# Patient Record
Sex: FEMALE | Race: WHITE | NOT HISPANIC OR LATINO | Employment: FULL TIME | URBAN - NONMETROPOLITAN AREA
[De-identification: names, ages, dates, MRNs, and addresses within clinical notes are randomized per-mention and may not be internally consistent; named-entity substitution may affect disease eponyms.]

---

## 2017-01-08 DIAGNOSIS — E53.8 VITAMIN B12 DEFICIENCY: ICD-10-CM

## 2017-01-09 RX ORDER — CYANOCOBALAMIN 1000 UG/ML
INJECTION, SOLUTION INTRAMUSCULAR; SUBCUTANEOUS
Qty: 1 ML | Refills: 6 | Status: SHIPPED | OUTPATIENT
Start: 2017-01-09 | End: 2017-06-29 | Stop reason: SDUPTHER

## 2017-01-12 RX ORDER — FERROUS FUMARATE 324(106)MG
TABLET ORAL
Qty: 60 TABLET | Refills: 1 | Status: SHIPPED | OUTPATIENT
Start: 2017-01-12 | End: 2017-02-10

## 2017-02-03 ENCOUNTER — LAB (OUTPATIENT)
Dept: LAB | Facility: HOSPITAL | Age: 47
End: 2017-02-03
Attending: PSYCHIATRY & NEUROLOGY

## 2017-02-03 ENCOUNTER — OFFICE VISIT (OUTPATIENT)
Dept: NEUROLOGY | Facility: CLINIC | Age: 47
End: 2017-02-03

## 2017-02-03 VITALS
SYSTOLIC BLOOD PRESSURE: 124 MMHG | OXYGEN SATURATION: 98 % | WEIGHT: 225.2 LBS | BODY MASS INDEX: 37.52 KG/M2 | DIASTOLIC BLOOD PRESSURE: 76 MMHG | HEIGHT: 65 IN | HEART RATE: 74 BPM

## 2017-02-03 DIAGNOSIS — D50.9 IRON DEFICIENCY ANEMIA, UNSPECIFIED IRON DEFICIENCY ANEMIA TYPE: ICD-10-CM

## 2017-02-03 DIAGNOSIS — G25.81 RESTLESS LEGS SYNDROME (RLS): ICD-10-CM

## 2017-02-03 DIAGNOSIS — G47.61 PERIODIC LIMB MOVEMENT DISORDER (PLMD): ICD-10-CM

## 2017-02-03 DIAGNOSIS — D50.9 IRON DEFICIENCY ANEMIA, UNSPECIFIED IRON DEFICIENCY ANEMIA TYPE: Primary | ICD-10-CM

## 2017-02-03 LAB — FERRITIN SERPL-MCNC: 39.1 NG/ML (ref 6.24–137)

## 2017-02-03 PROCEDURE — 36415 COLL VENOUS BLD VENIPUNCTURE: CPT

## 2017-02-03 PROCEDURE — 82728 ASSAY OF FERRITIN: CPT | Performed by: PSYCHIATRY & NEUROLOGY

## 2017-02-03 PROCEDURE — 99243 OFF/OP CNSLTJ NEW/EST LOW 30: CPT | Performed by: PSYCHIATRY & NEUROLOGY

## 2017-02-03 RX ORDER — NORETHINDRONE ACETATE AND ETHINYL ESTRADIOL AND FERROUS FUMARATE 1MG-20(24)
KIT ORAL
Refills: 0 | COMMUNITY
Start: 2017-01-29 | End: 2017-02-10

## 2017-02-04 NOTE — PROGRESS NOTES
NEUROLOGY CONSULTATION   History of Present Illness     Date: 2/3/2017    Patient Identification  Mary Lunsford is a 46 y.o. female.    Patient information was obtained from patient.  History/Exam limitations: none.  Patient presented to the [facility] by private vehicle.  Referring Provider: Debbie Rodriguez A* APRN, and Floridalma Jimenez M.D.      Chief Complaint   Restless Legs Syndrome (New pt here for sleep consult, pt c/o having RLS badly)      Mary is a delightful 46 y.o. presented to [facility] for consultation of Sleep Disorders.  Patient reported that her usually go to bed at 10PM. Mary reports  snoring  and Restless sleeper difficulty with concentration trouble with memory and excessive daytime sleepiness at night. her is also complaining of morning headache, memory problem and daytime fatigue associate with her apnea.      Mary is also complaining of creepy oliverio sensation in legs, urge to move legs, the urge is worse in the evening or at night and symptom improve with movement and activities which has been interfering with sleep at night.    Patient is also complaining of excessive daytime sleepiness.  her daytime sleepiness causing falling asleep while reading, dozing off while watching TV, falling asleep in public places such as theater or meeting, unable to fight the urge to sleep after lunch, dozing off when somebody is driving and urge to take naps    EPWORTH SLEEPINESS SCALE =10    PMH:   Past Medical History   Diagnosis Date   • Anemia    • Migraine    • Obesity    • Ovarian cyst    • Restless leg syndrome    • Vitamin B12 deficiency        Family Hisotry:   Family History   Problem Relation Age of Onset   • Cancer Father    • Cancer Other    • Other Other        Social Hisotry:   Social History     Social History   • Marital status:      Spouse name: N/A   • Number of children: N/A   • Years of education: N/A     Occupational History  "  • Not on file.     Social History Main Topics   • Smoking status: Former Smoker   • Smokeless tobacco: Not on file   • Alcohol use Yes      Comment: DAILY   • Drug use: No   • Sexual activity: Not on file     Other Topics Concern   • Not on file     Social History Narrative       Medications:   Current Outpatient Prescriptions   Medication Sig Dispense Refill   • MINASTRIN 24 FE 1-20 MG-MCG(24) chewable tablet Take as directed  0   • pramipexole (MIRAPEX) 0.5 MG tablet Take 1.5 tablets by mouth Every Night. 0.5mg QHS and 0.25mg PRN with breakthrough symptoms 45 tablet 5   • Syringe 25G X 1\" 3 ML misc 1 Units every 14 (fourteen) days. To inject B12 2 each 6   • carbidopa-levodopa CR (SINEMET CR)  MG per CR tablet Take 1 tablet by mouth 3 (three) times a day. 90 tablet 11   • cyanocobalamin 1000 MCG/ML injection inject 1 milliliter every 2 weeks as directed 1 mL 6   • FERROCITE 324 MG tablet take 1 tablet by mouth twice a day 60 tablet 1   • ibuprofen (ADVIL,MOTRIN) 200 MG tablet Take  by mouth.       No current facility-administered medications for this visit.        Allergy:   Allergies   Allergen Reactions   • Sulfa Antibiotics        Review of Systems:The following portions of the patient's history were reviewed and updated as appropriate: allergies, current medications, past family history, past medical history, past social history, past surgical history and problem list.  Review of Systems   Constitutional: Negative for chills and fever.   HENT: Negative for congestion, ear pain, hearing loss, rhinorrhea and sore throat.    Eyes: Negative for pain, discharge and redness.   Respiratory: Negative for cough, shortness of breath, wheezing and stridor.    Cardiovascular: Negative for chest pain, palpitations and leg swelling.   Gastrointestinal: Negative for abdominal pain, constipation, nausea and vomiting.   Endocrine: Negative for cold intolerance, heat intolerance and polyphagia.   Genitourinary: Negative " "for dysuria, flank pain, frequency and urgency.   Musculoskeletal: Negative for joint swelling, myalgias, neck pain and neck stiffness.   Skin: Negative for pallor, rash and wound.   Allergic/Immunologic: Negative for environmental allergies.   Neurological: Negative for dizziness, tremors, seizures, syncope, facial asymmetry, speech difficulty, weakness, light-headedness, numbness and headaches.   Hematological: Negative for adenopathy.   Psychiatric/Behavioral: Positive for sleep disturbance. Negative for confusion and hallucinations. The patient is not nervous/anxious.          Physical Exam     Visit Vitals   • /76   • Pulse 74   • Ht 64.5\" (163.8 cm)   • Wt 225 lb 3.2 oz (102 kg)   • SpO2 98%   • BMI 38.06 kg/m2     GENERAL: Patient is pleasant, cooperative, appears to be stated age.  Body habitus is endomorphic.  SKIN AND EXTREMITIES:  No skin rashes or lesions are noted.  No cyanosis, clubbing or edema of the extremities.    HEAD:  Head is normocephalic and atraumatic.    NECK: Neck are non-tender without thyromegaly or adenopathy.  Carotic upstrokes are 1+/4.  No cranial or cervical bruits.  The neck is supple with a full range of motion.   ENT: palate elevate symmetrically, no evidence of high arch palate, tongue midline erythema in posterior pharynx, MellamPatti Classification Class III   HEART:  Regular rate and rhythm with normal S1 and S2 without rub or gallop  LUNGS:  Clear to auscultation without wheezes or crackle   ABDOMEN:  Soft and non-tender, positive bowel sound without hepatosplenomegaly  BACK:  Back is straight without midline defect.    MENTAL:  Higher cortical function/mental status:  The patient is alert.  She is oriented x3 to time, place and person.  Recent and the remote memory appear normal.  The patient has a good fund of knowledge.  There is no visual or auditory hallucination or suicidal or homicidal ideation.  SPEECH:There is no gross evidence of aphasia, dysarthria or " agnosia.      CRANIAL NERVES:  Pupils are 4mm, equal round reactive to light, reacting briskly to 2mm without afferent pupillary defect.  Visual fields are intact to confrontation testing.  Fundoscopic examination reveals sharp disk margins with normal vasculature.  No papilledema, hemorrhages or exudates.  Extraocular movements are full and smooth with normal pursuits and saccades.  No nystagmus noted.  The face is symmetric. palate elevate symmetrically, Tongue midline, positive gag reflex. The remainder of the cranial nerves are intact and symmetrical.    MOTOR: Strength is 5/5 throughout with normal tone and bulk with the following exceptions, 4/5 intrinsic muscles of the hands and feet.  No involuntary movements noted.    REFLEX: are 2/4 and symmetrical in the upper extremities, 2/4 and symmetrical at the knees and 1/4 and symmetrical at the Achilles tendon.  Plantar responses were down-going bilaterally.    SENSATION:  Intact to pinprick, light touch, vibration and proprioception.  Coordination:  The patient normally performs finger-nose-finger, heel-to-knee-to-shin and rapid alternating movements in symmetrical fashion.    STATION AND GAIT:  The patient walks with a narrow-based gait.  Patient is able to heel-toe and tandem walk forward and backwards without difficulty.  Romberg and monopedal  Romberg are negative.             Records Reviewed: Old medical records.    Mary was seen today for restless legs syndrome.    Diagnoses and all orders for this visit:    Iron deficiency anemia, unspecified iron deficiency anemia type  -     Ferritin; Future    Periodic limb movement disorder (PLMD)    Restless legs syndrome (RLS)        Treatments:  1.  We have counseled patient extensively the regular sleep wake schedule  2.  Abstaining from alcohol and smoking  3.  Avoid sleep deprivation  4.  We have discussed how iron deficiency can give rise to restless leg syndrome  5.  We have also discussed about various  treatment option including dopamine agonist and Horizant  6.  We have also review all of her medication that can worsen RLS  7.  We have discussed various medical condition that can give rise to secondary restless leg syndrome  8.  We have counseled patient as follow  I have counseled patient extensively on the pathophysiology of Restless Legs Syndrome(RLS).  I have explained to the patient how D3 receptor dysfunction can give rise to RLS.  We have also discussed how iron deficiency can contribute to RLS.   Nevertheless, the first line of defense against restless legs syndrome is to avoid substances or foods that may be causing or worsening the problem such as alcohol, caffeine , and nicotine. In addition, we have reviewed all medications could exacerbate the problem and checking Ferritin level today.      I have also reviewed with my patient any underlying medical conditions that could give rise to secondary RLS, such as anemia , diabetes , nutritional deficiencies, kidney disease, thyroid  disease, varicose veins, or Parkinson's disease.     We have discussed FDA approved pharmacologic intervention such as dopamine agonists.  These are most often the first line treatment used to treat RLS including Mirapex (pramipexole), Neupro patch (rotigotine), and Requip (ropinirole). I have also counseled patient on side effects of Dopamine agonists include daytime sleepiness, nausea, hypotension and lightheadedness.    We have also discussed the use of non-dopaminergic medication such as Horizant (gabapentin enacarbil), as an adjunct to relieve the symptoms of RLS especially painful RLS symptoms.        This document is signed by Alex Jaramillo MD, FAAN, FAASM February 3, 2017 9:59 PM

## 2017-02-06 DIAGNOSIS — G47.33 OSA (OBSTRUCTIVE SLEEP APNEA): Primary | ICD-10-CM

## 2017-02-10 ENCOUNTER — TELEPHONE (OUTPATIENT)
Dept: NEUROLOGY | Facility: CLINIC | Age: 47
End: 2017-02-10

## 2017-02-10 DIAGNOSIS — E61.1 IRON DEFICIENCY: Primary | ICD-10-CM

## 2017-02-10 RX ORDER — FERROUS SULFATE 325(65) MG
325 TABLET ORAL DAILY
Qty: 30 TABLET | Refills: 3 | Status: SHIPPED | OUTPATIENT
Start: 2017-02-10 | End: 2017-03-14

## 2017-02-17 ENCOUNTER — OFFICE VISIT (OUTPATIENT)
Dept: NEUROLOGY | Facility: CLINIC | Age: 47
End: 2017-02-17

## 2017-02-17 ENCOUNTER — APPOINTMENT (OUTPATIENT)
Dept: LAB | Facility: HOSPITAL | Age: 47
End: 2017-02-17

## 2017-02-17 VITALS
OXYGEN SATURATION: 99 % | DIASTOLIC BLOOD PRESSURE: 74 MMHG | HEIGHT: 64 IN | WEIGHT: 225 LBS | HEART RATE: 88 BPM | BODY MASS INDEX: 38.41 KG/M2 | SYSTOLIC BLOOD PRESSURE: 126 MMHG | RESPIRATION RATE: 18 BRPM

## 2017-02-17 DIAGNOSIS — M79.10 MYALGIA: ICD-10-CM

## 2017-02-17 DIAGNOSIS — M25.50 ARTHRALGIA, UNSPECIFIED JOINT: ICD-10-CM

## 2017-02-17 DIAGNOSIS — G25.81 RESTLESS LEGS SYNDROME (RLS): Primary | ICD-10-CM

## 2017-02-17 LAB
CK MB SERPL-CCNC: 0.37 NG/ML (ref 0–5)
CK SERPL-CCNC: 91 U/L (ref 26–174)
CRP SERPL-MCNC: 13.2 MG/DL (ref 0–10)
ERYTHROCYTE [SEDIMENTATION RATE] IN BLOOD: 12 MM/HR (ref 0–20)
RHEUMATOID FACT SERPL-ACNC: NEGATIVE [IU]/ML

## 2017-02-17 PROCEDURE — 36415 COLL VENOUS BLD VENIPUNCTURE: CPT | Performed by: NURSE PRACTITIONER

## 2017-02-17 PROCEDURE — 82550 ASSAY OF CK (CPK): CPT | Performed by: NURSE PRACTITIONER

## 2017-02-17 PROCEDURE — 86431 RHEUMATOID FACTOR QUANT: CPT | Performed by: NURSE PRACTITIONER

## 2017-02-17 PROCEDURE — 86140 C-REACTIVE PROTEIN: CPT | Performed by: NURSE PRACTITIONER

## 2017-02-17 PROCEDURE — 99213 OFFICE O/P EST LOW 20 MIN: CPT | Performed by: NURSE PRACTITIONER

## 2017-02-17 PROCEDURE — 85652 RBC SED RATE AUTOMATED: CPT | Performed by: NURSE PRACTITIONER

## 2017-02-17 PROCEDURE — 82553 CREATINE MB FRACTION: CPT | Performed by: NURSE PRACTITIONER

## 2017-02-17 PROCEDURE — 86038 ANTINUCLEAR ANTIBODIES: CPT | Performed by: NURSE PRACTITIONER

## 2017-02-17 NOTE — PROGRESS NOTES
Subjective:     Patient ID: Mary Lunsford is a 46 y.o. female.    History of Present Illness   Here for 3 month follow up on severe RLS for 10+ years. Her mother has RLS and grandmother also had severe RLS. She is currently taking Sinemet TID and Mirapex 0.5mg 1 at night and 1/2 tab PRN breakthrough symptoms throughout the day. She feels like her symptoms are well managed overall. She did see Dr. Alex Jaramillo for concerns of sleep apnea and is scheduled to have sleep study in April 2017 with follow up afterwards. He also rechecked her Ferritin level and it was 39.10 (previously 13 9/2016) and recommended she increased iron to 300mg TID with vitamin C to enhance absorption and she is doing this. She also tells me she has had the muscle and joint aches and pains again for the past 2 months or so since switching positions at her current job and she feels achy in her shoulders, arms, elbows, hands, thumbs and between her shoulder blades and after work she feels very stiff, tight and achy. She also types a lot, sometimes 1.5 hours in the morning and is wondering if this could be the cause. She has had the myalgias and arthralgias on and off for several years and in 2015 had negative autoimmune/rheumatoid workup.    The following portions of the patient's history were reviewed and updated as appropriate: allergies, current medications, past family history, past medical history, past social history, past surgical history and problem list.    Review of Systems   Constitutional: Negative for chills, fatigue, fever and unexpected weight change.   HENT: Negative for ear pain, hearing loss, nosebleeds, rhinorrhea and sore throat.    Eyes: Negative for photophobia, pain, discharge, itching and visual disturbance.   Respiratory: Negative for cough, chest tightness, shortness of breath and wheezing.    Cardiovascular: Negative for chest pain, palpitations and leg swelling.   Gastrointestinal: Negative for abdominal pain, blood  in stool, constipation, diarrhea, nausea and vomiting.   Genitourinary: Negative for dysuria, frequency, hematuria and urgency.   Musculoskeletal: Negative for arthralgias, back pain, gait problem, joint swelling, myalgias, neck pain and neck stiffness.   Skin: Negative for rash and wound.   Allergic/Immunologic: Negative for environmental allergies and food allergies.   Neurological: Negative for dizziness, tremors, seizures, syncope, speech difficulty, weakness, light-headedness, numbness and headaches.   Hematological: Negative for adenopathy. Does not bruise/bleed easily.   Psychiatric/Behavioral: Negative for agitation, confusion, decreased concentration, hallucinations, sleep disturbance and suicidal ideas. The patient is not nervous/anxious.         Objective:    Neurologic Exam     Mental Status   Oriented to person, place, and time.   Registration: recalls 3 of 3 objects. Recall at 5 minutes: recalls 3 of 3 objects. Follows 3 step commands.   Attention: normal. Concentration: normal.   Speech: speech is normal   Level of consciousness: alert  Knowledge: good and consistent with education. Able to perform simple calculations.   Able to name object. Able to read. Able to repeat. Able to write. Normal comprehension.     Cranial Nerves   Cranial nerves II through XII intact.     Motor Exam   Muscle bulk: normal  Overall muscle tone: normal    Strength   Strength 5/5 throughout.        Negative tinel's and phalen's bilateral wrists/elbows.     Sensory Exam   Light touch normal.   Vibration normal.   Proprioception normal.   Pinprick normal.     Gait, Coordination, and Reflexes     Gait  Gait: normal    Coordination   Romberg: negative  Finger to nose coordination: normal  Heel to shin coordination: normal    Tremor   Resting tremor: absent  Intention tremor: absent  Action tremor: absent    Reflexes   Right brachioradialis: 2+  Left brachioradialis: 2+  Right biceps: 2+  Left biceps: 2+  Right triceps: 2+  Left  triceps: 2+  Right patellar: 2+  Left patellar: 2+  Right achilles: 2+  Left achilles: 2+  Right : 2+  Left : 2+  Right plantar: normal  Left plantar: normal  Right Martínez: absent  Left Martínez: absent  Right ankle clonus: absent  Left ankle clonus: absent      Physical Exam   Constitutional: She is oriented to person, place, and time.   Neurological: She is oriented to person, place, and time. She has normal strength. She has a normal Finger-Nose-Finger Test, a normal Heel to Alvarado Test and a normal Romberg Test. Gait normal.   Reflex Scores:       Tricep reflexes are 2+ on the right side and 2+ on the left side.       Bicep reflexes are 2+ on the right side and 2+ on the left side.       Brachioradialis reflexes are 2+ on the right side and 2+ on the left side.       Patellar reflexes are 2+ on the right side and 2+ on the left side.       Achilles reflexes are 2+ on the right side and 2+ on the left side.  Psychiatric: Her speech is normal.       Assessment/Plan:     Mary was seen today for restless legs syndrome.    Diagnoses and all orders for this visit:    Restless legs syndrome (RLS)    Arthralgia, unspecified joint  -     CK Total & CKMB  -     Sedimentation Rate  -     Rheumatoid Factor  -     C-reactive Protein  -     Antinuclear Antibody With Reflex [place]    Myalgia  -     CK Total & CKMB  -     Sedimentation Rate  -     Rheumatoid Factor  -     C-reactive Protein  -     Antinuclear Antibody With Reflex [place]       Labs to r/o other etiology of symptoms. Continue current medications for RLS and may consider switching to Neupro patch in future. She will complete sleep study and follow up with Dr. Jaramillo in April and f/u in our office in May 2017. Reviewed medications, potential side effects and signs and symptoms to report. Discussed risk versus benefits of treatment plan with patient and/or family-including medications, labs and radiology that may be ordered. Addressed questions and concerns  during visit. Patient and/or family verbalized understanding and agree with plan.

## 2017-02-20 ENCOUNTER — TELEPHONE (OUTPATIENT)
Dept: NEUROLOGY | Facility: CLINIC | Age: 47
End: 2017-02-20

## 2017-02-20 LAB
ANA SER QL: NEGATIVE
Lab: NORMAL

## 2017-02-20 NOTE — TELEPHONE ENCOUNTER
Please notify patient her blood work with us was normal except CRP which was slightly elevated and is a marker of general inflammation, but her rheumatoid factor and DESIRE results were normal and muscle enzymes were normal. We would not change anything at this time. Will f/u as scheduled. thanks

## 2017-02-20 NOTE — TELEPHONE ENCOUNTER
CALLED PATIENT, SPOKE WITH THE PATIENT, PATIENT IS AWARE OF THE RESULTS AND THE PROVIDER INSTRUCTIONS. PATIENT VERBALIZED UNDERSTANDING.

## 2017-03-14 ENCOUNTER — TELEPHONE (OUTPATIENT)
Dept: INTERNAL MEDICINE | Facility: CLINIC | Age: 47
End: 2017-03-14

## 2017-03-14 ENCOUNTER — HOSPITAL ENCOUNTER (INPATIENT)
Facility: HOSPITAL | Age: 47
LOS: 1 days | Discharge: HOME | End: 2017-03-15
Attending: FAMILY MEDICINE | Admitting: INTERNAL MEDICINE

## 2017-03-14 ENCOUNTER — OFFICE VISIT (OUTPATIENT)
Dept: INTERNAL MEDICINE | Facility: CLINIC | Age: 47
End: 2017-03-14

## 2017-03-14 ENCOUNTER — APPOINTMENT (OUTPATIENT)
Dept: GENERAL RADIOLOGY | Facility: HOSPITAL | Age: 47
End: 2017-03-14

## 2017-03-14 VITALS
OXYGEN SATURATION: 98 % | DIASTOLIC BLOOD PRESSURE: 82 MMHG | SYSTOLIC BLOOD PRESSURE: 130 MMHG | TEMPERATURE: 97.3 F | HEART RATE: 67 BPM | HEIGHT: 64 IN | BODY MASS INDEX: 38.41 KG/M2 | WEIGHT: 225 LBS

## 2017-03-14 DIAGNOSIS — M79.10 PAIN IN THE MUSCLES: ICD-10-CM

## 2017-03-14 DIAGNOSIS — IMO0001 ELEVATED BLOOD PRESSURE: ICD-10-CM

## 2017-03-14 DIAGNOSIS — R07.89 CHEST HEAVINESS: Primary | ICD-10-CM

## 2017-03-14 DIAGNOSIS — E53.8 VITAMIN B12 DEFICIENCY: ICD-10-CM

## 2017-03-14 DIAGNOSIS — I21.4 NSTEMI (NON-ST ELEVATED MYOCARDIAL INFARCTION) (HCC): Primary | ICD-10-CM

## 2017-03-14 DIAGNOSIS — E55.9 VITAMIN D DEFICIENCY: ICD-10-CM

## 2017-03-14 DIAGNOSIS — D50.9 IRON DEFICIENCY ANEMIA, UNSPECIFIED IRON DEFICIENCY ANEMIA TYPE: ICD-10-CM

## 2017-03-14 DIAGNOSIS — E07.9 THYROID DISORDER: ICD-10-CM

## 2017-03-14 DIAGNOSIS — G25.81 RESTLESS LEGS SYNDROME (RLS): ICD-10-CM

## 2017-03-14 LAB
25(OH)D3 SERPL-MCNC: 15.6 NG/ML
ALBUMIN SERPL-MCNC: 4.1 G/DL (ref 3.2–4.8)
ALBUMIN SERPL-MCNC: 4.1 G/DL (ref 3.5–5)
ALBUMIN/GLOB SERPL: 1.4 G/DL (ref 1–2)
ALBUMIN/GLOB SERPL: 1.6 G/DL (ref 1.5–2.5)
ALP SERPL-CCNC: 41 U/L (ref 25–100)
ALP SERPL-CCNC: 45 U/L (ref 38–126)
ALT SERPL W P-5'-P-CCNC: 14 U/L (ref 13–69)
ALT SERPL W P-5'-P-CCNC: 8 U/L (ref 7–40)
ANION GAP SERPL CALCULATED.3IONS-SCNC: 12.5 MMOL/L
ANION GAP SERPL CALCULATED.3IONS-SCNC: 9 MMOL/L (ref 3–11)
ARTICHOKE IGE QN: 123 MG/DL (ref 0–130)
AST SERPL-CCNC: 23 U/L (ref 0–33)
AST SERPL-CCNC: 24 U/L (ref 15–46)
BASOPHILS # BLD AUTO: 0.03 10*3/MM3 (ref 0–0.2)
BASOPHILS # BLD AUTO: 0.04 10*3/MM3 (ref 0–0.2)
BASOPHILS NFR BLD AUTO: 0.4 % (ref 0–2.5)
BASOPHILS NFR BLD AUTO: 0.7 % (ref 0–1)
BILIRUB SERPL-MCNC: 0.4 MG/DL (ref 0.3–1.2)
BILIRUB SERPL-MCNC: 0.5 MG/DL (ref 0.2–1.3)
BUN BLD-MCNC: 15 MG/DL (ref 9–23)
BUN BLD-MCNC: 16 MG/DL (ref 7–20)
BUN/CREAT SERPL: 14.5 (ref 7.1–23.5)
BUN/CREAT SERPL: 15 (ref 7–25)
CALCIUM SPEC-SCNC: 9.1 MG/DL (ref 8.4–10.2)
CALCIUM SPEC-SCNC: 9.4 MG/DL (ref 8.7–10.4)
CHLORIDE SERPL-SCNC: 105 MMOL/L (ref 99–109)
CHLORIDE SERPL-SCNC: 107 MMOL/L (ref 98–107)
CHOLEST SERPL-MCNC: 182 MG/DL (ref 0–200)
CK MB SERPL-CCNC: 1.12 NG/ML (ref 0–5)
CK SERPL-CCNC: 80 U/L (ref 26–174)
CO2 SERPL-SCNC: 24 MMOL/L (ref 20–31)
CO2 SERPL-SCNC: 26 MMOL/L (ref 26–30)
CREAT BLD-MCNC: 1 MG/DL (ref 0.6–1.3)
CREAT BLD-MCNC: 1.1 MG/DL (ref 0.6–1.3)
DEPRECATED RDW RBC AUTO: 46.7 FL (ref 37–54)
DEPRECATED RDW RBC AUTO: 48.6 FL (ref 37–54)
EOSINOPHIL # BLD AUTO: 0.15 10*3/MM3 (ref 0.1–0.3)
EOSINOPHIL # BLD AUTO: 0.18 10*3/MM3 (ref 0–0.7)
EOSINOPHIL NFR BLD AUTO: 2.5 % (ref 0–3)
EOSINOPHIL NFR BLD AUTO: 2.7 % (ref 0–7)
ERYTHROCYTE [DISTWIDTH] IN BLOOD BY AUTOMATED COUNT: 12.6 % (ref 11.5–14.5)
ERYTHROCYTE [DISTWIDTH] IN BLOOD BY AUTOMATED COUNT: 13.1 % (ref 11.3–14.5)
FOLATE SERPL-MCNC: 12.66 NG/ML (ref 3.2–20)
GFR SERPL CREATININE-BSD FRML MDRD: 53 ML/MIN/1.73
GFR SERPL CREATININE-BSD FRML MDRD: 59 ML/MIN/1.73
GLOBULIN UR ELPH-MCNC: 2.5 GM/DL
GLOBULIN UR ELPH-MCNC: 3 GM/DL
GLUCOSE BLD-MCNC: 84 MG/DL (ref 70–100)
GLUCOSE BLD-MCNC: 94 MG/DL (ref 74–98)
HCT VFR BLD AUTO: 42 % (ref 34.5–44)
HCT VFR BLD AUTO: 43.9 % (ref 37–47)
HDLC SERPL-MCNC: 55 MG/DL (ref 40–60)
HGB BLD-MCNC: 13.7 G/DL (ref 11.5–15.5)
HGB BLD-MCNC: 14.5 G/DL (ref 12–16)
HOLD SPECIMEN: NORMAL
HOLD SPECIMEN: NORMAL
IMM GRANULOCYTES # BLD: 0.01 10*3/MM3 (ref 0–0.03)
IMM GRANULOCYTES # BLD: 0.02 10*3/MM3 (ref 0–0.06)
IMM GRANULOCYTES NFR BLD: 0.2 % (ref 0–0.6)
IMM GRANULOCYTES NFR BLD: 0.3 % (ref 0–0.6)
INR PPP: 0.94 (ref 0.9–1.1)
IRON 24H UR-MRATE: 144 MCG/DL (ref 50–175)
IRON SATN MFR SERPL: 40 % (ref 15–50)
LYMPHOCYTES # BLD AUTO: 1.86 10*3/MM3 (ref 0.6–4.8)
LYMPHOCYTES # BLD AUTO: 2.21 10*3/MM3 (ref 0.6–3.4)
LYMPHOCYTES NFR BLD AUTO: 30.9 % (ref 24–44)
LYMPHOCYTES NFR BLD AUTO: 32.8 % (ref 10–50)
MCH RBC QN AUTO: 32.9 PG (ref 27–31)
MCH RBC QN AUTO: 33 PG (ref 27–31)
MCHC RBC AUTO-ENTMCNC: 32.6 G/DL (ref 32–36)
MCHC RBC AUTO-ENTMCNC: 33 G/DL (ref 30–37)
MCV RBC AUTO: 100 FL (ref 81–99)
MCV RBC AUTO: 100.7 FL (ref 80–99)
MONOCYTES # BLD AUTO: 0.36 10*3/MM3 (ref 0–1)
MONOCYTES # BLD AUTO: 0.42 10*3/MM3 (ref 0–0.9)
MONOCYTES NFR BLD AUTO: 6 % (ref 0–12)
MONOCYTES NFR BLD AUTO: 6.2 % (ref 0–12)
NEUTROPHILS # BLD AUTO: 3.6 10*3/MM3 (ref 1.5–8.3)
NEUTROPHILS # BLD AUTO: 3.88 10*3/MM3 (ref 2–6.9)
NEUTROPHILS NFR BLD AUTO: 57.6 % (ref 37–80)
NEUTROPHILS NFR BLD AUTO: 59.7 % (ref 41–71)
NRBC BLD MANUAL-RTO: 0 /100 WBC (ref 0–0)
PLATELET # BLD AUTO: 280 10*3/MM3 (ref 130–400)
PLATELET # BLD AUTO: 286 10*3/MM3 (ref 150–450)
PMV BLD AUTO: 9.6 FL (ref 6–12)
PMV BLD AUTO: 9.8 FL (ref 6–12)
POTASSIUM BLD-SCNC: 4.5 MMOL/L (ref 3.5–5.1)
POTASSIUM BLD-SCNC: 4.5 MMOL/L (ref 3.5–5.5)
PROT SERPL-MCNC: 6.6 G/DL (ref 5.7–8.2)
PROT SERPL-MCNC: 7.1 G/DL (ref 6.3–8.2)
PROTHROMBIN TIME: 10.3 SECONDS (ref 9.3–12.1)
RBC # BLD AUTO: 4.17 10*6/MM3 (ref 3.89–5.14)
RBC # BLD AUTO: 4.39 10*6/MM3 (ref 4.2–5.4)
SODIUM BLD-SCNC: 138 MMOL/L (ref 132–146)
SODIUM BLD-SCNC: 141 MMOL/L (ref 137–145)
T4 FREE SERPL-MCNC: 0.93 NG/DL (ref 0.89–1.76)
TIBC SERPL-MCNC: 364 MCG/DL (ref 250–450)
TRIGL SERPL-MCNC: 114 MG/DL (ref 0–150)
TROPONIN I SERPL-MCNC: 0.36 NG/ML (ref 0–0.05)
TROPONIN I SERPL-MCNC: 0.49 NG/ML (ref 0–0.03)
TROPONIN I SERPL-MCNC: 0.64 NG/ML
TSH SERPL DL<=0.05 MIU/L-ACNC: 4.74 MIU/ML (ref 0.35–5.35)
VIT B12 BLD-MCNC: 521 PG/ML (ref 211–911)
WBC NRBC COR # BLD: 6.02 10*3/MM3 (ref 3.5–10.8)
WBC NRBC COR # BLD: 6.74 10*3/MM3 (ref 4.8–10.8)
WHOLE BLOOD HOLD SPECIMEN: NORMAL
WHOLE BLOOD HOLD SPECIMEN: NORMAL

## 2017-03-14 PROCEDURE — 82607 VITAMIN B-12: CPT | Performed by: FAMILY MEDICINE

## 2017-03-14 PROCEDURE — 84484 ASSAY OF TROPONIN QUANT: CPT | Performed by: FAMILY MEDICINE

## 2017-03-14 PROCEDURE — 80061 LIPID PANEL: CPT | Performed by: FAMILY MEDICINE

## 2017-03-14 PROCEDURE — 99254 IP/OBS CNSLTJ NEW/EST MOD 60: CPT | Performed by: INTERNAL MEDICINE

## 2017-03-14 PROCEDURE — 93000 ELECTROCARDIOGRAM COMPLETE: CPT | Performed by: FAMILY MEDICINE

## 2017-03-14 PROCEDURE — 84439 ASSAY OF FREE THYROXINE: CPT | Performed by: FAMILY MEDICINE

## 2017-03-14 PROCEDURE — 71010 HC CHEST PA OR AP: CPT

## 2017-03-14 PROCEDURE — 83540 ASSAY OF IRON: CPT | Performed by: FAMILY MEDICINE

## 2017-03-14 PROCEDURE — 36415 COLL VENOUS BLD VENIPUNCTURE: CPT | Performed by: FAMILY MEDICINE

## 2017-03-14 PROCEDURE — 85025 COMPLETE CBC W/AUTO DIFF WBC: CPT | Performed by: FAMILY MEDICINE

## 2017-03-14 PROCEDURE — 80053 COMPREHEN METABOLIC PANEL: CPT | Performed by: FAMILY MEDICINE

## 2017-03-14 PROCEDURE — 82746 ASSAY OF FOLIC ACID SERUM: CPT | Performed by: FAMILY MEDICINE

## 2017-03-14 PROCEDURE — 82550 ASSAY OF CK (CPK): CPT | Performed by: FAMILY MEDICINE

## 2017-03-14 PROCEDURE — 85610 PROTHROMBIN TIME: CPT | Performed by: FAMILY MEDICINE

## 2017-03-14 PROCEDURE — 25010000002 ENOXAPARIN PER 10 MG: Performed by: FAMILY MEDICINE

## 2017-03-14 PROCEDURE — 99214 OFFICE O/P EST MOD 30 MIN: CPT | Performed by: FAMILY MEDICINE

## 2017-03-14 PROCEDURE — 93005 ELECTROCARDIOGRAM TRACING: CPT | Performed by: FAMILY MEDICINE

## 2017-03-14 PROCEDURE — 99284 EMERGENCY DEPT VISIT MOD MDM: CPT

## 2017-03-14 PROCEDURE — 82306 VITAMIN D 25 HYDROXY: CPT | Performed by: FAMILY MEDICINE

## 2017-03-14 PROCEDURE — 83550 IRON BINDING TEST: CPT | Performed by: FAMILY MEDICINE

## 2017-03-14 PROCEDURE — 84443 ASSAY THYROID STIM HORMONE: CPT | Performed by: FAMILY MEDICINE

## 2017-03-14 PROCEDURE — 82553 CREATINE MB FRACTION: CPT | Performed by: FAMILY MEDICINE

## 2017-03-14 RX ORDER — NORETHINDRONE ACETATE AND ETHINYL ESTRADIOL AND FERROUS FUMARATE 1MG-20(24)
KIT ORAL
Refills: 0 | Status: ON HOLD | COMMUNITY
Start: 2017-02-26 | End: 2017-03-14

## 2017-03-14 RX ORDER — ASPIRIN 81 MG/1
162 TABLET, CHEWABLE ORAL DAILY
Status: DISCONTINUED | OUTPATIENT
Start: 2017-03-15 | End: 2017-03-15 | Stop reason: HOSPADM

## 2017-03-14 RX ORDER — SODIUM CHLORIDE 0.9 % (FLUSH) 0.9 %
10 SYRINGE (ML) INJECTION AS NEEDED
Status: DISCONTINUED | OUTPATIENT
Start: 2017-03-14 | End: 2017-03-15 | Stop reason: HOSPADM

## 2017-03-14 RX ORDER — ERGOCALCIFEROL 1.25 MG/1
50000 CAPSULE ORAL
Status: DISCONTINUED | OUTPATIENT
Start: 2017-03-15 | End: 2017-03-15 | Stop reason: HOSPADM

## 2017-03-14 RX ORDER — NITROGLYCERIN 0.4 MG/1
0.4 TABLET SUBLINGUAL
Status: DISCONTINUED | OUTPATIENT
Start: 2017-03-14 | End: 2017-03-15

## 2017-03-14 RX ORDER — ERGOCALCIFEROL 1.25 MG/1
50000 CAPSULE ORAL
Status: DISCONTINUED | OUTPATIENT
Start: 2017-03-14 | End: 2017-03-14

## 2017-03-14 RX ORDER — FERROUS SULFATE 325(65) MG
325 TABLET ORAL 2 TIMES DAILY WITH MEALS
COMMUNITY
End: 2017-04-28 | Stop reason: SDUPTHER

## 2017-03-14 RX ORDER — CYANOCOBALAMIN 1000 UG/ML
1000 INJECTION, SOLUTION INTRAMUSCULAR; SUBCUTANEOUS
Status: DISCONTINUED | OUTPATIENT
Start: 2017-03-30 | End: 2017-03-15 | Stop reason: HOSPADM

## 2017-03-14 RX ORDER — PRAMIPEXOLE DIHYDROCHLORIDE 0.25 MG/1
0.75 TABLET ORAL NIGHTLY
Status: DISCONTINUED | OUTPATIENT
Start: 2017-03-14 | End: 2017-03-15 | Stop reason: HOSPADM

## 2017-03-14 RX ORDER — ATORVASTATIN CALCIUM 40 MG/1
40 TABLET, FILM COATED ORAL DAILY
Status: DISCONTINUED | OUTPATIENT
Start: 2017-03-15 | End: 2017-03-15 | Stop reason: HOSPADM

## 2017-03-14 RX ORDER — CARBIDOPA AND LEVODOPA 50; 200 MG/1; MG/1
1 TABLET, EXTENDED RELEASE ORAL 3 TIMES DAILY
Status: DISCONTINUED | OUTPATIENT
Start: 2017-03-14 | End: 2017-03-15 | Stop reason: HOSPADM

## 2017-03-14 RX ORDER — LOSARTAN POTASSIUM 50 MG/1
50 TABLET ORAL
Status: DISCONTINUED | OUTPATIENT
Start: 2017-03-14 | End: 2017-03-15 | Stop reason: SDUPTHER

## 2017-03-14 RX ORDER — SODIUM CHLORIDE 0.9 % (FLUSH) 0.9 %
1-10 SYRINGE (ML) INJECTION AS NEEDED
Status: DISCONTINUED | OUTPATIENT
Start: 2017-03-14 | End: 2017-03-15 | Stop reason: HOSPADM

## 2017-03-14 RX ORDER — ASPIRIN 325 MG
325 TABLET ORAL ONCE
Status: DISCONTINUED | OUTPATIENT
Start: 2017-03-14 | End: 2017-03-15

## 2017-03-14 RX ORDER — NITROGLYCERIN 0.4 MG/1
TABLET SUBLINGUAL
Status: COMPLETED
Start: 2017-03-14 | End: 2017-03-14

## 2017-03-14 RX ADMIN — LOSARTAN POTASSIUM 50 MG: 50 TABLET, FILM COATED ORAL at 22:56

## 2017-03-14 RX ADMIN — NITROGLYCERIN 0.4 MG: 0.4 TABLET SUBLINGUAL at 18:50

## 2017-03-14 RX ADMIN — NITROGLYCERIN 1 INCH: 20 OINTMENT TOPICAL at 22:55

## 2017-03-14 RX ADMIN — ENOXAPARIN SODIUM 100 MG: 100 INJECTION SUBCUTANEOUS at 20:03

## 2017-03-14 NOTE — ED PROVIDER NOTES
"Subjective   HPI Comments: 38-year-old female presents the emergency room with complaints of abnormal labs that occurred today.  Patient states she woke up with all night with bilateral arm pain radiating into her left and right sharp.  She states the pain subsequently radiated more to her left shoulder.  She states that she felt fatigued with her symptoms well.  She denies any nausea or vomiting or diaphoresis patient any chest pain or shortness of breath.  Patient denies similar symptoms in the past.  Patient with her family doctor today who had concern for patient given abnormal EKG findings.  Patient is unable to detail abnormality of her EKG however states that there was a \"slight decrease\" in parts of her EKG.  Patient had labs drawn and was called later today and instructed to come to the emergency room due to abnormal labs.  Patient reports she still is having left shoulder pain.  Patient is not a smoker she denies any recent travel.  She denies any recent surgery in the last 90 days.  She states she is on birth control started 6 months ago to regulate her menstrual cycles.    Patient is a 46 y.o. female presenting with chest pain.   Chest Pain   Chest pain location: Bilateral arm pain.  Pain quality: aching    Pain radiates to:  L shoulder and L arm  Pain severity:  Moderate  Onset quality:  Sudden  Timing:  Intermittent  Progression:  Improving  Chronicity:  New  Context: at rest    Relieved by:  Nothing  Worsened by:  Nothing  Associated symptoms: fatigue    Associated symptoms: no abdominal pain, no cough, no dizziness, no fever, no nausea, no orthopnea, no palpitations, no PND, no shortness of breath, no vomiting and no weakness    Risk factors: birth control    Risk factors: no coronary artery disease, no diabetes mellitus, no high cholesterol, no hypertension, not male, no prior DVT/PE and no smoking        Review of Systems   Constitutional: Positive for fatigue. Negative for activity change and " fever.   HENT: Negative for congestion, sore throat and tinnitus.    Eyes: Negative for visual disturbance.   Respiratory: Negative for apnea, cough, shortness of breath, wheezing and stridor.    Cardiovascular: Positive for chest pain. Negative for palpitations, orthopnea and PND.   Gastrointestinal: Negative for abdominal pain, diarrhea, nausea and vomiting.   Genitourinary: Negative for dysuria and flank pain.   Musculoskeletal: Negative for arthralgias and myalgias.   Skin: Negative for rash.   Neurological: Negative for dizziness, weakness and light-headedness.   Hematological: Negative for adenopathy.   Psychiatric/Behavioral: Negative for agitation.   All other systems reviewed and are negative.      Past Medical History   Diagnosis Date   • Anemia    • Migraine    • Obesity    • Ovarian cyst    • Restless leg syndrome    • Vitamin B12 deficiency        Allergies   Allergen Reactions   • Sulfa Antibiotics        Past Surgical History   Procedure Laterality Date   • Breast surgery     •  section     • Cholecystectomy     • Stomach surgery     • Purvi-en-y procedure     • Cholecystectomy laparoscopic and colonoscopy N/A 2013       Family History   Problem Relation Age of Onset   • Cancer Father    • Heart attack Father    • Cancer Other    • Other Other    • No Known Problems Mother    • Asthma Brother        Social History     Social History   • Marital status:      Spouse name: N/A   • Number of children: N/A   • Years of education: N/A     Social History Main Topics   • Smoking status: Former Smoker     Packs/day: 1.00     Years: 2.00     Quit date: 3/14/1992   • Smokeless tobacco: None   • Alcohol use 7.2 oz/week     12 Cans of beer per week      Comment: weekly   • Drug use: No   • Sexual activity: Yes     Partners: Male     Birth control/ protection: Other      Comment: birth control pill     Other Topics Concern   • None     Social History Narrative           Objective   Physical Exam    Constitutional: She is oriented to person, place, and time. She appears well-nourished. No distress.   HENT:   Head: Normocephalic.   Right Ear: External ear normal.   Left Ear: External ear normal.   Neck: Neck supple. No JVD present. No thyromegaly present.   Cardiovascular: Normal rate, regular rhythm and normal heart sounds.  Exam reveals no gallop and no friction rub.    No murmur heard.  Pulses:       Radial pulses are 2+ on the right side, and 2+ on the left side.        Dorsalis pedis pulses are 2+ on the right side, and 2+ on the left side.        Posterior tibial pulses are 2+ on the right side, and 2+ on the left side.   Pulmonary/Chest: Effort normal and breath sounds normal. No accessory muscle usage. She has no decreased breath sounds. She has no wheezes. She has no rhonchi. She has no rales.   Abdominal: Soft. Bowel sounds are normal. She exhibits no abdominal bruit. There is no tenderness. There is no rigidity, no rebound and no guarding.   Musculoskeletal: Normal range of motion. She exhibits no edema.   Negative Homans sign   Neurological: She is alert and oriented to person, place, and time.   Skin: Skin is warm and dry. She is not diaphoretic. No erythema. No pallor.   Nursing note and vitals reviewed.      Procedures         ED Course  ED Course                Patient noted to have EKG and normal sinus rhythm.  Patient had T-wave inversion in lead aVR and lead V1.  Patient with small Q waves in lead 1.  Patient denies any chest pain however she was not complaining of left sharp pain patient was given sublingual nitroglycerin with relief of pain.  Patient blood pressure has improved currently is 140/96.  Patient's second troponin is 0.488 is trending down.  Patient currently hemodynamically stable stable.  Have given subcutaneous Lovenox.  Has noted the patient received aspirin 4 baby aspirin prior to coming to the emergency room.  Patient case discussed with Dr. walton who feels patient be  admitted to the hospital through the hospitalist.  I discussed case with Dr. Toth is agreeable to admission to hospital  MDM    Final diagnoses:   NSTEMI (non-ST elevated myocardial infarction)   Elevated blood pressure            Kaleb Bardales MD  03/14/17 4210

## 2017-03-14 NOTE — ED NOTES
1949: DR. PEACE CALLED PER DR. YUN, CALL SENT TO [facility] @ THIS TIME.     Paula Harmon  03/14/17 1950

## 2017-03-14 NOTE — PROGRESS NOTES
Subjective   Mary Lunsford is a 46 y.o. female.     History of Present Illness   Achiness in arms and hands for years, comes and goes. 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 Started a new job in January, started having horible pain under shoulder blades that does down arms. Aching pain. Had rouble holding phone due to pain. Was consistent x 10 days or so when she started working. Working on computer more than past job. Had a neurologist appointment and she was checked for a lot of things, essentially normal. Got up last night to go to bathroom, had not had any trouble for a few days. As soon as she laid back down both arms started hurting, worse on left. Still hurting but not as bad. Went to couch and propped herself up, was scared to go back to sleep. Around 6 am the left arm felt like something was tightening in the arm. Did not exert herself this weekend to make muscles hurt.     Continues to take B12 but has noted some monthly.  Since she started the supplement her energy levels do feel somewhat improved.  She has also had a low vitamin D in the past.  Chart states she is supposed be doing IV infusions of iron that she was not aware of that.  Insurance was not covering her oral supplement.  She has a long-standing history of iron deficiency that has uncertain etiology.  Negative for celiac and she does not take chronic PPIs.    The following portions of the patient's history were reviewed and updated as appropriate: allergies, current medications, past family history, past medical history, past social history, past surgical history and problem list.    Review of Systems   Constitutional: Positive for fatigue. Negative for activity change.   Respiratory: Negative for shortness of breath.    Musculoskeletal: Positive for myalgias.   Psychiatric/Behavioral: Positive for sleep disturbance.       Objective   Physical Exam   Constitutional: She is oriented to person, place, and  time. Vital signs are normal. She appears well-developed and well-nourished. She is active. She does not have a sickly appearance. She does not appear ill.   Appears younger than stated age. Well groomed. Obese     HENT:   Head: Normocephalic and atraumatic. Hair is normal.   Right Ear: Hearing normal.   Left Ear: Hearing normal.   Nose: Nose normal.   Eyes: EOM and lids are normal. Pupils are equal, round, and reactive to light.   Neck: Phonation normal. No thyroid mass present.   Cardiovascular: Normal rate, regular rhythm and normal heart sounds.    Pulmonary/Chest: Effort normal and breath sounds normal.   Musculoskeletal:        Right shoulder: She exhibits no tenderness.        Left shoulder: She exhibits no tenderness.   Neurological: She is alert and oriented to person, place, and time. No cranial nerve deficit. Gait normal.   Skin: Skin is warm. She is not diaphoretic. No cyanosis. No pallor. Nails show no clubbing.   Psychiatric: She has a normal mood and affect. Her behavior is normal. Judgment and thought content normal.   Nursing note and vitals reviewed.         ECG 12 Lead  Date/Time: 3/14/2017 9:56 AM  Performed by: JOHAN ODEN  Authorized by: JOHAN ODEN   Comparison: not compared with previous ECG   Previous ECG: no previous ECG available  Rhythm: sinus bradycardia  Rate: bradycardic  BPM: 57  Conduction: conduction normal  ST Segments: ST segments normal  T depression: V1  T flattening: III  QRS axis: normal  Q waves: I and aVL (small)  Clinical impression: abnormal ECG         reviewed labs in chart:  On February 17 she has an AMA that was normal, rheumatoid factor normal, sedimentation rate normal, and slightly elevated CRP which may be due to obesity.  On February 3 she had a ferritin level that was normal.  September 15, 2016 she tested negative for H. pylori and for celiac disease.  August 17, 2016 her vitamin B12 level was a low normal at 229.  February 12, 2016 her thyroid  studies were normal.  Same day her vitamin D was found to be 22.8.  October 14, 2015 her TSH was abnormal at 5.040 and her free T4 was normal at 1.08.  August 12, 2015 her vitamin B12 level was low at 193 and her methylmalonic acid was elevated at 400.    I also reviewed Dr. Jaramillo's note from January.  There is also a telephone note stating that he wants her to be doing IV iron infusions.    Assessment/Plan   Mary was seen today for arm pain.    Diagnoses and all orders for this visit:    Chest heaviness  -     ECG 12 Lead  -     Comprehensive Metabolic Panel; Future  -     CK; Future  -     CK-MB; Future  -     Troponin; Future  -     Treadmill Stress Test; Future    Vitamin B12 deficiency  -     CBC & Differential; Future  -     Vitamin B12 & Folate; Future    Thyroid disorder  -     TSH; Future  -     T4, Free; Future    BMI 38.0-38.9,adult  -     Lipid Panel; Future    Restless legs syndrome (RLS)    Vitamin D deficiency  -     Vitamin D 25 Hydroxy; Future    Iron deficiency anemia, unspecified iron deficiency anemia type  -     Iron Profile; Future    Pain in the muscles  -     CK; Future     discussed EKG and the subtle Q waves noted in the distribution of the lateral heart.  One box of aspirin 81 mg provided to patient and discussed use, can decrease stroke risk and heart attack risk.  She is aware of need for stress test and of the labs being drawn today.  I encouraged her to follow-up with neurology to see how they wish to proceed with IV iron infusions as stated in the chart.  She's never had her cholesterol checked and this was done today.

## 2017-03-14 NOTE — TELEPHONE ENCOUNTER
I called Mary regarding her elevated troponin. She was in office earlier today complaining of some chest heaviness and arm/shoulder pain.  She reports this is still ongoing.  An EKG was done in office that showed Q waves but no ST elevations.  I educated patient on troponin levels.  I explained her that this could be either trending up with  still something going on or it could be trending down if something happened over the weekend (when her pain started).  After her visit she talked to her dad, has had a heart attack, and he had similar arm symptoms.  I had given her some aspirin earlier in the day and I asked her to take 324 mg and to get someone to take her to the ER as she needs at least 1 other troponin to see what her trend is.  She stated understanding and was calling her  when she was hanging up.    We did not get to discuss her other labs.

## 2017-03-15 VITALS
HEIGHT: 65 IN | RESPIRATION RATE: 18 BRPM | TEMPERATURE: 98.7 F | BODY MASS INDEX: 37.07 KG/M2 | DIASTOLIC BLOOD PRESSURE: 67 MMHG | HEART RATE: 60 BPM | OXYGEN SATURATION: 97 % | WEIGHT: 222.5 LBS | SYSTOLIC BLOOD PRESSURE: 103 MMHG

## 2017-03-15 LAB — TROPONIN I SERPL-MCNC: 0.27 NG/ML (ref 0–0.03)

## 2017-03-15 PROCEDURE — 87081 CULTURE SCREEN ONLY: CPT | Performed by: INTERNAL MEDICINE

## 2017-03-15 PROCEDURE — C1894 INTRO/SHEATH, NON-LASER: HCPCS | Performed by: INTERNAL MEDICINE

## 2017-03-15 PROCEDURE — 25010000002 MIDAZOLAM PER 1 MG: Performed by: INTERNAL MEDICINE

## 2017-03-15 PROCEDURE — 25010000002 HEPARIN (PORCINE) PER 1000 UNITS: Performed by: INTERNAL MEDICINE

## 2017-03-15 PROCEDURE — 99232 SBSQ HOSP IP/OBS MODERATE 35: CPT | Performed by: INTERNAL MEDICINE

## 2017-03-15 PROCEDURE — 0 IOPAMIDOL PER 1 ML: Performed by: INTERNAL MEDICINE

## 2017-03-15 PROCEDURE — 4A023N7 MEASUREMENT OF CARDIAC SAMPLING AND PRESSURE, LEFT HEART, PERCUTANEOUS APPROACH: ICD-10-PCS | Performed by: INTERNAL MEDICINE

## 2017-03-15 PROCEDURE — 99222 1ST HOSP IP/OBS MODERATE 55: CPT | Performed by: INTERNAL MEDICINE

## 2017-03-15 PROCEDURE — 84484 ASSAY OF TROPONIN QUANT: CPT | Performed by: INTERNAL MEDICINE

## 2017-03-15 PROCEDURE — B2151ZZ FLUOROSCOPY OF LEFT HEART USING LOW OSMOLAR CONTRAST: ICD-10-PCS | Performed by: INTERNAL MEDICINE

## 2017-03-15 PROCEDURE — 25010000002 FENTANYL CITRATE (PF) 100 MCG/2ML SOLUTION: Performed by: INTERNAL MEDICINE

## 2017-03-15 PROCEDURE — 93458 L HRT ARTERY/VENTRICLE ANGIO: CPT | Performed by: INTERNAL MEDICINE

## 2017-03-15 PROCEDURE — 25010000002 ENOXAPARIN PER 10 MG: Performed by: INTERNAL MEDICINE

## 2017-03-15 PROCEDURE — B2111ZZ FLUOROSCOPY OF MULTIPLE CORONARY ARTERIES USING LOW OSMOLAR CONTRAST: ICD-10-PCS | Performed by: INTERNAL MEDICINE

## 2017-03-15 RX ORDER — ACETAMINOPHEN 325 MG/1
650 TABLET ORAL EVERY 6 HOURS PRN
Status: DISCONTINUED | OUTPATIENT
Start: 2017-03-15 | End: 2017-03-15

## 2017-03-15 RX ORDER — MORPHINE SULFATE 4 MG/ML
1 INJECTION, SOLUTION INTRAMUSCULAR; INTRAVENOUS EVERY 4 HOURS PRN
Status: DISCONTINUED | OUTPATIENT
Start: 2017-03-15 | End: 2017-03-15 | Stop reason: SDUPTHER

## 2017-03-15 RX ORDER — LOSARTAN POTASSIUM 25 MG/1
25 TABLET ORAL DAILY
Status: DISCONTINUED | OUTPATIENT
Start: 2017-03-15 | End: 2017-03-15

## 2017-03-15 RX ORDER — MORPHINE SULFATE 2 MG/ML
1 INJECTION, SOLUTION INTRAMUSCULAR; INTRAVENOUS EVERY 4 HOURS PRN
Status: DISCONTINUED | OUTPATIENT
Start: 2017-03-15 | End: 2017-03-15 | Stop reason: HOSPADM

## 2017-03-15 RX ORDER — FENTANYL CITRATE 50 UG/ML
INJECTION, SOLUTION INTRAMUSCULAR; INTRAVENOUS AS NEEDED
Status: DISCONTINUED | OUTPATIENT
Start: 2017-03-15 | End: 2017-03-15 | Stop reason: HOSPADM

## 2017-03-15 RX ORDER — NALOXONE HCL 0.4 MG/ML
0.4 VIAL (ML) INJECTION
Status: DISCONTINUED | OUTPATIENT
Start: 2017-03-15 | End: 2017-03-15 | Stop reason: SDUPTHER

## 2017-03-15 RX ORDER — FERROUS SULFATE TAB EC 324 MG (65 MG FE EQUIVALENT) 324 (65 FE) MG
324 TABLET DELAYED RESPONSE ORAL
Status: DISCONTINUED | OUTPATIENT
Start: 2017-03-15 | End: 2017-03-15 | Stop reason: HOSPADM

## 2017-03-15 RX ORDER — MIDAZOLAM HYDROCHLORIDE 1 MG/ML
INJECTION INTRAMUSCULAR; INTRAVENOUS AS NEEDED
Status: DISCONTINUED | OUTPATIENT
Start: 2017-03-15 | End: 2017-03-15 | Stop reason: HOSPADM

## 2017-03-15 RX ORDER — LOSARTAN POTASSIUM 50 MG/1
25 TABLET ORAL
Qty: 30 TABLET | Refills: 6 | Status: SHIPPED | OUTPATIENT
Start: 2017-03-15 | End: 2017-03-20

## 2017-03-15 RX ORDER — DEXTROSE AND SODIUM CHLORIDE 5; .9 G/100ML; G/100ML
100 INJECTION, SOLUTION INTRAVENOUS CONTINUOUS
Status: DISCONTINUED | OUTPATIENT
Start: 2017-03-15 | End: 2017-03-15 | Stop reason: HOSPADM

## 2017-03-15 RX ORDER — LIDOCAINE HYDROCHLORIDE 20 MG/ML
INJECTION, SOLUTION INFILTRATION; PERINEURAL AS NEEDED
Status: DISCONTINUED | OUTPATIENT
Start: 2017-03-15 | End: 2017-03-15 | Stop reason: HOSPADM

## 2017-03-15 RX ORDER — NALOXONE HCL 0.4 MG/ML
0.4 VIAL (ML) INJECTION
Status: DISCONTINUED | OUTPATIENT
Start: 2017-03-15 | End: 2017-03-15 | Stop reason: HOSPADM

## 2017-03-15 RX ORDER — SODIUM CHLORIDE 9 MG/ML
100 INJECTION, SOLUTION INTRAVENOUS CONTINUOUS
Status: DISCONTINUED | OUTPATIENT
Start: 2017-03-15 | End: 2017-03-15

## 2017-03-15 RX ORDER — HYDROCODONE BITARTRATE AND ACETAMINOPHEN 7.5; 325 MG/1; MG/1
1 TABLET ORAL EVERY 4 HOURS PRN
Status: DISCONTINUED | OUTPATIENT
Start: 2017-03-15 | End: 2017-03-15

## 2017-03-15 RX ADMIN — ACETAMINOPHEN 650 MG: 325 TABLET, FILM COATED ORAL at 02:40

## 2017-03-15 RX ADMIN — ACETAMINOPHEN 650 MG: 325 TABLET, FILM COATED ORAL at 09:49

## 2017-03-15 RX ADMIN — ENOXAPARIN SODIUM 40 MG: 40 INJECTION SUBCUTANEOUS at 09:49

## 2017-03-15 RX ADMIN — METOPROLOL TARTRATE 25 MG: 25 TABLET ORAL at 09:49

## 2017-03-15 RX ADMIN — ATORVASTATIN CALCIUM 40 MG: 40 TABLET, FILM COATED ORAL at 09:49

## 2017-03-15 RX ADMIN — ASPIRIN 81 MG 162 MG: 81 TABLET ORAL at 09:49

## 2017-03-15 NOTE — PROGRESS NOTES
"Hospitalist Progress Note.    LOS: 1 day    Patient Care Team:  Floridalma Jimenez MD as PCP - General (Family Medicine)    Chief Complaint:    Chief Complaint   Patient presents with   • Abnormal Lab       Subjective   Patient seen and examined this morning.  Events from last night noted.  Patient denies having any fevers chills.  No nausea or vomiting no abdominal pain.  Denies any chest pain shortness of breath cough or sputum production.  There is no significant edema.   Patient also denies having new onset weakness of numbness of either extremity.  She is complaining of headache she states it is since them put a nitroglycerin patch on.  She has already requested and days off.  She did not want to take any medicine but willing to take Tylenol at this point.      Review of Systems:    The pertinent  ROS was done and it is noted above, rest  was negative.    Objective     Vital Signs  Visit Vitals   • /69   • Pulse 68   • Temp 98.7 °F (37.1 °C) (Oral)   • Resp 19   • Ht 65\" (165.1 cm)   • Wt 222 lb 8 oz (101 kg)   • LMP 02/28/2017   • SpO2 95%   • Breastfeeding No   • BMI 37.03 kg/m2         I/O this shift:  In: 380 [P.O.:380]  Out: 150 [Urine:150]    Intake/Output Summary (Last 24 hours) at 03/15/17 1351  Last data filed at 03/15/17 1200   Gross per 24 hour   Intake   1100 ml   Output    700 ml   Net    400 ml       Physical Exam:    General Appearance: alert, oriented x 3, no acute distress,   HEENT: pupils round and reactive to light, oral mucosa dry, extra occular movements intact.  Neck: supple, no JVD, trachea midline  Lungs: Clear to Auscultation, unlabored breathing effort  Heart: RRR, normal S1 and S2, no S3, no rub  Abdomen: soft, non-tender, no palpable bladder, present bowel sounds to auscultation  Extremities: no edema, cyanosis or clubbing.   Neuro: normal speech and mental status, grossly non focal.     Results Review:      Results from last 7 days  Lab Units 03/14/17  1845 03/14/17  1054 "   SODIUM mmol/L 141 138   POTASSIUM mmol/L 4.5 4.5   CHLORIDE mmol/L 107 105   TOTAL CO2 mmol/L 26.0 24.0   BUN mg/dL 16 15   CREATININE mg/dL 1.10 1.00   CALCIUM mg/dL 9.1 9.4   BILIRUBIN mg/dL 0.5 0.4   ALK PHOS U/L 45 41   ALT (SGPT) U/L 14 8   AST (SGOT) U/L 24 23   GLUCOSE mg/dL 94 84       Estimated Creatinine Clearance: 73.7 mL/min (by C-G formula based on Cr of 1.1).                  Results from last 7 days  Lab Units 03/14/17  1845 03/14/17  1054   WBC 10*3/mm3 6.74 6.02   HEMOGLOBIN g/dL 14.5 13.7   PLATELETS 10*3/mm3 280 286         Results from last 7 days  Lab Units 03/14/17  1845   INR  0.94         Imaging Results (last 24 hours)     Procedure Component Value Units Date/Time    XR Chest 1 View [[id number]] Collected:  03/15/17 0915     Updated:  03/15/17 1009    Narrative:       PROCEDURE: XR CHEST 1 VW-     HISTORY:Chest pain protocol.         COMPARISON: 10/09/2011     FINDINGS: No acute pulmonary opacity is present.    There is no evidence  of effusion or pneumothorax.        Mediastinum is unremarkable.          Heart size is stable, normal.            Impression:       No acute abnormality of the chest and no significant  interval change from the prior exam.     This report was finalized on 3/15/2017 10:07 AM by Caleb Clemens MD.          aspirin 162 mg Oral Daily   atorvastatin 40 mg Oral Daily   carbidopa-levodopa CR 1 tablet Oral TID   [START ON 3/30/2017] cyanocobalamin 1,000 mcg Subcutaneous Q30 Days   ferrous sulfate 324 mg Oral TID With Meals   pramipexole 0.75 mg Oral Nightly   vitamin D 50,000 Units Oral Q7 Days       dextrose 5 % and sodium chloride 0.9 % 100 mL/hr       Medication Review:   Current Facility-Administered Medications   Medication Dose Route Frequency Provider Last Rate Last Dose   • aspirin chewable tablet 162 mg  162 mg Oral Daily Brandon Hadley MD   162 mg at 03/15/17 0949   • atorvastatin (LIPITOR) tablet 40 mg  40 mg Oral Daily Brandon Hadley MD   40 mg at  03/15/17 0949   • carbidopa-levodopa CR (SINEMET CR)  MG per CR tablet 1 tablet  1 tablet Oral TID Brandon Hadley MD   1 tablet at 03/14/17 2334   • [START ON 3/30/2017] cyanocobalamin injection 1,000 mcg  1,000 mcg Subcutaneous Q30 Days Brandon Hadley MD       • dextrose 5 % and sodium chloride 0.9 % infusion  100 mL/hr Intravenous Continuous Mukul Cowart MD       • ferrous sulfate EC tablet 324 mg  324 mg Oral TID With Meals Moustapha Peñaloza MD   324 mg at 03/15/17 1237   • morphine injection 1 mg  1 mg Intravenous Q4H PRN Moustapha Peñaloza MD        And   • naloxone (NARCAN) injection 0.4 mg  0.4 mg Intravenous Q5 Min PRN Moustapha Peñaloza MD       • pramipexole (MIRAPEX) tablet 0.75 mg  0.75 mg Oral Nightly Brandon Hadley MD   0.75 mg at 03/14/17 2335   • sodium chloride 0.9 % flush 1-10 mL  1-10 mL Intravenous PRN Brandon Hadley MD       • sodium chloride 0.9 % flush 10 mL  10 mL Intravenous PRN Kaleb Bardales MD       • vitamin D (ERGOCALCIFEROL) capsule 50,000 Units  50,000 Units Oral Q7 Days Brandon Hadley MD   50,000 Units at 03/15/17 0626       Assessment/Plan       Principal Problem:    NSTEMI (non-ST elevated myocardial infarction)  Active Problems:    Elevated blood pressure    Restless legs syndrome (RLS)    Vitamin B12 deficiency    Vitamin D deficiency    Patient is waiting for Dr. Peñaloza to do the heart cath,  and further planning will be done after the heart cath report.  At this point patient is pain-free and resting comfortably.    Details were discussed with the patient as well as family in the room.   I also discussed the details with the nursing staff.    Rest as ordered.    Mukul Cowart MD  03/15/17  1:51 PM    Please note that portions of this note may have been completed with a voice recognition program. Efforts were made to edit the dictations, but occasionally words are mistranscribed.

## 2017-03-15 NOTE — NURSING NOTE
[facility]ACE Teaching for prevention of  MI  Instructed pt on what it is , causes, signs and symptoms, prevention, diet, exercise and when to call the doctor or come to ER with what problems also instructed on post cath care  With tammie

## 2017-03-15 NOTE — ED NOTES
1952: DR. GUDINO CALLED PER DR. YUN, CALL SENT TO HIM @ THIS TIME.     Paula Harmon  03/14/17 2001

## 2017-03-15 NOTE — H&P
Active Problems:    Elevated blood pressure    Restless legs syndrome (RLS)    Vitamin B12 deficiency    Vitamin D deficiency    NSTEMI (non-ST elevated myocardial infarction)          Assessment/Plan   She is to be seen by cardiology.  Her blood pressure is addressed with a ARB and topical nitrates she has been given aspirin.      Chief complaint   Having pain in her shoulders and arms in January for which she she had been evaluated by neurology and apparently there was only mild elevation in sedimentation rate other laboratory values were unremarkable.  On the night prior to admission she developed severe arm and shoulder symptoms she was seen by her physician where an EKG was obtained showing Q waves troponin was obtained which was elevated she was sent to emergency department for persistent elevation in troponin was noted i.e. troponin was 0.64 then 0.36 and then 0.488.  Symptoms are improved but there still is some discomfort in the left upper extremity  Subjective   Shoulder and arm pain without chest pain shortness of breath palpitations syncope no syncope or lower extremity edema      Review of Systems   There have been no fever sweats chills headache ear ache or sore throat no chest pain cough shortness of breath no abdominal pain she has mild chronic constipation probably worse because of oral iron therapy no dysuria or hematuria no lower extremity edema    History  Past Medical History   Diagnosis Date   • Anemia    • Migraine    • Obesity    • Ovarian cyst    • Restless leg syndrome    • Vitamin B12 deficiency     the restless leg syndrome is apparently related to iron deficiency she has been on oral iron which is been poorly tolerated ranges are pending regarding obtaining IV iron replacement  Past Surgical History   Procedure Laterality Date   • Breast surgery     •  section     • Cholecystectomy     • Stomach surgery     • Purvi-en-y procedure     • Cholecystectomy laparoscopic and colonoscopy  N/A 06/01/2013     Family History   Problem Relation Age of Onset   • Cancer Father    • Heart attack Father    • Cancer Other    • Other Other    • No Known Problems Mother    • Asthma Brother     father has a history of multiple myeloma and coronary disease mother healthy 2 siblings are healthy 2 children are healthy  Social History     Social History   • Marital status:      Spouse name: N/A   • Number of children: N/A   • Years of education: N/A     Occupational History   • Not on file.     Social History Main Topics   • Smoking status: Former Smoker     Packs/day: 1.00     Years: 2.00     Quit date: 3/14/1992   • Smokeless tobacco: Not on file   • Alcohol use 7.2 oz/week     12 Cans of beer per week      Comment: weekly   • Drug use: No   • Sexual activity: Yes     Partners: Male     Birth control/ protection: Other      Comment: birth control pill     Other Topics Concern   • Not on file     Social History Narrative           Objective     Vital Signs  Temp:  [97.3 °F (36.3 °C)-99.6 °F (37.6 °C)] 99.6 °F (37.6 °C)  Heart Rate:  [61-90] 61  Resp:  [18] 18  BP: (130-166)/(7-96) 133/84    Physical Exam:       General Appearance:    Alert, cooperative, in no acute distress   Head:    Normocephalic, without obvious abnormality, atraumatic   Eyes:            Lids and lashes normal, conjunctivae and sclerae normal, no   icterus, no pallor,PERRLA   Ears:    Ears appear intact with no abnormalities noted   Throat:   No oral lesions, no thrush, oral mucosa moist   Neck:   No adenopathy, supple, trachea midline, no thyromegaly, no   carotid bruit   Back:     No kyphosis present, no scoliosis present, no tenderness    Lungs:     Clear to auscultation,respirations regular, even and                  unlabored    Heart:    Regular rhythm and normal rate, normal S1 and S2, no            murmur, no gallop, no rub, no click   Chest Wall:    No abnormalities observed   Abdomen:     Normal bowel sounds, no masses, no  organomegaly, soft        non-tender, non-distended, no guarding, no rebound                tenderness   Rectal:      Extremities:   No edema, no cyanosis; mild tenderness left upper arm, shoulder range or motion is intact   Pulses:   Pulses palpable    Skin:   No bleeding or rash   Lymph nodes:   No palpable adenopathy   Neurologic:   Cranial nerves 2 - 12 grossly intact       Brandon Hadley MD  03/14/17  10:32 PM

## 2017-03-15 NOTE — CONSULTS
G-Cardiology Consult Note    Referring Provider: Navneet  Reason for Consultation: NSTEMI    Patient Care Team:  Floridalma Jimenez MD as PCP - General (Family Medicine)    Chief complaint Arm pain    Subjective .     History of present illness:  This is a 46-year-old female patient who is been experiencing bilateral arm pain since January.  The patient was seen her primary care provider today and mentioned that she was having bilateral arm pain.  She is being evaluated for bilateral arm pain by her neurologist.  A 12-lead electrocardiogram was performed which was thought to show high lateral Q waves in leads 1 and aVL.  On review of her 12-lead electrocardiogram these were trivial deflections and warm and not even close to being pathologic Q waves.  There were no ischemic ST-T wave changes and no injury current.  The patient had a normal CPK CPK-MB and CPK-MB relative index.  Her initial troponin was minimally elevated at 0.6.  Repeat troponin was minimally elevated at 0.3 and third troponin minimally elevated at 0.5 representing a plateau pattern.  The patient denies having chest discomfort.  There is no shortness of breath.  There is no exertional chest arm neck jaw shoulder or back discomfort.  There is no dyspnea with activity.  There is no orthopnea PND or lower extremity edema.  There is no dizziness palpitations or syncope.  She has no personal history of hypertension diabetes or hypercholesterolemia.  She is a lifelong nonsmoker.  Her family history is positive for premature coronary disease with her father having a myocardial infarction at the age of 50.    Review of Systems   Review of Systems   Constitution: Negative for chills, diaphoresis, fever, weakness, malaise/fatigue, night sweats, weight gain and weight loss.   HENT: Negative for ear discharge, hearing loss, hoarse voice and nosebleeds.    Eyes: Negative for discharge, double vision, pain and photophobia.   Cardiovascular: Negative for  chest pain, claudication, cyanosis, dyspnea on exertion, irregular heartbeat, leg swelling, near-syncope, orthopnea, palpitations, paroxysmal nocturnal dyspnea and syncope.   Respiratory: Negative for cough, hemoptysis, sputum production and wheezing.    Endocrine: Negative for cold intolerance, heat intolerance, polydipsia, polyphagia and polyuria.   Hematologic/Lymphatic: Negative for adenopathy and bleeding problem. Does not bruise/bleed easily.   Skin: Negative for color change, flushing, itching and rash.   Musculoskeletal: Negative for muscle cramps, muscle weakness, myalgias and stiffness.   Gastrointestinal: Negative for abdominal pain, diarrhea, hematemesis, hematochezia, nausea and vomiting.   Genitourinary: Negative for dysuria, frequency and nocturia.   Neurological: Positive for numbness. Negative for focal weakness, loss of balance, paresthesias and seizures.   Psychiatric/Behavioral: Negative for altered mental status, hallucinations and suicidal ideas.   Allergic/Immunologic: Negative for HIV exposure, hives and persistent infections.       History  Past Medical History   Diagnosis Date   • Anemia    • Migraine    • Obesity    • Ovarian cyst    • Restless leg syndrome    • Vitamin B12 deficiency    , Past Surgical History   Procedure Laterality Date   • Breast surgery     •  section     • Cholecystectomy     • Stomach surgery     • Purvi-en-y procedure     • Cholecystectomy laparoscopic and colonoscopy N/A 2013   , Family History   Problem Relation Age of Onset   • Cancer Father    • Heart attack Father    • Cancer Other    • Other Other    • No Known Problems Mother    • Asthma Brother    , Social History   Substance Use Topics   • Smoking status: Former Smoker     Packs/day: 1.00     Years: 2.00     Quit date: 3/14/1992   • Smokeless tobacco: None   • Alcohol use 7.2 oz/week     12 Cans of beer per week      Comment: weekly   , Prescriptions Prior to Admission   Medication Sig Dispense  "Refill Last Dose   • carbidopa-levodopa CR (SINEMET CR)  MG per CR tablet Take 1 tablet by mouth 3 (three) times a day. 90 tablet 11 3/14/2017 at 1600   • ferrous sulfate 325 (65 FE) MG tablet Take 325 mg by mouth 2 (Two) Times a Day With Meals.   3/14/2017 at 2100   • pramipexole (MIRAPEX) 0.5 MG tablet Take 1.5 tablets by mouth Every Night. 0.5mg QHS and 0.25mg PRN with breakthrough symptoms 45 tablet 5 3/14/2017 at 1730   • Syringe 25G X 1\" 3 ML misc 1 Units every 14 (fourteen) days. To inject B12 2 each 6 3/10/2017 at 1000   • cyanocobalamin 1000 MCG/ML injection inject 1 milliliter every 2 weeks as directed 1 mL 6 Taking    and Allergies:  Sulfa antibiotics    Objective     Vital Sign Min/Max for last 24 hours  Temp  Min: 97.3 °F (36.3 °C)  Max: 99.6 °F (37.6 °C)   BP  Min: 129/79  Max: 166/7   Pulse  Min: 61  Max: 90   Resp  Min: 18  Max: 18   SpO2  Min: 96 %  Max: 100 %   No Data Recorded   Weight  Min: 222 lb 5 oz (101 kg)  Max: 230 lb (104 kg)     Flowsheet Rows         First Filed Value    Admission Height  65\" (165.1 cm) Documented at 03/14/2017 1835    Admission Weight  230 lb (104 kg) Documented at 03/14/2017 1835             Ejection Fraction  No results found for: EF    Echo EF Estimated  No results found for: ECHOEFEST    Nuclear Stress Ejection Fraction  No components found for: NUCEF    Cath Ejection Fraction Quantitative  No results found for: CATHEF    Physical Exam:   Physical Exam   Constitutional: She is oriented to person, place, and time. She appears well-developed and well-nourished.   HENT:   Head: Normocephalic and atraumatic.   Mouth/Throat: Oropharynx is clear and moist.   Eyes: Conjunctivae and EOM are normal. Pupils are equal, round, and reactive to light. No scleral icterus.   Neck: Normal range of motion. Neck supple. No JVD present. No tracheal deviation present. No thyromegaly present.   Cardiovascular: Normal rate, regular rhythm, S1 normal, S2 normal, normal heart sounds, " intact distal pulses and normal pulses.  PMI is not displaced.  Exam reveals no gallop and no friction rub.    No murmur heard.  Pulmonary/Chest: Effort normal and breath sounds normal. No respiratory distress. She has no wheezes. She has no rales.   Abdominal: Soft. Bowel sounds are normal. She exhibits no distension and no mass. There is no tenderness. There is no rebound and no guarding.   Musculoskeletal: Normal range of motion. She exhibits no edema or deformity.   Neurological: She is alert and oriented to person, place, and time. She displays normal reflexes. No cranial nerve deficit. Coordination normal.   Skin: Skin is warm and dry. No rash noted. No erythema.   Psychiatric: She has a normal mood and affect. Her behavior is normal. Thought content normal.       Results Review:   I reviewed the patient's new clinical results.    Results from last 7 days  Lab Units 03/14/17  1845 03/14/17  1054   WBC 10*3/mm3 6.74 6.02   HEMOGLOBIN g/dL 14.5 13.7   HEMATOCRIT % 43.9 42.0   PLATELETS 10*3/mm3 280 286       Results from last 7 days  Lab Units 03/14/17  1845 03/14/17  1054   SODIUM mmol/L 141 138   POTASSIUM mmol/L 4.5 4.5   CHLORIDE mmol/L 107 105   TOTAL CO2 mmol/L 26.0 24.0   BUN mg/dL 16 15   CREATININE mg/dL 1.10 1.00   GLUCOSE mg/dL 94 84   CALCIUM mg/dL 9.1 9.4      TROPONINT: 0.6,0.4,0.5    Results from last 7 days  Lab Units 03/14/17  1054   CHOLESTEROL mg/dL 182   TRIGLYCERIDES mg/dL 114   HDL CHOL mg/dL 55         Assessment/Plan     Active Problems:    Elevated blood pressure    Restless legs syndrome (RLS)    Vitamin B12 deficiency    Vitamin D deficiency    NSTEMI (non-ST elevated myocardial infarction)- atypical presentation.  I plan coronary angiography in the morning with interventional standby.  She is being treated tonight with aspirin and Plavix nitroglycerin and beta blocker.  If she is confirmed to have obstructive coronary disease she will be started on high-dose statin based  cholesterol-lowering therapy.  Further recommendations will be predicated on the outcome of her catheterization.          I discussed the patients findings and my recommendations with patient    Moustapha Peñaloza MD  03/14/17  11:52 PM

## 2017-03-15 NOTE — PLAN OF CARE
Problem: Patient Care Overview (Adult)  Goal: Adult Individualization and Mutuality  Outcome: Ongoing (interventions implemented as appropriate)    Problem: Cardiac Output, Decreased (Adult)  Goal: Identify Related Risk Factors and Signs and Symptoms  Outcome: Ongoing (interventions implemented as appropriate)    03/15/17 0623   Cardiac Output, Decreased   Cardiac Output, Decreased: Related Risk Factors heart rate altered  (BP improved after bp med s)   Signs and Symptoms (Cardiac Output Decreased) (troponin 0.2)       Goal: Adequate Cardiac Output/Effective Tissue Perfusion  Outcome: Ongoing (interventions implemented as appropriate)    03/15/17 0623   Cardiac Output, Decreased (Adult)   Adequate Cardiac Output/Effective Tissue Perfusion making progress toward outcome  (TROPONIN DECREASING)

## 2017-03-15 NOTE — PLAN OF CARE
Problem: Cardiac Output, Decreased (Adult)  Goal: Identify Related Risk Factors and Signs and Symptoms  Outcome: Ongoing (interventions implemented as appropriate)

## 2017-03-17 ENCOUNTER — TELEPHONE (OUTPATIENT)
Dept: CARDIOLOGY | Facility: CLINIC | Age: 47
End: 2017-03-17

## 2017-03-17 LAB — MRSA SPEC QL CULT: NORMAL

## 2017-03-17 NOTE — TELEPHONE ENCOUNTER
I did not do her discharge so I am not sure what medications that the hospitalist sent her home on. I assume she can stop all BP medications. I don't see a discharge summary- may not yet be dictated. What heart pills is she taking?

## 2017-03-17 NOTE — TELEPHONE ENCOUNTER
Patient called and states Heart Cath done this week and is unsure why she is on heart medications if nothing was wrong. She states she doesn't have HTN.I cannot see DC summary with any explanation about the medications.  Please review and advise.  Matilde Burciaga MA

## 2017-03-20 ENCOUNTER — OFFICE VISIT (OUTPATIENT)
Dept: CARDIOLOGY | Facility: CLINIC | Age: 47
End: 2017-03-20

## 2017-03-20 VITALS
WEIGHT: 224.2 LBS | OXYGEN SATURATION: 98 % | SYSTOLIC BLOOD PRESSURE: 118 MMHG | HEIGHT: 65 IN | BODY MASS INDEX: 37.36 KG/M2 | DIASTOLIC BLOOD PRESSURE: 62 MMHG | RESPIRATION RATE: 16 BRPM | HEART RATE: 76 BPM

## 2017-03-20 DIAGNOSIS — R74.8 ABNORMAL CARDIAC ENZYME LEVEL: ICD-10-CM

## 2017-03-20 DIAGNOSIS — I25.42 DISSECTION OF CORONARY ARTERY: Primary | ICD-10-CM

## 2017-03-20 DIAGNOSIS — I40.1 SUBACUTE IDIOPATHIC MYOCARDITIS: ICD-10-CM

## 2017-03-20 PROBLEM — I21.4 NSTEMI (NON-ST ELEVATED MYOCARDIAL INFARCTION): Status: RESOLVED | Noted: 2017-03-14 | Resolved: 2017-03-20

## 2017-03-20 PROCEDURE — 99214 OFFICE O/P EST MOD 30 MIN: CPT | Performed by: INTERNAL MEDICINE

## 2017-03-20 NOTE — PROGRESS NOTES
Subjective:     Encounter Date:03/20/2017      Patient ID: Mary Lunsford is a 46 y.o. female.    Chief Complaint: Bilateral arm pain.  HPI  This is a 46-year-old female patient who presented to the emergency room complaining of bilateral shoulder and arm pain present continuously for the last 5-7 days prior to presentation.  The patient had no chest discomfort per se.  There was no shortness of breath at rest or with activity.  There was no orthopnea PND or lower extremity edema.  There was no dizziness palpitations or syncope.  The patient had a 12-lead electrocardiogram performed which showed no ischemic ST-T wave changes or injury current.  Her CPK, CPK-MB and CPK-MB relative index were all normal.  The patient had a mildly elevated troponin which was persistently elevated in a plateau pattern ranging between 0.2 and 0.5.  The patient was told in the emergency room that she was experiencing a non-ST elevation myocardial infarction and coronary angiography was performed urgently for this reason.  Cardiac catheterization demonstrated no angiographically apparent stenoses.  There was ROHIT-3 flow present in all vessels.  There was no evidence of recent plaque rupture or coronary thrombosis.  However, the distal left anterior descending had a fairly abrupt transition in caliber with significant tortuosity suggestive of hypertensive cardiac disease.  The patient had no personal history of hypertension diabetes or hypercholesterolemia.  In the absence of a history of hypertension there was consideration regarding the possibility of a spontaneous coronary artery dissection.  In the first area of tortuosity there was some haziness but no actual dissection flap was identified and there was no thrombus staining.  The left main circumflex and right coronary arteries had no significant disease and demonstrated normal ROHIT-3 blood flow throughout.  The contrast ventriculogram was relatively poor quality with PVCs.   "The overall ejection fraction was felt to be normal and the left ventricular end-diastolic pressure was normal at 8 mmHg.  There was no mitral regurgitation.  There was perhaps the subtle evidence of mid to distal anterior and apical hypokinesis with distal inferior hypokinesis corresponding to the area of abrupt tapering of the left anterior descending and tortuosity limiting some credence to the possibility of a spontaneous LAD dissection.  Given these findings and the presence of ROHIT-3 blood flow without a focal stenosis no further coronary imaging or intervention was performed.  In follow-up in clinic today the patient reports having ongoing bilateral shoulder and arm pain which is present essentially continuously throughout the day.  There is no associated shortness of breath nausea vomiting or diaphoresis.  She has no dyspnea on exertion or at rest.  There is no orthopnea PND or lower extremity edema.  She has no dizziness palpitations or syncope.  She wonders if her Parkinson's medication that she takes for restless leg syndrome could be causing her arm and shoulder pain.  She has no personal history of diabetes hypertension or dyslipidemia.  She is a nonsmoker.  Her family history is negative for premature coronary disease.  She indicates that she did not take the Cozaar or Lopressor at hospital discharge since her blood pressure tended to \"run low\".  The following portions of the patient's history were reviewed and updated as appropriate: allergies, current medications, past family history, past medical history, past social history, past surgical history and problem  Review of Systems   Constitution: Negative for chills, diaphoresis, fever, weakness, malaise/fatigue, night sweats, weight gain and weight loss.   HENT: Negative for ear discharge, hearing loss, hoarse voice and nosebleeds.    Eyes: Negative for discharge, double vision, pain and photophobia.   Cardiovascular: Negative for chest pain, " claudication, cyanosis, dyspnea on exertion, irregular heartbeat, leg swelling, near-syncope, orthopnea, palpitations, paroxysmal nocturnal dyspnea and syncope.   Respiratory: Negative for cough, hemoptysis, shortness of breath, sputum production and wheezing.    Endocrine: Negative for cold intolerance, heat intolerance, polydipsia, polyphagia and polyuria.   Hematologic/Lymphatic: Negative for adenopathy and bleeding problem. Does not bruise/bleed easily.   Skin: Negative for color change, flushing, itching and rash.   Musculoskeletal: Negative for muscle cramps, muscle weakness, myalgias and stiffness.   Gastrointestinal: Negative for abdominal pain, diarrhea, hematemesis, hematochezia, nausea and vomiting.   Genitourinary: Negative for dysuria, frequency and nocturia.   Neurological: Negative for focal weakness, loss of balance, numbness, paresthesias and seizures.   Psychiatric/Behavioral: Negative for altered mental status, hallucinations and suicidal ideas.   Allergic/Immunologic: Negative for HIV exposure, hives and persistent infections.       Current Outpatient Prescriptions:   •  carbidopa-levodopa CR (SINEMET CR)  MG per CR tablet, Take 1 tablet by mouth 3 (three) times a day., Disp: 90 tablet, Rfl: 11  •  cyanocobalamin 1000 MCG/ML injection, inject 1 milliliter every 2 weeks as directed, Disp: 1 mL, Rfl: 6  •  ferrous sulfate 325 (65 FE) MG tablet, Take 325 mg by mouth 2 (Two) Times a Day With Meals., Disp: , Rfl:   •  pramipexole (MIRAPEX) 0.5 MG tablet, Take 1.5 tablets by mouth Every Night. 0.5mg QHS and 0.25mg PRN with breakthrough symptoms, Disp: 45 tablet, Rfl: 5  •  losartan (COZAAR) 50 MG tablet, Take 0.5 tablets by mouth Daily., Disp: 30 tablet, Rfl: 6  •  metoprolol tartrate (LOPRESSOR) 25 MG tablet, Take 1 tablet by mouth 2 (Two) Times a Day., Disp: 60 tablet, Rfl: 6     Objective:     Physical Exam   Constitutional: She is oriented to person, place, and time. She appears  "well-developed and well-nourished.   HENT:   Head: Normocephalic and atraumatic.   Mouth/Throat: Oropharynx is clear and moist.   Eyes: Conjunctivae and EOM are normal. Pupils are equal, round, and reactive to light. No scleral icterus.   Neck: Normal range of motion. Neck supple. No JVD present. No tracheal deviation present. No thyromegaly present.   Cardiovascular: Normal rate, regular rhythm, S1 normal, S2 normal, normal heart sounds, intact distal pulses and normal pulses.  PMI is not displaced.  Exam reveals no gallop and no friction rub.    No murmur heard.  Pulmonary/Chest: Effort normal and breath sounds normal. No respiratory distress. She has no wheezes. She has no rales.   Abdominal: Soft. Bowel sounds are normal. She exhibits no distension and no mass. There is no tenderness. There is no rebound and no guarding.   Musculoskeletal: Normal range of motion. She exhibits no edema or deformity.   Neurological: She is alert and oriented to person, place, and time. She displays normal reflexes. No cranial nerve deficit. Coordination normal.   Skin: Skin is warm and dry. No rash noted. No erythema.   Psychiatric: She has a normal mood and affect. Her behavior is normal. Thought content normal.     Blood pressure 118/62, pulse 76, resp. rate 16, height 65\" (165.1 cm), weight 224 lb 3.2 oz (102 kg), last menstrual period 02/28/2017, SpO2 98 %, not currently breastfeeding.   Lab Review:       Assessment:         1. Dissection of coronary artery  After further review of her coronary angiograms it appears possible that the patient may have had a spontaneous coronary artery dissection which was not obvious in regards to a dissection flap or evidence of thrombosis.  The patient had a transition in caliber from normal lumen dimensions in the proximal and mid left anterior descending to tapering in the distal vessel that was associated with increased tortuosity that would typically be suggestive of hypertensive cardiac " "disease and/or diabetic disease.  However the patient has no personal history of diabetes or hypertension.  It is a well-known phenomenon that patients with fairly abrupt change in vessel caliber particularly with excessive tortuosity in the distal vessel can be associated with spontaneous coronary artery dissection.  On closer inspection of the contrast ventriculogram there may have been a mid to distal anterior and apical hypokinesis with distal inferior hypokinesis consistent with a wraparound left anterior descending.  However there was ROHIT-3 flow throughout the vessel and no evidence of any side branch compromise.  The contrast ventriculogram was not consistent with transient apical ballooning syndrome.  In addition the patient's troponins were minimally elevated in a \"plateau\" pattern.  This would not have been consistent with myocardial infarction.  The patient continues to have bilateral shoulder and arm pain which I wonder if this could be related to taking Parkinson's medication for restless leg syndrome.  - Adult Transthoracic Echo Complete    2. Subacute idiopathic myocarditis  The patient's troponin elevation appears more consistent with a process such as myocarditis.  There was no pericardial friction rub or changes on electrocardiogram to suggest pericarditis.  Overall her global left ventricular function was felt to be normal by contrast ventriculogram however the quality of her left ventriculogram was less than ideal as she experienced significant ectopy during the injection.  An echocardiogram was not performed while in the hospital.  - Adult Transthoracic Echo Complete    3. Abnormal cardiac enzyme level  The patient's CPK, CPK-MB and CPK-MB relative index were all normal.  The troponins were minimally elevated in a \"Plateau\" pattern.  Her 12-lead electrocardiogram did not suggest myocardial injury or ischemia.  Her symptoms were very atypical in nature.  Procedures     Plan:       I have " recommended an echocardiogram to get a better idea of her global and regional LV systolic function.  I've also recommended a second opinion.  We are making arrangements for the patient to have a outpatient cardiac MRI.  This will be performed with gadolinium contrast and late enhancement imaging should help us to decide if the patient has had a subendocardial myocardial infarction versus myocarditis.  Further coronary imaging may be necessary to decide if the patient has had a spontaneous LAD dissection.  It may be worthwhile to discontinue her Sinemet CR to see if her arm discomfort does not improve.  I have recommended discontinuation of her Cozaar and Lopressor as she has no hypertension and she has not been taking these medications since discharge regardless.  With a normal ejection fraction and no chronic renal insufficiency or diabetes there is no compelling indication to treat with angiotensin II receptor blocker.  Recommended that she take an enteric-coated baby aspirin daily.  Further recommendations will be predicated on the outcome of her outpatient echocardiogram.

## 2017-03-21 RX ORDER — ERGOCALCIFEROL 1.25 MG/1
50000 CAPSULE ORAL WEEKLY
Qty: 6 CAPSULE | Refills: 0 | Status: SHIPPED | OUTPATIENT
Start: 2017-03-21 | End: 2017-10-30

## 2017-03-22 LAB
BH CV ECHO MEAS - % IVS THICK: 16.7 %
BH CV ECHO MEAS - % LVPW THICK: 19 %
BH CV ECHO MEAS - AO MAX PG (FULL): 1.9 MMHG
BH CV ECHO MEAS - AO MAX PG: 7.5 MMHG
BH CV ECHO MEAS - AO MEAN PG (FULL): 1 MMHG
BH CV ECHO MEAS - AO MEAN PG: 4 MMHG
BH CV ECHO MEAS - AO ROOT AREA (BSA CORRECTED): 1
BH CV ECHO MEAS - AO ROOT AREA: 3.5 CM^2
BH CV ECHO MEAS - AO ROOT DIAM: 2.1 CM
BH CV ECHO MEAS - AO V2 MAX: 137 CM/SEC
BH CV ECHO MEAS - AO V2 MEAN: 98.6 CM/SEC
BH CV ECHO MEAS - AO V2 VTI: 27.3 CM
BH CV ECHO MEAS - AVA(I,A): 3.2 CM^2
BH CV ECHO MEAS - AVA(I,D): 3.2 CM^2
BH CV ECHO MEAS - AVA(V,A): 3 CM^2
BH CV ECHO MEAS - AVA(V,D): 3 CM^2
BH CV ECHO MEAS - BSA(HAYCOCK): 2.2 M^2
BH CV ECHO MEAS - BSA: 2.1 M^2
BH CV ECHO MEAS - BZI_BMI: 37.3 KILOGRAMS/M^2
BH CV ECHO MEAS - BZI_METRIC_HEIGHT: 165.1 CM
BH CV ECHO MEAS - BZI_METRIC_WEIGHT: 101.6 KG
BH CV ECHO MEAS - EDV(CUBED): 121.3 ML
BH CV ECHO MEAS - EDV(TEICH): 115.5 ML
BH CV ECHO MEAS - EF(CUBED): 74.2 %
BH CV ECHO MEAS - EF(TEICH): 65.9 %
BH CV ECHO MEAS - ESV(CUBED): 31.3 ML
BH CV ECHO MEAS - ESV(TEICH): 39.4 ML
BH CV ECHO MEAS - FS: 36.4 %
BH CV ECHO MEAS - IVS/LVPW: 1.4
BH CV ECHO MEAS - IVSD: 1.5 CM
BH CV ECHO MEAS - IVSS: 1.8 CM
BH CV ECHO MEAS - LA DIMENSION: 3.6 CM
BH CV ECHO MEAS - LA/AO: 1.7
BH CV ECHO MEAS - LV MASS(C)D: 250.7 GRAMS
BH CV ECHO MEAS - LV MASS(C)DI: 120.8 GRAMS/M^2
BH CV ECHO MEAS - LV MASS(C)S: 168.1 GRAMS
BH CV ECHO MEAS - LV MASS(C)SI: 81 GRAMS/M^2
BH CV ECHO MEAS - LV MAX PG: 5.6 MMHG
BH CV ECHO MEAS - LV MEAN PG: 3 MMHG
BH CV ECHO MEAS - LV V1 MAX: 118 CM/SEC
BH CV ECHO MEAS - LV V1 MEAN: 81.2 CM/SEC
BH CV ECHO MEAS - LV V1 VTI: 25.2 CM
BH CV ECHO MEAS - LVIDD: 5 CM
BH CV ECHO MEAS - LVIDS: 3.2 CM
BH CV ECHO MEAS - LVOT AREA (M): 3.5 CM^2
BH CV ECHO MEAS - LVOT AREA: 3.5 CM^2
BH CV ECHO MEAS - LVOT DIAM: 2.1 CM
BH CV ECHO MEAS - LVPWD: 1.1 CM
BH CV ECHO MEAS - LVPWS: 1.3 CM
BH CV ECHO MEAS - MV A MAX VEL: 67.4 CM/SEC
BH CV ECHO MEAS - MV DEC SLOPE: 448.5 CM/SEC^2
BH CV ECHO MEAS - MV E MAX VEL: 79.8 CM/SEC
BH CV ECHO MEAS - MV E/A: 1.2
BH CV ECHO MEAS - MV P1/2T MAX VEL: 86.6 CM/SEC
BH CV ECHO MEAS - MV P1/2T: 56.6 MSEC
BH CV ECHO MEAS - MVA P1/2T LCG: 2.5 CM^2
BH CV ECHO MEAS - MVA(P1/2T): 3.9 CM^2
BH CV ECHO MEAS - PA MAX PG: 3.1 MMHG
BH CV ECHO MEAS - PA V2 MAX: 88.7 CM/SEC
BH CV ECHO MEAS - RAP SYSTOLE: 10 MMHG
BH CV ECHO MEAS - RVSP: 40 MMHG
BH CV ECHO MEAS - SI(AO): 45.6 ML/M^2
BH CV ECHO MEAS - SI(CUBED): 43.4 ML/M^2
BH CV ECHO MEAS - SI(LVOT): 42 ML/M^2
BH CV ECHO MEAS - SI(TEICH): 36.7 ML/M^2
BH CV ECHO MEAS - SV(AO): 94.6 ML
BH CV ECHO MEAS - SV(CUBED): 90 ML
BH CV ECHO MEAS - SV(LVOT): 87.3 ML
BH CV ECHO MEAS - SV(TEICH): 76.1 ML
BH CV ECHO MEAS - TR MAX VEL: 269.5 CM/SEC
BH CV ECHO MEAS - TV MAX PG: 0.93 MMHG
BH CV ECHO MEAS - TV V2 MAX: 48.1 CM/SEC
LV EF 2D ECHO EST: 76 %

## 2017-03-23 NOTE — DISCHARGE SUMMARY
"  Discharge 20 minDate of Discharge:  3/23/2017    Discharge Diagnosis: Abnormal cardiac troponin with no evidence of myocardial infarction.    Presenting Problem/History of Present Illness  NSTEMI (non-ST elevated myocardial infarction) [I21.4]- this was the suspected diagnosis of presentation was subsequently proved to be incorrect.  Bilateral arm pain initially thought to be an angina equivalent but subsequently proved otherwise.    Hospital Course  Patient is a 46 y.o. female presented with bilateral arm pain.  The patient presented from her primary care provider's office complaining of greater than 24 hours of continuous bilateral arm pain with no numbness or weakness.  The patient had no chest pain or shortness of breath.  There was no orthopnea PND or lower extremity edema.  Her 12-lead electrocardiogram was thought to show lateral Q waves however on review of her 12-lead electrocardiogram she was demonstrated to have tiny nonpathologic Q waves present in leads 1 and aVL consistent with normal septal depolarization.  The patient's initial CPK, CPK-MB and CPK-MB relative index were all normal.  The patient's initial troponin was mildly elevated at 0.6.  Her repeat troponins were mildly elevated with a \"plateau pattern between 0.3 and 0.5.  The patient had no history of hypertension diabetes or hypercholesterolemia.  Her 12-lead electrocardiogram showed no ischemic ST-T wave changes or injury current.  Because of persistent arm pain and abnormal troponins she was initially labeled as having a non-ST elevation myocardial infarction.  She was subsequently offered diagnostic catheterization with interventional standby as definitive means of diagnosing her cardiovascular condition.  Iliac catheterization was performed in an uncomplicated fashion from the radial artery which demonstrated no focal obstructive coronary disease.  The mid left anterior descending demonstrated a fairly abrupt nonfocal tapering with " tortuosity which was initially felt to be the result of long-standing hypertensive vasculopathy.  However on further review the patient was noted to have no history of hypertension and had never taken high blood pressure medications.  There was consideration that the patient may have a spontaneous coronary artery dissection however she demonstrated ROHIT-3 flow with no other features of typical dissection.  In addition her contrast ventriculogram showed normal global and regional LV systolic function with a normal left ventricular end-diastolic pressure.  Consideration was given to the possibility that she had a low-grade subclinical myocarditis.  However this would be extremely atypical with normal global and regional LV systolic function as well as no ST-T wave changes on the electrocardiogram.  In retrospect it was felt that her arm pain was noncardiac in etiology.    Procedures Performed  Procedure(s):  Coronary angiography  Left ventriculography       Consults:   Consults     Date and Time Order Name Status Description    3/14/2017 2219 Inpatient Consult to Cardiology Completed     3/14/2017 1958 IP Consult to Cardiology Completed           Pertinent Test Results: angiography: As described above       Echo EF Estimated  Lab Results   Component Value Date    ECHOEFEST 76 03/22/2017         Cath Ejection Fraction Quantitative  > 55%  Condition on Discharge:  Stable    Physical Exam at Discharge    Vital Signs       Physical Exam:     General Appearance:    Alert, cooperative, in no acute distress   Head:    Normocephalic, without obvious abnormality, atraumatic   Eyes:            Lids and lashes normal, conjunctivae and sclerae normal, no   icterus, no pallor, corneas clear, PERRLA   Ears:    Ears appear intact with no abnormalities noted   Throat:   No oral lesions, no thrush, oral mucosa moist   Neck:   No adenopathy, supple, trachea midline, no thyromegaly, no     carotid bruit, no JVD   Back:     No kyphosis  present, no scoliosis present, no skin lesions,       erythema or scars, no tenderness to percussion or                   palpation,   range of motion normal   Lungs:     Clear to auscultation,respirations regular, even and                   unlabored    Heart:    Regular rhythm and normal rate, normal S1 and S2, no            murmur, no gallop, no rub, no click   Breast Exam:    Deferred   Abdomen:     Normal bowel sounds, no masses, no organomegaly, soft        non-tender, non-distended, no guarding, no rebound                 tenderness   Genitalia:    Deferred   Extremities:   Moves all extremities well, no edema, no cyanosis, no              redness   Pulses:   Pulses palpable and equal bilaterally   Skin:   No bleeding, bruising or rash   Lymph nodes:   No palpable adenopathy   Neurologic:   Cranial nerves 2 - 12 grossly intact, sensation intact, DTR        present and equal bilaterally       Discharge Disposition  Home or Self Care    Discharge Medications   Mary Lunsford   Home Medication Instructions SOCORRO:[id number]    Printed on:03/23/17 1912   Medication Information                      carbidopa-levodopa CR (SINEMET CR)  MG per CR tablet  Take 1 tablet by mouth 3 (three) times a day.             cyanocobalamin 1000 MCG/ML injection  inject 1 milliliter every 2 weeks as directed             ferrous sulfate 325 (65 FE) MG tablet  Take 325 mg by mouth 2 (Two) Times a Day With Meals.             pramipexole (MIRAPEX) 0.5 MG tablet  Take 1.5 tablets by mouth Every Night. 0.5mg QHS and 0.25mg PRN with breakthrough symptoms                 Discharge Diet:   Diet Instructions     Resume previous home diet               Activity at Discharge:   Activity Instructions     As tolerated               Follow-up Appointments  Future Appointments  Date Time Provider Department Center   4/13/2017 7:55 PM [facility] SLEEP ROOM 1 [facility]   4/28/2017 9:30 AM Alex Jaramillo MD, FAAN IZA SALAS None    5/15/2017 3:45 PM MD IZA Short CD BG R None   5/18/2017 5:00 PM LINNEA Mendoza [facility] N CN J[facility]Y None         Test Results Pending at Discharge       Moustapha Peñaloza MD  03/23/17  7:12 PM  20me: Discharge 20 min

## 2017-04-05 ENCOUNTER — APPOINTMENT (OUTPATIENT)
Dept: SLEEP MEDICINE | Facility: HOSPITAL | Age: 47
End: 2017-04-05
Attending: PSYCHIATRY & NEUROLOGY

## 2017-04-13 ENCOUNTER — HOSPITAL ENCOUNTER (OUTPATIENT)
Dept: SLEEP MEDICINE | Facility: HOSPITAL | Age: 47
Setting detail: THERAPIES SERIES
End: 2017-04-13
Attending: PSYCHIATRY & NEUROLOGY

## 2017-04-13 DIAGNOSIS — G47.33 OSA (OBSTRUCTIVE SLEEP APNEA): ICD-10-CM

## 2017-04-13 PROCEDURE — 95810 POLYSOM 6/> YRS 4/> PARAM: CPT | Performed by: PSYCHIATRY & NEUROLOGY

## 2017-04-13 PROCEDURE — 95810 POLYSOM 6/> YRS 4/> PARAM: CPT

## 2017-04-24 ENCOUNTER — TELEPHONE (OUTPATIENT)
Dept: OBSTETRICS AND GYNECOLOGY | Facility: CLINIC | Age: 47
End: 2017-04-24

## 2017-04-24 RX ORDER — NORETHINDRONE ACETATE AND ETHINYL ESTRADIOL AND FERROUS FUMARATE 1MG-20(24)
1 KIT ORAL DAILY
Qty: 28 TABLET | Refills: 1 | Status: SHIPPED | OUTPATIENT
Start: 2017-04-24 | End: 2017-06-06 | Stop reason: SDUPTHER

## 2017-04-24 RX ORDER — NORETHINDRONE ACETATE AND ETHINYL ESTRADIOL AND FERROUS FUMARATE 1MG-20(24)
1 KIT ORAL DAILY
Qty: 28 TABLET | Refills: 1 | Status: SHIPPED | OUTPATIENT
Start: 2017-04-24 | End: 2017-04-28 | Stop reason: SDUPTHER

## 2017-04-24 NOTE — TELEPHONE ENCOUNTER
----- Message from Gema Hubbard sent at 4/24/2017 10:44 AM EDT -----  Contact: PT  THIS IS CJ'S PT.  SHE IS SCHEDULED FOR ANNUAL ON 6/6/17.  CAN WE SEND IN 2 REFILLS OF MINASTRIN 24 FE TO [facility] ON [address]?  THANKS

## 2017-04-28 ENCOUNTER — LAB (OUTPATIENT)
Dept: LAB | Facility: HOSPITAL | Age: 47
End: 2017-04-28
Attending: PSYCHIATRY & NEUROLOGY

## 2017-04-28 ENCOUNTER — OFFICE VISIT (OUTPATIENT)
Dept: NEUROLOGY | Facility: CLINIC | Age: 47
End: 2017-04-28

## 2017-04-28 VITALS
SYSTOLIC BLOOD PRESSURE: 130 MMHG | BODY MASS INDEX: 37.15 KG/M2 | HEIGHT: 65 IN | DIASTOLIC BLOOD PRESSURE: 82 MMHG | WEIGHT: 223 LBS

## 2017-04-28 DIAGNOSIS — E61.1 IRON DEFICIENCY: ICD-10-CM

## 2017-04-28 DIAGNOSIS — G25.81 RESTLESS LEGS SYNDROME (RLS): ICD-10-CM

## 2017-04-28 DIAGNOSIS — E61.1 IRON DEFICIENCY: Primary | ICD-10-CM

## 2017-04-28 DIAGNOSIS — G47.61 PERIODIC LIMB MOVEMENT DISORDER (PLMD): ICD-10-CM

## 2017-04-28 LAB — FERRITIN SERPL-MCNC: 44.2 NG/ML (ref 6.24–137)

## 2017-04-28 PROCEDURE — 36415 COLL VENOUS BLD VENIPUNCTURE: CPT

## 2017-04-28 PROCEDURE — 99214 OFFICE O/P EST MOD 30 MIN: CPT | Performed by: PSYCHIATRY & NEUROLOGY

## 2017-04-28 PROCEDURE — 82728 ASSAY OF FERRITIN: CPT | Performed by: PSYCHIATRY & NEUROLOGY

## 2017-04-28 RX ORDER — ACETAMINOPHEN 160 MG
TABLET,CHEWABLE ORAL
Refills: 0 | COMMUNITY
Start: 2017-04-18 | End: 2019-10-03 | Stop reason: HOSPADM

## 2017-04-28 RX ORDER — FERROUS FUMARATE 324(106)MG
1 TABLET ORAL 2 TIMES DAILY
Refills: 0 | COMMUNITY
Start: 2017-04-24 | End: 2017-07-14 | Stop reason: SDUPTHER

## 2017-04-29 NOTE — PROGRESS NOTES
[facility] NEUROLOGY [place] PROGRESS NOTE  History of Present Illness     Date: 2017    Patient Identification  Mary Lunsford is a 46 y.o. female.    Patient information was obtained from patient.  History/Exam limitations: none.    Original consultation requested by: Floridalma Jimenez MD      Chief Complaint   Follow-up (Pt here for follow up on sleep study and labs. Pt is not sure what she was supposed to do about her iron.  Pt wants to dicsuss some aching in her arms that she was having last time.)      History of Present Illness   Patient is a pleasant 46-year-old with diagnosis of restless leg syndrome interval history since office visit patient underwent total polysomnography study it is significant for periodic limb movement of sleep.  Patient has been taking Mirapex 0.5 mg one half tablet a day along with Sinemet 50/200 one pill a day.  Clinically stable on current regimen.  Discussed about augmentation from chronic dopamine exposure.  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   • Gallstone    • Kidney stones    • Migraine    • Obesity    • Ovarian cyst    • Restless leg syndrome    • Vitamin B12 deficiency        Past Surgical History:   Past Surgical History:   Procedure Laterality Date   • BREAST SURGERY Bilateral 1987   • CARDIAC CATHETERIZATION N/A 3/15/2017    Procedure: Coronary angiography;  Surgeon: Moustapha Peñaloza MD;  Location: [facility] CATH INVASIVE LOCATION;  Service:    • CARDIAC CATHETERIZATION N/A 3/15/2017    Procedure: Left ventriculography;  Surgeon: Moustapha Peñaloza MD;  Location: [facility] CATH INVASIVE LOCATION;  Service:    •  SECTION      1991 & 1995   • CHOLECYSTECTOMY  2013   • GASTRIC BYPASS  2011       Family Hisotry:   Family History   Problem Relation Age of Onset   • Cancer Father    • Heart attack Father    • Cancer Other    • Other Other     • No Known Problems Mother    • Asthma Brother    • Arthritis Paternal Grandmother        Social History:   Social History     Social History   • Marital status:      Spouse name: N/A   • Number of children: N/A   • Years of education: N/A     Occupational History   • Not on file.     Social History Main Topics   • Smoking status: Former Smoker     Packs/day: 1.00     Years: 2.00     Quit date: 10/1995   • Smokeless tobacco: Never Used   • Alcohol use 7.2 oz/week     12 Cans of beer per week      Comment: weekly   • Drug use: No   • Sexual activity: Yes     Partners: Male     Birth control/ protection: Other      Comment: birth control pill     Other Topics Concern   • Not on file     Social History Narrative       Medications:   Current Outpatient Prescriptions   Medication Sig Dispense Refill   • carbidopa-levodopa CR (SINEMET CR)  MG per CR tablet Take 1 tablet by mouth 3 (three) times a day. 90 tablet 11   • FERROCITE 324 MG tablet Take one twice daily  0   • metoprolol tartrate (LOPRESSOR) 25 MG tablet Take 25 mg by mouth 2 (Two) Times a Day.     • Norethin Ace-Eth Estrad-FE (MINASTRIN 24 FE) 1-20 MG-MCG(24) chewable tablet Chew 1 tablet Daily. 28 tablet 1   • pramipexole (MIRAPEX) 0.5 MG tablet Take 1.5 tablets by mouth Every Night. 0.5mg QHS and 0.25mg PRN with breakthrough symptoms 45 tablet 5   • RA ASPIRIN ADULT LOW STRENGTH 81 MG chewable tablet chew and swallow 1 tablet by mouth once daily  0   • vitamin D (ERGOCALCIFEROL) 65046 UNITS capsule capsule Take 1 capsule by mouth 1 (One) Time Per Week. 6 capsule 0   • cyanocobalamin 1000 MCG/ML injection inject 1 milliliter every 2 weeks as directed 1 mL 6     No current facility-administered medications for this visit.        Allergy:   Allergies   Allergen Reactions   • Sulfa Antibiotics        Review of Systems:  Review of Systems   Constitutional: Positive for fatigue. Negative for chills and fever.   HENT: Negative for congestion, ear pain,  "hearing loss, rhinorrhea and sore throat.    Eyes: Negative for pain, discharge and redness.   Respiratory: Positive for apnea. Negative for cough, shortness of breath, wheezing and stridor.    Cardiovascular: Negative for chest pain, palpitations and leg swelling.   Gastrointestinal: Negative for abdominal pain, constipation, nausea and vomiting.   Endocrine: Negative for cold intolerance, heat intolerance and polyphagia.   Genitourinary: Negative for dysuria, flank pain, frequency and urgency.   Musculoskeletal: Negative for joint swelling, myalgias, neck pain and neck stiffness.   Skin: Negative for pallor, rash and wound.   Allergic/Immunologic: Negative for environmental allergies.   Neurological: Negative for dizziness, tremors, seizures, syncope, facial asymmetry, speech difficulty, weakness, light-headedness, numbness and headaches.   Hematological: Negative for adenopathy.   Psychiatric/Behavioral: Negative for confusion and hallucinations. The patient is not nervous/anxious.        Physical Exam     Vitals:    04/28/17 0933   BP: 130/82   Weight: 223 lb (101 kg)   Height: 64.5\" (163.8 cm)     GENERAL: Patient is pleasant, cooperative, appears to be stated age.  Body habitus is endomorphic.  SKIN AND EXTREMITIES:  No skin rashes or lesions are noted.  No cyanosis, clubbing or edema of the extremities.    HEAD:  Head is normocephalic and atraumatic.    NECK: Neck are non-tender without thyromegaly or adenopathy.  Carotic upstrokes are 1+/4.  No cranial or cervical bruits.  The neck is supple with a full range of motion.   ENT: palate elevate symmetrically, no evidence of high arch palate, tongue midline erythema in posterior pharynx, Mallampati Classification Class III   CARDIOVASCULAR:  Regular rate and rhythm with normal S1 and S2 without rub or gallop.  RESPIRATORY:  Clear to auscultation without wheezes or crackle   ABDOMEN:  Soft and non-tender, positive bowel sound without hepatosplenomegaly  BACK:  " Back is straight without midline defect.    PSYCH:  Higher cortical function/mental status:  The patient is alert.  She is oriented x3 to time, place and person.  Recent and the remote memory appear normal.  The patient has a good fund of knowledge.  There is no visual or auditory hallucination or suicidal or homicidal ideation.  SPEECH:There is no gross evidence of aphasia, dysarthria or agnosia.      CRANIAL NERVES:  Pupils are 4mm, equal round reactive to light, reacting briskly to 2mm without afferent pupillary defect.  Visual fields are intact to confrontation testing.  Fundoscopic examination reveals sharp disk margins with normal vasculature.  No papilledema, hemorrhages or exudates.  Extraocular movements are full and smooth with normal pursuits and saccades.  No nystagmus noted.  The face is symmetric. palate elevate symmetrically, Tongue midline, positive gag reflex. The remainder of the cranial nerves are intact and symmetrical.    MOTOR: Strength is 5/5 throughout with normal tone and bulk with the following exceptions, 4/5 intrinsic muscles of the hands and feet.  No involuntary movements noted.    Deep Tendon Reflexes: are 2/4 and symmetrical in the upper extremities, 2/4 and symmetrical at the knees and 1/4 and symmetrical at the Achilles tendon.  Plantar responses were down-going bilaterally.    SENSATION:  Intact to pinprick, light touch, vibration and proprioception.  Coordination:  The patient normally performs finger-nose-finger, heel-to-knee-to-shin and rapid alternating movements in symmetrical fashion.    COORDINATION AND GAIT:  The patient walks with a narrow-based gait.  Patient is able to heel-toe and tandem walk forward and backwards without difficulty.  Romberg and monopedal  Romberg are negative.    MUSCULOSKELETAL: Range of motion normal, no clubbing, cyanosis, or edema.  No joint swelling.            Studies: I have personally reviewed the following and discussed with the  patient.  Results for orders placed or performed in visit on 03/20/17   Adult Transthoracic Echo Complete   Result Value Ref Range    BSA 2.1 m^2    IVSd 1.5 cm    IVSs 1.8 cm    LVIDd 5.0 cm    LVIDs 3.2 cm    LVPWd 1.1 cm    BH CV ECHO FABY - LVPWS 1.3 cm    IVS/LVPW 1.4     FS 36.4 %    EDV(Teich) 115.5 ml    ESV(Teich) 39.4 ml    EF(Teich) 65.9 %    EDV(cubed) 121.3 ml    ESV(cubed) 31.3 ml    EF(cubed) 74.2 %    % IVS thick 16.7 %    % LVPW thick 19.0 %    LV mass(C)d 250.7 grams    LV mass(C)dI 120.8 grams/m^2    LV mass(C)s 168.1 grams    LV mass(C)sI 81.0 grams/m^2    SV(Teich) 76.1 ml    SI(Teich) 36.7 ml/m^2    SV(cubed) 90.0 ml    SI(cubed) 43.4 ml/m^2    Ao root diam 2.1 cm    Ao root area 3.5 cm^2    LA dimension 3.6 cm    LA/Ao 1.7     LVOT diam 2.1 cm    LVOT area 3.5 cm^2    LVOT area(traced) 3.5 cm^2    Ao root area (BSA corrected) 1.0     MV E max steve 79.8 cm/sec    MV A max steve 67.4 cm/sec    MV E/A 1.2     MV P1/2t max steve 86.6 cm/sec    MV P1/2t 56.6 msec    MVA(P1/2t) 3.9 cm^2    MV dec slope 448.5 cm/sec^2    Ao pk steve 137.0 cm/sec    Ao max PG 7.5 mmHg    Ao max PG (full) 1.9 mmHg    Ao V2 mean 98.6 cm/sec    Ao mean PG 4.0 mmHg    Ao mean PG (full) 1.0 mmHg    Ao V2 VTI 27.3 cm    MICKY(I,A) 3.2 cm^2    MICKY(I,D) 3.2 cm^2    MICKY(V,A) 3.0 cm^2    MICKY(V,D) 3.0 cm^2    LV V1 max PG 5.6 mmHg    LV V1 mean PG 3.0 mmHg    LV V1 max 118.0 cm/sec    LV V1 mean 81.2 cm/sec    LV V1 VTI 25.2 cm    SV(Ao) 94.6 ml    SI(Ao) 45.6 ml/m^2    SV(LVOT) 87.3 ml    SI(LVOT) 42.0 ml/m^2    TV V2 max 48.1 cm/sec    TV max PG 0.93 mmHg    PA V2 max 88.7 cm/sec    PA max PG 3.1 mmHg    TR max steve 269.5 cm/sec    RVSP(TR) 40 mmHg    RAP systole 10.0 mmHg    MVA P1/2T LCG 2.5 cm^2     CV ECHO FABY - BZI_BMI 37.3 kilograms/m^2     CV ECHO FABY - BSA(HAYCOCK) 2.2 m^2     CV ECHO FABY - BZI_METRIC_WEIGHT 101.6 kg     CV ECHO FABY - BZI_METRIC_HEIGHT 165.1 cm    Echo EF Estimated 76 %     Records Reviewed: I have  personally reviewed her previous medical record.    Mary was seen today for follow-up.    Diagnoses and all orders for this visit:    Iron deficiency  -     Ferritin; Future    Restless legs syndrome (RLS)    Periodic limb movement disorder (PLMD)        Treatments:  1.  Encourage recklessly wake schedule  2.  Avoid sleep deprivation  3.  Continue dopamine agonist  4.  Counseled patient extensively on augmentation  5.  Counseled patient on restless leg as follow  I have counseled patient extensively on the pathophysiology of Restless Legs Syndrome(RLS).  I have explained to the patient how D3 receptor dysfunction can give rise to RLS.  We have also discussed how iron deficiency can contribute to RLS.   Nevertheless, the first line of defense against restless legs syndrome is to avoid substances or foods that may be causing or worsening the problem such as alcohol, caffeine , and nicotine. In addition, we have reviewed all medications could exacerbate the problem and checking Ferritin level today.      I have also reviewed with my patient any underlying medical conditions that could give rise to secondary RLS, such as anemia , diabetes , nutritional deficiencies, kidney disease, thyroid  disease, varicose veins, or Parkinson's disease.     We have discussed FDA approved pharmacologic intervention such as dopamine agonists.  These are most often the first line treatment used to treat RLS including Mirapex (pramipexole), Neupro patch (rotigotine), and Requip (ropinirole). I have also counseled patient on side effects of Dopamine agonists include daytime sleepiness, nausea, hypotension and lightheadedness.    We have also discussed the use of non-dopaminergic medication such as Horizant (gabapentin enacarbil), as an adjunct to relieve the symptoms of RLS especially painful RLS symptoms.      This Document is signed by Alex Jaramillo MD, FAAN, FAASM   April 28, 20179:33 PM

## 2017-05-02 ENCOUNTER — TELEPHONE (OUTPATIENT)
Dept: NEUROLOGY | Facility: CLINIC | Age: 47
End: 2017-05-02

## 2017-05-18 ENCOUNTER — OFFICE VISIT (OUTPATIENT)
Dept: NEUROLOGY | Facility: CLINIC | Age: 47
End: 2017-05-18

## 2017-05-18 VITALS
SYSTOLIC BLOOD PRESSURE: 142 MMHG | RESPIRATION RATE: 16 BRPM | HEIGHT: 64 IN | DIASTOLIC BLOOD PRESSURE: 96 MMHG | OXYGEN SATURATION: 99 % | HEART RATE: 69 BPM | WEIGHT: 230 LBS | BODY MASS INDEX: 39.27 KG/M2

## 2017-05-18 DIAGNOSIS — G25.81 RESTLESS LEGS SYNDROME (RLS): Primary | ICD-10-CM

## 2017-05-18 DIAGNOSIS — D50.9 IRON DEFICIENCY ANEMIA, UNSPECIFIED IRON DEFICIENCY ANEMIA TYPE: ICD-10-CM

## 2017-05-18 PROCEDURE — 99213 OFFICE O/P EST LOW 20 MIN: CPT | Performed by: NURSE PRACTITIONER

## 2017-05-18 RX ORDER — PRAMIPEXOLE DIHYDROCHLORIDE 0.5 MG/1
0.75 TABLET ORAL NIGHTLY
Qty: 45 TABLET | Refills: 11 | Status: SHIPPED | OUTPATIENT
Start: 2017-05-18 | End: 2017-10-20 | Stop reason: SDUPTHER

## 2017-05-18 RX ORDER — CARBIDOPA AND LEVODOPA 50; 200 MG/1; MG/1
1 TABLET, EXTENDED RELEASE ORAL 3 TIMES DAILY
Qty: 90 TABLET | Refills: 11 | Status: SHIPPED | OUTPATIENT
Start: 2017-05-18 | End: 2017-10-20 | Stop reason: SDUPTHER

## 2017-06-06 ENCOUNTER — OFFICE VISIT (OUTPATIENT)
Dept: OBSTETRICS AND GYNECOLOGY | Facility: CLINIC | Age: 47
End: 2017-06-06

## 2017-06-06 VITALS
WEIGHT: 223 LBS | HEIGHT: 65 IN | DIASTOLIC BLOOD PRESSURE: 80 MMHG | BODY MASS INDEX: 37.15 KG/M2 | SYSTOLIC BLOOD PRESSURE: 116 MMHG

## 2017-06-06 DIAGNOSIS — Z12.4 SCREENING FOR MALIGNANT NEOPLASM OF CERVIX: ICD-10-CM

## 2017-06-06 DIAGNOSIS — Z01.419 ENCOUNTER FOR GYNECOLOGICAL EXAMINATION WITHOUT ABNORMAL FINDING: Primary | ICD-10-CM

## 2017-06-06 PROCEDURE — 99396 PREV VISIT EST AGE 40-64: CPT | Performed by: PHYSICIAN ASSISTANT

## 2017-06-06 RX ORDER — NORETHINDRONE ACETATE AND ETHINYL ESTRADIOL AND FERROUS FUMARATE 1MG-20(24)
1 KIT ORAL DAILY
Qty: 28 TABLET | Refills: 11 | Status: SHIPPED | OUTPATIENT
Start: 2017-06-06 | End: 2017-09-12

## 2017-06-06 NOTE — PROGRESS NOTES
Subjective   Chief Complaint   Patient presents with   • Gynecologic Exam     Mary Lunsford is a 46 y.o. year old  presenting to be seen for her annual exam.   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 Take one twice daily, Disp: , Rfl: 0  •  metoprolol tartrate (LOPRESSOR) 25 MG tablet, Take 25 mg by mouth 2 (Two) Times a Day., Disp: , Rfl:   •  Norethin Ace-Eth Estrad-FE (MINASTRIN 24 FE) 1-20 MG-MCG(24) chewable tablet, Chew 1 tablet Daily., Disp: 28 tablet, Rfl: 11  •  pramipexole (MIRAPEX) 0.5 MG tablet, Take 1.5 tablets by mouth Every Night. 0.5mg QHS and 0.25mg PRN with breakthrough symptoms, Disp: 45 tablet, Rfl: 11  •  RA ASPIRIN ADULT LOW STRENGTH 81 MG chewable tablet, chew and swallow 1 tablet by mouth once daily, Disp: , Rfl: 0  •  vitamin D (ERGOCALCIFEROL) 78710 UNITS capsule capsule, Take 1 capsule by mouth 1 (One) Time Per Week., Disp: 6 capsule, Rfl: 0   Allergies   Allergen Reactions   • Sulfa Antibiotics       Past Surgical History:   Procedure Laterality Date   • BREAST SURGERY Bilateral 1987   • CARDIAC CATHETERIZATION N/A 3/15/2017    Procedure: Coronary angiography;  Surgeon: Moustapha Peñaloza MD;  Location: [facility] CATH INVASIVE LOCATION;  Service:    •  "CARDIAC CATHETERIZATION N/A 3/15/2017    Procedure: Left ventriculography;  Surgeon: Moustapha Peñaloza MD;  Location: [facility] CATH INVASIVE LOCATION;  Service:    •  SECTION      1991 & 1995   • CHOLECYSTECTOMY  2013   • GASTRIC BYPASS  2011      Social History     Social History   • Marital status:      Spouse name: N/A   • Number of children: N/A   • Years of education: N/A     Occupational History   • Not on file.     Social History Main Topics   • Smoking status: Former Smoker     Packs/day: 1.00     Years: 2.00     Quit date: 10/1995   • Smokeless tobacco: Never Used   • Alcohol use 7.2 oz/week     12 Cans of beer per week      Comment: weekly   • Drug use: No   • Sexual activity: Yes     Partners: Male     Birth control/ protection: OCP      Comment: birth control pill     Other Topics Concern   • Not on file     Social History Narrative      Family History   Problem Relation Age of Onset   • Cancer Father    • Heart attack Father    • Cancer Other    • Other Other    • No Known Problems Mother    • Asthma Brother    • Arthritis Paternal Grandmother        The following portions of the patient's history were reviewed and updated as appropriate:problem list, current medications, allergies, past family history, past medical history, past social history and past surgical history.    Review of Systems   Constitutional: Negative.    Gastrointestinal: Negative.    Genitourinary: Negative.            Objective   /80  Ht 65\" (165.1 cm)  Wt 223 lb (101 kg)  LMP 2017  BMI 37.11 kg/m2    Physical Exam   Constitutional: She appears well-developed and well-nourished. She is cooperative.   Pulmonary/Chest: Right breast exhibits no inverted nipple, no mass, no nipple discharge, no skin change and no tenderness. Left breast exhibits no inverted nipple, no mass, no nipple discharge, no skin change and no tenderness.   Abdominal: Soft. Normal appearance. She exhibits no " distension. There is no hepatosplenomegaly. There is no tenderness.   Genitourinary: Vagina normal and uterus normal. There is no tenderness or lesion on the right labia. There is no tenderness or lesion on the left labia. Cervix exhibits no motion tenderness and no discharge. Right adnexum displays no mass and no tenderness. Left adnexum displays no mass and no tenderness.   Neurological: She is alert.   Skin: Skin is warm, dry and intact.   Psychiatric: She has a normal mood and affect. Her behavior is normal.            Assessment /Plan      Mary was seen today for gynecologic exam.    Diagnoses and all orders for this visit:    Encounter for gynecological examination without abnormal finding    Screening for malignant neoplasm of cervix  -     Liquid-based Pap Smear, Screening; Future    Other orders  -     Norethin Ace-Eth Estrad-FE (MINASTRIN 24 FE) 1-20 MG-MCG(24) chewable tablet; Chew 1 tablet Daily.               This note was electronically signed.    Tari Boston PA-C   June 6, 2017

## 2017-06-11 RX ORDER — FERROUS FUMARATE 324(106)MG
TABLET ORAL
Qty: 60 TABLET | Refills: 1 | Status: SHIPPED | OUTPATIENT
Start: 2017-06-11 | End: 2017-07-10

## 2017-06-14 DIAGNOSIS — Z12.4 SCREENING FOR MALIGNANT NEOPLASM OF CERVIX: ICD-10-CM

## 2017-06-29 DIAGNOSIS — E53.8 VITAMIN B12 DEFICIENCY: ICD-10-CM

## 2017-07-03 RX ORDER — CYANOCOBALAMIN 1000 UG/ML
INJECTION, SOLUTION INTRAMUSCULAR; SUBCUTANEOUS
Qty: 1 ML | Refills: 6 | Status: SHIPPED | OUTPATIENT
Start: 2017-07-03 | End: 2019-02-05

## 2017-07-10 ENCOUNTER — OFFICE VISIT (OUTPATIENT)
Dept: INTERNAL MEDICINE | Facility: CLINIC | Age: 47
End: 2017-07-10

## 2017-07-10 VITALS
HEART RATE: 69 BPM | OXYGEN SATURATION: 98 % | WEIGHT: 226 LBS | TEMPERATURE: 99.3 F | DIASTOLIC BLOOD PRESSURE: 80 MMHG | SYSTOLIC BLOOD PRESSURE: 150 MMHG | HEIGHT: 65 IN | BODY MASS INDEX: 37.65 KG/M2

## 2017-07-10 DIAGNOSIS — E61.1 IRON DEFICIENCY: ICD-10-CM

## 2017-07-10 DIAGNOSIS — M79.10 MYALGIA: Primary | ICD-10-CM

## 2017-07-10 DIAGNOSIS — E66.9 OBESITY (BMI 30-39.9): ICD-10-CM

## 2017-07-10 DIAGNOSIS — IMO0001 ELEVATED BLOOD PRESSURE: ICD-10-CM

## 2017-07-10 DIAGNOSIS — F41.9 ANXIETY: ICD-10-CM

## 2017-07-10 DIAGNOSIS — R53.83 FATIGUE, UNSPECIFIED TYPE: ICD-10-CM

## 2017-07-10 DIAGNOSIS — E53.8 VITAMIN B12 DEFICIENCY: ICD-10-CM

## 2017-07-10 DIAGNOSIS — R06.83 SNORING: ICD-10-CM

## 2017-07-10 PROCEDURE — 99214 OFFICE O/P EST MOD 30 MIN: CPT | Performed by: FAMILY MEDICINE

## 2017-07-10 RX ORDER — FLUOXETINE 10 MG/1
10 CAPSULE ORAL DAILY
Qty: 30 CAPSULE | Refills: 2 | Status: SHIPPED | OUTPATIENT
Start: 2017-07-10 | End: 2017-12-20

## 2017-07-10 NOTE — PROGRESS NOTES
Subjective   Mary Lunsford is a 46 y.o. female.     History of Present Illness   She is using a computer mouse more since January. Symptoms have waxed and waned but worse for last couple of months. She is getting pain in wrist on right where it sits on desk. She has some pain in the left but it's worse on right. Pain described as aching. Sometimes thumb will jerk when she's not moving her hand on mouse. Driving from [place] the other day her thumb jerked for a hour. She has a lot of achiness overall. Shoulders hurt in shoulder blades and down arms. Fingers have ached a little for at least 10 years. Symptoms improved when she's on vacation. She's tried adjusting her chair at work to help with angle. She's asked for a stand to help keep her from looking down all the time, she's gotten that. Gel thing for wrist just iritates it. Ibuprofen helps a little for a while.     After her last visit I had her go to the ER due to troponin elevation, checked due to aching in the left arm. She has been put through an extensive work up and says she's still not sure if she had a heart attack. Was seen at  and underwent cardiac MRI. Blood pressure elevated today but she was rushed, late for appointment.    With all of her health issues she admits she's developed some problems with anxiety. She's not been treated for this previously. Nervous. She worries a lot, admits she worries about parkinson's because she read something about Campbell Roberts having some of her symptoms.     Struggling with weight, interested in Contrave.     Also would like to check her levels on vitamin B12 and iron due to taking replacement therapies.     She also wonders what she could do for her snoring.  sleeps elsewhere with his cpap due to her snoring. She notes having a normal sleep study recently.    The following portions of the patient's history were reviewed and updated as appropriate: allergies, current medications, past family history,  past medical history, past social history, past surgical history and problem list.    Review of Systems   Constitutional: Positive for activity change and fatigue. Negative for unexpected weight change.   Cardiovascular: Negative for chest pain.   Musculoskeletal: Positive for arthralgias and myalgias.   Neurological: Positive for tremors (twitching).   Psychiatric/Behavioral: The patient is nervous/anxious.        Vitals:    07/10/17 1616   BP: 150/80   Pulse: 69   Temp: 99.3 °F (37.4 °C)   SpO2: 98%       Objective   Physical Exam   Constitutional: She is oriented to person, place, and time. Vital signs are normal. She appears well-developed and well-nourished. She is active. She does not have a sickly appearance. She does not appear ill.   HENT:   Head: Normocephalic and atraumatic. Hair is normal.   Right Ear: Hearing normal.   Left Ear: Hearing normal.   Eyes: EOM are normal. Pupils are equal, round, and reactive to light. No scleral icterus.   Neck: Phonation normal. Neck supple.   Cardiovascular:   Pulses:       Radial pulses are 2+ on the right side, and 2+ on the left side.   Pulmonary/Chest: Effort normal.   Musculoskeletal: She exhibits no deformity.        Cervical back: She exhibits spasm.        Right hand: She exhibits no deformity and no swelling.        Left hand: She exhibits no deformity and no swelling.   Neurological: She is alert and oriented to person, place, and time. She displays no tremor. No cranial nerve deficit. Gait normal.   Negative Tinel and Phalen bilaterally   Skin: Skin is warm. She is not diaphoretic. No cyanosis. No pallor.   Skin is very tan, no nail abnormalities appreciated. Small scar left wrist from cardiac cath   Psychiatric: Her speech is normal and behavior is normal. Judgment normal. Her mood appears anxious.   Nursing note and vitals reviewed.    Lab Results   Component Value Date    [id number] 521 03/14/2017   was 229 on 8/17/16. Has been taking  injections.    2/17/17: DESIRE negative, ESR normal at 12, CRP elevated at 13.20 (normal 0-10), RF negative    Assessment/Plan   Mary was seen today for arm pain and shoulder pain.    Diagnoses and all orders for this visit:    Myalgia  -     DESIRE  -     CBC Auto Differential  -     C-reactive Protein  -     Rheumatoid Factor, Quant  -     Sedimentation Rate  -     CK    Fatigue, unspecified type  -     TSH  -     T4, Free    Vitamin B12 deficiency  -     Vitamin B12 & Folate    Iron deficiency  -     Iron Profile  -     Transferrin  -     Ferritin    Anxiety  -     FLUoxetine (PROZAC) 10 MG capsule; Take 1 capsule by mouth Daily.    Elevated blood pressure    Obesity (BMI 30-39.9)    Snoring      Repeating labs, her labs for autoimmune problems were drawn prior to the events with her heart.   Started Prozac to help with anxiety, stop for any SI/HI. I've asked her to return in 2 weeks.   Discouraged Contrave at this time as it may worsen anxiety and is typically not covered by insurance.   Consider oral appliance for snoring, follow up with Dr. Jaramillo re: sleep issues.           Floridalma Jimenez MD

## 2017-07-11 LAB
ANA SER QL: NEGATIVE
BASOPHILS # BLD AUTO: 0.04 10*3/MM3 (ref 0–0.2)
BASOPHILS NFR BLD AUTO: 0.5 % (ref 0–2.5)
CK SERPL-CCNC: 58 U/L (ref 30–170)
CRP SERPL-MCNC: 1.8 MG/DL (ref 0–1)
EOSINOPHIL # BLD AUTO: 0.19 10*3/MM3 (ref 0–0.7)
EOSINOPHIL NFR BLD AUTO: 2.3 % (ref 0–7)
ERYTHROCYTE [DISTWIDTH] IN BLOOD BY AUTOMATED COUNT: 12.7 % (ref 11.5–14.5)
ERYTHROCYTE [SEDIMENTATION RATE] IN BLOOD BY WESTERGREN METHOD: 10 MM/HR (ref 0–20)
FERRITIN SERPL-MCNC: 58.8 NG/ML (ref 6.24–137)
FOLATE SERPL-MCNC: 11.6 NG/ML
HCT VFR BLD AUTO: 40.4 % (ref 37–47)
HGB BLD-MCNC: 13.3 G/DL (ref 12–16)
IMM GRANULOCYTES # BLD: 0.04 10*3/MM3 (ref 0–0.06)
IMM GRANULOCYTES NFR BLD: 0.5 % (ref 0–0.6)
IRON SATN MFR SERPL: 22 % (ref 11–46)
IRON SERPL-MCNC: 79 MCG/DL (ref 37–181)
LYMPHOCYTES # BLD AUTO: 2.21 10*3/MM3 (ref 0.6–3.4)
LYMPHOCYTES NFR BLD AUTO: 27 % (ref 10–50)
MCH RBC QN AUTO: 32.2 PG (ref 27–31)
MCHC RBC AUTO-ENTMCNC: 32.9 G/DL (ref 30–37)
MCV RBC AUTO: 97.8 FL (ref 81–99)
MONOCYTES # BLD AUTO: 0.41 10*3/MM3 (ref 0–0.9)
MONOCYTES NFR BLD AUTO: 5 % (ref 0–12)
NEUTROPHILS # BLD AUTO: 5.29 10*3/MM3 (ref 2–6.9)
NEUTROPHILS NFR BLD AUTO: 64.7 % (ref 37–80)
NRBC BLD AUTO-RTO: 0 /100 WBC (ref 0–0)
PLATELET # BLD AUTO: 277 10*3/MM3 (ref 130–400)
RBC # BLD AUTO: 4.13 10*6/MM3 (ref 4.2–5.4)
RHEUMATOID FACT SERPL-ACNC: <10 IU/ML (ref 0–13.9)
T4 FREE SERPL-MCNC: 1.05 NG/DL (ref 0.78–2.19)
TIBC SERPL-MCNC: 366 MCG/DL (ref 261–497)
TRANSFERRIN SERPL-MCNC: 295 MG/DL (ref 200–370)
TSH SERPL DL<=0.005 MIU/L-ACNC: 7.52 MIU/ML (ref 0.47–4.68)
UIBC SERPL-MCNC: 287 MCG/DL
VIT B12 SERPL-MCNC: 254 PG/ML
WBC # BLD AUTO: 8.18 10*3/MM3 (ref 4.8–10.8)

## 2017-07-14 RX ORDER — FERROUS FUMARATE 324(106)MG
1 TABLET ORAL 2 TIMES DAILY
Qty: 30 TABLET | Refills: 5 | Status: SHIPPED | OUTPATIENT
Start: 2017-07-14 | End: 2018-03-25 | Stop reason: SDUPTHER

## 2017-07-14 RX ORDER — LEVOTHYROXINE SODIUM 0.05 MG/1
50 TABLET ORAL DAILY
Qty: 30 TABLET | Refills: 1 | Status: SHIPPED | OUTPATIENT
Start: 2017-07-14 | End: 2017-11-24 | Stop reason: SDUPTHER

## 2017-08-25 ENCOUNTER — HOSPITAL ENCOUNTER (EMERGENCY)
Facility: HOSPITAL | Age: 47
Discharge: HOME | End: 2017-08-25
Attending: STUDENT IN AN ORGANIZED HEALTH CARE EDUCATION/TRAINING PROGRAM | Admitting: STUDENT IN AN ORGANIZED HEALTH CARE EDUCATION/TRAINING PROGRAM

## 2017-08-25 VITALS
BODY MASS INDEX: 38.32 KG/M2 | DIASTOLIC BLOOD PRESSURE: 104 MMHG | HEART RATE: 104 BPM | WEIGHT: 230 LBS | OXYGEN SATURATION: 98 % | RESPIRATION RATE: 20 BRPM | TEMPERATURE: 98.5 F | SYSTOLIC BLOOD PRESSURE: 167 MMHG | HEIGHT: 65 IN

## 2017-08-25 DIAGNOSIS — S91.311A FOOT LACERATION, RIGHT, INITIAL ENCOUNTER: Primary | ICD-10-CM

## 2017-08-25 PROCEDURE — 99282 EMERGENCY DEPT VISIT SF MDM: CPT

## 2017-08-25 PROCEDURE — 90471 IMMUNIZATION ADMIN: CPT | Performed by: PHYSICIAN ASSISTANT

## 2017-08-25 PROCEDURE — 25010000002 TDAP 5-2.5-18.5 LF-MCG/0.5 SUSPENSION: Performed by: PHYSICIAN ASSISTANT

## 2017-08-25 PROCEDURE — 90715 TDAP VACCINE 7 YRS/> IM: CPT | Performed by: PHYSICIAN ASSISTANT

## 2017-08-25 RX ORDER — LIDOCAINE HYDROCHLORIDE 10 MG/ML
10 INJECTION, SOLUTION INFILTRATION; PERINEURAL ONCE
Status: COMPLETED | OUTPATIENT
Start: 2017-08-25 | End: 2017-08-25

## 2017-08-25 RX ADMIN — LIDOCAINE HYDROCHLORIDE 10 ML: 10 INJECTION, SOLUTION INFILTRATION; PERINEURAL at 23:25

## 2017-08-25 RX ADMIN — TETANUS TOXOID, REDUCED DIPHTHERIA TOXOID AND ACELLULAR PERTUSSIS VACCINE, ADSORBED 0.5 ML: 5; 2.5; 8; 8; 2.5 SUSPENSION INTRAMUSCULAR at 23:28

## 2017-08-26 NOTE — ED PROVIDER NOTES
Subjective   HPI Comments: 46 yr old female with Laceration to the top of the right foot.  She was cleaning dishes at home and a knife slid off the table landed on the top of her foot and causing a puncture wound.  She had bleeding at home route the wound and came to the emergency department for evaluation.  Unknown tetanus status      History provided by:  Patient   used: No        Review of Systems   Skin:        Laceration to the top of the right foot   All other systems reviewed and are negative.      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   • Gallstone    • Kidney stones    • Migraine    • Obesity    • Ovarian cyst    • Restless leg syndrome    • Urinary tract infection    • Vitamin B12 deficiency        Allergies   Allergen Reactions   • Sulfa Antibiotics        Past Surgical History:   Procedure Laterality Date   • BREAST SURGERY Bilateral 1987   • CARDIAC CATHETERIZATION N/A 3/15/2017    Procedure: Coronary angiography;  Surgeon: Moustapha Peñaloza MD;  Location: [facility] CATH INVASIVE LOCATION;  Service:    • CARDIAC CATHETERIZATION N/A 3/15/2017    Procedure: Left ventriculography;  Surgeon: Moustapha Peñaloza MD;  Location: [facility] CATH INVASIVE LOCATION;  Service:    •  SECTION      1991 & 1995   • CHOLECYSTECTOMY  2013   • GASTRIC BYPASS  2011       Family History   Problem Relation Age of Onset   • Cancer Father    • Heart attack Father    • Cancer Other    • Other Other    • No Known Problems Mother    • Asthma Brother    • Arthritis Paternal Grandmother        Social History     Social History   • Marital status:      Spouse name: N/A   • Number of children: N/A   • Years of education: N/A     Social History Main Topics   • Smoking status: Former Smoker     Packs/day: 1.00     Years: 2.00     Quit date: 10/1995   • Smokeless tobacco: Never Used   • Alcohol use 7.2 oz/week     12 Cans of beer  per week      Comment: weekly   • Drug use: No   • Sexual activity: Yes     Partners: Male     Birth control/ protection: OCP      Comment: birth control pill     Other Topics Concern   • None     Social History Narrative           Objective   Physical Exam   Constitutional: She is oriented to person, place, and time. She appears well-developed and well-nourished.   HENT:   Head: Normocephalic.   Eyes: EOM are normal. Pupils are equal, round, and reactive to light.   Neck: Normal range of motion. Neck supple.   Cardiovascular: Normal rate and regular rhythm.    Pulmonary/Chest: Effort normal and breath sounds normal.   Abdominal: Soft. Bowel sounds are normal.   Musculoskeletal: Normal range of motion.   Neurological: She is alert and oriented to person, place, and time. She has normal reflexes.   Skin: Skin is warm and dry.   1 cm puncture wound to the top of the right foot bleeding controlled   Psychiatric: She has a normal mood and affect.   Nursing note and vitals reviewed.      Laceration Repair  Date/Time: 8/25/2017 11:14 PM  Performed by: ISAC HANSON JR  Authorized by: FERNANDO PRIDE   Consent: Verbal consent obtained.  Consent given by: patient  Patient understanding: patient states understanding of the procedure being performed  Patient identity confirmed: verbally with patient  Body area: lower extremity  Location details: right foot  Laceration length: 1 cm  Anesthesia: local infiltration    Anesthesia:  Local Anesthetic: lidocaine 1% without epinephrine  Anesthetic total: 3 mL    Sedation:  Patient sedated: no  Irrigation solution: saline  Irrigation method: syringe  Amount of cleaning: standard  Fascia closure: 4-0 Vicryl  Number of sutures: 2  Technique: simple  Approximation: close  Approximation difficulty: simple  Dressing: 4x4 sterile gauze  Patient tolerance: Patient tolerated the procedure well with no immediate complications               ED Course  ED Course                  MDM    Final  diagnoses:   Foot laceration, right, initial encounter            Shawn Foster Jr., PA-C  08/25/17 0236

## 2017-09-11 ENCOUNTER — TELEPHONE (OUTPATIENT)
Dept: OBSTETRICS AND GYNECOLOGY | Facility: CLINIC | Age: 47
End: 2017-09-11

## 2017-09-11 NOTE — TELEPHONE ENCOUNTER
----- Message from Gema Hubbard sent at 9/11/2017 10:39 AM EDT -----  Contact: PT  THIS IS CJ'S PT.  SHE CALLED REQUESTING A GENERIC OC.  SHE CONFIRMED WITH HER PHARMACY THAT BLISOVI 24 FE IS COVERED WITH NO COPAY.  CAN WE E-PRESCRIBE IT TO [facility] [place]?  THANKS

## 2017-09-12 RX ORDER — NORETHINDRONE ACETATE AND ETHINYL ESTRADIOL AND FERROUS FUMARATE 1MG-20(24)
1 KIT ORAL DAILY
Qty: 28 TABLET | Refills: 10 | Status: SHIPPED | OUTPATIENT
Start: 2017-09-12 | End: 2018-07-12 | Stop reason: SDUPTHER

## 2017-10-12 ENCOUNTER — TELEPHONE (OUTPATIENT)
Dept: OBSTETRICS AND GYNECOLOGY | Facility: CLINIC | Age: 47
End: 2017-10-12

## 2017-10-12 RX ORDER — FLUCONAZOLE 150 MG/1
TABLET ORAL
Qty: 2 TABLET | Refills: 0 | Status: SHIPPED | OUTPATIENT
Start: 2017-10-12 | End: 2017-10-19 | Stop reason: SDUPTHER

## 2017-10-12 NOTE — TELEPHONE ENCOUNTER
----- Message from Gema Hubbard sent at 10/12/2017  9:57 AM EDT -----  Contact: PT  THIS IS CJ'S PT.  SHE CALLED STATING SHE HAS A YEAST INFECTION.  CAN WE SEND IN 2 DIFLUCANS TO [facility] [place]?  THANKS

## 2017-10-19 ENCOUNTER — TELEPHONE (OUTPATIENT)
Dept: OBSTETRICS AND GYNECOLOGY | Facility: CLINIC | Age: 47
End: 2017-10-19

## 2017-10-19 RX ORDER — FLUCONAZOLE 150 MG/1
TABLET ORAL
Qty: 2 TABLET | Refills: 0 | Status: SHIPPED | OUTPATIENT
Start: 2017-10-19 | End: 2017-10-30

## 2017-10-19 NOTE — TELEPHONE ENCOUNTER
----- Message from Gema Hubbard sent at 10/19/2017 10:24 AM EDT -----  Contact: PT  THIS IS [facility]S PT.  WE SENT IN DIFLUCAN FOR HER ON 10/12/17.  SHE SAID HER YEAST INFECTION HAS MOSTLY CLEARED UP, BUT SHE IS STILL FEELING SYMPTOMS JUST WHEN SHE USES THE RESTROOM.  CAN WE SEND IN ANOTHER ROUND OF DIFLUCAN OR DOES SHE NEED TO BE SEEN?  SHE USES [facility] [place].  THANKS

## 2017-10-20 ENCOUNTER — OFFICE VISIT (OUTPATIENT)
Dept: NEUROLOGY | Facility: CLINIC | Age: 47
End: 2017-10-20

## 2017-10-20 VITALS
DIASTOLIC BLOOD PRESSURE: 96 MMHG | HEART RATE: 66 BPM | RESPIRATION RATE: 18 BRPM | SYSTOLIC BLOOD PRESSURE: 135 MMHG | BODY MASS INDEX: 37.49 KG/M2 | HEIGHT: 65 IN | WEIGHT: 225 LBS

## 2017-10-20 DIAGNOSIS — G25.81 RESTLESS LEGS SYNDROME (RLS): Primary | ICD-10-CM

## 2017-10-20 PROCEDURE — 99212 OFFICE O/P EST SF 10 MIN: CPT | Performed by: NURSE PRACTITIONER

## 2017-10-20 RX ORDER — CARBIDOPA AND LEVODOPA 50; 200 MG/1; MG/1
1 TABLET, EXTENDED RELEASE ORAL 3 TIMES DAILY
Qty: 90 TABLET | Refills: 5 | Status: SHIPPED | OUTPATIENT
Start: 2017-10-20 | End: 2018-04-03 | Stop reason: SDUPTHER

## 2017-10-20 RX ORDER — PRAMIPEXOLE DIHYDROCHLORIDE 0.5 MG/1
0.75 TABLET ORAL NIGHTLY
Qty: 45 TABLET | Refills: 5 | Status: SHIPPED | OUTPATIENT
Start: 2017-10-20 | End: 2018-04-03 | Stop reason: SDUPTHER

## 2017-10-20 NOTE — PROGRESS NOTES
Subjective:     Patient ID: Mary Lunsford is a 47 y.o. female.    CC:   Chief Complaint   Patient presents with   • Restless Legs Syndrome       HPI:   History of Present Illness   This is a very pleasant 48 yo female who presents for 5 month follow up on severe RLS for 10+ years. Her mother has RLS and grandmother also had severe RLS. She is currently taking Sinemet TID and Mirapex 0.5mg 1 at night and 1/2 tab PRN breakthrough symptoms throughout the day. She feels like her symptoms are well managed overall. She tells me she is doing well. She had Sleep Study 4/2017 and followed up with Dr. Jaramillo which showed no sleep apnea and showed PLMD with movement 6 times an hour. She is taking iron BID for long term RICHAR. Previously tried Ropinirole, Neupro and Horizant without significant improvement in symptoms. She is now on low dose thyroid medication and gets vitamin b12 injections every 2 weeks at home by a friend prescribed by PCP. She is going to be getting a standing desk at work to help with daytime RLS symptoms. She has also recently started on low dose Prozac for anxiety and has been taking this about 3 weeks and has seen some improvement overall. She is scheduled to f/u with PCP for recheck of labs soon.    The following portions of the patient's history were reviewed and updated as appropriate: allergies, current medications, past family history, past medical history, past social history, past surgical history and problem list.    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   • Gallstone    • Kidney stones    • Migraine    • Obesity    • Ovarian cyst    • Restless leg syndrome    • Urinary tract infection    • Vitamin B12 deficiency        Past Surgical History:   Procedure Laterality Date   • BREAST SURGERY Bilateral 07/1987   • CARDIAC CATHETERIZATION N/A 3/15/2017    Procedure: Coronary angiography;  Surgeon: Moustapha Peñaloza MD;  Location[facility]  INVASIVE LOCATION;  Service:    • CARDIAC CATHETERIZATION N/A 3/15/2017    Procedure: Left ventriculography;  Surgeon: Moustapha Peñaloza MD;  Location: [facility] INVASIVE LOCATION;  Service:    •  SECTION      1991 & 1995   • CHOLECYSTECTOMY  2013   • GASTRIC BYPASS  2011       Social History     Social History   • Marital status:      Spouse name: N/A   • Number of children: N/A   • Years of education: N/A     Occupational History   • Not on file.     Social History Main Topics   • Smoking status: Former Smoker     Packs/day: 1.00     Years: 2.00     Quit date: 10/1995   • Smokeless tobacco: Never Used   • Alcohol use 7.2 oz/week     12 Cans of beer per week      Comment: weekly   • Drug use: No   • Sexual activity: Yes     Partners: Male     Birth control/ protection: OCP      Comment: birth control pill     Other Topics Concern   • Not on file     Social History Narrative       Family History   Problem Relation Age of Onset   • Cancer Father    • Heart attack Father    • Cancer Other    • Other Other    • No Known Problems Mother    • Asthma Brother    • Arthritis Paternal Grandmother         Review of Systems   Constitutional: Negative for chills, fatigue, fever and unexpected weight change.   HENT: Negative for ear pain, hearing loss, nosebleeds, rhinorrhea and sore throat.    Eyes: Negative for photophobia, pain, discharge, itching and visual disturbance.   Respiratory: Negative for cough, chest tightness, shortness of breath and wheezing.    Cardiovascular: Negative for chest pain, palpitations and leg swelling.   Gastrointestinal: Negative for abdominal pain, blood in stool, constipation, diarrhea, nausea and vomiting.   Genitourinary: Negative for dysuria, frequency, hematuria and urgency.   Musculoskeletal: Negative for arthralgias, back pain, gait problem, joint swelling, myalgias, neck pain and neck stiffness.   Skin: Negative for rash and wound.    Allergic/Immunologic: Negative for environmental allergies and food allergies.   Neurological: Negative for dizziness, tremors, seizures, syncope, speech difficulty, weakness, light-headedness, numbness and headaches.   Hematological: Negative for adenopathy. Does not bruise/bleed easily.   Psychiatric/Behavioral: Negative for agitation, confusion, decreased concentration, hallucinations, sleep disturbance and suicidal ideas. The patient is not nervous/anxious.         Objective:    Neurologic Exam     Mental Status   Oriented to person, place, and time.   Speech: speech is normal   Level of consciousness: alert    Cranial Nerves   Cranial nerves II through XII intact.     Motor Exam   Muscle bulk: normal  Overall muscle tone: normal    Strength   Strength 5/5 throughout.     Sensory Exam   Light touch normal.   Vibration normal.   Pinprick normal.     Gait, Coordination, and Reflexes     Gait  Gait: normal    Coordination   Finger to nose coordination: normal  Heel to shin coordination: normal    Tremor   Resting tremor: absent  Intention tremor: absent  Action tremor: absent    Reflexes   Right brachioradialis: 2+  Left brachioradialis: 2+  Right biceps: 2+  Left biceps: 2+  Right triceps: 2+  Left triceps: 2+  Right patellar: 2+  Left patellar: 2+  Right achilles: 2+  Left achilles: 2+  Right : 2+  Left : 2+      Physical Exam   Constitutional: She is oriented to person, place, and time.   Neurological: She is oriented to person, place, and time. She has normal strength. She has a normal Finger-Nose-Finger Test and a normal Heel to Shin Test. Gait normal.   Reflex Scores:       Tricep reflexes are 2+ on the right side and 2+ on the left side.       Bicep reflexes are 2+ on the right side and 2+ on the left side.       Brachioradialis reflexes are 2+ on the right side and 2+ on the left side.       Patellar reflexes are 2+ on the right side and 2+ on the left side.       Achilles reflexes are 2+ on the  right side and 2+ on the left side.  Psychiatric: Her speech is normal and behavior is normal. Judgment and thought content normal. Her mood appears anxious. Cognition and memory are normal.       Assessment/Plan:       Mary was seen today for restless legs syndrome.    Diagnoses and all orders for this visit:    Restless legs syndrome (RLS)  -     carbidopa-levodopa CR (SINEMET CR)  MG per CR tablet; Take 1 tablet by mouth 3 (Three) Times a Day.  -     pramipexole (MIRAPEX) 0.5 MG tablet; Take 1.5 tablets by mouth Every Night. 0.5mg QHS and 0.25mg PRN with breakthrough symptoms         Continue current medications. Continue new medications prescribed by PCP. Continue iron lifetime. Limit alcohol and caffeine intake. F/U in 6 months or sooner if needed. Reviewed medications, potential side effects and signs and symptoms to report. Discussed risk versus benefits of treatment plan with patient and/or family-including medications, labs and radiology that may be ordered. Addressed questions and concerns during visit. Patient and/or family verbalized understanding and agree with plan.    Debbie Rodriguez, APRN  10/20/2017

## 2017-10-30 ENCOUNTER — OFFICE VISIT (OUTPATIENT)
Dept: INTERNAL MEDICINE | Facility: CLINIC | Age: 47
End: 2017-10-30

## 2017-10-30 VITALS
RESPIRATION RATE: 12 BRPM | OXYGEN SATURATION: 97 % | HEART RATE: 93 BPM | DIASTOLIC BLOOD PRESSURE: 82 MMHG | WEIGHT: 226 LBS | SYSTOLIC BLOOD PRESSURE: 124 MMHG | BODY MASS INDEX: 37.65 KG/M2 | TEMPERATURE: 97.2 F | HEIGHT: 65 IN

## 2017-10-30 DIAGNOSIS — M25.511 ACUTE PAIN OF RIGHT SHOULDER: Primary | ICD-10-CM

## 2017-10-30 PROCEDURE — 99213 OFFICE O/P EST LOW 20 MIN: CPT | Performed by: FAMILY MEDICINE

## 2017-10-30 NOTE — PATIENT INSTRUCTIONS
Shoulder Range of Motion Exercises  Shoulder range of motion (ROM) exercises are designed to keep the shoulder moving freely. They are often recommended for people who have shoulder pain.  MOVEMENT EXERCISE  When you are able, do this exercise 5-6 days per week, or as told by your health care provider. Work toward doing 2 sets of 10 swings.  Pendulum Exercise  How To Do This Exercise Lying Down  1. Lie face-down on a bed with your abdomen close to the side of the bed.  2. Let your arm hang over the side of the bed.  3. Relax your shoulder, arm, and hand.  4. Slowly and gently swing your arm forward and back. Do not use your neck muscles to swing your arm. They should be relaxed. If you are struggling to swing your arm, have someone gently swing it for you. When you do this exercise for the first time, swing your arm at a 15 degree angle for 15 seconds, or swing your arm 10 times. As pain lessens over time, increase the angle of the swing to 30-45 degrees.  5. Repeat steps 1-4 with the other arm.  How To Do This Exercise While Standing  1. Stand next to a sturdy chair or table and hold on to it with your hand.  ¨ Bend forward at the waist.  ¨ Bend your knees slightly.  ¨ Relax your other arm and let it hang limp.  ¨ Relax the shoulder blade of the arm that is hanging and let it drop.  ¨ While keeping your shoulder relaxed, use body motion to swing your arm in small circles. The first time you do this exercise, swing your arm for about 30 seconds or 10 times. When you do it next time, swing your arm for a little longer.  ¨ Stand up tall and relax.  ¨ Repeat steps 1-7, this time changing the direction of the circles.  2. Repeat steps 1-8 with the other arm.  STRETCHING EXERCISES  Do these exercises 3-4 times per day on 5-6 days per week or as told by your health care provider. Work toward holding the stretch for 20 seconds.  Stretching Exercise 1  1. Lift your arm straight out in front of you.  2. Bend your arm 90  "degrees at the elbow (right angle) so your forearm goes across your body and looks like the letter \"L.\"  3. Use your other arm to gently pull the elbow forward and across your body.  4. Repeat steps 1-3 with the other arm.  Stretching Exercise 2  You will need a towel or rope for this exercise.  1. Bend one arm behind your back with the palm facing outward.  2. Hold a towel with your other hand.  3. Reach the arm that holds the towel above your head, and bend that arm at the elbow. Your wrist should be behind your neck.  4. Use your free hand to grab the free end of the towel.  5. With the higher hand, gently pull the towel up behind you.  6. With the lower hand, pull the towel down behind you.  7. Repeat steps 1-6 with the other arm.  STRENGTHENING EXERCISES  Do each of these exercises at four different times of day (sessions) every day or as told by your health care provider. To begin with, repeat each exercise 5 times (repetitions). Work toward doing 3 sets of 12 repetitions or as told by your health care provider.  Strengthening Exercise 1  You will need a light weight for this activity. As you grow stronger, you may use a heavier weight.  1. Standing with a weight in your hand, lift your arm straight out to the side until it is at the same height as your shoulder.  2. Bend your arm at 90 degrees so that your fingers are pointing to the ceiling.  3. Slowly raise your hand until your arm is straight up in the air.  4. Repeat steps 1-3 with the other arm.  Strengthening Exercise 2  You will need a light weight for this activity. As you grow stronger, you may use a heavier weight.  1. Standing with a weight in your hand, gradually move your straight arm in an arc, starting at your side, then out in front of you, then straight up over your head.  2. Gradually move your other arm in an arc, starting at your side, then out in front of you, then straight up over your head.  3. Repeat steps 1-2 with the other " arm.  Strengthening Exercise 3  You will need an elastic band for this activity. As you grow stronger, gradually increase the size of the bands or increase the number of bands that you use at one time.  1. While standing, hold an elastic band in one hand and raise that arm up in the air.  2. With your other hand, pull down the band until that hand is by your side.  3. Repeat steps 1-2 with the other arm.     This information is not intended to replace advice given to you by your health care provider. Make sure you discuss any questions you have with your health care provider.     Document Released: 09/15/2004 Document Revised: 05/03/2016 Document Reviewed: 12/14/2015  [employer] Interactive Patient Education ©2017 [employer]    Adhesive Capsulitis  Adhesive capsulitis is inflammation of the tendons and ligaments that surround the shoulder joint (shoulder capsule). This condition causes the shoulder to become stiff and painful to move. Adhesive capsulitis is also called frozen shoulder.  CAUSES  This condition may be caused by:  · An injury to the shoulder joint.  · Straining the shoulder.  · Not moving the shoulder for a period of time. This can happen if your arm was injured or in a sling.  · Long-standing health problems, such as:    Diabetes.    Thyroid problems.    Heart disease.    Stroke.    Rheumatoid arthritis.    Lung disease.  In some cases, the cause may not be known.  RISK FACTORS  This condition is more likely to develop in:  · Women.  · People who are older than 40 years of age.  SYMPTOMS  Symptoms of this condition include:  · Pain in the shoulder when moving the arm. There may also be pain when parts of the shoulder are touched. The pain is worse at night or when at rest.  · Soreness or aching in the shoulder.  · Inability to move the shoulder normally.  · Muscle spasms.  DIAGNOSIS  This condition is diagnosed with a physical exam and imaging tests, such as an X-ray or MRI.  TREATMENT  This  condition may be treated with:  · Treatment of the underlying cause or condition.  · Physical therapy. This involves performing exercises to get the shoulder moving again.  · Medicine. Medicine may be given to relieve pain, inflammation, or muscle spasms.  · Steroid injections into the shoulder joint.  · Shoulder manipulation. This is a procedure to move the shoulder into another position. It is done after you are given a medicine to make you fall asleep (general anesthetic). The joint may also be injected with salt water at high pressure to break down scarring.  · Surgery. This may be done in severe cases when other treatments have failed.  Although most people recover completely from adhesive capsulitis, some may not regain the full movement of the shoulder.  HOME CARE INSTRUCTIONS  · Take over-the-counter and prescription medicines only as told by your health care provider.  · If you are being treated with physical therapy, follow instructions from your physical therapist.  · Avoid exercises that put a lot of demand on your shoulder, such as throwing. These exercises can make pain worse.  · If directed, apply ice to the injured area:    Put ice in a plastic bag.    Place a towel between your skin and the bag.    Leave the ice on for 20 minutes, 2-3 times per day.  SEEK MEDICAL CARE IF:  · You develop new symptoms.  · Your symptoms get worse.     This information is not intended to replace advice given to you by your health care provider. Make sure you discuss any questions you have with your health care provider.     Document Released: 10/15/2010 Document Revised: 09/07/2016 Document Reviewed: 04/11/2016  [employer] Interactive Patient Education ©2017 [employer]      Use voltaren twice a day for a week. If not better next week call and I'll send a steroid pack. If still not improved then we need to consider MRI shoulder.

## 2017-10-30 NOTE — PROGRESS NOTES
Subjective    Mary Lunsford is a 47 y.o. female here for:  Chief Complaint   Patient presents with   • Neck Pain   • Shoulder Pain     History of Present Illness   Started having pain in right shoulder a couple of weeks ago. Had trouble raising it, took ibuprofen and it went away. A few days later it started hurting again. Started movement at work with mouse (at computer now more than she was previously). She got to where she was trying to use mouse with left hand so she did not have to move shoulder. Shoulder feels sore. Got up a few days ago and she had pain move to neck area. More tired than normal, napped this weekend both days and that's not her. Shoulder was not bad this morning until she dried her hair with hairdryer. She says she could have been sleeping funny and maybe that has caused some of the neck pain. No pain to touch on neck area. When she raises shoulder it worsens neck pain. New role started in January. Awaiting a new mouse to help. No injuries. Patient reiterates several times that movement worsens/causes pain.    The following portions of the patient's history were reviewed and updated as appropriate: allergies, current medications, past family history, past medical history, past social history, past surgical history and problem list.    Review of Systems   Constitutional: Positive for fatigue.   Musculoskeletal: Positive for arthralgias and myalgias.       Vitals:    10/30/17 1008   BP: 124/82   Pulse: 93   Resp: 12   Temp: 97.2 °F (36.2 °C)   SpO2: 97%       Objective   Physical Exam   Constitutional: Vital signs are normal. She appears well-developed and well-nourished. She is active. She does not appear ill.   Eyes: No scleral icterus.   Cardiovascular:   Pulses:       Radial pulses are 2+ on the right side, and 2+ on the left side.   Pulmonary/Chest: Effort normal.   Musculoskeletal:        Right shoulder: She exhibits tenderness (near deltoid insertion) and pain. She exhibits normal  range of motion, no bony tenderness, no swelling, no deformity, no spasm, normal pulse and normal strength.        Right hand: She exhibits normal capillary refill, no deformity and no swelling. Normal strength noted.        Left hand: She exhibits normal capillary refill, no deformity and no swelling. Normal strength noted.   Negative jeter, negative empty can test right.   Neurological: She is alert.   Nursing note and vitals reviewed.      Assessment/Plan   Mary was seen today for neck pain and shoulder pain.    Diagnoses and all orders for this visit:    Acute pain of right shoulder  -     diclofenac (VOLTAREN) 1 % gel gel; Apply 4 g topically 4 (Four) Times a Day As Needed (shoulder pain).        Use voltaren twice a day for a week. If not better next week call and I'll send a steroid pack. If still not improved then we need to consider MRI shoulder.       Floridalma Jimenez MD

## 2017-11-27 RX ORDER — LEVOTHYROXINE SODIUM 0.05 MG/1
TABLET ORAL
Qty: 30 TABLET | Refills: 3 | Status: SHIPPED | OUTPATIENT
Start: 2017-11-27 | End: 2018-04-02 | Stop reason: SDUPTHER

## 2017-12-20 ENCOUNTER — OFFICE VISIT (OUTPATIENT)
Dept: OBSTETRICS AND GYNECOLOGY | Facility: CLINIC | Age: 47
End: 2017-12-20

## 2017-12-20 VITALS
SYSTOLIC BLOOD PRESSURE: 132 MMHG | DIASTOLIC BLOOD PRESSURE: 80 MMHG | TEMPERATURE: 99.3 F | HEIGHT: 65 IN | WEIGHT: 228 LBS | BODY MASS INDEX: 37.99 KG/M2

## 2017-12-20 DIAGNOSIS — R68.83 CHILLS: ICD-10-CM

## 2017-12-20 DIAGNOSIS — N76.0 ACUTE VAGINITIS: ICD-10-CM

## 2017-12-20 DIAGNOSIS — R05.9 COUGH: ICD-10-CM

## 2017-12-20 DIAGNOSIS — R21 RASH: ICD-10-CM

## 2017-12-20 DIAGNOSIS — R53.81 MALAISE: ICD-10-CM

## 2017-12-20 DIAGNOSIS — R39.89 POSSIBLE URINARY TRACT INFECTION: Primary | ICD-10-CM

## 2017-12-20 LAB
CLARITY, POC: CLEAR
COLOR UR: YELLOW
GLUCOSE UR STRIP-MCNC: NEGATIVE MG/DL
KETONES UR QL: NEGATIVE
LEUKOCYTE EST, POC: NEGATIVE
NITRITE UR-MCNC: NEGATIVE MG/ML
RBC # UR STRIP: NEGATIVE /UL
UROBILINOGEN UR QL: NORMAL

## 2017-12-20 PROCEDURE — 99214 OFFICE O/P EST MOD 30 MIN: CPT | Performed by: OBSTETRICS & GYNECOLOGY

## 2017-12-20 PROCEDURE — 81002 URINALYSIS NONAUTO W/O SCOPE: CPT | Performed by: OBSTETRICS & GYNECOLOGY

## 2017-12-20 RX ORDER — FLUCONAZOLE 150 MG/1
TABLET ORAL
Qty: 2 TABLET | Refills: 0 | Status: SHIPPED | OUTPATIENT
Start: 2017-12-20 | End: 2018-03-08

## 2017-12-20 NOTE — PROGRESS NOTES
Subjective  Chief Complaint   Patient presents with   • Vaginitis     Patient advised was treated for UTI x 1 week ago at [facility], is still having symptoms, vaginal irritation, fever, aching and chills. Patient advised also has rash on upper legs.      Patient is 47 y.o.  here for evaluation of possible persistent UTI.  Pt reports going to [facility] one week ago.  Pt dx with UTI on UA; no culture done.  Pt given macrobid.  Pt reports feeling better until today.  Pt with malaise, chills, whole body aches.  Pt feels like fever.  Pt with rash on LEs; started today.  Pt still with vaginal irritation and burning.  Pt does have vaginal discharge as well.  Pt denies any flank pain.  Pt with no nausea or emesis.  Pt has also developed head congestion, ear pain, and nonproductive cough today as well.  Pt did not take flu vaccine.    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 90 tablet 5   • cyanocobalamin 1000 MCG/ML injection inject 1 milliliter every 2 weeks as directed 1 mL 6   • diclofenac (VOLTAREN) 1 % gel gel Apply 4 g topically 4 (Four) Times a Day As Needed (shoulder pain). 100 g 1   • FERROCITE 324 MG tablet Take 1 tablet by mouth 2 (Two) Times a Day. Take one twice daily 30 tablet 5   • levothyroxine (SYNTHROID, LEVOTHROID) 50 MCG tablet take 1 tablet by mouth once daily 30 tablet 3   • metoprolol tartrate (LOPRESSOR) 25 MG tablet Take 25 mg by mouth 2 (Two) Times a Day.     • norethindrone-ethinyl estradiol-ferrous fumarate (BLISOVI 24 FE) 1-20 MG-MCG(24) per tablet Take 1  tablet by mouth Daily. 28 tablet 10   • pramipexole (MIRAPEX) 0.5 MG tablet Take 1.5 tablets by mouth Every Night. 0.5mg QHS and 0.25mg PRN with breakthrough symptoms 45 tablet 5   • RA ASPIRIN ADULT LOW STRENGTH 81 MG chewable tablet chew and swallow 1 tablet by mouth once daily  0   • [DISCONTINUED] FLUoxetine (PROZAC) 10 MG capsule Take 1 capsule by mouth Daily. 30 capsule 2     No current facility-administered medications on file prior to visit.      Allergies   Allergen Reactions   • Sulfa Antibiotics      Past Surgical History:   Procedure Laterality Date   • BREAST SURGERY Bilateral 1987   • CARDIAC CATHETERIZATION N/A 3/15/2017    Procedure: Coronary angiography;  Surgeon: Moustapha Peñaloza MD;  Location: [facility] CATH INVASIVE LOCATION;  Service:    • CARDIAC CATHETERIZATION N/A 3/15/2017    Procedure: Left ventriculography;  Surgeon: Moustapha Peñaloza MD;  Location: [facility] CATH INVASIVE LOCATION;  Service:    •  SECTION      1991 & 1995   • CHOLECYSTECTOMY  2013   • GASTRIC BYPASS  2011     Family History   Problem Relation Age of Onset   • Cancer Father    • Heart attack Father    • Cancer Other    • Other Other    • No Known Problems Mother    • Asthma Brother    • Arthritis Paternal Grandmother      Social History     Social History   • Marital status:      Spouse name: N/A   • Number of children: N/A   • Years of education: N/A     Social History Main Topics   • Smoking status: Former Smoker     Packs/day: 1.00     Years: 2.00     Quit date: 10/1995   • Smokeless tobacco: Never Used   • Alcohol use 7.2 oz/week     12 Cans of beer per week      Comment: weekly   • Drug use: No   • Sexual activity: Yes     Partners: Male     Birth control/ protection: OCP      Comment: birth control pill     Other Topics Concern   • None     Social History Narrative     Review of Systems  All systems were reviewed and negative except for:  Constitution:  positive for chills, fevers and  "sweats  Genitourinary: postivie for  frequency, painful urination and vaginal irritation  Endocrine: positive for  hot flashes and night sweats     Objective  Vitals:    12/20/17 1608   BP: 132/80   Temp: 99.3 °F (37.4 °C)   Weight: 103 kg (228 lb)   Height: 165.1 cm (65\")     Physical Exam:  General Appearance: alert, appears stated age and cooperative  Head: normocephalic, without obvious abnormality and atraumatic  Eyes: lids and lashes normal, conjunctivae and sclerae normal, no icterus, no pallor, corneas clear and PERRLA  Ears: ears appear intact with no abnormalities noted  Nose: nares normal, septum midline, mucosa normal and no drainage  Neck: suppple, trachea midline and no thyromegaly  Lungs: clear to auscultation, respirations regular, respirations even and respirations unlabored  Heart: regular rhythm and normal rate, normal S1, S2, no murmur, gallop, or rubs and no click  Breasts: Not performed.  Abdomen: normal bowel sounds, no masses, no hepatomegaly, no splenomegaly, soft non-tender, no guarding and no rebound tenderness  Pelvic: Clinical staff was present for exam  External genitalia:  +erythema of vulva  :  urethral meatus normal;  Vaginal:  normal pink mucosa without prolapse or lesions. discharge present -  white;  Cervix:  normal appearance.  Uterus:  normal size, shape and consistency.  Adnexa:  normal bimanual exam of the adnexa.  Extremities: moves extremities well, no edema, no cyanosis and no redness  Skin: no bleeding, bruising: fine macular rash on LE's  Lymph Nodes: no palpable adenopathy  Psych: normal mood and affect, oriented to person, time and place, thought content organized and appropriate judgment    Lab Review   UA    Imaging   No data reviewed    Assessment/Plan    Problem List Items Addressed This Visit        Respiratory    Cough  See plan below.       Musculoskeletal and Integument    Rash  Patient with new onset rash with other associated symptoms as noted; see plan " below.      Other Visit Diagnoses     Possible urinary tract infection    -  Primary  See UA today.  Send urine culture; will hold on antibiotics pending culture results.    Relevant Orders    POC Urinalysis Dipstick (Completed)    Urine Culture - Urine, Urine, Clean Catch    Urinalysis With Microscopic - Urine, Clean Catch    Acute vaginitis      Probable yeast vaginitis.  Cultures done.  Rx Diflucan given as noted.    Relevant Orders    NuSwab VG+ - Swab, Vagina    Chills      Patient with overall body aches, chills, malaise, cough, and rash.  [facility] given above symptoms.  Unable to do flu swab in office.  Pt in agreement with plan.    Malaise      See above plan.            Follow up as directed     This note was electronically signed.  Tracee Garcia M.D.

## 2017-12-27 LAB
A VAGINAE DNA VAG QL NAA+PROBE: NORMAL SCORE
APPEARANCE UR: (no result)
BACTERIA #/AREA URNS HPF: ABNORMAL /HPF
BACTERIA UR CULT: NORMAL
BACTERIA UR CULT: NORMAL
BILIRUB UR QL STRIP: NEGATIVE
BVAB2 DNA VAG QL NAA+PROBE: NORMAL SCORE
C ALBICANS DNA VAG QL NAA+PROBE: NEGATIVE
C GLABRATA DNA VAG QL NAA+PROBE: NEGATIVE
C TRACH RRNA SPEC QL NAA+PROBE: NEGATIVE
CASTS URNS MICRO: ABNORMAL
COLOR UR: YELLOW
CRYSTALS URNS MICRO: ABNORMAL
EPI CELLS #/AREA URNS HPF: ABNORMAL /HPF
GLUCOSE UR QL: NEGATIVE
HGB UR QL STRIP: (no result)
KETONES UR QL STRIP: (no result)
LEUKOCYTE ESTERASE UR QL STRIP: NEGATIVE
MEGA1 DNA VAG QL NAA+PROBE: NORMAL SCORE
N GONORRHOEA RRNA SPEC QL NAA+PROBE: NEGATIVE
NITRITE UR QL STRIP: NEGATIVE
PH UR STRIP: 5.5 [PH] (ref 5–8)
PROT UR QL STRIP: NEGATIVE
RBC #/AREA URNS HPF: ABNORMAL /HPF
SP GR UR: 1.02 (ref 1–1.03)
T VAGINALIS RRNA SPEC QL NAA+PROBE: NEGATIVE
UROBILINOGEN UR STRIP-MCNC: (no result) MG/DL
WBC #/AREA URNS HPF: ABNORMAL /HPF

## 2018-03-01 ENCOUNTER — HOSPITAL ENCOUNTER (EMERGENCY)
Facility: HOSPITAL | Age: 48
Discharge: HOME | End: 2018-03-01
Attending: EMERGENCY MEDICINE | Admitting: EMERGENCY MEDICINE

## 2018-03-01 ENCOUNTER — TELEPHONE (OUTPATIENT)
Dept: INTERNAL MEDICINE | Facility: CLINIC | Age: 48
End: 2018-03-01

## 2018-03-01 VITALS
SYSTOLIC BLOOD PRESSURE: 138 MMHG | OXYGEN SATURATION: 99 % | DIASTOLIC BLOOD PRESSURE: 84 MMHG | WEIGHT: 240 LBS | TEMPERATURE: 98.7 F | BODY MASS INDEX: 39.99 KG/M2 | HEART RATE: 76 BPM | RESPIRATION RATE: 16 BRPM | HEIGHT: 65 IN

## 2018-03-01 DIAGNOSIS — M79.602 BILATERAL ARM PAIN: Primary | ICD-10-CM

## 2018-03-01 DIAGNOSIS — M54.9 UPPER BACK PAIN: ICD-10-CM

## 2018-03-01 DIAGNOSIS — M79.601 BILATERAL ARM PAIN: Primary | ICD-10-CM

## 2018-03-01 LAB
ALBUMIN SERPL-MCNC: 4.3 G/DL (ref 3.5–5)
ALBUMIN/GLOB SERPL: 1.6 G/DL (ref 1–2)
ALP SERPL-CCNC: 47 U/L (ref 38–126)
ALT SERPL W P-5'-P-CCNC: 19 U/L (ref 13–69)
ANION GAP SERPL CALCULATED.3IONS-SCNC: 16.3 MMOL/L
AST SERPL-CCNC: 28 U/L (ref 15–46)
BASOPHILS # BLD AUTO: 0.05 10*3/MM3 (ref 0–0.2)
BASOPHILS NFR BLD AUTO: 0.6 % (ref 0–2.5)
BILIRUB SERPL-MCNC: 0.5 MG/DL (ref 0.2–1.3)
BUN BLD-MCNC: 16 MG/DL (ref 7–20)
BUN/CREAT SERPL: 16 (ref 7.1–23.5)
CALCIUM SPEC-SCNC: 9.1 MG/DL (ref 8.4–10.2)
CHLORIDE SERPL-SCNC: 104 MMOL/L (ref 98–107)
CO2 SERPL-SCNC: 26 MMOL/L (ref 26–30)
CREAT BLD-MCNC: 1 MG/DL (ref 0.6–1.3)
DEPRECATED RDW RBC AUTO: 44.9 FL (ref 37–54)
EOSINOPHIL # BLD AUTO: 0.18 10*3/MM3 (ref 0–0.7)
EOSINOPHIL NFR BLD AUTO: 2 % (ref 0–7)
ERYTHROCYTE [DISTWIDTH] IN BLOOD BY AUTOMATED COUNT: 12.3 % (ref 11.5–14.5)
GFR SERPL CREATININE-BSD FRML MDRD: 59 ML/MIN/1.73
GLOBULIN UR ELPH-MCNC: 2.7 GM/DL
GLUCOSE BLD-MCNC: 93 MG/DL (ref 74–98)
HCG SERPL QL: NEGATIVE
HCT VFR BLD AUTO: 40.5 % (ref 37–47)
HGB BLD-MCNC: 13.8 G/DL (ref 12–16)
HOLD SPECIMEN: NORMAL
HOLD SPECIMEN: NORMAL
IMM GRANULOCYTES # BLD: 0.05 10*3/MM3 (ref 0–0.06)
IMM GRANULOCYTES NFR BLD: 0.6 % (ref 0–0.6)
LYMPHOCYTES # BLD AUTO: 2.72 10*3/MM3 (ref 0.6–3.4)
LYMPHOCYTES NFR BLD AUTO: 30.4 % (ref 10–50)
MCH RBC QN AUTO: 33.7 PG (ref 27–31)
MCHC RBC AUTO-ENTMCNC: 34.1 G/DL (ref 30–37)
MCV RBC AUTO: 99 FL (ref 81–99)
MONOCYTES # BLD AUTO: 0.46 10*3/MM3 (ref 0–0.9)
MONOCYTES NFR BLD AUTO: 5.1 % (ref 0–12)
NEUTROPHILS # BLD AUTO: 5.48 10*3/MM3 (ref 2–6.9)
NEUTROPHILS NFR BLD AUTO: 61.3 % (ref 37–80)
NRBC BLD MANUAL-RTO: 0 /100 WBC (ref 0–0)
PLATELET # BLD AUTO: 243 10*3/MM3 (ref 130–400)
PMV BLD AUTO: 9.1 FL (ref 6–12)
POTASSIUM BLD-SCNC: 4.3 MMOL/L (ref 3.5–5.1)
PROT SERPL-MCNC: 7 G/DL (ref 6.3–8.2)
RBC # BLD AUTO: 4.09 10*6/MM3 (ref 4.2–5.4)
SODIUM BLD-SCNC: 142 MMOL/L (ref 137–145)
TROPONIN I SERPL-MCNC: 0 NG/ML (ref 0–0.05)
TROPONIN I SERPL-MCNC: <0.012 NG/ML (ref 0–0.03)
TROPONIN I SERPL-MCNC: <0.012 NG/ML (ref 0–0.03)
WBC NRBC COR # BLD: 8.94 10*3/MM3 (ref 4.8–10.8)
WHOLE BLOOD HOLD SPECIMEN: NORMAL
WHOLE BLOOD HOLD SPECIMEN: NORMAL

## 2018-03-01 PROCEDURE — 85025 COMPLETE CBC W/AUTO DIFF WBC: CPT

## 2018-03-01 PROCEDURE — 84484 ASSAY OF TROPONIN QUANT: CPT

## 2018-03-01 PROCEDURE — 84703 CHORIONIC GONADOTROPIN ASSAY: CPT

## 2018-03-01 PROCEDURE — 84484 ASSAY OF TROPONIN QUANT: CPT | Performed by: NURSE PRACTITIONER

## 2018-03-01 PROCEDURE — 80053 COMPREHEN METABOLIC PANEL: CPT

## 2018-03-01 PROCEDURE — 93005 ELECTROCARDIOGRAM TRACING: CPT

## 2018-03-01 PROCEDURE — 99284 EMERGENCY DEPT VISIT MOD MDM: CPT

## 2018-03-01 RX ORDER — SODIUM CHLORIDE 0.9 % (FLUSH) 0.9 %
10 SYRINGE (ML) INJECTION AS NEEDED
Status: DISCONTINUED | OUTPATIENT
Start: 2018-03-01 | End: 2018-03-01 | Stop reason: HOSPADM

## 2018-03-01 RX ORDER — ASPIRIN 325 MG
325 TABLET ORAL ONCE
Status: COMPLETED | OUTPATIENT
Start: 2018-03-01 | End: 2018-03-01

## 2018-03-01 RX ADMIN — METOPROLOL TARTRATE 25 MG: 25 TABLET ORAL at 17:53

## 2018-03-01 RX ADMIN — ASPIRIN 325 MG ORAL TABLET 325 MG: 325 PILL ORAL at 17:53

## 2018-03-01 NOTE — ED PROVIDER NOTES
"Subjective   History of Present Illness  This is a 47 year old female who comes in today complaining of bilat arm pain that started this morning. She states that the pain persisted and started running across her upper back around 1pm. She denies any nausea, vomiting or fever. She reports this happened last year and her troponin was elevated and she went for normal heart cath but had \"cork screw\" vessels that were of concern to Dr. Peñaloza so he referred her on to [facility] and she underwent MRI. The cardiologist there felt she may have had a blockage and infarct of a \"tiny\" vessel causing the elevation in the troponin. He then ordered her Metoprolol and she has not had any issues since that time. However, she ran out of her metoprolol 5 days ago.   Review of Systems   Constitutional: Negative.    HENT: Negative.    Eyes: Negative.    Respiratory: Negative.    Cardiovascular: Positive for chest pain.   Gastrointestinal: Negative.    Genitourinary: Negative.    Skin: Negative.    Neurological: Negative.    Psychiatric/Behavioral: Negative.    All other systems reviewed and are negative.      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   • Gallstone    • Kidney stones    • Migraine    • Obesity    • Ovarian cyst    • Restless leg syndrome    • Urinary tract infection    • Vitamin B12 deficiency        Allergies   Allergen Reactions   • Sulfa Antibiotics        Past Surgical History:   Procedure Laterality Date   • BREAST SURGERY Bilateral 1987   • CARDIAC CATHETERIZATION N/A 3/15/2017    Procedure: Coronary angiography;  Surgeon: Moustapha Peñaloza MD;  Location: [facility] CATH INVASIVE LOCATION;  Service:    • CARDIAC CATHETERIZATION N/A 3/15/2017    Procedure: Left ventriculography;  Surgeon: Moustapha Peñaloza MD;  Location: [facility] CATH INVASIVE LOCATION;  Service:    •  SECTION      1991 & 1995   • CHOLECYSTECTOMY  2013   • GASTRIC BYPASS  2011 "       Family History   Problem Relation Age of Onset   • Cancer Father    • Heart attack Father    • Cancer Other    • Other Other    • No Known Problems Mother    • Asthma Brother    • Arthritis Paternal Grandmother        Social History     Social History   • Marital status:      Social History Main Topics   • Smoking status: Former Smoker     Packs/day: 1.00     Years: 2.00     Quit date: 10/1995   • Smokeless tobacco: Never Used   • Alcohol use 7.2 oz/week     12 Cans of beer per week      Comment: drinks beer 4 times a week    • Drug use: No   • Sexual activity: Yes     Partners: Male     Birth control/ protection: OCP      Comment: birth control pill           Objective   Physical Exam   Constitutional: She appears well-developed and well-nourished.   Nursing note and vitals reviewed.  GEN: No acute distress  Head: Normocephalic, atraumatic  Eyes: Pupils equal round reactive to light  ENT: Posterior pharynx normal in appearance, oral mucosa is moist  Chest: Nontender to palpation  Cardiovascular: Regular rate  Lungs: Clear to auscultation bilaterally  Abdomen: Soft, nontender, nondistended, no peritoneal signs  Extremities: No edema, normal appearance  Neuro: GCS 15  Psych: Mood and affect are appropriate      ECG 12 Lead    Date/Time: 3/1/2018 6:32 PM  Performed by: ROSA PALMER  Authorized by: [facility]   Interpreted by physician  Comparison: compared with previous ECG   Similar to previous ECG  Rhythm: sinus rhythm  Clinical impression: non-specific ECG               ED Course  ED Course                  MDM  Number of Diagnoses or Management Options     Amount and/or Complexity of Data Reviewed  Clinical lab tests: reviewed and ordered  Tests in the radiology section of CPT®: ordered and reviewed  Review and summarize past medical records: yes  Discuss the patient with other providers: yes  Independent visualization of images, tracings, or specimens: yes    Risk of  Complications, Morbidity, and/or Mortality  Presenting problems: moderate  Diagnostic procedures: moderate  Management options: moderate        Final diagnoses:   Bilateral arm pain   Upper back pain            LINNEA Yanez  03/01/18 2022

## 2018-03-01 NOTE — TELEPHONE ENCOUNTER
Patient called and states she is having arm pain that started this morning and states she is concerned as last time this happened she had to have extensive cardiac work-up. She states she is probably going to leave work and head this way to possible go to the er but wants a call back to speak with nurse.

## 2018-03-02 NOTE — TELEPHONE ENCOUNTER
Did not see this message until now as Dr. Jimenez and I are out on Thursday afternoons. After looking at patients chart she did go to ER.

## 2018-03-02 NOTE — DISCHARGE INSTRUCTIONS
"    Hypertension  Hypertension, commonly called high blood pressure, is when the force of blood pumping through the arteries is too strong. The arteries are the blood vessels that carry blood from the heart throughout the body. Hypertension forces the heart to work harder to pump blood and may cause arteries to become narrow or stiff. Having untreated or uncontrolled hypertension can cause heart attacks, strokes, kidney disease, and other problems.  A blood pressure reading consists of a higher number over a lower number. Ideally, your blood pressure should be below 120/80. The first (\"top\") number is called the systolic pressure. It is a measure of the pressure in your arteries as your heart beats. The second (\"bottom\") number is called the diastolic pressure. It is a measure of the pressure in your arteries as the heart relaxes.  What are the causes?  The cause of this condition is not known.  What increases the risk?  Some risk factors for high blood pressure are under your control. Others are not.  Factors you can change   · Smoking.  · Having type 2 diabetes mellitus, high cholesterol, or both.  · Not getting enough exercise or physical activity.  · Being overweight.  · Having too much fat, sugar, calories, or salt (sodium) in your diet.  · Drinking too much alcohol.  Factors that are difficult or impossible to change   · Having chronic kidney disease.  · Having a family history of high blood pressure.  · Age. Risk increases with age.  · Race. You may be at higher risk if you are -American.  · Gender. Men are at higher risk than women before age 45. After age 65, women are at higher risk than men.  · Having obstructive sleep apnea.  · Stress.  What are the signs or symptoms?  Extremely high blood pressure (hypertensive crisis) may cause:  · Headache.  · Anxiety.  · Shortness of breath.  · Nosebleed.  · Nausea and vomiting.  · Severe chest pain.  · Jerky movements you cannot control (seizures).  How is " this diagnosed?  This condition is diagnosed by measuring your blood pressure while you are seated, with your arm resting on a surface. The cuff of the blood pressure monitor will be placed directly against the skin of your upper arm at the level of your heart. It should be measured at least twice using the same arm. Certain conditions can cause a difference in blood pressure between your right and left arms.  Certain factors can cause blood pressure readings to be lower or higher than normal (elevated) for a short period of time:  · When your blood pressure is higher when you are in a health care provider's office than when you are at home, this is called white coat hypertension. Most people with this condition do not need medicines.  · When your blood pressure is higher at home than when you are in a health care provider's office, this is called masked hypertension. Most people with this condition may need medicines to control blood pressure.  If you have a high blood pressure reading during one visit or you have normal blood pressure with other risk factors:  · You may be asked to return on a different day to have your blood pressure checked again.  · You may be asked to monitor your blood pressure at home for 1 week or longer.  If you are diagnosed with hypertension, you may have other blood or imaging tests to help your health care provider understand your overall risk for other conditions.  How is this treated?  This condition is treated by making healthy lifestyle changes, such as eating healthy foods, exercising more, and reducing your alcohol intake. Your health care provider may prescribe medicine if lifestyle changes are not enough to get your blood pressure under control, and if:  · Your systolic blood pressure is above 130.  · Your diastolic blood pressure is above 80.  Your personal target blood pressure may vary depending on your medical conditions, your age, and other factors.  Follow these  instructions at home:  Eating and drinking   · Eat a diet that is high in fiber and potassium, and low in sodium, added sugar, and fat. An example eating plan is called the DASH (Dietary Approaches to Stop Hypertension) diet. To eat this way:  ¨ Eat plenty of fresh fruits and vegetables. Try to fill half of your plate at each meal with fruits and vegetables.  ¨ Eat whole grains, such as whole wheat pasta, brown rice, or whole grain bread. Fill about one quarter of your plate with whole grains.  ¨ Eat or drink low-fat dairy products, such as skim milk or low-fat yogurt.  ¨ Avoid fatty cuts of meat, processed or cured meats, and poultry with skin. Fill about one quarter of your plate with lean proteins, such as fish, chicken without skin, beans, eggs, and tofu.  ¨ Avoid premade and processed foods. These tend to be higher in sodium, added sugar, and fat.  · Reduce your daily sodium intake. Most people with hypertension should eat less than 1,500 mg of sodium a day.  · Limit alcohol intake to no more than 1 drink a day for nonpregnant women and 2 drinks a day for men. One drink equals 12 oz of beer, 5 oz of wine, or 1½ oz of hard liquor.  Lifestyle   · Work with your health care provider to maintain a healthy body weight or to lose weight. Ask what an ideal weight is for you.  · Get at least 30 minutes of exercise that causes your heart to beat faster (aerobic exercise) most days of the week. Activities may include walking, swimming, or biking.  · Include exercise to strengthen your muscles (resistance exercise), such as pilates or lifting weights, as part of your weekly exercise routine. Try to do these types of exercises for 30 minutes at least 3 days a week.  · Do not use any products that contain nicotine or tobacco, such as cigarettes and e-cigarettes. If you need help quitting, ask your health care provider.  · Monitor your blood pressure at home as told by your health care provider.  · Keep all follow-up visits  as told by your health care provider. This is important.  Medicines   · Take over-the-counter and prescription medicines only as told by your health care provider. Follow directions carefully. Blood pressure medicines must be taken as prescribed.  · Do not skip doses of blood pressure medicine. Doing this puts you at risk for problems and can make the medicine less effective.  · Ask your health care provider about side effects or reactions to medicines that you should watch for.  Contact a health care provider if:  · You think you are having a reaction to a medicine you are taking.  · You have headaches that keep coming back (recurring).  · You feel dizzy.  · You have swelling in your ankles.  · You have trouble with your vision.  Get help right away if:  · You develop a severe headache or confusion.  · You have unusual weakness or numbness.  · You feel faint.  · You have severe pain in your chest or abdomen.  · You vomit repeatedly.  · You have trouble breathing.  Summary  · Hypertension is when the force of blood pumping through your arteries is too strong. If this condition is not controlled, it may put you at risk for serious complications.  · Your personal target blood pressure may vary depending on your medical conditions, your age, and other factors. For most people, a normal blood pressure is less than 120/80.  · Hypertension is treated with lifestyle changes, medicines, or a combination of both. Lifestyle changes include weight loss, eating a healthy, low-sodium diet, exercising more, and limiting alcohol.  This information is not intended to replace advice given to you by your health care provider. Make sure you discuss any questions you have with your health care provider.  Document Released: 12/18/2006 Document Revised: 11/15/2017 Document Reviewed: 11/15/2017  [employer] EMR Interactive Patient Education © 2017 [employer]

## 2018-03-08 ENCOUNTER — OFFICE VISIT (OUTPATIENT)
Dept: INTERNAL MEDICINE | Facility: CLINIC | Age: 48
End: 2018-03-08

## 2018-03-08 VITALS
OXYGEN SATURATION: 99 % | WEIGHT: 227.75 LBS | TEMPERATURE: 98.6 F | HEIGHT: 64 IN | DIASTOLIC BLOOD PRESSURE: 87 MMHG | BODY MASS INDEX: 38.88 KG/M2 | HEART RATE: 67 BPM | SYSTOLIC BLOOD PRESSURE: 120 MMHG

## 2018-03-08 DIAGNOSIS — H93.12 TINNITUS OF LEFT EAR: Primary | ICD-10-CM

## 2018-03-08 DIAGNOSIS — M54.9 BACK PAIN, UNSPECIFIED BACK LOCATION, UNSPECIFIED BACK PAIN LATERALITY, UNSPECIFIED CHRONICITY: ICD-10-CM

## 2018-03-08 LAB
BILIRUB BLD-MCNC: NEGATIVE MG/DL
CLARITY, POC: CLEAR
COLOR UR: YELLOW
GLUCOSE UR STRIP-MCNC: NEGATIVE MG/DL
KETONES UR QL: NEGATIVE
LEUKOCYTE EST, POC: NEGATIVE
NITRITE UR-MCNC: NEGATIVE MG/ML
PH UR: 5 [PH] (ref 5–8)
PROT UR STRIP-MCNC: NEGATIVE MG/DL
RBC # UR STRIP: ABNORMAL /UL
SP GR UR: 1.02 (ref 1–1.03)
UROBILINOGEN UR QL: ABNORMAL

## 2018-03-08 PROCEDURE — 99214 OFFICE O/P EST MOD 30 MIN: CPT | Performed by: NURSE PRACTITIONER

## 2018-03-08 PROCEDURE — 81003 URINALYSIS AUTO W/O SCOPE: CPT | Performed by: NURSE PRACTITIONER

## 2018-03-08 RX ORDER — CETIRIZINE HYDROCHLORIDE 10 MG/1
10 TABLET ORAL DAILY
Qty: 30 TABLET | Refills: 1 | Status: SHIPPED | OUTPATIENT
Start: 2018-03-08 | End: 2018-05-30

## 2018-03-08 RX ORDER — FLUTICASONE PROPIONATE 50 MCG
2 SPRAY, SUSPENSION (ML) NASAL DAILY
Qty: 9.9 ML | Refills: 0 | Status: SHIPPED | OUTPATIENT
Start: 2018-03-08 | End: 2018-04-07

## 2018-03-08 NOTE — PROGRESS NOTES
"  Chief Complaint / Reason:      Chief Complaint   Patient presents with   • Follow-up     ER Follow up   • Tinnitus     Ringing in left ear       Subjective     HPI  Patient presents today for ER follow-up regarding bilateral arm pain that started on 3/1/18 and with complaints of ringing in her left ear.  She denies any fever, chest pain, shortness of breath, or heart palpitations.  When she was seen in the ER she indicated that the pain started and was running across her upper back and that last year her troponin was elevated and she went for a normal heart catheter that had \"cork screw\" vessels that were of concern to Dr. Peñaloza so he referred her on to [facility] and she underwent MRI. The cardiologist there felt she may have had a blockage and infarct of a \"tiny\" vessel causing the elevation in the troponin. He then ordered her Metoprolol and she has not had any issues since that time.  She ran out of her medication and began having symptoms and when she did she went to [facility] emergency department.  She had normal troponins and EKG.  Patient's GFR was less than 60 and patient indicates that she has had some ongoing back pain that her most recent UA did have blood but no bacteria.  Patient does see neurology for restless leg syndrome and has been taking different medications in attempts to relieve symptoms but her sleeping has been limited due to the RLS.  She has been sleeping on her left side a little more.  Denies any ear drainage or fever.  She does state that her ears feel kind of bubbly and full at times.  Vital signs are stable and patient appears to be in no acute distress at this time.  History taken from: patient    PMH/FH/Social History were reviewed and updated appropriately in the electronic medical record.     Review of Systems:   Review of Systems   Constitutional: Positive for fatigue.   HENT:        Ringing in her left ear    Respiratory: Negative.    Cardiovascular: Negative.  "   Gastrointestinal: Negative.    Musculoskeletal: Positive for back pain.   Neurological: Negative.      All other systems were reviewed and are negative.  Exceptions are noted in the subjective or above.      Objective     Vital Signs  Vitals:    03/08/18 1724   BP: 120/87   Pulse: 67   Temp: 98.6 °F (37 °C)   SpO2: 99%       Body mass index is 38.5 kg/m².    Physical Exam   Constitutional: She is oriented to person, place, and time. She appears well-developed and well-nourished. No distress.   HENT:   Head: Normocephalic.   Right Ear: External ear normal. Tympanic membrane is erythematous and bulging.   Left Ear: External ear normal. Tympanic membrane is erythematous and bulging.   Nose: Nose normal.   Mouth/Throat: Oropharynx is clear and moist.   Cardiovascular: Normal rate, regular rhythm, normal heart sounds and intact distal pulses.    Pulmonary/Chest: Effort normal and breath sounds normal. She has no wheezes. She exhibits no tenderness.   Neurological: She is alert and oriented to person, place, and time.   Skin: Skin is warm and dry. No rash noted. No erythema. No pallor.   Psychiatric: She has a normal mood and affect. Her behavior is normal. Judgment and thought content normal.   Nursing note and vitals reviewed.       Results Review:    I reviewed the patient's new clinical results.   Office Visit on 03/08/2018   Component Date Value Ref Range Status   • Color 03/08/2018 Yellow  Yellow, Straw, Dark Yellow, Paula Final   • Clarity, UA 03/08/2018 Clear  Clear Final   • Glucose, UA 03/08/2018 Negative  Negative, 1000 mg/dL (3+) mg/dL Final   • Bilirubin 03/08/2018 Negative  Negative Final   • Ketones, UA 03/08/2018 Negative  Negative Final   • Specific Gravity  03/08/2018 1.020  1.005 - 1.030 Final   • Blood, UA 03/08/2018 [address] [place]/ul* Negative Final   • pH, Urine 03/08/2018 5.0  5.0 - 8.0 Final   • Protein, POC 03/08/2018 Negative  Negative mg/dL Final   • Urobilinogen, UA 03/08/2018 1 E.U./dL * Normal  Final   • Leukocytes 03/08/2018 Negative  Negative Final   • Nitrite, UA 03/08/2018 Negative  Negative Final         Medication Review:     Current Outpatient Prescriptions:   •  carbidopa-levodopa CR (SINEMET CR)  MG per CR tablet, Take 1 tablet by mouth 3 (Three) Times a Day., Disp: 90 tablet, Rfl: 5  •  cyanocobalamin 1000 MCG/ML injection, inject 1 milliliter every 2 weeks as directed, Disp: 1 mL, Rfl: 6  •  FERROCITE 324 MG tablet, Take 1 tablet by mouth 2 (Two) Times a Day. Take one twice daily, Disp: 30 tablet, Rfl: 5  •  levothyroxine (SYNTHROID, LEVOTHROID) 50 MCG tablet, take 1 tablet by mouth once daily, Disp: 30 tablet, Rfl: 3  •  metoprolol tartrate (LOPRESSOR) 25 MG tablet, Take 1 tablet by mouth Every 12 (Twelve) Hours., Disp: 60 tablet, Rfl: 0  •  norethindrone-ethinyl estradiol-ferrous fumarate (BLISOVI 24 FE) 1-20 MG-MCG(24) per tablet, Take 1 tablet by mouth Daily., Disp: 28 tablet, Rfl: 10  •  pramipexole (MIRAPEX) 0.5 MG tablet, Take 1.5 tablets by mouth Every Night. 0.5mg QHS and 0.25mg PRN with breakthrough symptoms, Disp: 45 tablet, Rfl: 5  •  RA ASPIRIN ADULT LOW STRENGTH 81 MG chewable tablet, chew and swallow 1 tablet by mouth once daily, Disp: , Rfl: 0  •  cetirizine (zyrTEC) 10 MG tablet, Take 1 tablet by mouth Daily., Disp: 30 tablet, Rfl: 1  •  fluticasone (FLONASE) 50 MCG/ACT nasal spray, 2 sprays into each nostril Daily for 30 days. Administer 2 sprays in each nostril for each dose., Disp: 9.9 mL, Rfl: 0    Assessment/Plan   Mary was seen today for follow-up and tinnitus.    Diagnoses and all orders for this visit:    Tinnitus of left ear  -     fluticasone (FLONASE) 50 MCG/ACT nasal spray; 2 sprays into each nostril Daily for 30 days. Administer 2 sprays in each nostril for each dose.  -     cetirizine (zyrTEC) 10 MG tablet; Take 1 tablet by mouth Daily.  Keep scheduled appointment with Neurology     Back pain, unspecified back location, unspecified back pain laterality,  unspecified chronicity  -     POCT urinalysis dipstick, automated  -     XR Abdomen KUB  Increase water intake and avoid constipation or straining   Dicussed reasons for hematuria.     Return in about 4 weeks (around 4/5/2018), or if symptoms worsen or fail to improve.    Megha Mattson, APRN  03/08/2018

## 2018-03-13 ENCOUNTER — HOSPITAL ENCOUNTER (OUTPATIENT)
Dept: GENERAL RADIOLOGY | Facility: HOSPITAL | Age: 48
Discharge: HOME | End: 2018-03-13
Attending: NURSE PRACTITIONER | Admitting: NURSE PRACTITIONER

## 2018-03-13 PROCEDURE — 74018 RADEX ABDOMEN 1 VIEW: CPT

## 2018-03-19 ENCOUNTER — TELEPHONE (OUTPATIENT)
Dept: INTERNAL MEDICINE | Facility: CLINIC | Age: 48
End: 2018-03-19

## 2018-03-19 DIAGNOSIS — R94.4 DECREASED GFR: ICD-10-CM

## 2018-03-19 DIAGNOSIS — R31.9 HEMATURIA, UNSPECIFIED TYPE: ICD-10-CM

## 2018-03-19 DIAGNOSIS — R10.9 FLANK PAIN: Primary | ICD-10-CM

## 2018-03-19 NOTE — PROGRESS NOTES
Please contact patient and let her know I put order for CT scan in and I tried calling her 2 times on first number and one time on second number but was not able to reach her. Thanks

## 2018-03-19 NOTE — TELEPHONE ENCOUNTER
PATIENT CALLED AND WANTED TO GET THE RESULTS OF HER X-RAY DONE ON 03/13/2018. PLEASE CALL AND LET HER KNOW.

## 2018-03-26 RX ORDER — FERROUS FUMARATE 324(106)MG
TABLET ORAL
Qty: 60 TABLET | Refills: 5 | Status: SHIPPED | OUTPATIENT
Start: 2018-03-26 | End: 2018-11-18 | Stop reason: SDUPTHER

## 2018-03-30 ENCOUNTER — OFFICE VISIT (OUTPATIENT)
Dept: CARDIOLOGY | Facility: CLINIC | Age: 48
End: 2018-03-30

## 2018-03-30 VITALS
DIASTOLIC BLOOD PRESSURE: 84 MMHG | HEART RATE: 65 BPM | RESPIRATION RATE: 18 BRPM | HEIGHT: 64 IN | OXYGEN SATURATION: 98 % | BODY MASS INDEX: 38.76 KG/M2 | SYSTOLIC BLOOD PRESSURE: 132 MMHG | WEIGHT: 227 LBS

## 2018-03-30 DIAGNOSIS — I40.1 SUBACUTE IDIOPATHIC MYOCARDITIS: Primary | ICD-10-CM

## 2018-03-30 DIAGNOSIS — I10 ESSENTIAL HYPERTENSION: ICD-10-CM

## 2018-03-30 PROCEDURE — 99213 OFFICE O/P EST LOW 20 MIN: CPT | Performed by: INTERNAL MEDICINE

## 2018-03-30 NOTE — PROGRESS NOTES
Subjective:     Encounter Date:03/30/2018      Patient ID: Mary Lunsford is a 47 y.o. female.    Chief Complaint:Arm pain  HPI  This is a 47-year-old female patient who reports to cardiology clinic for routine follow-up after a recent emergency room visit for symptomatic severe hypertension which occurred after the abrupt discontinuation of her beta blocker.  The patient indicates that she decided that she no longer required beta blocker therapy.  She made the decision to discontinue her beta blocker abruptly.  She did not contact our office or other physicians regarding this decision.  Within a few days of discontinuation of her beta blocker therapy she was experiencing arm pain and went to a local emergency room where she was discovered to be severely hypertensive.  Her medication was restarted and she has done fine since.  She requests a refill of her Lopressor at today's visit.  The patient underwent evaluation at [facility].  At the time there was concern that the patient either had a subacute focal form of myocarditis versus a small non-ST elevation myocardial infarction.  The patient underwent a cardiac MR study with gadolinium enhancement.  Based on the results of this examination the patient was told that she did not experience a myocardial infarction.  There was also concern based on her coronary angiogram that the patient may have had a subacute mid LAD spontaneous dissection.  The patient underwent a vasodilator stress test which showed no evidence of ischemia or infarction.  After review of the coronary angiograms the second opinion by the cardiologist at the [facility] was that there was very little to suggest a spontaneous coronary dissection.  The patient's left arm pain has not recurred since her reinitiation of beta blocker therapy.  She has no exertional chest arm neck jaw shoulder or back discomfort.  There is no shortness of  breath at rest or with activity.  There is no orthopnea PND or lower extremity edema.  There is no dizziness palpitations or syncope.  The following portions of the patient's history were reviewed and updated as appropriate: allergies, current medications, past family history, past medical history, past social history, past surgical history and problem  Review of Systems   Constitution: Negative for chills, diaphoresis, fever, malaise/fatigue, weight gain and weight loss.   HENT: Negative for ear discharge, hearing loss, hoarse voice and nosebleeds.    Eyes: Negative for discharge, double vision, pain and photophobia.   Cardiovascular: Negative for chest pain, claudication, cyanosis, dyspnea on exertion, irregular heartbeat, leg swelling, near-syncope, orthopnea, palpitations, paroxysmal nocturnal dyspnea and syncope.   Respiratory: Negative for cough, hemoptysis, shortness of breath, sputum production and wheezing.    Endocrine: Negative for cold intolerance, heat intolerance, polydipsia, polyphagia and polyuria.   Hematologic/Lymphatic: Negative for adenopathy and bleeding problem. Does not bruise/bleed easily.   Skin: Negative for color change, flushing, itching and rash.   Musculoskeletal: Negative for muscle cramps, muscle weakness, myalgias and stiffness.   Gastrointestinal: Negative for abdominal pain, diarrhea, hematemesis, hematochezia, nausea and vomiting.   Genitourinary: Negative for dysuria, frequency and nocturia.   Neurological: Negative for focal weakness, loss of balance, numbness, paresthesias and seizures.   Psychiatric/Behavioral: Negative for altered mental status, hallucinations and suicidal ideas.   Allergic/Immunologic: Negative for HIV exposure, hives and persistent infections.           Current Outpatient Prescriptions:   •  carbidopa-levodopa CR (SINEMET CR)  MG per CR tablet, Take 1 tablet by mouth 3 (Three) Times a Day., Disp: 90 tablet, Rfl: 5  •  cetirizine (zyrTEC) 10 MG tablet,  Take 1 tablet by mouth Daily., Disp: 30 tablet, Rfl: 1  •  cyanocobalamin 1000 MCG/ML injection, inject 1 milliliter every 2 weeks as directed, Disp: 1 mL, Rfl: 6  •  FERROCITE 324 MG tablet, take 1 tablet by mouth twice a day, Disp: 60 tablet, Rfl: 5  •  fluticasone (FLONASE) 50 MCG/ACT nasal spray, 2 sprays into each nostril Daily for 30 days. Administer 2 sprays in each nostril for each dose., Disp: 9.9 mL, Rfl: 0  •  levothyroxine (SYNTHROID, LEVOTHROID) 50 MCG tablet, take 1 tablet by mouth once daily, Disp: 30 tablet, Rfl: 3  •  norethindrone-ethinyl estradiol-ferrous fumarate (BLISOVI 24 FE) 1-20 MG-MCG(24) per tablet, Take 1 tablet by mouth Daily., Disp: 28 tablet, Rfl: 10  •  pramipexole (MIRAPEX) 0.5 MG tablet, Take 1.5 tablets by mouth Every Night. 0.5mg QHS and 0.25mg PRN with breakthrough symptoms, Disp: 45 tablet, Rfl: 5  •  RA ASPIRIN ADULT LOW STRENGTH 81 MG chewable tablet, chew and swallow 1 tablet by mouth once daily, Disp: , Rfl: 0  •  metoprolol tartrate (LOPRESSOR) 25 MG tablet, Take 1 tablet by mouth Every 12 (Twelve) Hours., Disp: 60 tablet, Rfl: 6     Objective:     Physical Exam   Constitutional: She is oriented to person, place, and time. She appears well-developed and well-nourished.   HENT:   Head: Normocephalic and atraumatic.   Eyes: Conjunctivae are normal. No scleral icterus.   Neck: No JVD present.   Cardiovascular: Normal rate, regular rhythm, normal heart sounds and intact distal pulses.  Exam reveals no gallop and no friction rub.    No murmur heard.  Pulmonary/Chest: Effort normal and breath sounds normal. No respiratory distress.   Abdominal: Soft. Bowel sounds are normal. There is no tenderness.   Musculoskeletal: She exhibits no edema.   Neurological: She is alert and oriented to person, place, and time.   Skin: Skin is warm and dry. No rash noted.   Psychiatric: She has a normal mood and affect. Her behavior is normal.     Blood pressure 132/84, pulse 65, resp. rate 18,  "height 163.8 cm (64.49\"), weight 103 kg (227 lb), SpO2 98 %, not currently breastfeeding.   Lab Review:       Assessment:         1. Subacute idiopathic myocarditis  Stable with no evidence of clinical recurrence.    2. Essential hypertension  The patient had a symptomatic episode of rebound hypertension after the abrupt discontinuation of beta blocker therapy.  Her beta blockers have been now restarted and her blood pressure control is acceptable.    Procedures     Plan:       The patient has been counseled and educated regarding the dangers of cold turkey discontinuation of beta blocker therapy.  She has been instructed that if the decision is made in the future to discontinue therapy with beta blockers that the beta blocker will need to be tapered off slowly over a two-week period.  No changes to her medication therapy have been made at today's visit.  She has been given a 12 month supply of Lopressor 25 mg orally twice per day.  No additional cardiovascular testing is warranted from my standpoint.         "

## 2018-04-02 ENCOUNTER — APPOINTMENT (OUTPATIENT)
Dept: CT IMAGING | Facility: HOSPITAL | Age: 48
End: 2018-04-02
Attending: NURSE PRACTITIONER

## 2018-04-02 RX ORDER — LEVOTHYROXINE SODIUM 0.05 MG/1
TABLET ORAL
Qty: 30 TABLET | Refills: 3 | Status: SHIPPED | OUTPATIENT
Start: 2018-04-02 | End: 2018-08-10 | Stop reason: SDUPTHER

## 2018-04-03 ENCOUNTER — OFFICE VISIT (OUTPATIENT)
Dept: NEUROLOGY | Facility: CLINIC | Age: 48
End: 2018-04-03

## 2018-04-03 ENCOUNTER — APPOINTMENT (OUTPATIENT)
Dept: LAB | Facility: HOSPITAL | Age: 48
End: 2018-04-03

## 2018-04-03 ENCOUNTER — HOSPITAL ENCOUNTER (OUTPATIENT)
Dept: CT IMAGING | Facility: HOSPITAL | Age: 48
Discharge: HOME | End: 2018-04-03
Attending: NURSE PRACTITIONER | Admitting: NURSE PRACTITIONER

## 2018-04-03 VITALS
OXYGEN SATURATION: 98 % | HEART RATE: 90 BPM | WEIGHT: 230 LBS | SYSTOLIC BLOOD PRESSURE: 122 MMHG | DIASTOLIC BLOOD PRESSURE: 72 MMHG | BODY MASS INDEX: 38.88 KG/M2

## 2018-04-03 DIAGNOSIS — G25.81 RESTLESS LEGS SYNDROME (RLS): Primary | ICD-10-CM

## 2018-04-03 LAB — FERRITIN SERPL-MCNC: 162 NG/ML (ref 10–291)

## 2018-04-03 PROCEDURE — 99213 OFFICE O/P EST LOW 20 MIN: CPT | Performed by: NURSE PRACTITIONER

## 2018-04-03 PROCEDURE — 82728 ASSAY OF FERRITIN: CPT | Performed by: NURSE PRACTITIONER

## 2018-04-03 PROCEDURE — 74176 CT ABD & PELVIS W/O CONTRAST: CPT

## 2018-04-03 PROCEDURE — 36415 COLL VENOUS BLD VENIPUNCTURE: CPT | Performed by: NURSE PRACTITIONER

## 2018-04-03 RX ORDER — CARBIDOPA AND LEVODOPA 50; 200 MG/1; MG/1
1 TABLET, EXTENDED RELEASE ORAL 3 TIMES DAILY
Qty: 90 TABLET | Refills: 5 | Status: SHIPPED | OUTPATIENT
Start: 2018-04-03 | End: 2018-10-03 | Stop reason: SDUPTHER

## 2018-04-03 RX ORDER — PRAMIPEXOLE DIHYDROCHLORIDE 0.5 MG/1
0.75 TABLET ORAL NIGHTLY
Qty: 45 TABLET | Refills: 5 | Status: SHIPPED | OUTPATIENT
Start: 2018-04-03 | End: 2018-10-11

## 2018-04-03 NOTE — PROGRESS NOTES
Subjective:     Patient ID: Mary Lunsford is a 47 y.o. female.    CC:   Chief Complaint   Patient presents with   • Restless Legs Syndrome       HPI:   History of Present Illness     This is a very pleasant 48 yo female who presents for 6 month follow up on severe RLS for 10+ years. Her mother has RLS and grandmother also had severe RLS. She is currently taking Sinemet TID and Mirapex 0.5mg 1 at night and 1/2 tab PRN breakthrough symptoms throughout the day. She feels like her symptoms are well managed overall. She tells me she is doing well. She has confirmed PLMD with sleep study in 2017 and no BASIL. She is taking iron BID for long term RICHAR-due for ferritin check (has required iron infusions in the past). Previously tried Ropinirole, Neupro and Horizant without significant improvement in symptoms.She now has a standing desk at work to help with daytime RLS symptoms and this has been beneficial. She is aware of Dopamine Agonists' risks and denies any unusual behavioral changes.     The following portions of the patient's history were reviewed and updated as appropriate: allergies, current medications, past family history, past medical history, past social history, past surgical history and problem list.    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   • Essential hypertension 3/30/2018   • Gallstone    • Kidney stones    • Migraine    • Obesity    • Ovarian cyst    • Restless leg syndrome    • Urinary tract infection    • Vitamin B12 deficiency        Past Surgical History:   Procedure Laterality Date   • BREAST SURGERY Bilateral 07/1987   • CARDIAC CATHETERIZATION N/A 3/15/2017    Procedure: Coronary angiography;  Surgeon: Moustapha Peñaloza MD;  Location: [facility] CATH INVASIVE LOCATION;  Service:    • CARDIAC CATHETERIZATION N/A 3/15/2017    Procedure: Left ventriculography;  Surgeon: Moustapha Peñaloza MD;  Location: [facility] CATH INVASIVE LOCATION;  Service:     •  SECTION      1991 & 1995   • CHOLECYSTECTOMY  2013   • GASTRIC BYPASS  2011       Social History     Social History   • Marital status:      Spouse name: N/A   • Number of children: N/A   • Years of education: N/A     Occupational History   • Not on file.     Social History Main Topics   • Smoking status: Former Smoker     Packs/day: 1.00     Years: 2.00     Quit date: 10/1995   • Smokeless tobacco: Never Used   • Alcohol use 7.2 oz/week     12 Cans of beer per week      Comment: drinks beer 4 times a week    • Drug use: No   • Sexual activity: Yes     Partners: Male     Birth control/ protection: OCP      Comment: birth control pill     Other Topics Concern   • Not on file     Social History Narrative   • No narrative on file       Family History   Problem Relation Age of Onset   • Cancer Father    • Heart attack Father    • Cancer Other    • Other Other    • No Known Problems Mother    • Asthma Brother    • Arthritis Paternal Grandmother         Review of Systems   Constitutional: Negative.    HENT: Negative.    Eyes: Negative.    Respiratory: Negative.    Cardiovascular: Negative.    Gastrointestinal: Negative.    Endocrine: Negative.    Genitourinary: Negative.    Musculoskeletal: Negative.    Skin: Negative.    Allergic/Immunologic: Negative.    Neurological: Negative.    Hematological: Negative.    Psychiatric/Behavioral: Negative.         Objective:    Neurologic Exam     Mental Status   Oriented to person, place, and time.   Level of consciousness: alert    Cranial Nerves   Cranial nerves II through XII intact.     Motor Exam   Muscle bulk: normal  Overall muscle tone: normal    Strength   Strength 5/5 throughout.     Gait, Coordination, and Reflexes     Gait  Gait: normal    Coordination   Finger to nose coordination: normal  Heel to shin coordination: normal    Tremor   Resting tremor: absent  Intention tremor: absent  Action tremor: absent    Reflexes   Right  brachioradialis: 2+  Left brachioradialis: 2+  Right biceps: 2+  Left biceps: 2+  Right triceps: 2+  Left triceps: 2+  Right patellar: 2+  Left patellar: 2+  Right achilles: 2+  Left achilles: 2+  Right : 2+  Left : 2+      Physical Exam   Constitutional: She is oriented to person, place, and time.   Neurological: She is oriented to person, place, and time. She has normal strength. She has a normal Finger-Nose-Finger Test and a normal Heel to Shin Test. Gait normal.   Reflex Scores:       Tricep reflexes are 2+ on the right side and 2+ on the left side.       Bicep reflexes are 2+ on the right side and 2+ on the left side.       Brachioradialis reflexes are 2+ on the right side and 2+ on the left side.       Patellar reflexes are 2+ on the right side and 2+ on the left side.       Achilles reflexes are 2+ on the right side and 2+ on the left side.      Assessment/Plan:       Mary was seen today for restless legs syndrome.    Diagnoses and all orders for this visit:    Restless legs syndrome (RLS)  -     pramipexole (MIRAPEX) 0.5 MG tablet; Take 1.5 tablets by mouth Every Night. 0.5mg QHS and 0.25mg PRN with breakthrough symptoms  -     carbidopa-levodopa CR (SINEMET CR)  MG per CR tablet; Take 1 tablet by mouth 3 (Three) Times a Day.  -     Ferritin         Continue current medications for now. Recheck ferritin level today. F/U in 6 months or sooner if needed. Reviewed medications, potential side effects and signs and symptoms to report. Discussed risk versus benefits of treatment plan with patient and/or family-including medications, labs and radiology that may be ordered. Addressed questions and concerns during visit. Patient and/or family verbalized understanding and agree with plan.    During this visit the following were done:  Labs Reviewed []    Labs Ordered [x]    Radiology Reports Reviewed []    Radiology Ordered []    PCP Records Reviewed []    Referring Provider Records Reviewed []    ER  Records Reviewed []    Hospital Records Reviewed []    History Obtained From Family []    Radiology Images Reviewed []    Other Reviewed []    Records Requested []      Debbie Rodriguez, APRN  4/3/2018

## 2018-04-04 ENCOUNTER — TELEPHONE (OUTPATIENT)
Dept: NEUROLOGY | Facility: CLINIC | Age: 48
End: 2018-04-04

## 2018-04-04 ENCOUNTER — TELEPHONE (OUTPATIENT)
Dept: INTERNAL MEDICINE | Facility: CLINIC | Age: 48
End: 2018-04-04

## 2018-04-04 NOTE — TELEPHONE ENCOUNTER
I called pt and let her know that her ferritin level was excellent and she should continue her medication as prescribed.  Pt verbalized understanding.

## 2018-04-04 NOTE — TELEPHONE ENCOUNTER
Please notify patient her ferritin level looks excellent and she should continue her prescription as written. thanks

## 2018-04-04 NOTE — TELEPHONE ENCOUNTER
There were some lymph nodes that were borderline enlarged, which is not specific for any condition, and the radiologist recommended follow up. If she has any additional abdominal issues, or develops diarrhea or pain, needs to let us know. No other abnormal findings.

## 2018-05-30 ENCOUNTER — OFFICE VISIT (OUTPATIENT)
Dept: INTERNAL MEDICINE | Facility: CLINIC | Age: 48
End: 2018-05-30

## 2018-05-30 DIAGNOSIS — H65.93 BILATERAL OTITIS MEDIA WITH EFFUSION: Primary | ICD-10-CM

## 2018-05-30 DIAGNOSIS — L23.9 ALLERGIC DERMATITIS: ICD-10-CM

## 2018-05-30 PROCEDURE — 99213 OFFICE O/P EST LOW 20 MIN: CPT | Performed by: PHYSICIAN ASSISTANT

## 2018-05-30 RX ORDER — TRIAMCINOLONE ACETONIDE 1 MG/G
CREAM TOPICAL 2 TIMES DAILY
Qty: 15 G | Refills: 0 | Status: SHIPPED | OUTPATIENT
Start: 2018-05-30 | End: 2018-09-11

## 2018-06-01 VITALS
BODY MASS INDEX: 39.61 KG/M2 | HEIGHT: 64 IN | TEMPERATURE: 98.2 F | WEIGHT: 232 LBS | HEART RATE: 73 BPM | SYSTOLIC BLOOD PRESSURE: 136 MMHG | OXYGEN SATURATION: 100 % | DIASTOLIC BLOOD PRESSURE: 84 MMHG

## 2018-06-18 ENCOUNTER — TELEPHONE (OUTPATIENT)
Dept: INTERNAL MEDICINE | Facility: CLINIC | Age: 48
End: 2018-06-18

## 2018-06-18 DIAGNOSIS — F40.243 FEAR OF FLYING: Primary | ICD-10-CM

## 2018-06-18 RX ORDER — LORAZEPAM 0.5 MG/1
TABLET ORAL
Qty: 10 TABLET | Refills: 0 | Status: SHIPPED | OUTPATIENT
Start: 2018-06-18 | End: 2018-09-11

## 2018-06-18 NOTE — TELEPHONE ENCOUNTER
She will be flying on Saturday and has a extreme fear of flying.  Wants to know if there is something that you can prescribe her to help calm her down.  Please return call to pt.

## 2018-07-12 RX ORDER — NORETHINDRONE ACETATE AND ETHINYL ESTRADIOL 1MG-20(24)
KIT ORAL
Qty: 28 TABLET | Refills: 2 | Status: SHIPPED | OUTPATIENT
Start: 2018-07-12 | End: 2018-09-10 | Stop reason: SDUPTHER

## 2018-07-16 RX ORDER — NORETHINDRONE ACETATE AND ETHINYL ESTRADIOL 1MG-20(24)
KIT ORAL
Qty: 28 TABLET | Refills: 10 | OUTPATIENT
Start: 2018-07-16

## 2018-07-30 ENCOUNTER — TRANSCRIBE ORDERS (OUTPATIENT)
Dept: MAMMOGRAPHY | Facility: HOSPITAL | Age: 48
End: 2018-07-30

## 2018-07-30 DIAGNOSIS — Z12.39 BREAST CANCER SCREENING: Primary | ICD-10-CM

## 2018-08-10 RX ORDER — LEVOTHYROXINE SODIUM 0.05 MG/1
50 TABLET ORAL DAILY
Qty: 30 TABLET | Refills: 0 | Status: SHIPPED | OUTPATIENT
Start: 2018-08-10 | End: 2018-09-16 | Stop reason: SDUPTHER

## 2018-09-10 ENCOUNTER — TELEPHONE (OUTPATIENT)
Dept: OBSTETRICS AND GYNECOLOGY | Facility: CLINIC | Age: 48
End: 2018-09-10

## 2018-09-10 RX ORDER — NORETHINDRONE ACETATE AND ETHINYL ESTRADIOL AND FERROUS FUMARATE 1MG-20(24)
1 KIT ORAL DAILY
Qty: 28 TABLET | Refills: 2 | Status: SHIPPED | OUTPATIENT
Start: 2018-09-10 | End: 2019-02-05

## 2018-09-10 NOTE — TELEPHONE ENCOUNTER
----- Message from Gema Hubbard sent at 9/10/2018 11:04 AM EDT -----  Contact: PT  PT RESCHEDULED HER ANNUAL WITH [facility] FOR 10/15/18.  SHE ASKED IF WE CAN SEND IN 2 REFILLS OF BLISOVI 24 FE TO [facility] [place].  THANKS

## 2018-09-11 ENCOUNTER — HOSPITAL ENCOUNTER (OUTPATIENT)
Dept: MAMMOGRAPHY | Facility: HOSPITAL | Age: 48
Discharge: HOME | End: 2018-09-11
Admitting: PHYSICIAN ASSISTANT

## 2018-09-11 ENCOUNTER — OFFICE VISIT (OUTPATIENT)
Dept: INTERNAL MEDICINE | Facility: CLINIC | Age: 48
End: 2018-09-11

## 2018-09-11 VITALS
DIASTOLIC BLOOD PRESSURE: 82 MMHG | HEART RATE: 76 BPM | HEIGHT: 64 IN | WEIGHT: 236 LBS | TEMPERATURE: 98 F | SYSTOLIC BLOOD PRESSURE: 125 MMHG | BODY MASS INDEX: 40.29 KG/M2 | OXYGEN SATURATION: 98 %

## 2018-09-11 DIAGNOSIS — Z12.39 BREAST CANCER SCREENING: ICD-10-CM

## 2018-09-11 DIAGNOSIS — J01.00 ACUTE NON-RECURRENT MAXILLARY SINUSITIS: Primary | ICD-10-CM

## 2018-09-11 PROCEDURE — 99213 OFFICE O/P EST LOW 20 MIN: CPT | Performed by: PHYSICIAN ASSISTANT

## 2018-09-11 PROCEDURE — 77067 SCR MAMMO BI INCL CAD: CPT

## 2018-09-11 PROCEDURE — 77063 BREAST TOMOSYNTHESIS BI: CPT

## 2018-09-11 RX ORDER — DOXYCYCLINE HYCLATE 100 MG/1
100 CAPSULE ORAL 2 TIMES DAILY
Qty: 20 CAPSULE | Refills: 0 | Status: SHIPPED | OUTPATIENT
Start: 2018-09-11 | End: 2018-11-26

## 2018-09-11 NOTE — PATIENT INSTRUCTIONS
Short-term (5-7 days): claritin D, allegra D, zyrtec D   Anti-histamine/ sudafed combo- cheapest generic      Going forward, plain antihistamine (plain zyrtec, claritin, allegra) at bedtime  Help prevent fluid on ears in the future    Possibly ENT or allergist if not feeling better or fluid coming back on

## 2018-09-11 NOTE — PROGRESS NOTES
Subjective     Chief Complaint: ear pain    History of Present Illness     Mary Lunsford is a 47 y.o. female presenting with complaints of bilateral ear pain, and ears are throbbing, sore throat, fatigue, sinus pressure worsening over the past week or so.  Patient has had several episodes of fluid on her ears over the past few months.  She uses Flonase regularly.  Is not on an antihistamine.  States she had never really dealt with allergy troubles in the past.  Denies any fevers, chills, cough, shortness of breath, chest pain.    The following portions of the patient's history were reviewed and updated as appropriate: current medications, allergies, PMH.    Review of Systems   Constitutional: Positive for fatigue. Negative for appetite change, chills, fever and unexpected weight change.   HENT: Positive for congestion, ear pain, sinus pain, sinus pressure and sore throat. Negative for hearing loss, nosebleeds, tinnitus and trouble swallowing.    Eyes: Negative for pain, discharge, redness, itching and visual disturbance.   Respiratory: Negative for cough, chest tightness, shortness of breath and wheezing.    Cardiovascular: Negative for chest pain, palpitations and leg swelling.   Gastrointestinal: Negative for abdominal pain, blood in stool, constipation, diarrhea, nausea and vomiting.   Endocrine: Negative for cold intolerance, heat intolerance, polydipsia, polyphagia and polyuria.   Genitourinary: Negative for decreased urine volume, dysuria, flank pain, frequency and hematuria.   Musculoskeletal: Negative for arthralgias, back pain, gait problem, joint swelling, myalgias, neck pain and neck stiffness.   Skin: Negative for color change and rash.   Allergic/Immunologic: Negative for environmental allergies, food allergies and immunocompromised state.   Neurological: Positive for headaches. Negative for dizziness, syncope, weakness and light-headedness.   Hematological: Negative for adenopathy. Does not  "bruise/bleed easily.   Psychiatric/Behavioral: Negative for dysphoric mood, sleep disturbance and suicidal ideas. The patient is not nervous/anxious.      Objective     Vitals:    09/11/18 0828   BP: 125/82   Pulse: 76   Temp: 98 °F (36.7 °C)   SpO2: 98%   Weight: 107 kg (236 lb)   Height: 163.8 cm (64.49\")     Physical Exam   Constitutional: She is oriented to person, place, and time. She appears well-developed and well-nourished.   HENT:   Right Ear: Tympanic membrane is erythematous. A middle ear effusion is present.   Left Ear: A middle ear effusion is present.   Nose: Right sinus exhibits maxillary sinus tenderness. Left sinus exhibits maxillary sinus tenderness.   Mouth/Throat: Posterior oropharyngeal erythema present.   Eyes: Pupils are equal, round, and reactive to light. EOM are normal.   Neck: Normal range of motion. Neck supple.   Cardiovascular: Normal rate and regular rhythm.    Pulmonary/Chest: Effort normal and breath sounds normal.   Abdominal: Soft. Bowel sounds are normal.   Musculoskeletal: Normal range of motion.   Lymphadenopathy:     She has no cervical adenopathy.   Neurological: She is alert and oriented to person, place, and time.   Skin: Skin is warm and dry.   Psychiatric: She has a normal mood and affect.       Assessment/Plan     Diagnoses and all orders for this visit:    Acute non-recurrent maxillary sinusitis  -     doxycycline (VIBRAMYCIN) 100 MG capsule; Take 1 capsule by mouth 2 (Two) Times a Day.  -     loratadine-pseudoephedrine (CLARITIN-D 12 HOUR) 5-120 MG per 12 hr tablet; Take 1 tablet by mouth 2 (Two) Times a Day.      Discussed with patient she may benefit from regular use of Flonase and/or antihistamine going forward as she continues to have trouble with fluid on her ears.  Will refer if symptoms return.    Radha Bates PA-C  09/11/2018         Please note that portions of this note were completed with a voice recognition program. Efforts were made to edit dictation, but " occasionally words are mistranscribed.

## 2018-09-13 ENCOUNTER — OFFICE VISIT (OUTPATIENT)
Dept: INTERNAL MEDICINE | Facility: CLINIC | Age: 48
End: 2018-09-13

## 2018-09-13 VITALS
WEIGHT: 235.13 LBS | TEMPERATURE: 97.6 F | DIASTOLIC BLOOD PRESSURE: 80 MMHG | SYSTOLIC BLOOD PRESSURE: 114 MMHG | HEIGHT: 64 IN | BODY MASS INDEX: 40.14 KG/M2 | HEART RATE: 66 BPM | RESPIRATION RATE: 18 BRPM | OXYGEN SATURATION: 95 %

## 2018-09-13 DIAGNOSIS — R05.9 COUGH: ICD-10-CM

## 2018-09-13 DIAGNOSIS — J01.00 ACUTE NON-RECURRENT MAXILLARY SINUSITIS: Primary | ICD-10-CM

## 2018-09-13 PROCEDURE — 99213 OFFICE O/P EST LOW 20 MIN: CPT | Performed by: NURSE PRACTITIONER

## 2018-09-13 RX ORDER — DEXTROMETHORPHAN HYDROBROMIDE AND PROMETHAZINE HYDROCHLORIDE 15; 6.25 MG/5ML; MG/5ML
5 SYRUP ORAL NIGHTLY PRN
Qty: 118 ML | Refills: 0 | Status: SHIPPED | OUTPATIENT
Start: 2018-09-13 | End: 2018-11-26

## 2018-09-13 NOTE — PROGRESS NOTES
Chief Complaint / Reason:      Chief Complaint   Patient presents with   • Cough     recheck from Radha on Tues. Cannot sleep for the cough.        Subjective     HPI  Patient presents today with complaints of cough and continued sinus pressure, fatigue and sore throat. She was seen on 9/11/18 by Radha SHELL and was diagnosed with sinusitis. She was given doxycycline and Claritin. She states that symptoms have worsened. States she cannot sleep from coughing so much. Explained to patient that she had only be on the antibiotics for a few days and it would take longer for them to work. Denies fever or chills.    History taken from: patient    PMH/FH/Social History were reviewed and updated appropriately in the electronic medical record.     Review of Systems:   Review of Systems   Constitutional: Positive for fatigue.   HENT: Positive for congestion, ear pain, sinus pain, sinus pressure and sore throat.    Respiratory: Positive for cough.    Cardiovascular: Negative.    Gastrointestinal: Negative.    Skin: Negative.    Neurological: Positive for headaches.   Hematological: Negative for adenopathy.   Psychiatric/Behavioral: Positive for sleep disturbance.     All other systems were reviewed and are negative.  Exceptions are noted in the subjective or above.      Objective     Vital Signs  Vitals:    09/13/18 1152   BP: 114/80   Pulse: 66   Resp: 18   Temp: 97.6 °F (36.4 °C)   SpO2: 95%       Body mass index is 39.75 kg/m².    Physical Exam   Constitutional: She is oriented to person, place, and time. She appears well-developed and well-nourished.   HENT:   Head: Normocephalic.   Right Ear: External ear normal. Tympanic membrane is erythematous and bulging.   Left Ear: External ear normal. Tympanic membrane is erythematous and bulging.   Nose: Right sinus exhibits maxillary sinus tenderness and frontal sinus tenderness. Left sinus exhibits maxillary sinus tenderness and frontal sinus tenderness.   Mouth/Throat: Mucous  membranes are dry. Posterior oropharyngeal erythema present.   Cardiovascular: Normal rate, regular rhythm, normal heart sounds and intact distal pulses.    Pulmonary/Chest: Effort normal and breath sounds normal.   Abdominal: Soft. Bowel sounds are normal.   Lymphadenopathy:     She has no cervical adenopathy.   Neurological: She is alert and oriented to person, place, and time.   Skin: Skin is warm and dry.   Psychiatric: She has a normal mood and affect. Her behavior is normal. Judgment and thought content normal.   Nursing note and vitals reviewed.       Results Review:    I reviewed the patient's previous clinical results.       Medication Review:     Current Outpatient Prescriptions:   •  carbidopa-levodopa CR (SINEMET CR)  MG per CR tablet, Take 1 tablet by mouth 3 (Three) Times a Day., Disp: 90 tablet, Rfl: 5  •  cyanocobalamin 1000 MCG/ML injection, inject 1 milliliter every 2 weeks as directed, Disp: 1 mL, Rfl: 6  •  doxycycline (VIBRAMYCIN) 100 MG capsule, Take 1 capsule by mouth 2 (Two) Times a Day., Disp: 20 capsule, Rfl: 0  •  FERROCITE 324 MG tablet, take 1 tablet by mouth twice a day, Disp: 60 tablet, Rfl: 5  •  loratadine-pseudoephedrine (CLARITIN-D 12 HOUR) 5-120 MG per 12 hr tablet, Take 1 tablet by mouth 2 (Two) Times a Day., Disp: 14 tablet, Rfl: 0  •  metoprolol tartrate (LOPRESSOR) 25 MG tablet, Take 1 tablet by mouth Every 12 (Twelve) Hours., Disp: 60 tablet, Rfl: 6  •  norethindrone-ethinyl estradiol-ferrous fumarate (BLISOVI 24 FE) 1-20 MG-MCG(24) per tablet, Take 1 tablet by mouth Daily., Disp: 28 tablet, Rfl: 2  •  pramipexole (MIRAPEX) 0.5 MG tablet, Take 1.5 tablets by mouth Every Night. 0.5mg QHS and 0.25mg PRN with breakthrough symptoms, Disp: 45 tablet, Rfl: 5  •  RA ASPIRIN ADULT LOW STRENGTH 81 MG chewable tablet, chew and swallow 1 tablet by mouth once daily, Disp: , Rfl: 0  •  levothyroxine (SYNTHROID, LEVOTHROID) 50 MCG tablet, take 1 tablet by mouth daily .NEED APPT FOR  MORE REFILLS, Disp: 30 tablet, Rfl: 0  •  PredniSONE 5 MG tablet therapy pack dosepak, Take 1 tablet by mouth Take As Directed. Take as directed on package instructions., Disp: 21 each, Rfl: 0  •  promethazine-dextromethorphan (PROMETHAZINE-DM) 6.25-15 MG/5ML syrup, Take 5 mL by mouth At Night As Needed for Cough., Disp: 118 mL, Rfl: 0    Assessment/Plan   Mary was seen today for cough.    Diagnoses and all orders for this visit:    Acute non-recurrent maxillary sinusitis  -     PredniSONE 5 MG tablet therapy pack dosepak; Take 1 tablet by mouth Take As Directed. Take as directed on package instructions.    Cough  -     PredniSONE 5 MG tablet therapy pack dosepak; Take 1 tablet by mouth Take As Directed. Take as directed on package instructions.  -     promethazine-dextromethorphan (PROMETHAZINE-DM) 6.25-15 MG/5ML syrup; Take 5 mL by mouth At Night As Needed for Cough.        Return if symptoms worsen or fail to improve.    Megha Mattson, APRN  09/13/2018

## 2018-09-17 RX ORDER — LEVOTHYROXINE SODIUM 0.05 MG/1
TABLET ORAL
Qty: 30 TABLET | Refills: 0 | Status: SHIPPED | OUTPATIENT
Start: 2018-09-17 | End: 2018-10-19 | Stop reason: SDUPTHER

## 2018-10-03 ENCOUNTER — OFFICE VISIT (OUTPATIENT)
Dept: NEUROLOGY | Facility: CLINIC | Age: 48
End: 2018-10-03

## 2018-10-03 ENCOUNTER — APPOINTMENT (OUTPATIENT)
Dept: LAB | Facility: HOSPITAL | Age: 48
End: 2018-10-03

## 2018-10-03 VITALS
WEIGHT: 240 LBS | DIASTOLIC BLOOD PRESSURE: 84 MMHG | BODY MASS INDEX: 40.57 KG/M2 | HEART RATE: 73 BPM | SYSTOLIC BLOOD PRESSURE: 130 MMHG | OXYGEN SATURATION: 99 %

## 2018-10-03 DIAGNOSIS — D50.9 IRON DEFICIENCY ANEMIA, UNSPECIFIED IRON DEFICIENCY ANEMIA TYPE: ICD-10-CM

## 2018-10-03 DIAGNOSIS — G25.81 RESTLESS LEGS SYNDROME (RLS): ICD-10-CM

## 2018-10-03 DIAGNOSIS — G25.81 RESTLESS LEGS SYNDROME (RLS): Primary | ICD-10-CM

## 2018-10-03 LAB
BASOPHILS # BLD AUTO: 0.02 10*3/MM3 (ref 0–0.2)
BASOPHILS NFR BLD AUTO: 0.2 % (ref 0–1)
DEPRECATED RDW RBC AUTO: 47.8 FL (ref 37–54)
EOSINOPHIL # BLD AUTO: 0.2 10*3/MM3 (ref 0–0.3)
EOSINOPHIL NFR BLD AUTO: 2.5 % (ref 0–3)
ERYTHROCYTE [DISTWIDTH] IN BLOOD BY AUTOMATED COUNT: 13.1 % (ref 11.3–14.5)
FOLATE SERPL-MCNC: 8.03 NG/ML (ref 3.2–20)
HCT VFR BLD AUTO: 41.7 % (ref 34.5–44)
HGB BLD-MCNC: 13.5 G/DL (ref 11.5–15.5)
IMM GRANULOCYTES # BLD: 0.02 10*3/MM3 (ref 0–0.03)
IMM GRANULOCYTES NFR BLD: 0.2 % (ref 0–0.6)
IRON 24H UR-MRATE: 63 MCG/DL (ref 50–175)
IRON SATN MFR SERPL: 18 % (ref 15–50)
LYMPHOCYTES # BLD AUTO: 2.4 10*3/MM3 (ref 0.6–4.8)
LYMPHOCYTES NFR BLD AUTO: 29.6 % (ref 24–44)
MCH RBC QN AUTO: 32.5 PG (ref 27–31)
MCHC RBC AUTO-ENTMCNC: 32.4 G/DL (ref 32–36)
MCV RBC AUTO: 100.2 FL (ref 80–99)
MONOCYTES # BLD AUTO: 0.4 10*3/MM3 (ref 0–1)
MONOCYTES NFR BLD AUTO: 4.9 % (ref 0–12)
NEUTROPHILS # BLD AUTO: 5.1 10*3/MM3 (ref 1.5–8.3)
NEUTROPHILS NFR BLD AUTO: 62.8 % (ref 41–71)
PLATELET # BLD AUTO: 295 10*3/MM3 (ref 150–450)
PMV BLD AUTO: 10.1 FL (ref 6–12)
RBC # BLD AUTO: 4.16 10*6/MM3 (ref 3.89–5.14)
TIBC SERPL-MCNC: 344 MCG/DL (ref 250–450)
VIT B12 BLD-MCNC: 186 PG/ML (ref 211–911)
WBC NRBC COR # BLD: 8.12 10*3/MM3 (ref 3.5–10.8)

## 2018-10-03 PROCEDURE — 83550 IRON BINDING TEST: CPT | Performed by: NURSE PRACTITIONER

## 2018-10-03 PROCEDURE — 82728 ASSAY OF FERRITIN: CPT | Performed by: NURSE PRACTITIONER

## 2018-10-03 PROCEDURE — 82607 VITAMIN B-12: CPT | Performed by: NURSE PRACTITIONER

## 2018-10-03 PROCEDURE — 36415 COLL VENOUS BLD VENIPUNCTURE: CPT | Performed by: NURSE PRACTITIONER

## 2018-10-03 PROCEDURE — 83540 ASSAY OF IRON: CPT | Performed by: NURSE PRACTITIONER

## 2018-10-03 PROCEDURE — 83921 ORGANIC ACID SINGLE QUANT: CPT | Performed by: NURSE PRACTITIONER

## 2018-10-03 PROCEDURE — 85025 COMPLETE CBC W/AUTO DIFF WBC: CPT | Performed by: NURSE PRACTITIONER

## 2018-10-03 PROCEDURE — 99213 OFFICE O/P EST LOW 20 MIN: CPT | Performed by: NURSE PRACTITIONER

## 2018-10-03 PROCEDURE — 82746 ASSAY OF FOLIC ACID SERUM: CPT | Performed by: NURSE PRACTITIONER

## 2018-10-03 RX ORDER — CARBIDOPA AND LEVODOPA 50; 200 MG/1; MG/1
1 TABLET, EXTENDED RELEASE ORAL 3 TIMES DAILY
Qty: 90 TABLET | Refills: 5 | Status: SHIPPED | OUTPATIENT
Start: 2018-10-03 | End: 2018-10-30 | Stop reason: SDUPTHER

## 2018-10-03 NOTE — PROGRESS NOTES
Subjective:     Patient ID: Mary Lunsford is a 47 y.o. female.    CC:   Chief Complaint   Patient presents with   • restless leg syndrome       HPI:   History of Present Illness   This is a very pleasant 48 yo female who presents for 6 month follow up on severe RLS for 10+ years. Her mother has RLS (just started Neupro and tolerating well) and grandmother also had severe RLS. She is currently taking Sinemet TID and Mirapex 0.5mg 1 at night and 1/2 tab PRN breakthrough symptoms throughout the day. She feels like her symptoms during the daytime are worsening and more difficult to work through them during work. She has the standing desk but is wondering about trying the Neupro patch (reports tried this in 2012 but at the time was not on Sinemet). She has confirmed PLMD with sleep study in 2017 and no BASIL. She is taking iron BID for long term RICHAR-ferritin 160 4/2018 and previously required iron infusions. Has Vitamin B12 deficiency history. Needs labs rechecked. Previously tried Ropinirole, Neupro and Horizant without significant improvement in symptoms.She now has a standing desk at work to help with daytime RLS symptoms and this has been beneficial. She is aware of Dopamine Agonists' risks and denies any unusual behavioral changes. She is always nervous about medication changes due to possible sleep deprivation.    The following portions of the patient's history were reviewed and updated as appropriate: allergies, current medications, past family history, past medical history, past social history, past surgical history and problem list.    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   • Essential hypertension 3/30/2018   • Gallstone    • Kidney stones    • Migraine    • Obesity    • Ovarian cyst    • Restless leg syndrome    • Urinary tract infection    • Vitamin B12 deficiency        Past Surgical History:   Procedure Laterality Date   • BREAST SURGERY  Bilateral 1987   • CARDIAC CATHETERIZATION N/A 3/15/2017    Procedure: Coronary angiography;  Surgeon: Moustapha Peñaloza MD;  Location: [facility] CATH INVASIVE LOCATION;  Service:    • CARDIAC CATHETERIZATION N/A 3/15/2017    Procedure: Left ventriculography;  Surgeon: Moustapha Peñaloza MD;  Location: [facility] INVASIVE LOCATION;  Service:    •  SECTION      1991 & 1995   • CHOLECYSTECTOMY  2013   • GASTRIC BYPASS  2011       Social History     Social History   • Marital status:      Spouse name: N/A   • Number of children: N/A   • Years of education: N/A     Occupational History   • Not on file.     Social History Main Topics   • Smoking status: Former Smoker     Packs/day: 1.00     Years: 2.00     Quit date: 10/1995   • Smokeless tobacco: Never Used   • Alcohol use 7.2 oz/week     12 Cans of beer per week      Comment: drinks beer 4 times a week    • Drug use: No   • Sexual activity: Defer      Comment: birth control pill     Other Topics Concern   • Not on file     Social History Narrative   • No narrative on file       Family History   Problem Relation Age of Onset   • Cancer Father    • Heart attack Father    • Cancer Other    • Other Other    • No Known Problems Mother    • Asthma Brother    • Arthritis Paternal Grandmother         Review of Systems   Constitutional: Negative for chills, fatigue, fever and unexpected weight change.   HENT: Negative for ear pain, hearing loss, nosebleeds, rhinorrhea and sore throat.    Eyes: Negative for photophobia, pain, discharge, itching and visual disturbance.   Respiratory: Negative for cough, chest tightness, shortness of breath and wheezing.    Cardiovascular: Negative for chest pain, palpitations and leg swelling.   Gastrointestinal: Negative for abdominal pain, blood in stool, constipation, diarrhea, nausea and vomiting.   Genitourinary: Negative for dysuria, frequency, hematuria and urgency.   Musculoskeletal: Negative for  arthralgias, back pain, gait problem, joint swelling, myalgias, neck pain and neck stiffness.   Skin: Negative for rash and wound.   Allergic/Immunologic: Negative for environmental allergies and food allergies.   Neurological: Negative for dizziness, tremors, seizures, syncope, speech difficulty, weakness, light-headedness, numbness and headaches.   Hematological: Negative for adenopathy. Does not bruise/bleed easily.   Psychiatric/Behavioral: Negative for agitation, confusion, decreased concentration, hallucinations, sleep disturbance and suicidal ideas. The patient is not nervous/anxious.    All other systems reviewed and are negative.       Objective:    Neurologic Exam     Mental Status   Oriented to person, place, and time.   Level of consciousness: alert    Cranial Nerves   Cranial nerves II through XII intact.     Motor Exam   Muscle bulk: normal  Overall muscle tone: normal    Strength   Strength 5/5 throughout.     Gait, Coordination, and Reflexes     Gait  Gait: normal    Coordination   Finger to nose coordination: normal    Tremor   Resting tremor: absent  Intention tremor: absent  Action tremor: absent    Reflexes   Right brachioradialis: 2+  Left brachioradialis: 2+  Right biceps: 2+  Left biceps: 2+  Right triceps: 2+  Left triceps: 2+  Right : 2+  Left : 2+      Physical Exam   Constitutional: She is oriented to person, place, and time.   Neurological: She is oriented to person, place, and time. She has normal strength. She has a normal Finger-Nose-Finger Test. Gait normal.   Reflex Scores:       Tricep reflexes are 2+ on the right side and 2+ on the left side.       Bicep reflexes are 2+ on the right side and 2+ on the left side.       Brachioradialis reflexes are 2+ on the right side and 2+ on the left side.      Assessment/Plan:       Mary was seen today for restless leg syndrome.    Diagnoses and all orders for this visit:    Restless legs syndrome (RLS)  Comments:  Will trial Neupro  patch gave 1mg and 2mg sample patches-will call with most effective dosage for RX. Will d/c Mirapex when starting Neupro.  Orders:  -     Iron Profile  -     carbidopa-levodopa CR (SINEMET CR)  MG per CR tablet; Take 1 tablet by mouth 3 (Three) Times a Day.  -     Vitamin B12  -     Folate  -     Methylmalonic Acid, Serum    Iron deficiency anemia, unspecified iron deficiency anemia type  -     CBC & Differential  -     Iron Profile  -     CBC Auto Differential         She will call us with results on Neupro patch. She is aware to stop Mirapex before starting the patch-gave 7 days of 1mg and 2mg samples and copay card. Continue Sinemet. Labs today. F/U in 6 months or sooner if needed. Reviewed medications, potential side effects and signs and symptoms to report. Discussed risk versus benefits of treatment plan with patient and/or family-including medications, labs and radiology that may be ordered. Addressed questions and concerns during visit. Patient and/or family verbalized understanding and agree with plan.    During this visit the following were done:  Labs Reviewed [x]    Labs Ordered [x]    Radiology Reports Reviewed []    Radiology Ordered []    PCP Records Reviewed []    Referring Provider Records Reviewed []    ER Records Reviewed []    Hospital Records Reviewed []    History Obtained From Family []    Radiology Images Reviewed []    Other Reviewed []    Records Requested []      EMR Dragon/Transcription Disclaimer:  Much of this encounter note is an electronic transcription of spoken language to printed text. Electronic transcription of spoken language may permit erroneous words or phrases to be inadvertently transcribed. Although I have reviewed the note for such errors, some may still exist in this documentation.      Debbie Rodriguez, APRN  10/3/2018

## 2018-10-04 ENCOUNTER — TELEPHONE (OUTPATIENT)
Dept: INTERNAL MEDICINE | Facility: CLINIC | Age: 48
End: 2018-10-04

## 2018-10-04 ENCOUNTER — TELEPHONE (OUTPATIENT)
Dept: NEUROLOGY | Facility: CLINIC | Age: 48
End: 2018-10-04

## 2018-10-04 DIAGNOSIS — D50.9 IRON DEFICIENCY ANEMIA, UNSPECIFIED IRON DEFICIENCY ANEMIA TYPE: Primary | ICD-10-CM

## 2018-10-04 DIAGNOSIS — G25.81 RESTLESS LEGS SYNDROME (RLS): ICD-10-CM

## 2018-10-04 LAB — FERRITIN SERPL-MCNC: 224 NG/ML (ref 10–291)

## 2018-10-04 NOTE — TELEPHONE ENCOUNTER
----- Message from LINNEA Mendoza sent at 10/4/2018 12:52 PM EDT -----  Please notify patient her iron levels are still excellent and she should continue her iron replacement. Her vitamin B12 is very low now and she needs to be restarted on Vitamin B12 injections-please notify PCP office. I will also fwd labs to Dr. Jimenez. Thanks, LINNEA Torre

## 2018-10-04 NOTE — TELEPHONE ENCOUNTER
Has been almost a year since I last saw patient. Looks like she's needing vitamin B12 shots set up. See if she wants to schedule and get those started, please.

## 2018-10-04 NOTE — PROGRESS NOTES
Please notify patient her iron levels are still excellent and she should continue her iron replacement. Her vitamin B12 is very low now and she needs to be restarted on Vitamin B12 injections-please notify PCP office. I will also fwd labs to Dr. Jimenez. Thanks, LINNEA Torre

## 2018-10-04 NOTE — TELEPHONE ENCOUNTER
I spoke with pt giving her the results and recommendations and faxed results to Dr. Jimenez's office PCP.

## 2018-10-08 ENCOUNTER — TELEPHONE (OUTPATIENT)
Dept: NEUROLOGY | Facility: CLINIC | Age: 48
End: 2018-10-08

## 2018-10-08 LAB
Lab: NORMAL
METHYLMALONATE SERPL-SCNC: 289 NMOL/L (ref 0–378)

## 2018-10-08 NOTE — TELEPHONE ENCOUNTER
Pt said she has taken the Neurpo patch 1 mgs for two nights and then a 2mg for one night and has not slept for about 3 nights.  She wants to know if you want her to take another 2 mg patch for another night before going to a 3 mg patch and if so can she take a sinemet with it since she no longer takes the mirapex?

## 2018-10-08 NOTE — TELEPHONE ENCOUNTER
I called and lvm with pt whom stated her name on answering machine to let her know the information below

## 2018-10-08 NOTE — TELEPHONE ENCOUNTER
She can go ahead and start the 3mg neupro patch. She can take a sinemet with it if needed. The 3mg should be fine to take. Let me know if she needs a script sent in. It may take a week or so to adjust. I know its always tough for her to change medication regimens with her RLS.

## 2018-10-11 ENCOUNTER — TELEPHONE (OUTPATIENT)
Dept: NEUROLOGY | Facility: CLINIC | Age: 48
End: 2018-10-11

## 2018-10-11 DIAGNOSIS — G25.81 RESTLESS LEGS SYNDROME (RLS): Primary | ICD-10-CM

## 2018-10-11 RX ORDER — ROTIGOTINE 3 MG/24H
3 PATCH, EXTENDED RELEASE TRANSDERMAL DAILY
Qty: 30 PATCH | Refills: 3 | Status: SHIPPED | OUTPATIENT
Start: 2018-10-11 | End: 2018-10-30

## 2018-10-11 NOTE — TELEPHONE ENCOUNTER
Pt said she was taking Neupro 3 mgs at bedtime and would like to have a script for this sent to pharmacy because she is going out of town this weekend and does not have enough.  She wanted you to know the 3 mg is still not working as well as she would like but is trying to hold off and give it ample time to do its magic.

## 2018-10-11 NOTE — TELEPHONE ENCOUNTER
Sent neupro 3mg patches to pharmacy. Have her keep us posted. We can always increase if needed but would like her to try this dose at least 2 weeks or so.

## 2018-10-16 NOTE — TELEPHONE ENCOUNTER
"I spoke with patient and she states that she has a \"nurse friend\" who gives her the vitamin b12 injections. She was just wondering if this could be called into the pharmacy  "

## 2018-10-17 RX ORDER — CYANOCOBALAMIN 1000 UG/ML
1000 INJECTION, SOLUTION INTRAMUSCULAR; SUBCUTANEOUS
Qty: 2 ML | Refills: 5 | Status: SHIPPED | OUTPATIENT
Start: 2018-10-17 | End: 2021-04-27

## 2018-10-19 RX ORDER — LEVOTHYROXINE SODIUM 0.05 MG/1
TABLET ORAL
Qty: 90 TABLET | Refills: 3 | Status: SHIPPED | OUTPATIENT
Start: 2018-10-19 | End: 2019-05-09 | Stop reason: SDUPTHER

## 2018-10-22 ENCOUNTER — TELEPHONE (OUTPATIENT)
Dept: NEUROLOGY | Facility: CLINIC | Age: 48
End: 2018-10-22

## 2018-10-22 DIAGNOSIS — G25.81 RESTLESS LEGS SYNDROME (RLS): Primary | ICD-10-CM

## 2018-10-22 NOTE — TELEPHONE ENCOUNTER
It has been about 19 days since I saw her in office and changed her medication. It is going to take some time for her body to adjust to the neupro. It can take 4-6 weeks sometimes with medicine changes. As far as treatment for RLS some of the best and newest research based guidelines suggest that using Lyrica can be very effective for RLS symptoms especially in severe cases like hers. This would be something she would take in addition to her current medications and should help with her discomfort through the day and night. Would she be willing to try this? She will get a 2 week free trial to titrate the medicine up to an ideal dose. The drug will be generic in 2019. We do have copay cards where she would not pay more than $25/month. Let me know if she is open to this. If she still wants to speak with me I can try to call her during one of my admin. Breaks.

## 2018-10-22 NOTE — TELEPHONE ENCOUNTER
Pt called and said she was still having problems sleeping at night due to the RLS, she requested another appt but your first available is not until 11-5-2018 and she would like to know if you could squeeze her in sooner or give her a call.  She said she is desperate for some sleep and cant seem to function during the day due to the RLS.

## 2018-10-22 NOTE — TELEPHONE ENCOUNTER
Ok I am going to send in Lyrica 75mg twice a day for 2 weeks-she needs to get the free 2 week trial card from us and then also the copay card for now more than 25$ out of pocket for the Lyrica 150mg twice a day which she will take after she has completed the 75mg script. Sending both to pharmacy. She can  copay cards or have someone else get those-the free trial and copay card from our office. This can cause drowsiness and dizziness initially but improves with time. Please let her know to be aware. Thanks.

## 2018-10-23 NOTE — TELEPHONE ENCOUNTER
Pt is aware of the medication Lyrica being sent to pharmacy and she said I could mail the two week free medication and she would download the $25 co-pay off line and print to use.  She said she lives in [place] and it is too far to come .  She is aware of the side effects below and said she was already tired in morning and did not know how that would affect her.  I will mail out the card tomorrow since I am in a different office today.

## 2018-10-30 ENCOUNTER — TELEPHONE (OUTPATIENT)
Dept: NEUROLOGY | Facility: CLINIC | Age: 48
End: 2018-10-30

## 2018-10-30 DIAGNOSIS — G25.81 RESTLESS LEGS SYNDROME (RLS): ICD-10-CM

## 2018-10-30 RX ORDER — PRAMIPEXOLE DIHYDROCHLORIDE 0.5 MG/1
0.25 TABLET ORAL 3 TIMES DAILY
Qty: 45 TABLET | Refills: 3 | Status: SHIPPED | OUTPATIENT
Start: 2018-10-30 | End: 2019-03-27 | Stop reason: SDUPTHER

## 2018-10-30 RX ORDER — CARBIDOPA AND LEVODOPA 50; 200 MG/1; MG/1
1 TABLET, EXTENDED RELEASE ORAL 3 TIMES DAILY
Qty: 120 TABLET | Refills: 5 | Status: SHIPPED | OUTPATIENT
Start: 2018-10-30 | End: 2019-03-27 | Stop reason: SDUPTHER

## 2018-10-30 NOTE — TELEPHONE ENCOUNTER
Called patient and notified her to d/c neupro patch tonight and take mirapex 0.5 1 tab tonight with the lyrica. Then resume the 1/2 tab up to TID. She has required an extra sinemet over the past 3 weeks d/t barely any sleepp. Sent in higher quantity for this. Sent all new RX to pharmacy. She verbalizes understanding and will f/u as scheduled.

## 2018-10-30 NOTE — TELEPHONE ENCOUNTER
Pt's called crying said she just could not do the patch anymore and is still waking up with the jerking, tingling and creepy crawlings in her leg.  She said she may get a couple of hours of sleep a night and can't function during the day.  She does not want to go back to the medication she was on prior to this either.

## 2018-11-18 DIAGNOSIS — G25.81 RESTLESS LEGS SYNDROME (RLS): ICD-10-CM

## 2018-11-19 RX ORDER — FERROUS FUMARATE 324(106)MG
TABLET ORAL
Qty: 60 TABLET | Refills: 5 | Status: SHIPPED | OUTPATIENT
Start: 2018-11-19 | End: 2019-03-27 | Stop reason: SDUPTHER

## 2018-11-19 NOTE — TELEPHONE ENCOUNTER
Pt is currently taking 75 mg once daily not twice daily.  She said it is working well and she feels much better.  She would like to stick with the 75 mg and she is in need of a refill.

## 2018-11-19 NOTE — TELEPHONE ENCOUNTER
Per LINNEA Carolina note on labs she wants pt to continue iron suppliment and also wants her to restart b12 treatment.. Is it ok to refill iron? Please advise. Thanks

## 2018-11-19 NOTE — TELEPHONE ENCOUNTER
I received an automated refill request for the lyrica 75mg twice a day. I sent in a higher dose of the 150mg twice a day earlier in the month with 2 refills. Can you check with patient to see which dosage she is taking and tolerating. Let me know. thanks

## 2018-11-26 ENCOUNTER — OFFICE VISIT (OUTPATIENT)
Dept: OBSTETRICS AND GYNECOLOGY | Facility: CLINIC | Age: 48
End: 2018-11-26

## 2018-11-26 VITALS
HEIGHT: 64 IN | WEIGHT: 242 LBS | SYSTOLIC BLOOD PRESSURE: 126 MMHG | BODY MASS INDEX: 41.32 KG/M2 | DIASTOLIC BLOOD PRESSURE: 84 MMHG

## 2018-11-26 DIAGNOSIS — Z01.419 ENCOUNTER FOR GYNECOLOGICAL EXAMINATION WITHOUT ABNORMAL FINDING: Primary | ICD-10-CM

## 2018-11-26 DIAGNOSIS — Z12.4 SCREENING FOR MALIGNANT NEOPLASM OF CERVIX: ICD-10-CM

## 2018-11-26 PROCEDURE — 99396 PREV VISIT EST AGE 40-64: CPT | Performed by: PHYSICIAN ASSISTANT

## 2018-11-26 NOTE — PROGRESS NOTES
Subjective   Chief Complaint   Patient presents with   • Gynecologic Exam       Mary Lunsford is a 48 y.o. year old  presenting to be seen for her annual gynecological exam.   She has no complaints or concerns  She has been using blisovi mainly for hot flashes and to regulate periods and is working well. However in the past year she has had issues with high blood pressure and cardiac issues and is agreeable to discontinue oc's at this time  Patient's last menstrual period was 2018 (exact date).   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 May take 1 extra prn breakthrough RLS symptoms, Disp: 120 tablet, Rfl: 5  •  FERROCITE 324 MG tablet, take 1 tablet by mouth twice a day, Disp: 60 tablet, Rfl: 5  •  levothyroxine (SYNTHROID, LEVOTHROID) 50 MCG tablet, take 1 tablet by mouth once daily, Disp: 90 tablet, Rfl: 3  •  LYRICA 75 MG capsule, Take 1 capsule by mouth Daily., Disp: 30 capsule, Rfl: 5  •  metoprolol tartrate (LOPRESSOR) 25 MG tablet, take 1 tablet by mouth every 12 hours, Disp: 60 tablet, Rfl: 6  •  norethindrone-ethinyl estradiol-ferrous fumarate (BLISOVI 24 FE) 1-20 MG-MCG(24) per tablet, Take 1 tablet by mouth Daily., Disp: 28 tablet, Rfl: 2  •  pramipexole (MIRAPEX) 0.5 MG tablet, Take 0.5 tablets by mouth 3 (Three) Times a Day., Disp: 45 tablet, Rfl: 3  •  RA ASPIRIN ADULT LOW STRENGTH 81 MG chewable tablet, chew and swallow 1 tablet by mouth once daily, Disp: , Rfl: 0  •  Syringe, Disposable, 1 ML misc, Use to inject vitamin B12 every 14 days, Disp: 2  each, Rfl: 5  •  cyanocobalamin 1000 MCG/ML injection, inject 1 milliliter every 2 weeks as directed, Disp: 1 mL, Rfl: 6  •  cyanocobalamin 1000 MCG/ML injection, Inject 1 mL into the appropriate muscle as directed by prescriber Every 14 (Fourteen) Days., Disp: 2 mL, Rfl: 5  •  loratadine-pseudoephedrine (CLARITIN-D 12 HOUR) 5-120 MG per 12 hr tablet, Take 1 tablet by mouth 2 (Two) Times a Day., Disp: 14 tablet, Rfl: 0   Allergies   Allergen Reactions   • Sulfa Antibiotics Rash and Other (See Comments)     blind      Past Surgical History:   Procedure Laterality Date   • BREAST SURGERY Bilateral 1987   •  SECTION      1991 & 1995   • CHOLECYSTECTOMY  2013   • GASTRIC BYPASS  2011      Social History     Socioeconomic History   • Marital status:      Spouse name: Not on file   • Number of children: Not on file   • Years of education: Not on file   • Highest education level: Not on file   Social Needs   • Financial resource strain: Not on file   • Food insecurity - worry: Not on file   • Food insecurity - inability: Not on file   • Transportation needs - medical: Not on file   • Transportation needs - non-medical: Not on file   Occupational History   • Not on file   Tobacco Use   • Smoking status: Former Smoker     Packs/day: 1.00     Years: 2.00     Pack years: 2.00     Last attempt to quit: 10/1995     Years since quittin.1   • Smokeless tobacco: Never Used   Substance and Sexual Activity   • Alcohol use: Yes     Alcohol/week: 7.2 oz     Types: 12 Cans of beer per week     Comment: drinks beer 4 times a week    • Drug use: No   • Sexual activity: Defer     Partners: Male     Birth control/protection: OCP     Comment: birth control pill   Other Topics Concern   • Not on file   Social History Narrative   • Not on file      Family History   Problem Relation Age of Onset   • Cancer Father    • Heart attack Father    • Cancer Other    • Other Other    • No Known Problems Mother   "  • Asthma Brother    • Arthritis Paternal Grandmother        Review of Systems   Constitutional: Negative.    Gastrointestinal: Negative.    Genitourinary: Negative.            Objective   /84 (BP Location: Left arm, Patient Position: Sitting, Cuff Size: Adult)   Ht 163.8 cm (64.49\")   Wt 110 kg (242 lb)   LMP 11/04/2018 (Exact Date)   BMI 40.91 kg/m²     Physical Exam   Constitutional: She appears well-developed and well-nourished. She is cooperative.   Pulmonary/Chest: Right breast exhibits no inverted nipple, no mass, no nipple discharge, no skin change and no tenderness. Left breast exhibits no inverted nipple, no mass, no nipple discharge, no skin change and no tenderness.   Abdominal: Soft. Normal appearance. There is no tenderness.   Genitourinary: Vagina normal and uterus normal. There is no tenderness or lesion on the right labia. There is no tenderness or lesion on the left labia. Cervix exhibits no motion tenderness and no discharge. Right adnexum displays no mass and no tenderness. Left adnexum displays no mass and no tenderness.   Neurological: She is alert.   Skin: Skin is warm and dry.   Psychiatric: She has a normal mood and affect. Her behavior is normal.            Assessment and Plan  Mary was seen today for gynecologic exam.    Diagnoses and all orders for this visit:    Encounter for gynecological examination without abnormal finding    Screening for malignant neoplasm of cervix  -     Liquid-based Pap Smear, Screening; Future      Patient Instructions   Self breast exam monthly  Annual mammogram  Calcium 1200 mg daily and vit D 2000 mg daily  Regular exercise  Discontinue oc's at end of current pack             This note was electronically signed.    Tari Boston PA-C   November 26, 2018  "

## 2018-12-03 DIAGNOSIS — Z12.4 SCREENING FOR MALIGNANT NEOPLASM OF CERVIX: ICD-10-CM

## 2019-02-05 ENCOUNTER — OFFICE VISIT (OUTPATIENT)
Dept: INTERNAL MEDICINE | Facility: CLINIC | Age: 49
End: 2019-02-05

## 2019-02-05 VITALS
DIASTOLIC BLOOD PRESSURE: 78 MMHG | HEART RATE: 66 BPM | SYSTOLIC BLOOD PRESSURE: 120 MMHG | BODY MASS INDEX: 41.83 KG/M2 | RESPIRATION RATE: 16 BRPM | WEIGHT: 245 LBS | OXYGEN SATURATION: 98 % | TEMPERATURE: 98.3 F | HEIGHT: 64 IN

## 2019-02-05 DIAGNOSIS — H65.93 MIDDLE EAR EFFUSION, BILATERAL: Primary | ICD-10-CM

## 2019-02-05 PROCEDURE — 99213 OFFICE O/P EST LOW 20 MIN: CPT | Performed by: PHYSICIAN ASSISTANT

## 2019-02-05 NOTE — PROGRESS NOTES
"Chief Complaint   Patient presents with   • Earache     Pt c/o having a lot of trouble with her ears, she states that they have heart for the past 3-4 days this time around but she typically has fluid in her ears everytime she visits the doctor.        Subjective   Mary Lunsford is a 48 y.o. female    History of Present Illness    Ear Pain:  Patient reports that she has had problems with her ears for sometime.  She reports that she has been told that she has fluid on her ears regularly.  The right ear began having a muffled tone and \"white noise\" in the back ground.  She reports that she has a family history of ear problems and she has been concerned that she is beginning to have the same problems.  She denies any fever, pain, discharge from the ear.  She does not take any OTC allergy medications.  She has used Flonase in the past, but has not used it for sometime.      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   • Essential hypertension 3/30/2018   • Gallstone    • Kidney stones    • Migraine    • Obesity    • Ovarian cyst    • Restless leg syndrome    • Urinary tract infection    • Vitamin B12 deficiency      Past Surgical History:   Procedure Laterality Date   • BREAST SURGERY Bilateral 1987   • CARDIAC CATHETERIZATION N/A 3/15/2017    Procedure: Coronary angiography;  Surgeon: Moustapha Peñaloza MD;  Location: [facility] INVASIVE LOCATION;  Service:    • CARDIAC CATHETERIZATION N/A 3/15/2017    Procedure: Left ventriculography;  Surgeon: Moustapha Peñaloza MD;  Location: [facility] INVASIVE LOCATION;  Service:    •  SECTION      1991 & 1995   • CHOLECYSTECTOMY  2013   • GASTRIC BYPASS  2011     Family History   Problem Relation Age of Onset   • Cancer Father    • Heart attack Father    • Cancer Other    • Other Other    • No Known Problems Mother    • Asthma Brother    • Arthritis Paternal Grandmother      Social History "     Socioeconomic History   • Marital status:      Spouse name: Not on file   • Number of children: Not on file   • Years of education: Not on file   • Highest education level: Not on file   Social Needs   • Financial resource strain: Not on file   • Food insecurity - worry: Not on file   • Food insecurity - inability: Not on file   • Transportation needs - medical: Not on file   • Transportation needs - non-medical: Not on file   Occupational History   • Not on file   Tobacco Use   • Smoking status: Former Smoker     Packs/day: 1.00     Years: 2.00     Pack years: 2.00     Last attempt to quit: 10/1995     Years since quittin.3   • Smokeless tobacco: Never Used   Substance and Sexual Activity   • Alcohol use: Yes     Alcohol/week: 7.2 oz     Types: 12 Cans of beer per week     Comment: drinks beer 4 times a week    • Drug use: No   • Sexual activity: Defer     Partners: Male     Birth control/protection: OCP     Comment: birth control pill   Other Topics Concern   • Not on file   Social History Narrative   • Not on file     Allergies   Allergen Reactions   • Sulfa Antibiotics Rash and Other (See Comments)     blind         Review of Systems   Constitutional: Negative for activity change, appetite change, chills and fever.   HENT: Negative for congestion, postnasal drip, sinus pressure and sore throat. Ear pain: ear fullness.    Respiratory: Negative for cough and shortness of breath.    Cardiovascular: Negative for chest pain.   Neurological: Positive for headache. Negative for dizziness and light-headedness.     Objective     Vitals:    19 1624   BP: 120/78   Pulse: 66   Resp: 16   Temp: 98.3 °F (36.8 °C)   SpO2: 98%       Physical Exam   Constitutional: She is oriented to person, place, and time. She appears well-developed and well-nourished. No distress.   HENT:   Head: Normocephalic and atraumatic.   Right Ear: External ear and ear canal normal. No mastoid tenderness. Tympanic membrane is not  erythematous, not retracted and not bulging. A middle ear effusion is present.   Left Ear: External ear and ear canal normal. No mastoid tenderness. Tympanic membrane is not erythematous, not retracted and not bulging. A middle ear effusion is present.   Nose: Congestion present. No rhinorrhea.   Mouth/Throat: Uvula is midline, oropharynx is clear and moist and mucous membranes are normal.   Mild congestion   Eyes: Conjunctivae and EOM are normal. Pupils are equal, round, and reactive to light.   Neck: Normal range of motion. Neck supple.   Cardiovascular: Normal rate, regular rhythm and normal heart sounds. Exam reveals no gallop and no friction rub.   No murmur heard.  Pulmonary/Chest: Effort normal and breath sounds normal. No respiratory distress. She has no wheezes. She has no rales.   Neurological: She is alert and oriented to person, place, and time.   Skin: Skin is warm and dry. Capillary refill takes less than 2 seconds. She is not diaphoretic.   Psychiatric: She has a normal mood and affect. Her behavior is normal.   Nursing note and vitals reviewed.      Assessment/Plan     Mary was seen today for earache.    Diagnoses and all orders for this visit:    Middle ear effusion, bilateral  -     Start OTC Allegra or Zyrtec.  Start using Flonase nasal spray daily.  Patient would like to proceed with referral to ENT.  I feel she would benefit from this as she has had symptoms for some time now.  Will proceed with referral.  RTC is symptoms worsen.    -     Ambulatory Referral to ENT (Otolaryngology)    Return if symptoms worsen or fail to improve.    Lavern Wetzel PA-C

## 2019-02-22 ENCOUNTER — TELEPHONE (OUTPATIENT)
Dept: INTERNAL MEDICINE | Facility: CLINIC | Age: 49
End: 2019-02-22

## 2019-02-22 NOTE — TELEPHONE ENCOUNTER
Needs to monitor for worsening in breathing, one complication from flu is pneumonia. Needs to go to ER over weekend if she develops trouble breathing.

## 2019-02-22 NOTE — TELEPHONE ENCOUNTER
Spoke with patient and she states she is having a very bad earache, sore throat, and coughing up mucus and was wondering if they were sx of the flu. I stated to patient that it was and asked her if they had tested her for strep throat as well. She said yes they did but that it was negative. I told her that she could try OTC chlorseptic spray to help numb her throat but that I would send this message to Dr. Jimenez to see if she had anything to add. The patient is taking tamiflu. Please advise

## 2019-02-22 NOTE — TELEPHONE ENCOUNTER
Patient called and states she has been diagnosed with flu this week and wants to speak with nurse about some other symptoms that has came up that wants to discuss with nurse.

## 2019-03-12 ENCOUNTER — TRANSCRIBE ORDERS (OUTPATIENT)
Dept: ADMINISTRATIVE | Facility: HOSPITAL | Age: 49
End: 2019-03-12

## 2019-03-12 ENCOUNTER — HOSPITAL ENCOUNTER (OUTPATIENT)
Dept: ULTRASOUND IMAGING | Facility: HOSPITAL | Age: 49
Discharge: HOME | End: 2019-03-12
Admitting: OTOLARYNGOLOGY

## 2019-03-12 DIAGNOSIS — E04.1 NONTOXIC UNINODULAR GOITER: ICD-10-CM

## 2019-03-12 DIAGNOSIS — E04.1 NONTOXIC UNINODULAR GOITER: Primary | ICD-10-CM

## 2019-03-12 PROCEDURE — 76536 US EXAM OF HEAD AND NECK: CPT

## 2019-03-27 ENCOUNTER — OFFICE VISIT (OUTPATIENT)
Dept: NEUROLOGY | Facility: CLINIC | Age: 49
End: 2019-03-27

## 2019-03-27 VITALS
HEIGHT: 64 IN | SYSTOLIC BLOOD PRESSURE: 128 MMHG | DIASTOLIC BLOOD PRESSURE: 82 MMHG | BODY MASS INDEX: 41.83 KG/M2 | OXYGEN SATURATION: 98 % | WEIGHT: 245 LBS | HEART RATE: 88 BPM

## 2019-03-27 DIAGNOSIS — E53.8 VITAMIN B12 DEFICIENCY: Primary | ICD-10-CM

## 2019-03-27 DIAGNOSIS — G25.81 RESTLESS LEGS SYNDROME (RLS): ICD-10-CM

## 2019-03-27 DIAGNOSIS — D50.9 IRON DEFICIENCY ANEMIA, UNSPECIFIED IRON DEFICIENCY ANEMIA TYPE: ICD-10-CM

## 2019-03-27 PROCEDURE — 99213 OFFICE O/P EST LOW 20 MIN: CPT | Performed by: NURSE PRACTITIONER

## 2019-03-27 RX ORDER — PRAMIPEXOLE DIHYDROCHLORIDE 0.5 MG/1
0.25 TABLET ORAL 3 TIMES DAILY
Qty: 45 TABLET | Refills: 3 | Status: SHIPPED | OUTPATIENT
Start: 2019-03-27 | End: 2019-08-01 | Stop reason: SDUPTHER

## 2019-03-27 RX ORDER — FERROUS FUMARATE 324(106)MG
1 TABLET ORAL 2 TIMES DAILY
Qty: 60 TABLET | Refills: 5 | Status: SHIPPED | OUTPATIENT
Start: 2019-03-27 | End: 2020-02-10

## 2019-03-27 RX ORDER — CARBIDOPA AND LEVODOPA 50; 200 MG/1; MG/1
1 TABLET, EXTENDED RELEASE ORAL 3 TIMES DAILY
Qty: 120 TABLET | Refills: 5 | Status: SHIPPED | OUTPATIENT
Start: 2019-03-27 | End: 2019-09-19 | Stop reason: SDUPTHER

## 2019-03-27 NOTE — PROGRESS NOTES
Subjective:     Patient ID: Mary Lunsford is a 48 y.o. female.    CC:   Chief Complaint   Patient presents with   • restless leg syndrome       HPI:   History of Present Illness   This is a very pleasant 47 yo female who presents for 5 month follow up on severe RLS for 10+ years. Her mother has RLS and grandmother also had severe RLS. She is currently taking Sinemet TID and Mirapex 0.5mg 1 at night and 1/2 tab PRN breakthrough symptoms throughout the day. She feels like her symptoms during the daytime were worsening last visit so we tried to switch her to the neupro patch but after 4-6 weeks and up to 3mg patch daily she could not sleep at all. We discontinued the patch and added Lyrica 75mg QHS. She is tolerating this very well, is sleeping well and is not needing an extra dose of Mirapex in the morning. She feels like she is finally not waking up early in the morning. She wishes to continue the current dosage. She has the standing desk. She has confirmed PLMD with sleep study in 2017 and no BASIL. She is taking iron BID for long term RICHAR-ferritin 10/3/18 224 normal and required iron infusions in past. Has Vitamin B12 deficiency history-10/3/18 B12 180's-needs to restart her injections prescribed by PCP. Previously tried Ropinirole, Neupro and Horizant without significant improvement in symptoms.She now has a standing desk at work to help with daytime RLS symptoms and this has been beneficial. She is aware of Dopamine Agonists' risks and denies any unusual behavioral changes.     The following portions of the patient's history were reviewed and updated as appropriate: allergies, current medications, past family history, past medical history, past social history, past surgical history and problem list.    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   • Essential hypertension 3/30/2018   • Gallstone    • Kidney stones    • Migraine    • Obesity    • Ovarian cyst     • Restless leg syndrome    • Urinary tract infection    • Vitamin B12 deficiency        Past Surgical History:   Procedure Laterality Date   • BREAST SURGERY Bilateral 1987   • CARDIAC CATHETERIZATION N/A 3/15/2017    Procedure: Coronary angiography;  Surgeon: Moustapha Peñaloza MD;  Location: [facility] CATH INVASIVE LOCATION;  Service:    • CARDIAC CATHETERIZATION N/A 3/15/2017    Procedure: Left ventriculography;  Surgeon: Moustapha Peñaloza MD;  Location: [facility] CATH INVASIVE LOCATION;  Service:    •  SECTION      1991 & 1995   • CHOLECYSTECTOMY  2013   • GASTRIC BYPASS  2011       Social History     Socioeconomic History   • Marital status:      Spouse name: Not on file   • Number of children: Not on file   • Years of education: Not on file   • Highest education level: Not on file   Tobacco Use   • Smoking status: Former Smoker     Packs/day: 1.00     Years: 2.00     Pack years: 2.00     Last attempt to quit: 10/1995     Years since quittin.5   • Smokeless tobacco: Never Used   Substance and Sexual Activity   • Alcohol use: Yes     Alcohol/week: 7.2 oz     Types: 12 Cans of beer per week     Comment: drinks beer 4 times a week    • Drug use: No   • Sexual activity: Defer     Partners: Male     Birth control/protection: OCP     Comment: birth control pill       Family History   Problem Relation Age of Onset   • Cancer Father    • Heart attack Father    • Cancer Other    • Other Other    • No Known Problems Mother    • Asthma Brother    • Arthritis Paternal Grandmother         Review of Systems   Constitutional: Negative for chills, fatigue, fever and unexpected weight change.   HENT: Negative for ear pain, hearing loss, nosebleeds, rhinorrhea and sore throat.    Eyes: Negative for photophobia, pain, discharge, itching and visual disturbance.   Respiratory: Negative for cough, chest tightness, shortness of breath and wheezing.    Cardiovascular: Negative for chest pain,  palpitations and leg swelling.   Gastrointestinal: Positive for nausea. Negative for abdominal pain, blood in stool, constipation, diarrhea and vomiting.   Genitourinary: Negative for dysuria, frequency, hematuria and urgency.   Musculoskeletal: Negative for arthralgias, back pain, gait problem, joint swelling, myalgias, neck pain and neck stiffness.   Skin: Negative for rash and wound.   Allergic/Immunologic: Negative for environmental allergies and food allergies.   Neurological: Negative for dizziness, tremors, seizures, syncope, speech difficulty, weakness, light-headedness, numbness and headaches.   Hematological: Negative for adenopathy. Does not bruise/bleed easily.   Psychiatric/Behavioral: Negative for agitation, confusion, decreased concentration, hallucinations, sleep disturbance and suicidal ideas. The patient is not nervous/anxious.    All other systems reviewed and are negative.       Objective:    Neurologic Exam     Mental Status   Oriented to person, place, and time.   Level of consciousness: alert    Cranial Nerves   Cranial nerves II through XII intact.     Motor Exam   Muscle bulk: normal  Overall muscle tone: normal    Strength   Strength 5/5 throughout.     Gait, Coordination, and Reflexes     Gait  Gait: normal    Coordination   Finger to nose coordination: normal    Tremor   Resting tremor: absent  Intention tremor: absent  Action tremor: absent    Reflexes   Right brachioradialis: 2+  Left brachioradialis: 2+  Right biceps: 2+  Left biceps: 2+  Right triceps: 2+  Left triceps: 2+  Right patellar: 2+  Left patellar: 2+  Right achilles: 2+  Left achilles: 2+  Right : 2+  Left : 2+      Physical Exam   Constitutional: She is oriented to person, place, and time.   Neurological: She is oriented to person, place, and time. She has normal strength. She has a normal Finger-Nose-Finger Test. Gait normal.   Reflex Scores:       Tricep reflexes are 2+ on the right side and 2+ on the left  side.       Bicep reflexes are 2+ on the right side and 2+ on the left side.       Brachioradialis reflexes are 2+ on the right side and 2+ on the left side.       Patellar reflexes are 2+ on the right side and 2+ on the left side.       Achilles reflexes are 2+ on the right side and 2+ on the left side.      Assessment/Plan:       Mary was seen today for restless leg syndrome.    Diagnoses and all orders for this visit:    Vitamin B12 deficiency  Comments:  Restart B12 injections    Iron deficiency anemia, unspecified iron deficiency anemia type  Comments:  Continue iron replacement  Orders:  -     Ferrous Fumarate (FERROCITE) 324 (106 Fe) MG tablet; Take 1 tablet by mouth 2 (Two) Times a Day.    Restless legs syndrome (RLS)  -     pramipexole (MIRAPEX) 0.5 MG tablet; Take 0.5 tablets by mouth 3 (Three) Times a Day.  -     LYRICA 75 MG capsule; Take 1 capsule by mouth Every Night.  -     carbidopa-levodopa CR (SINEMET CR)  MG per CR tablet; Take 1 tablet by mouth 3 (Three) Times a Day. May take 1 extra prn breakthrough RLS symptoms  -     Ferrous Fumarate (FERROCITE) 324 (106 Fe) MG tablet; Take 1 tablet by mouth 2 (Two) Times a Day.           She will continue current medications.  Recommend restarting B12 injections and she tells me she has these at home but has not been taking these.  I have refilled all of her medications.  She will follow-up in 6 months or sooner if needed.  Recommend having B12 labs rechecked in about 3 months once consistently taking the injections. Reviewed medications, potential side effects and signs and symptoms to report. Discussed risk versus benefits of treatment plan with patient and/or family-including medications, labs and radiology that may be ordered. Addressed questions and concerns during visit. Patient and/or family verbalized understanding and agree with plan.    As part of this patient's treatment plan I am prescribing controlled substances. The patient has been made  aware of appropriate use of such medications, including potential risk of somnolence, limited ability to drive and/or work safely, and potential for dependence or overdose. It has also been made clear that these medications are for use by the patient only, without concomitant use of alcohol or other substances unless prescribed. Keep out of reach of children.  [place] report has been reviewed. If this is going to be prescribed long term, [facility] Controlled Substance Agreement Contract has also been read and signed by patient and myself.  K[place]ley #[id number] reviewed.    During this visit the following were done:  Labs Reviewed [x]    Labs Ordered []    Radiology Reports Reviewed []    Radiology Ordered []    PCP Records Reviewed []    Referring Provider Records Reviewed []    ER Records Reviewed []    Hospital Records Reviewed []    History Obtained From Family []    Radiology Images Reviewed []    Other Reviewed []    Records Requested []      EMR Dragon/Transcription Disclaimer:  Much of this encounter note is an electronic transcription of spoken language to printed text. Electronic transcription of spoken language may permit erroneous words or phrases to be inadvertently transcribed. Although I have reviewed the note for such errors, some may still exist in this documentation.    Debbie Rodriguez, APRN  3/27/2019

## 2019-04-01 ENCOUNTER — OFFICE VISIT (OUTPATIENT)
Dept: CARDIOLOGY | Facility: CLINIC | Age: 49
End: 2019-04-01

## 2019-04-01 VITALS
WEIGHT: 244.6 LBS | HEIGHT: 64 IN | SYSTOLIC BLOOD PRESSURE: 120 MMHG | HEART RATE: 78 BPM | BODY MASS INDEX: 41.76 KG/M2 | OXYGEN SATURATION: 96 % | RESPIRATION RATE: 18 BRPM | DIASTOLIC BLOOD PRESSURE: 72 MMHG

## 2019-04-01 DIAGNOSIS — I10 ESSENTIAL HYPERTENSION: Primary | ICD-10-CM

## 2019-04-01 PROCEDURE — 99213 OFFICE O/P EST LOW 20 MIN: CPT | Performed by: INTERNAL MEDICINE

## 2019-04-01 NOTE — PROGRESS NOTES
Subjective:     Encounter Date:04/01/2019      Patient ID: Mary Lunsford is a 48 y.o. female.    Chief Complaint: Essential hypertension  HPI  She has had no issues or problems since her last visit.  She reports compliance with her medications.  She remains a non-smoker.  She hopes to lose weight once she starts exercising again this spring.  The patient has no chest discomfort at rest or with activity.  There is no exertional chest arm neck jaw shoulder or back discomfort.  There is no orthopnea PND or lower extremity edema.  There is no dizziness palpitations or syncope.  The following portions of the patient's history were reviewed and updated as appropriate: allergies, current medications, past family history, past medical history, past social history, past surgical history and problem  Review of Systems   Constitution: Negative for chills, diaphoresis, fever, weakness, malaise/fatigue, weight gain and weight loss.   HENT: Negative for ear discharge, hearing loss, hoarse voice and nosebleeds.    Eyes: Negative for discharge, double vision, pain and photophobia.   Cardiovascular: Negative for chest pain, claudication, cyanosis, dyspnea on exertion, irregular heartbeat, leg swelling, near-syncope, orthopnea, palpitations, paroxysmal nocturnal dyspnea and syncope.   Respiratory: Negative for cough, hemoptysis, shortness of breath, sputum production and wheezing.    Endocrine: Negative for cold intolerance, heat intolerance, polydipsia, polyphagia and polyuria.   Hematologic/Lymphatic: Negative for adenopathy and bleeding problem. Does not bruise/bleed easily.   Skin: Negative for color change, flushing, itching and rash.   Musculoskeletal: Negative for muscle cramps, muscle weakness, myalgias and stiffness.   Gastrointestinal: Negative for abdominal pain, diarrhea, hematemesis, hematochezia, nausea and vomiting.   Genitourinary: Negative for dysuria, frequency and nocturia.   Neurological: Negative for  focal weakness, loss of balance, numbness, paresthesias and seizures.   Psychiatric/Behavioral: Negative for altered mental status, hallucinations and suicidal ideas.   Allergic/Immunologic: Negative for HIV exposure, hives and persistent infections.           Current Outpatient Medications:   •  carbidopa-levodopa CR (SINEMET CR)  MG per CR tablet, Take 1 tablet by mouth 3 (Three) Times a Day. May take 1 extra prn breakthrough RLS symptoms, Disp: 120 tablet, Rfl: 5  •  Ferrous Fumarate (FERROCITE) 324 (106 Fe) MG tablet, Take 1 tablet by mouth 2 (Two) Times a Day., Disp: 60 tablet, Rfl: 5  •  levothyroxine (SYNTHROID, LEVOTHROID) 50 MCG tablet, take 1 tablet by mouth once daily, Disp: 90 tablet, Rfl: 3  •  LYRICA 75 MG capsule, Take 1 capsule by mouth Every Night., Disp: 30 capsule, Rfl: 5  •  metoprolol tartrate (LOPRESSOR) 25 MG tablet, take 1 tablet by mouth every 12 hours, Disp: 60 tablet, Rfl: 6  •  pramipexole (MIRAPEX) 0.5 MG tablet, Take 0.5 tablets by mouth 3 (Three) Times a Day., Disp: 45 tablet, Rfl: 3  •  RA ASPIRIN ADULT LOW STRENGTH 81 MG chewable tablet, chew and swallow 1 tablet by mouth once daily, Disp: , Rfl: 0  •  cyanocobalamin 1000 MCG/ML injection, Inject 1 mL into the appropriate muscle as directed by prescriber Every 14 (Fourteen) Days., Disp: 2 mL, Rfl: 5  •  Syringe, Disposable, 1 ML misc, Use to inject vitamin B12 every 14 days, Disp: 2 each, Rfl: 5    Objective:   Physical Exam   Constitutional: She is oriented to person, place, and time. She appears well-developed and well-nourished. No distress.   HENT:   Head: Normocephalic and atraumatic.   Mouth/Throat: Oropharynx is clear and moist.   Eyes: Conjunctivae and EOM are normal. Pupils are equal, round, and reactive to light. No scleral icterus.   Neck: Normal range of motion. Neck supple. No JVD present. No tracheal deviation present. No thyromegaly present.   Cardiovascular: Normal rate, regular rhythm, S1 normal, S2 normal,  "normal heart sounds, intact distal pulses and normal pulses. PMI is not displaced. Exam reveals no gallop and no friction rub.   No murmur heard.  Pulmonary/Chest: Effort normal and breath sounds normal. No stridor. No respiratory distress. She has no wheezes. She has no rales.   Abdominal: Soft. Bowel sounds are normal. She exhibits no distension and no mass. There is no tenderness. There is no rebound and no guarding.   Musculoskeletal: Normal range of motion. She exhibits no edema or deformity.   Neurological: She is alert and oriented to person, place, and time. She displays normal reflexes. No cranial nerve deficit. Coordination normal.   Skin: Skin is warm and dry. No rash noted. She is not diaphoretic. No erythema.   Psychiatric: She has a normal mood and affect. Her behavior is normal. Thought content normal.     Blood pressure 120/72, pulse 78, resp. rate 18, height 162.6 cm (64\"), weight 111 kg (244 lb 9.6 oz), SpO2 96 %, not currently breastfeeding.   Lab Review:     Assessment:       1. Essential hypertension  Acceptable BP control    Procedures    Plan:     Importance of dietary sodium restriction has been reinforced with the patient.  The patient has been counseled regarding the importance of diet exercise and weight management.  No changes in her medication therapy have been made at today's visit.  No additional cardiovascular testing is warranted at this time.      "

## 2019-05-07 ENCOUNTER — OFFICE VISIT (OUTPATIENT)
Dept: INTERNAL MEDICINE | Facility: CLINIC | Age: 49
End: 2019-05-07

## 2019-05-07 VITALS
SYSTOLIC BLOOD PRESSURE: 124 MMHG | HEIGHT: 64 IN | OXYGEN SATURATION: 98 % | DIASTOLIC BLOOD PRESSURE: 78 MMHG | TEMPERATURE: 98.8 F | HEART RATE: 77 BPM | WEIGHT: 246 LBS | BODY MASS INDEX: 42 KG/M2

## 2019-05-07 DIAGNOSIS — M25.50 POLYARTHRALGIA: ICD-10-CM

## 2019-05-07 DIAGNOSIS — R59.0 EPITROCHLEAR LYMPHADENOPATHY: ICD-10-CM

## 2019-05-07 DIAGNOSIS — E53.8 B12 DEFICIENCY: Primary | ICD-10-CM

## 2019-05-07 DIAGNOSIS — E07.9 THYROID DISORDER: ICD-10-CM

## 2019-05-07 PROCEDURE — 99214 OFFICE O/P EST MOD 30 MIN: CPT | Performed by: PHYSICIAN ASSISTANT

## 2019-05-07 PROCEDURE — 96372 THER/PROPH/DIAG INJ SC/IM: CPT | Performed by: PHYSICIAN ASSISTANT

## 2019-05-07 RX ORDER — CYANOCOBALAMIN 1000 UG/ML
1000 INJECTION, SOLUTION INTRAMUSCULAR; SUBCUTANEOUS ONCE
Status: COMPLETED | OUTPATIENT
Start: 2019-05-07 | End: 2019-05-07

## 2019-05-07 RX ADMIN — CYANOCOBALAMIN 1000 MCG: 1000 INJECTION, SOLUTION INTRAMUSCULAR; SUBCUTANEOUS at 08:59

## 2019-05-07 NOTE — PROGRESS NOTES
Mary Lunsford is a 48 y.o. female.     Subjective   History of Present Illness   Here today with concern of left arm pain. 3 weeks ago she moved which included lifting boxes and since then she has had bothersome pain in the left arm which was improving but then she had a yard sale and did more lifting which aggravated the pain again.      She also reports trouble with aching in both hands and upper back for a couple of years which has been worsening.  She feels the upper back pain may be contributed to by sitting at a desk all day at work as well as having large, heavy breasts.  She does not feel the aches in her hands are work related as the pain is present at all times. Her upper back pain is not present on the weekends when she is more active.  Ibuprofen helps both upper back and hand pain. She denies any appreciable edema, numbness or tingling.     1 day ago she noticed a lump in the right elbow which is tender to palpation but otherwise not bothersome. No known injury but she cannot exclude. She denies any skin abnormalities. No fever, abnormal fatigue, night sweats, weight loss or abnormal bleeding/bruising.     She is known to have thyroid disorder and has not had labs assessed in over 1 year. She is currently taking levothyroxine 50 mcg daily. She denies abnormal fatigue, weight change, temperature intolerance, skin changes, bowel habit changes or heart rate changes.         The following portions of the patient's history were reviewed and updated as appropriate: allergies, current medications, past family history, past medical history, past social history, past surgical history and problem list.    Review of Systems   Constitutional: Negative for activity change, appetite change, chills, diaphoresis, fatigue, fever and unexpected weight change.   Eyes: Negative for discharge, redness and visual disturbance.   Respiratory: Negative for cough, chest tightness, shortness of breath and wheezing.     Cardiovascular: Negative for chest pain, palpitations and leg swelling.   Endocrine: Negative for cold intolerance, heat intolerance, polydipsia, polyphagia and polyuria.        Large breasts.    Musculoskeletal: Positive for arthralgias, back pain and myalgias. Negative for gait problem, joint swelling, neck pain and neck stiffness.   Skin: Negative for color change, pallor, rash and wound.   Allergic/Immunologic: Negative for environmental allergies, food allergies and immunocompromised state.   Neurological: Negative for dizziness, syncope, facial asymmetry, weakness, light-headedness, numbness and headaches.   Hematological: Positive for adenopathy (possible right elbow). Does not bruise/bleed easily.   Psychiatric/Behavioral: Negative for agitation, behavioral problems, confusion, decreased concentration, dysphoric mood, hallucinations, self-injury, sleep disturbance and suicidal ideas. The patient is not nervous/anxious and is not hyperactive.          Objective    Physical Exam   Constitutional: She is oriented to person, place, and time. She appears well-developed and well-nourished. No distress.   HENT:   Head: Normocephalic and atraumatic.   Eyes: Conjunctivae and EOM are normal. Pupils are equal, round, and reactive to light.   Neck: Normal range of motion. Neck supple.   Cardiovascular: Normal rate, regular rhythm and normal heart sounds. Exam reveals no gallop and no friction rub.   No murmur heard.  Pulmonary/Chest: Effort normal and breath sounds normal. No respiratory distress. She has no wheezes. She has no rales.   Abdominal: Soft. Bowel sounds are normal. There is no tenderness.   Musculoskeletal: Normal range of motion. She exhibits tenderness (cervical and upper thoracic paraspinal muscle tenderness bilaterally. right medial elbow soft tissue tenderness without bony tenderness.  no tenderness of hands or wrists, negative tinnel and timmy tests. ). She exhibits no edema or deformity.  "  Lymphadenopathy:     She has no cervical adenopathy.        Right: Epitrochlear adenopathy present. No supraclavicular adenopathy present.        Left: No supraclavicular and no epitrochlear adenopathy present.   Neurological: She is alert and oriented to person, place, and time. She displays normal reflexes. No cranial nerve deficit or sensory deficit. She exhibits normal muscle tone. Coordination normal.   Skin: Skin is warm and dry. Capillary refill takes less than 2 seconds. No rash noted. She is not diaphoretic. No erythema. No pallor.   Psychiatric: She has a normal mood and affect. Her behavior is normal. Judgment and thought content normal.   Nursing note and vitals reviewed.        /78   Pulse 77   Temp 98.8 °F (37.1 °C)   Ht 162.6 cm (64.02\")   Wt 112 kg (246 lb)   SpO2 98%   BMI 42.20 kg/m²     Nursing note and vitals reviewed.        Assessment/Plan   Mary was seen today for hand pain and lump near r elbow.    Diagnoses and all orders for this visit:      Epitrochlear lymphadenopathy  -     CBC & Differential  -     Comprehensive Metabolic Panel  -     US Nonvascular Extremity Limited- right arm/lymph nodes    Polyarthralgia  -     CBC & Differential  -     Comprehensive Metabolic Panel  -     Rheumatoid Arthritis Expanded Panel  Upper back pain likely related to poor ergonomics at work where she sits at a desk all day as well as large/heavy breasts. She was encouraged to have her work station assessed for potential changes that may help her pain.  Massage therapy and chiropractic care should also be considered if desired.  Will assess etiology of hand pain with rheumatoid arthritis expanded panel.    Continue NSAIDs as needed with food but encouraged to use sparingly.    Thyroid disorder  -     TSH  -     T4, Free  Clinically euthyroid.                "

## 2019-05-09 LAB
ALBUMIN SERPL-MCNC: 4 G/DL (ref 3.5–5)
ALBUMIN/GLOB SERPL: 1.5 G/DL (ref 1–2)
ALP SERPL-CCNC: 63 U/L (ref 38–126)
ALT SERPL-CCNC: 22 U/L (ref 13–69)
AST SERPL-CCNC: 25 U/L (ref 15–46)
BASOPHILS # BLD AUTO: 0.04 10*3/MM3 (ref 0–0.2)
BASOPHILS NFR BLD AUTO: 0.7 % (ref 0–1.5)
BILIRUB SERPL-MCNC: 0.7 MG/DL (ref 0.2–1.3)
BUN SERPL-MCNC: 16 MG/DL (ref 7–20)
BUN/CREAT SERPL: 16 (ref 7.1–23.5)
CALCIUM SERPL-MCNC: 9.3 MG/DL (ref 8.4–10.2)
CCP IGA+IGG SERPL IA-ACNC: 9 UNITS (ref 0–19)
CHLORIDE SERPL-SCNC: 102 MMOL/L (ref 98–107)
CO2 SERPL-SCNC: 28 MMOL/L (ref 26–30)
CREAT SERPL-MCNC: 1 MG/DL (ref 0.6–1.3)
CRP SERPL-MCNC: 9.1 MG/L (ref 0–4.9)
EOSINOPHIL # BLD AUTO: 0.29 10*3/MM3 (ref 0–0.4)
EOSINOPHIL NFR BLD AUTO: 5.3 % (ref 0.3–6.2)
ERYTHROCYTE [DISTWIDTH] IN BLOOD BY AUTOMATED COUNT: 12.4 % (ref 12.3–15.4)
ERYTHROCYTE [SEDIMENTATION RATE] IN BLOOD BY WESTERGREN METHOD: 4 MM/HR (ref 0–40)
GLOBULIN SER CALC-MCNC: 2.6 GM/DL
GLUCOSE SERPL-MCNC: 89 MG/DL (ref 74–98)
HCT VFR BLD AUTO: 41.6 % (ref 34–46.6)
HGB BLD-MCNC: 13.7 G/DL (ref 12–15.9)
IMM GRANULOCYTES # BLD AUTO: 0.02 10*3/MM3 (ref 0–0.05)
IMM GRANULOCYTES NFR BLD AUTO: 0.4 % (ref 0–0.5)
LYMPHOCYTES # BLD AUTO: 1.64 10*3/MM3 (ref 0.7–3.1)
LYMPHOCYTES NFR BLD AUTO: 29.9 % (ref 19.6–45.3)
MCH RBC QN AUTO: 32.8 PG (ref 26.6–33)
MCHC RBC AUTO-ENTMCNC: 32.9 G/DL (ref 31.5–35.7)
MCV RBC AUTO: 99.5 FL (ref 79–97)
MONOCYTES # BLD AUTO: 0.41 10*3/MM3 (ref 0.1–0.9)
MONOCYTES NFR BLD AUTO: 7.5 % (ref 5–12)
NEUTROPHILS # BLD AUTO: 3.08 10*3/MM3 (ref 1.7–7)
NEUTROPHILS NFR BLD AUTO: 56.2 % (ref 42.7–76)
NRBC BLD AUTO-RTO: 0 /100 WBC (ref 0–0.2)
PLATELET # BLD AUTO: 230 10*3/MM3 (ref 140–450)
POTASSIUM SERPL-SCNC: 4.6 MMOL/L (ref 3.5–5.1)
PROT SERPL-MCNC: 6.6 G/DL (ref 6.3–8.2)
RBC # BLD AUTO: 4.18 10*6/MM3 (ref 3.77–5.28)
RHEUMATOID FACT SERPL-ACNC: 10.1 IU/ML (ref 0–13.9)
SODIUM SERPL-SCNC: 139 MMOL/L (ref 137–145)
T4 FREE SERPL-MCNC: 1.06 NG/DL (ref 0.78–2.19)
TSH SERPL DL<=0.005 MIU/L-ACNC: 5.75 MIU/ML (ref 0.47–4.68)
WBC # BLD AUTO: 5.48 10*3/MM3 (ref 3.4–10.8)

## 2019-05-09 RX ORDER — LEVOTHYROXINE SODIUM 0.07 MG/1
75 TABLET ORAL DAILY
Qty: 30 TABLET | Refills: 5 | Status: SHIPPED | OUTPATIENT
Start: 2019-05-09 | End: 2019-09-17

## 2019-05-13 ENCOUNTER — HOSPITAL ENCOUNTER (OUTPATIENT)
Dept: ULTRASOUND IMAGING | Facility: HOSPITAL | Age: 49
Discharge: HOME | End: 2019-05-13
Admitting: PHYSICIAN ASSISTANT

## 2019-05-13 PROCEDURE — 76882 US LMTD JT/FCL EVL NVASC XTR: CPT

## 2019-06-11 ENCOUNTER — TELEPHONE (OUTPATIENT)
Dept: NEUROLOGY | Facility: CLINIC | Age: 49
End: 2019-06-11

## 2019-06-11 NOTE — TELEPHONE ENCOUNTER
PATIENT IS WANTING TO KNOW IF SHE CAN GET AN EARLY REFILL ON HER LYRICA. SHE IS GOING TO BE OUT OF TOWN UNTIL NEXT Saturday BUT SHE WILL BE OUT ON WEDNESDAY

## 2019-07-22 ENCOUNTER — OFFICE VISIT (OUTPATIENT)
Dept: INTERNAL MEDICINE | Facility: CLINIC | Age: 49
End: 2019-07-22

## 2019-07-22 VITALS
RESPIRATION RATE: 15 BRPM | TEMPERATURE: 98.6 F | HEART RATE: 62 BPM | HEIGHT: 64 IN | WEIGHT: 248 LBS | BODY MASS INDEX: 42.34 KG/M2 | DIASTOLIC BLOOD PRESSURE: 103 MMHG | SYSTOLIC BLOOD PRESSURE: 156 MMHG | OXYGEN SATURATION: 100 %

## 2019-07-22 DIAGNOSIS — E07.9 THYROID DISORDER: ICD-10-CM

## 2019-07-22 DIAGNOSIS — E53.8 B12 DEFICIENCY: ICD-10-CM

## 2019-07-22 DIAGNOSIS — R93.89 THYROID WITH HETEROGENEOUS ECHOTEXTURE DETERMINED BY ULTRASOUND: Primary | ICD-10-CM

## 2019-07-22 DIAGNOSIS — R22.1 LOCALIZED SWELLING, MASS AND LUMP, NECK: ICD-10-CM

## 2019-07-22 DIAGNOSIS — R03.0 ELEVATED BLOOD PRESSURE READING: ICD-10-CM

## 2019-07-22 PROCEDURE — 99214 OFFICE O/P EST MOD 30 MIN: CPT | Performed by: NURSE PRACTITIONER

## 2019-07-22 PROCEDURE — 96372 THER/PROPH/DIAG INJ SC/IM: CPT | Performed by: NURSE PRACTITIONER

## 2019-07-22 RX ORDER — CYANOCOBALAMIN 1000 UG/ML
1000 INJECTION, SOLUTION INTRAMUSCULAR; SUBCUTANEOUS ONCE
Status: COMPLETED | OUTPATIENT
Start: 2019-07-22 | End: 2019-07-22

## 2019-07-22 RX ADMIN — CYANOCOBALAMIN 1000 MCG: 1000 INJECTION, SOLUTION INTRAMUSCULAR; SUBCUTANEOUS at 17:48

## 2019-07-29 ENCOUNTER — TELEPHONE (OUTPATIENT)
Dept: INTERNAL MEDICINE | Facility: CLINIC | Age: 49
End: 2019-07-29

## 2019-07-29 DIAGNOSIS — Z12.11 COLON CANCER SCREENING: Primary | ICD-10-CM

## 2019-07-29 NOTE — TELEPHONE ENCOUNTER
Patient called asking if she can get a referral to have a screening colonoscopy done. She would like dr blackman.

## 2019-08-01 DIAGNOSIS — G25.81 RESTLESS LEGS SYNDROME (RLS): ICD-10-CM

## 2019-08-01 RX ORDER — PRAMIPEXOLE DIHYDROCHLORIDE 0.5 MG/1
TABLET ORAL
Qty: 45 TABLET | Refills: 3 | Status: SHIPPED | OUTPATIENT
Start: 2019-08-01 | End: 2019-09-19 | Stop reason: SDUPTHER

## 2019-08-02 ENCOUNTER — TELEPHONE (OUTPATIENT)
Dept: INTERNAL MEDICINE | Facility: CLINIC | Age: 49
End: 2019-08-02

## 2019-08-02 NOTE — TELEPHONE ENCOUNTER
PATIENT CALLED WANTING A UPDATE ON THE TEST THAT WAS ORDERED FOR HER NECK. TOLD HER ITS PENDING REVIEW ON HER INSURANCE. SHE WAS CONCERNED. SHE WANDERED IF THEIR ANYTHING SHE COULD DO TO SPEED UP PROCESS I TOLD HER SHE MAY GIVE HER INSURANCE A CALL. PLEASE ADVISE IF THEIR IS ANYTHING ELSE. THANKS

## 2019-08-12 ENCOUNTER — TELEPHONE (OUTPATIENT)
Dept: INTERNAL MEDICINE | Facility: CLINIC | Age: 49
End: 2019-08-12

## 2019-08-12 ENCOUNTER — OFFICE VISIT (OUTPATIENT)
Dept: INTERNAL MEDICINE | Facility: CLINIC | Age: 49
End: 2019-08-12

## 2019-08-12 VITALS
OXYGEN SATURATION: 100 % | HEIGHT: 64 IN | TEMPERATURE: 97.9 F | HEART RATE: 55 BPM | BODY MASS INDEX: 42 KG/M2 | WEIGHT: 246 LBS | SYSTOLIC BLOOD PRESSURE: 128 MMHG | DIASTOLIC BLOOD PRESSURE: 87 MMHG

## 2019-08-12 DIAGNOSIS — R19.7 DIARRHEA, UNSPECIFIED TYPE: Primary | ICD-10-CM

## 2019-08-12 PROCEDURE — 99213 OFFICE O/P EST LOW 20 MIN: CPT | Performed by: NURSE PRACTITIONER

## 2019-08-12 NOTE — PROGRESS NOTES
"Date: 2019    Name: Mary Lunsford  : 1970    Chief Complaint:   Chief Complaint   Patient presents with   • Diarrhea     x 1 week       HPI:  Ms Grayson has had diarrhea x 7 days.  She has a previously scheduled consultation for colonosopy on 19, related to BRB per rectum; noted 2 weeks ago x 2 days.  No recollection of suspect food, no sick contacts.  Missed work 2 days last week.  [place]ushing diet, diminished appetite. Increased flatulence.   Diarrhea has been stringy with some body, darker when diarrhea started; now, orange-y in color.  This morning, thought she felt gassy and passed stool before she realized what she was feeling was more than gas.  Denies fever, chills, abdominal pain, dizziness, headache, dysuria, back pain.  Has not taken any medication for diarrhea.      History:  The following portions of the patient's history were reviewed and updated as appropriate: allergies, current medications, past medical history, family history, surgical history, social history and problem list.     ROS:  Review of Systems   Constitutional: Positive for appetite change.   Respiratory: Negative for cough, shortness of breath and wheezing.    Cardiovascular: Negative for chest pain, palpitations and leg swelling.   Gastrointestinal: Positive for abdominal distention. Negative for rectal pain.   Musculoskeletal: Negative for arthralgias and myalgias.   Skin: Negative for pallor and rash.       VS:  Vitals:    19 1623   BP: 128/87   Pulse: 55   Temp: 97.9 °F (36.6 °C)   TempSrc: Temporal   SpO2: 100%   Weight: 112 kg (246 lb)   Height: 162.6 cm (64\")     Body mass index is 42.23 kg/m².    PE:  Physical Exam   Constitutional: She is oriented to person, place, and time. She appears well-developed and well-nourished. She appears distressed.   HENT:   Mouth/Throat: Oropharynx is clear and moist.   Eyes: Conjunctivae are normal.   Cardiovascular: Normal rate, regular rhythm, normal heart sounds and " intact distal pulses.   Pulmonary/Chest: Effort normal and breath sounds normal.   Abdominal: Soft. Bowel sounds are increased. There is no tenderness. There is no rigidity, no guarding, no CVA tenderness, no tenderness at McBurney's point and negative Alejandra's sign.   Neurological: She is alert and oriented to person, place, and time.   Skin: Skin is warm and dry. Capillary refill takes less than 2 seconds.       Assessment/Plan:  Mary was seen today for diarrhea.    Diagnoses and all orders for this visit:    Diarrhea, unspecified type  -     CBC & Differential  -     Comprehensive Metabolic Panel  -     Fecal Leukocytes - Stool, Per Rectum  -     Gastrointestinal Panel, PCR - Stool, Per Rectum  - Drink plenty of clear, decaffeinated fluids, as tolerated.  - Continue bland diet until symptoms improve  - Good hand hygiene practices    Return in about 10 days (around 8/22/2019).

## 2019-08-15 ENCOUNTER — HOSPITAL ENCOUNTER (OUTPATIENT)
Dept: CT IMAGING | Facility: HOSPITAL | Age: 49
Discharge: HOME | End: 2019-08-15
Admitting: NURSE PRACTITIONER

## 2019-08-15 ENCOUNTER — OFFICE VISIT (OUTPATIENT)
Dept: GASTROENTEROLOGY | Facility: CLINIC | Age: 49
End: 2019-08-15

## 2019-08-15 VITALS
SYSTOLIC BLOOD PRESSURE: 138 MMHG | HEIGHT: 65 IN | TEMPERATURE: 98.3 F | DIASTOLIC BLOOD PRESSURE: 85 MMHG | RESPIRATION RATE: 16 BRPM | BODY MASS INDEX: 41.32 KG/M2 | HEART RATE: 54 BPM | WEIGHT: 248 LBS

## 2019-08-15 DIAGNOSIS — R19.7 DIARRHEA, UNSPECIFIED TYPE: Primary | Chronic | ICD-10-CM

## 2019-08-15 DIAGNOSIS — K62.5 BRIGHT RED BLOOD PER RECTUM: Chronic | ICD-10-CM

## 2019-08-15 DIAGNOSIS — R10.32 BILATERAL LOWER ABDOMINAL CRAMPING: Chronic | ICD-10-CM

## 2019-08-15 DIAGNOSIS — Z12.11 COLON CANCER SCREENING: ICD-10-CM

## 2019-08-15 DIAGNOSIS — R10.31 BILATERAL LOWER ABDOMINAL CRAMPING: Chronic | ICD-10-CM

## 2019-08-15 PROCEDURE — 70490 CT SOFT TISSUE NECK W/O DYE: CPT

## 2019-08-15 PROCEDURE — 99204 OFFICE O/P NEW MOD 45 MIN: CPT | Performed by: NURSE PRACTITIONER

## 2019-08-15 RX ORDER — SODIUM CHLORIDE 9 MG/ML
70 INJECTION, SOLUTION INTRAVENOUS CONTINUOUS PRN
Status: CANCELLED | OUTPATIENT
Start: 2019-08-15

## 2019-08-15 NOTE — PROGRESS NOTES
Please contact patient and let her know no nodule was seen and it was unremarkable examination of the neck.

## 2019-08-15 NOTE — PROGRESS NOTES
"Chief Complaint   Patient presents with   • Colon Cancer Screening   • Diarrhea   • Rectal Bleeding     The patient has a history of bright red blood per rectum starting about 2 weeks ago. The patient has 3-4 episodes of bright red blood on the tissue. It is a very small amount. A few days after rectal bleeding started, the patient began having diarrhea and was having 6-8 episodes per day. It only lasted 3-4 days. Since then, she has may 2-4 episodes of diarrhea per day. Stools are described as loose and \"stringy\". No history of watery diarrhea. Diarrhea has woke her up at night. She has not traveled, taken antibiotics or been around anyone ill prior to diarrhea started. Stool studies were negative. The patient denies fevers or chills. She does have some lower abdominal cramping on occasion immediately prior to having a bowel movement. The pain is mild to moderate and resolves after a bowel movement.    The patient denies nausea or vomiting. There is no history of heartburn or difficulty swallowing.    The patient has not had a colonoscopy in the past. There is no family history of colon cancer. The patient's brother has a history of ulcerative colitis diagnosed in [facility].    Diarrhea    This is a new problem. Episode onset: 1 1/2 weeks. The problem has been waxing and waning. Diarrhea characteristics: loose and stringy. The patient states that diarrhea awakens her from sleep. Pertinent negatives include no abdominal pain, arthralgias, chills, coughing, fever, headaches, myalgias or vomiting. Nothing aggravates the symptoms. There are no known risk factors. She has tried nothing for the symptoms.   Rectal Bleeding   This is a new problem. Episode onset: 2 weeks ago. The problem occurs intermittently. The problem has been waxing and waning. Pertinent negatives include no abdominal pain, arthralgias, chest pain, chills, coughing, fatigue, fever, headaches, joint swelling, myalgias, nausea, rash or vomiting. Nothing " aggravates the symptoms. She has tried nothing for the symptoms.   Abdominal Cramping   This is a new problem. Episode onset: 1 week ago. The onset quality is sudden. The problem occurs intermittently. The problem has been waxing and waning. The pain is located in the suprapubic region. The pain is moderate. The quality of the pain is cramping. The abdominal pain does not radiate. Associated symptoms include diarrhea and hematochezia. Pertinent negatives include no arthralgias, constipation, dysuria, fever, headaches, hematuria, myalgias, nausea or vomiting. The pain is aggravated by bowel movement. The pain is relieved by nothing. She has tried nothing for the symptoms.      Review of Systems   Constitutional: Negative for appetite change, chills, fatigue, fever and unexpected weight change.   HENT: Negative for mouth sores, nosebleeds and trouble swallowing.    Eyes: Negative for discharge and redness.   Respiratory: Negative for apnea, cough and shortness of breath.    Cardiovascular: Negative for chest pain, palpitations and leg swelling.   Gastrointestinal: Positive for diarrhea and hematochezia. Negative for abdominal distention, abdominal pain, anal bleeding, blood in stool, constipation, nausea and vomiting.   Endocrine: Negative for cold intolerance, heat intolerance and polydipsia.   Genitourinary: Negative for dysuria, hematuria and urgency.   Musculoskeletal: Negative for arthralgias, joint swelling and myalgias.   Skin: Negative for rash.   Allergic/Immunologic: Negative for food allergies and immunocompromised state.   Neurological: Negative for dizziness, seizures, syncope and headaches.   Hematological: Negative for adenopathy. Does not bruise/bleed easily.   Psychiatric/Behavioral: Negative for dysphoric mood. The patient is not nervous/anxious and is not hyperactive.      Patient Active Problem List   Diagnosis   • Anemia   • Obesity (BMI 30-39.9)   • Elevated blood pressure   • Fatigue   •  Insomnia   • Iron deficiency anemia   • Irregular periods   • Pain in the muscles   • Rash   • Restless legs syndrome (RLS)   • Thyroid disorder   • Vitamin B12 deficiency   • Snoring   • Cough   • Chest heaviness   • Vitamin D deficiency   • Dissection of coronary artery   • Subacute idiopathic myocarditis   • Abnormal cardiac enzyme level   • Essential hypertension   • Breast lump   • Diarrhea   • Bright red blood per rectum   • Bilateral lower abdominal cramping     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   • Essential hypertension 3/30/2018   • Gallstone    • Kidney stones    • Migraine    • Obesity    • Ovarian cyst    • Restless leg syndrome    • Urinary tract infection    • Vitamin B12 deficiency      Past Surgical History:   Procedure Laterality Date   • BREAST SURGERY Bilateral 1987   • CARDIAC CATHETERIZATION N/A 3/15/2017    Procedure: Coronary angiography;  Surgeon: Moustapha Peñaloza MD;  Location: [facility] CATH INVASIVE LOCATION;  Service:    • CARDIAC CATHETERIZATION N/A 3/15/2017    Procedure: Left ventriculography;  Surgeon: Moustapha Peñaloza MD;  Location: [facility] CATH INVASIVE LOCATION;  Service:    •  SECTION      1991 & 1995   • CHOLECYSTECTOMY  2013   • GASTRIC BYPASS  2011     Family History   Problem Relation Age of Onset   • Cancer Father    • Heart attack Father    • Cancer Other    • Other Other    • No Known Problems Mother    • Asthma Brother    • Ulcerative colitis Brother    • Arthritis Paternal Grandmother    • Colon cancer Neg Hx      Social History     Tobacco Use   • Smoking status: Former Smoker     Packs/day: 1.00     Years: 2.00     Pack years: 2.00     Last attempt to quit: 10/1995     Years since quittin.8   • Smokeless tobacco: Never Used   Substance Use Topics   • Alcohol use: Yes     Alcohol/week: 7.2 oz     Types: 12 Cans of beer per week     Comment: drinks beer 4 times a week   "      Current Outpatient Medications:   •  carbidopa-levodopa CR (SINEMET CR)  MG per CR tablet, Take 1 tablet by mouth 3 (Three) Times a Day. May take 1 extra prn breakthrough RLS symptoms, Disp: 120 tablet, Rfl: 5  •  cyanocobalamin 1000 MCG/ML injection, Inject 1 mL into the appropriate muscle as directed by prescriber Every 14 (Fourteen) Days., Disp: 2 mL, Rfl: 5  •  Ferrous Fumarate (FERROCITE) 324 (106 Fe) MG tablet, Take 1 tablet by mouth 2 (Two) Times a Day., Disp: 60 tablet, Rfl: 5  •  levothyroxine (SYNTHROID, LEVOTHROID) 75 MCG tablet, Take 1 tablet by mouth Daily., Disp: 30 tablet, Rfl: 5  •  LYRICA 75 MG capsule, Take 1 capsule by mouth Every Night., Disp: 30 capsule, Rfl: 5  •  metoprolol tartrate (LOPRESSOR) 25 MG tablet, take 1 tablet by mouth every 12 hours, Disp: 180 tablet, Rfl: 3  •  pramipexole (MIRAPEX) 0.5 MG tablet, take 1/2 tablet by mouth three times a day, Disp: 45 tablet, Rfl: 3  •  RA ASPIRIN ADULT LOW STRENGTH 81 MG chewable tablet, chew and swallow 1 tablet by mouth once daily, Disp: , Rfl: 0  •  Syringe, Disposable, 1 ML misc, Use to inject vitamin B12 every 14 days, Disp: 2 each, Rfl: 5    Allergies   Allergen Reactions   • Sulfa Antibiotics Rash and Other (See Comments)     blind     /85   Pulse 54   Temp 98.3 °F (36.8 °C)   Resp 16   Ht 165.1 cm (65\")   Wt 112 kg (248 lb)   BMI 41.27 kg/m²     Physical Exam   Constitutional: She is oriented to person, place, and time. She appears well-developed and well-nourished. No distress.   HENT:   Head: Normocephalic and atraumatic.   Right Ear: Hearing and external ear normal.   Left Ear: Hearing and external ear normal.   Nose: Nose normal.   Mouth/Throat: Oropharynx is clear and moist and mucous membranes are normal. Mucous membranes are not pale, not dry and not cyanotic. No oral lesions. No oropharyngeal exudate.   Eyes: Conjunctivae and EOM are normal. Right eye exhibits no discharge. Left eye exhibits no discharge. "   Neck: Trachea normal. Neck supple. No JVD present. No edema present. No thyroid mass and no thyromegaly present.   Cardiovascular: Normal rate, regular rhythm, S2 normal and normal heart sounds. Exam reveals no gallop, no S3 and no friction rub.   No murmur heard.  Pulmonary/Chest: Effort normal and breath sounds normal. No respiratory distress. She exhibits no tenderness.   Abdominal: Normal appearance and bowel sounds are normal. She exhibits no distension, no ascites and no mass. There is no splenomegaly or hepatomegaly. There is no tenderness. There is no rigidity, no rebound and no guarding. No hernia.     Vascular Status -  Her right foot exhibits no edema. Her left foot exhibits no edema.  Lymphadenopathy:     She has no cervical adenopathy.        Left: No supraclavicular adenopathy present.   Neurological: She is alert and oriented to person, place, and time. She has normal strength. No cranial nerve deficit or sensory deficit.   Skin: No rash noted. She is not diaphoretic. No cyanosis. No pallor. Nails show no clubbing.   Psychiatric: She has a normal mood and affect.   Nursing note and vitals reviewed.  Stigmata of chronic liver disease:  None.  Asterixis:  None.    Laboratory Tests:   Upon review of records:     Dated 5/7/2019 glucose 89 BUN 16 creatinine 1.0 sodium 139 potassium 4.6 chloride 102 CO2 28 calcium 9.3 albumin 4.0 total bilirubin 0.7 alkaline phosphatase 63 AST 25 ALT 22 WBC 5.48 hemoglobin 13.7 hematocrit 41.6 platelet count 230 MCV 99.5 TSH 5.570 CRP 9.1    Dated 8/12/2019 glucose 96 BUN 13 creatinine 1.11 sodium 141 potassium 4.3 chloride 103 CO2 26.6 calcium 9.1 albumin 4.4 total bilirubin 0.3 alkaline phosphatase 62 AST 15 ALT 6 WBC 5.62 hemoglobin 13.7 hematocrit 42.5 platelet count 245 .7 gastrointestinal panel by PCR: Negative    Assessment:      ICD-10-CM ICD-9-CM   1. Diarrhea, unspecified type R19.7 787.91   2. Bright red blood per rectum K62.5 569.3   3. Bilateral lower  abdominal cramping R10.31 789.09    R10.32    4. Colon cancer screening Z12.11 V76.51     Plan/  Patient Instructions   1. Colonoscopy: Description of the procedure, risks, benefits, alternatives and options, including nonoperative options, were discussed with the patient in detail. The patient understands and wishes to proceed.     Leo Sykes, APRN

## 2019-08-16 LAB
ADV 40+41 DNA STL QL NAA+NON-PROBE: NOT DETECTED
ALBUMIN SERPL-MCNC: 4.4 G/DL (ref 3.5–5.2)
ALBUMIN/GLOB SERPL: 1.9 G/DL
ALP SERPL-CCNC: 62 U/L (ref 39–117)
ALT SERPL-CCNC: 6 U/L (ref 1–33)
AST SERPL-CCNC: 15 U/L (ref 1–32)
ASTRO TYP 1-8 RNA STL QL NAA+NON-PROBE: NOT DETECTED
BASOPHILS # BLD AUTO: 0.04 10*3/MM3 (ref 0–0.2)
BASOPHILS NFR BLD AUTO: 0.7 % (ref 0–1.5)
BILIRUB SERPL-MCNC: 0.3 MG/DL (ref 0.2–1.2)
BUN SERPL-MCNC: 13 MG/DL (ref 6–20)
BUN/CREAT SERPL: 11.7 (ref 7–25)
C CAYETANENSIS DNA STL QL NAA+NON-PROBE: NOT DETECTED
C COLI+JEJ+UPSA DNA STL QL NAA+NON-PROBE: NOT DETECTED
C DIF TOX TCDA+TCDB STL QL NAA+NON-PROBE: NOT DETECTED
CALCIUM SERPL-MCNC: 9.1 MG/DL (ref 8.6–10.5)
CHLORIDE SERPL-SCNC: 103 MMOL/L (ref 98–107)
CO2 SERPL-SCNC: 26.6 MMOL/L (ref 22–29)
CREAT SERPL-MCNC: 1.11 MG/DL (ref 0.57–1)
CRYPTOSP DNA STL QL NAA+NON-PROBE: NOT DETECTED
E COLI O157 DNA STL QL NAA+NON-PROBE: NORMAL
E HISTOLYT DNA STL QL NAA+NON-PROBE: NOT DETECTED
EAEC PAA PLAS AGGR+AATA ST NAA+NON-PRB: NOT DETECTED
EC STX1+STX2 GENES STL QL NAA+NON-PROBE: NOT DETECTED
EOSINOPHIL # BLD AUTO: 0.33 10*3/MM3 (ref 0–0.4)
EOSINOPHIL NFR BLD AUTO: 5.9 % (ref 0.3–6.2)
EPEC EAE GENE STL QL NAA+NON-PROBE: NOT DETECTED
ERYTHROCYTE [DISTWIDTH] IN BLOOD BY AUTOMATED COUNT: 12.8 % (ref 12.3–15.4)
ETEC LTA+ST1A+ST1B TOX ST NAA+NON-PROBE: NOT DETECTED
G LAMBLIA DNA STL QL NAA+NON-PROBE: NOT DETECTED
GLOBULIN SER CALC-MCNC: 2.3 GM/DL
GLUCOSE SERPL-MCNC: 96 MG/DL (ref 65–99)
HCT VFR BLD AUTO: 42.5 % (ref 34–46.6)
HGB BLD-MCNC: 13.7 G/DL (ref 12–15.9)
IMM GRANULOCYTES # BLD AUTO: 0.02 10*3/MM3 (ref 0–0.05)
IMM GRANULOCYTES NFR BLD AUTO: 0.4 % (ref 0–0.5)
LYMPHOCYTES # BLD AUTO: 1.81 10*3/MM3 (ref 0.7–3.1)
LYMPHOCYTES NFR BLD AUTO: 32.2 % (ref 19.6–45.3)
MCH RBC QN AUTO: 32.5 PG (ref 26.6–33)
MCHC RBC AUTO-ENTMCNC: 32.2 G/DL (ref 31.5–35.7)
MCV RBC AUTO: 100.7 FL (ref 79–97)
MONOCYTES # BLD AUTO: 0.45 10*3/MM3 (ref 0.1–0.9)
MONOCYTES NFR BLD AUTO: 8 % (ref 5–12)
NEUTROPHILS # BLD AUTO: 2.97 10*3/MM3 (ref 1.7–7)
NEUTROPHILS NFR BLD AUTO: 52.8 % (ref 42.7–76)
NOROVIRUS GI+II RNA STL QL NAA+NON-PROBE: NOT DETECTED
NRBC BLD AUTO-RTO: 0 /100 WBC (ref 0–0.2)
P SHIGELLOIDES DNA STL QL NAA+NON-PROBE: NOT DETECTED
PLATELET # BLD AUTO: 245 10*3/MM3 (ref 140–450)
POTASSIUM SERPL-SCNC: 4.3 MMOL/L (ref 3.5–5.2)
PROT SERPL-MCNC: 6.7 G/DL (ref 6–8.5)
RBC # BLD AUTO: 4.22 10*6/MM3 (ref 3.77–5.28)
RVA RNA STL QL NAA+NON-PROBE: NOT DETECTED
S ENT+BONG DNA STL QL NAA+NON-PROBE: NOT DETECTED
SAPO I+II+IV+V RNA STL QL NAA+NON-PROBE: NOT DETECTED
SHIGELLA SP+EIEC IPAH ST NAA+NON-PROBE: NOT DETECTED
SODIUM SERPL-SCNC: 141 MMOL/L (ref 136–145)
V CHOL+PARA+VUL DNA STL QL NAA+NON-PROBE: NOT DETECTED
V CHOLERAE DNA STL QL NAA+NON-PROBE: NOT DETECTED
WBC # BLD AUTO: 5.62 10*3/MM3 (ref 3.4–10.8)
WBC STL QL MICRO: NORMAL
WBC STL QL MICRO: NORMAL
Y ENTEROCOL DNA STL QL NAA+NON-PROBE: NOT DETECTED

## 2019-08-21 ENCOUNTER — TELEPHONE (OUTPATIENT)
Dept: NEUROLOGY | Facility: CLINIC | Age: 49
End: 2019-08-21

## 2019-08-21 NOTE — TELEPHONE ENCOUNTER
I called and gave a verbal for pt at pharmacy for pregamblin in place of the lyrica, maryam Lewis request and pt is aware to give our office a call if the change in medication doesn't work and we can switch it back

## 2019-09-16 ENCOUNTER — APPOINTMENT (OUTPATIENT)
Dept: LAB | Facility: HOSPITAL | Age: 49
End: 2019-09-16

## 2019-09-16 ENCOUNTER — OFFICE VISIT (OUTPATIENT)
Dept: INTERNAL MEDICINE | Facility: CLINIC | Age: 49
End: 2019-09-16

## 2019-09-16 VITALS
HEART RATE: 76 BPM | DIASTOLIC BLOOD PRESSURE: 72 MMHG | SYSTOLIC BLOOD PRESSURE: 126 MMHG | WEIGHT: 252 LBS | TEMPERATURE: 97.8 F | OXYGEN SATURATION: 98 % | RESPIRATION RATE: 16 BRPM | HEIGHT: 65 IN | BODY MASS INDEX: 41.99 KG/M2

## 2019-09-16 DIAGNOSIS — E55.9 VITAMIN D DEFICIENCY: ICD-10-CM

## 2019-09-16 DIAGNOSIS — R94.4 DECREASED GFR: ICD-10-CM

## 2019-09-16 DIAGNOSIS — D50.9 IRON DEFICIENCY ANEMIA, UNSPECIFIED IRON DEFICIENCY ANEMIA TYPE: ICD-10-CM

## 2019-09-16 DIAGNOSIS — R79.82 CRP ELEVATED: ICD-10-CM

## 2019-09-16 DIAGNOSIS — M25.522 LEFT ELBOW PAIN: ICD-10-CM

## 2019-09-16 DIAGNOSIS — M25.50 POLYARTHRALGIA: ICD-10-CM

## 2019-09-16 DIAGNOSIS — E53.8 B12 DEFICIENCY: Primary | ICD-10-CM

## 2019-09-16 DIAGNOSIS — Z12.39 BREAST CANCER SCREENING: ICD-10-CM

## 2019-09-16 DIAGNOSIS — E53.8 VITAMIN B12 DEFICIENCY: ICD-10-CM

## 2019-09-16 DIAGNOSIS — R59.0 EPITROCHLEAR LYMPHADENOPATHY: ICD-10-CM

## 2019-09-16 DIAGNOSIS — E53.8 VITAMIN B12 DEFICIENCY: Primary | ICD-10-CM

## 2019-09-16 DIAGNOSIS — Z80.7 FAMILY HISTORY OF MULTIPLE MYELOMA: ICD-10-CM

## 2019-09-16 DIAGNOSIS — E07.9 THYROID DISORDER: ICD-10-CM

## 2019-09-16 LAB
ALBUMIN SERPL-MCNC: 4.1 G/DL (ref 3.5–5.2)
ALBUMIN/GLOB SERPL: 1.4 G/DL
ALP SERPL-CCNC: 75 U/L (ref 39–117)
ALT SERPL W P-5'-P-CCNC: 7 U/L (ref 1–33)
ANION GAP SERPL CALCULATED.3IONS-SCNC: 14.7 MMOL/L (ref 5–15)
AST SERPL-CCNC: 18 U/L (ref 1–32)
BILIRUB SERPL-MCNC: 0.3 MG/DL (ref 0.2–1.2)
BUN BLD-MCNC: 16 MG/DL (ref 6–20)
BUN/CREAT SERPL: 15.7 (ref 7–25)
CA-I BLD-MCNC: 5.2 MG/DL (ref 4.6–5.4)
CA-I SERPL ISE-MCNC: 1.29 MMOL/L (ref 1.15–1.35)
CALCIUM SPEC-SCNC: 9 MG/DL (ref 8.6–10.5)
CHLORIDE SERPL-SCNC: 100 MMOL/L (ref 98–107)
CO2 SERPL-SCNC: 24.3 MMOL/L (ref 22–29)
CREAT BLD-MCNC: 1.02 MG/DL (ref 0.57–1)
CRP SERPL-MCNC: 0.82 MG/DL (ref 0–0.5)
GFR SERPL CREATININE-BSD FRML MDRD: 57 ML/MIN/1.73
GLOBULIN UR ELPH-MCNC: 2.9 GM/DL
GLUCOSE BLD-MCNC: 85 MG/DL (ref 65–99)
IRON 24H UR-MRATE: 109 MCG/DL (ref 37–145)
IRON SATN MFR SERPL: 33 % (ref 20–50)
POTASSIUM BLD-SCNC: 4.4 MMOL/L (ref 3.5–5.2)
PROT SERPL-MCNC: 7 G/DL (ref 6–8.5)
PTH-INTACT SERPL-MCNC: 68.2 PG/ML (ref 15–65)
SODIUM BLD-SCNC: 139 MMOL/L (ref 136–145)
TIBC SERPL-MCNC: 334 MCG/DL (ref 298–536)
TRANSFERRIN SERPL-MCNC: 224 MG/DL (ref 200–360)
VIT B12 BLD-MCNC: 276 PG/ML (ref 211–946)

## 2019-09-16 PROCEDURE — 99214 OFFICE O/P EST MOD 30 MIN: CPT | Performed by: FAMILY MEDICINE

## 2019-09-16 PROCEDURE — 85652 RBC SED RATE AUTOMATED: CPT | Performed by: FAMILY MEDICINE

## 2019-09-16 PROCEDURE — 82784 ASSAY IGA/IGD/IGG/IGM EACH: CPT | Performed by: FAMILY MEDICINE

## 2019-09-16 PROCEDURE — 84156 ASSAY OF PROTEIN URINE: CPT | Performed by: FAMILY MEDICINE

## 2019-09-16 PROCEDURE — 36415 COLL VENOUS BLD VENIPUNCTURE: CPT | Performed by: FAMILY MEDICINE

## 2019-09-16 PROCEDURE — 86235 NUCLEAR ANTIGEN ANTIBODY: CPT | Performed by: FAMILY MEDICINE

## 2019-09-16 PROCEDURE — 82306 VITAMIN D 25 HYDROXY: CPT | Performed by: FAMILY MEDICINE

## 2019-09-16 PROCEDURE — 83883 ASSAY NEPHELOMETRY NOT SPEC: CPT | Performed by: FAMILY MEDICINE

## 2019-09-16 PROCEDURE — 84166 PROTEIN E-PHORESIS/URINE/CSF: CPT | Performed by: FAMILY MEDICINE

## 2019-09-16 PROCEDURE — 82330 ASSAY OF CALCIUM: CPT | Performed by: FAMILY MEDICINE

## 2019-09-16 PROCEDURE — 84466 ASSAY OF TRANSFERRIN: CPT | Performed by: FAMILY MEDICINE

## 2019-09-16 PROCEDURE — 82728 ASSAY OF FERRITIN: CPT | Performed by: FAMILY MEDICINE

## 2019-09-16 PROCEDURE — 82607 VITAMIN B-12: CPT | Performed by: FAMILY MEDICINE

## 2019-09-16 PROCEDURE — 80053 COMPREHEN METABOLIC PANEL: CPT | Performed by: FAMILY MEDICINE

## 2019-09-16 PROCEDURE — 86140 C-REACTIVE PROTEIN: CPT | Performed by: FAMILY MEDICINE

## 2019-09-16 PROCEDURE — 84165 PROTEIN E-PHORESIS SERUM: CPT | Performed by: FAMILY MEDICINE

## 2019-09-16 PROCEDURE — 84443 ASSAY THYROID STIM HORMONE: CPT | Performed by: FAMILY MEDICINE

## 2019-09-16 PROCEDURE — 86334 IMMUNOFIX E-PHORESIS SERUM: CPT | Performed by: FAMILY MEDICINE

## 2019-09-16 PROCEDURE — 86038 ANTINUCLEAR ANTIBODIES: CPT | Performed by: FAMILY MEDICINE

## 2019-09-16 PROCEDURE — 83540 ASSAY OF IRON: CPT | Performed by: FAMILY MEDICINE

## 2019-09-16 PROCEDURE — 84439 ASSAY OF FREE THYROXINE: CPT | Performed by: FAMILY MEDICINE

## 2019-09-16 PROCEDURE — 83970 ASSAY OF PARATHORMONE: CPT | Performed by: FAMILY MEDICINE

## 2019-09-16 PROCEDURE — 86225 DNA ANTIBODY NATIVE: CPT | Performed by: FAMILY MEDICINE

## 2019-09-16 NOTE — PROGRESS NOTES
"Subjective    Mary Lunsford is a 48 y.o. female here for:    Chief Complaint   Patient presents with   • Arm Pain     arm and hands ache all the time on and off for years    • Neck Pain     has had an xray and ct scan but she is still having pain and tingling on the right side of face        History of Present Illness   Having a lot of aching in arms and hands, pain worsening over time. Feels \"sore\". Also feels there's something going from left elbow toward hand, hand is throbbing some mornings. Has had pain since April when she moved. Considers it possibly tennis elbow. She feels heat may help some with discomfort.     Also having neck issues. She feels the right karlos of her neck is more prominent than left near clavicles. In general, it feels different on the right than the left. Has had imaging (CT scan) and no abnormalities noted. On levothyroxine due to hypothyroidism. Due to have labs rechecked.     Feels like right face tingles at times, not numb, but comes and goes over last few weeks.     The following portions of the patient's history were reviewed and updated as appropriate: allergies, current medications, past family history, past medical history, past social history, past surgical history and problem list.    Review of Systems   Constitutional: Positive for fatigue.   Musculoskeletal: Positive for arthralgias, joint swelling, myalgias and neck pain.   Neurological: Positive for numbness.   Psychiatric/Behavioral: Positive for stress.   All other systems reviewed and are negative.      Vitals:    09/16/19 1524   BP: 126/72   Pulse: 76   Resp: 16   Temp: 97.8 °F (36.6 °C)   TempSrc: Temporal   SpO2: 98%   Weight: 114 kg (252 lb)   Height: 165.1 cm (65\")       Objective   Physical Exam   Constitutional: She is oriented to person, place, and time. Vital signs are normal. She appears well-developed and well-nourished. She is active.  Non-toxic appearance. She does not have a sickly appearance. She does " not appear ill. No distress. She is morbidly obese.  HENT:   Head: Normocephalic and atraumatic.   Right Ear: Hearing normal.   Left Ear: Hearing normal.   Mouth/Throat: Mucous membranes are not dry.   Eyes: EOM are normal. No scleral icterus.   Neck: Phonation normal. Neck supple.       Pulmonary/Chest: Effort normal.   Musculoskeletal:        Left elbow: Tenderness found. Lateral epicondyle tenderness noted.        Right wrist: She exhibits swelling.        Right hand: She exhibits no deformity.        Left hand: She exhibits no deformity.   Bilateral antecubital fossa medial aspect knots noted, with tendernes.s    Neurological: She is alert and oriented to person, place, and time. She displays no tremor. No cranial nerve deficit.   Skin: Skin is warm. No rash noted. She is not diaphoretic. No pallor.   Very tan   Psychiatric: She has a normal mood and affect. Her speech is normal and behavior is normal. Judgment and thought content normal. Cognition and memory are normal.   Nursing note and vitals reviewed.    Reviewed read on ct soft tissue neck from 8/15/19 as well as us 5/13/19.    Assessment/Plan     Problem List Items Addressed This Visit        Digestive    Vitamin B12 deficiency    Relevant Orders    Vitamin B12    Vitamin D deficiency    Relevant Orders    Vitamin D 25 Hydroxy       Endocrine    Thyroid disorder    Relevant Orders    TSH+Free T4    PTH, Intact    Calcium, Ionized       Hematopoietic and Hemostatic    Iron deficiency anemia    Relevant Orders    Ferritin    Iron Profile    Comprehensive Metabolic Panel    Peripheral Blood Smear      Other Visit Diagnoses     B12 deficiency    -  Primary    Relevant Orders    Vitamin B12    Polyarthralgia        Relevant Medications    diclofenac (VOLTAREN) 1 % gel gel    Other Relevant Orders    DESIRE by IFA, Reflex 9-biomarkers profile    PTH, Intact    Calcium, Ionized    Epitrochlear lymphadenopathy        Relevant Orders    Peripheral Blood Smear     GOLDY,PE and FLC, Serum    Decreased GFR        Relevant Orders    GOLDY,PE and FLC, Serum    Protein Electrophoresis, Random Urine - Urine, Clean Catch    Family history of multiple myeloma        Relevant Orders    GOLDY,PE and FLC, Serum    Breast cancer screening        Relevant Orders    Mammo Screening Digital Tomosynthesis Bilateral With CAD    CRP elevated        Relevant Orders    C-reactive Protein    Sedimentation Rate    Comprehensive Metabolic Panel    DESIRE by IFA, Reflex 9-biomarkers profile    GOLDY,PE and FLC, Serum    Left elbow pain        Relevant Orders    Ambulatory Referral to Orthopedic Surgery          May refer to rheumatology pending lab results. Discussed braces for elbow over the counter.     Floridalma Jimenez MD

## 2019-09-17 LAB
25(OH)D3 SERPL-MCNC: 48 NG/ML (ref 30–100)
ALBUMIN SERPL-MCNC: 3.9 G/DL (ref 2.9–4.4)
ALBUMIN/GLOB SERPL: 1.4 {RATIO} (ref 0.7–1.7)
ALPHA1 GLOB FLD ELPH-MCNC: 0.2 G/DL (ref 0–0.4)
ALPHA2 GLOB SERPL ELPH-MCNC: 0.8 G/DL (ref 0.4–1)
B-GLOBULIN SERPL ELPH-MCNC: 1 G/DL (ref 0.7–1.3)
ERYTHROCYTE [SEDIMENTATION RATE] IN BLOOD: 13 MM/HR (ref 0–20)
FERRITIN SERPL-MCNC: 290 NG/ML (ref 13–150)
GAMMA GLOB SERPL ELPH-MCNC: 0.9 G/DL (ref 0.4–1.8)
GLOBULIN SER CALC-MCNC: 2.9 G/DL (ref 2.2–3.9)
IGA SERPL-MCNC: 219 MG/DL (ref 87–352)
IGG SERPL-MCNC: 903 MG/DL (ref 700–1600)
IGM SERPL-MCNC: 49 MG/DL (ref 26–217)
INTERPRETATION SERPL IEP-IMP: ABNORMAL
KAPPA LC SERPL-MCNC: 30.8 MG/L (ref 3.3–19.4)
KAPPA LC/LAMBDA SER: 1.46 {RATIO} (ref 0.26–1.65)
LAMBDA LC FREE SERPL-MCNC: 21.1 MG/L (ref 5.7–26.3)
Lab: ABNORMAL
M-SPIKE: ABNORMAL G/DL
PATHOLOGY REVIEW: YES
PROT SERPL-MCNC: 6.8 G/DL (ref 6–8.5)
T4 FREE SERPL-MCNC: 1.22 NG/DL (ref 0.93–1.7)
TSH SERPL DL<=0.05 MIU/L-ACNC: 7.24 UIU/ML (ref 0.27–4.2)

## 2019-09-17 RX ORDER — LEVOTHYROXINE SODIUM 88 UG/1
88 TABLET ORAL DAILY
Qty: 90 TABLET | Refills: 3 | Status: SHIPPED | OUTPATIENT
Start: 2019-09-17 | End: 2020-09-08

## 2019-09-18 ENCOUNTER — PATIENT MESSAGE (OUTPATIENT)
Dept: INTERNAL MEDICINE | Facility: CLINIC | Age: 49
End: 2019-09-18

## 2019-09-18 DIAGNOSIS — R79.82 CRP ELEVATED: ICD-10-CM

## 2019-09-18 DIAGNOSIS — E07.9 THYROID DISORDER: ICD-10-CM

## 2019-09-18 DIAGNOSIS — M25.50 POLYARTHRALGIA: ICD-10-CM

## 2019-09-18 DIAGNOSIS — R76.8 POSITIVE ANA (ANTINUCLEAR ANTIBODY): Primary | ICD-10-CM

## 2019-09-18 DIAGNOSIS — R94.4 DECREASED GFR: ICD-10-CM

## 2019-09-18 DIAGNOSIS — R59.0 EPITROCHLEAR LYMPHADENOPATHY: ICD-10-CM

## 2019-09-18 LAB
ALBUMIN 24H MFR UR ELPH: 16.5 %
ALPHA1 GLOB 24H MFR UR ELPH: 2.6 %
ALPHA2 GLOB 24H MFR UR ELPH: 8.8 %
ANA SER QL IA: POSITIVE
ANA SPECKLED TITR SER: NORMAL {TITER}
B-GLOBULIN MFR UR ELPH: 42.6 %
CENTROMERE B AB SER-ACNC: <0.2 AI (ref 0–0.9)
CHROMATIN AB SERPL-ACNC: 0.2 AI (ref 0–0.9)
DSDNA AB SER-ACNC: <1 IU/ML (ref 0–9)
ENA JO1 AB SER-ACNC: <0.2 AI (ref 0–0.9)
ENA RNP AB SER-ACNC: <0.2 AI (ref 0–0.9)
ENA SCL70 AB SER-ACNC: <0.2 AI (ref 0–0.9)
ENA SM AB SER-ACNC: 0.2 AI (ref 0–0.9)
ENA SS-A AB SER-ACNC: <0.2 AI (ref 0–0.9)
ENA SS-B AB SER-ACNC: <0.2 AI (ref 0–0.9)
GAMMA GLOB 24H MFR UR ELPH: 29.5 %
LAB AP CASE REPORT: NORMAL
LAB AP CLINICAL INFORMATION: NORMAL
Lab: ABNORMAL
Lab: NORMAL
M-SPIKE, UR: NORMAL %
PATH REPORT.FINAL DX SPEC: NORMAL
PATH REPORT.GROSS SPEC: NORMAL
PROT UR-MCNC: 6.8 MG/DL

## 2019-09-19 ENCOUNTER — OFFICE VISIT (OUTPATIENT)
Dept: NEUROLOGY | Facility: CLINIC | Age: 49
End: 2019-09-19

## 2019-09-19 VITALS
HEART RATE: 71 BPM | WEIGHT: 251 LBS | HEIGHT: 64 IN | DIASTOLIC BLOOD PRESSURE: 90 MMHG | SYSTOLIC BLOOD PRESSURE: 130 MMHG | BODY MASS INDEX: 42.85 KG/M2 | OXYGEN SATURATION: 98 %

## 2019-09-19 DIAGNOSIS — G25.81 RESTLESS LEGS SYNDROME (RLS): Primary | ICD-10-CM

## 2019-09-19 DIAGNOSIS — R20.2 FACIAL TINGLING: ICD-10-CM

## 2019-09-19 DIAGNOSIS — D50.9 IRON DEFICIENCY ANEMIA, UNSPECIFIED IRON DEFICIENCY ANEMIA TYPE: ICD-10-CM

## 2019-09-19 PROBLEM — Z12.11 COLON CANCER SCREENING: Status: ACTIVE | Noted: 2019-09-19

## 2019-09-19 PROCEDURE — 99214 OFFICE O/P EST MOD 30 MIN: CPT | Performed by: NURSE PRACTITIONER

## 2019-09-19 RX ORDER — PRAMIPEXOLE DIHYDROCHLORIDE 0.5 MG/1
0.25 TABLET ORAL 3 TIMES DAILY
Qty: 45 TABLET | Refills: 5 | Status: SHIPPED | OUTPATIENT
Start: 2019-09-19 | End: 2020-05-26

## 2019-09-19 RX ORDER — CARBIDOPA AND LEVODOPA 50; 200 MG/1; MG/1
1 TABLET, EXTENDED RELEASE ORAL 3 TIMES DAILY
Qty: 120 TABLET | Refills: 5 | Status: SHIPPED | OUTPATIENT
Start: 2019-09-19 | End: 2020-07-20

## 2019-09-20 DIAGNOSIS — M25.522 ARTHRALGIA OF ELBOW, LEFT: Primary | ICD-10-CM

## 2019-09-20 NOTE — TELEPHONE ENCOUNTER
From: Floridalma Jimenez MD  To: Mary Lunsford  Sent: 9/18/2019 5:26 PM EDT  Subject: labs    Positive DESIRE. Can be found with autoimmune disease (including thyroid issues) but also found with conditions such as lupus. I suggest the higher dose of thyroid medicine as I've previously suggested and if no improvement in 4-6 weeks a referral to rheumatology. However, if you would like to see them, just for a complete work up, that's okay and I can refer you. I'm not sure how far out they're scheduling and I refer to providers in [place].    Dr. Jimenez

## 2019-09-23 ENCOUNTER — OFFICE VISIT (OUTPATIENT)
Dept: ORTHOPEDIC SURGERY | Facility: CLINIC | Age: 49
End: 2019-09-23

## 2019-09-23 VITALS — HEIGHT: 64 IN | RESPIRATION RATE: 18 BRPM | WEIGHT: 251 LBS | BODY MASS INDEX: 42.85 KG/M2

## 2019-09-23 DIAGNOSIS — G56.03 BILATERAL CARPAL TUNNEL SYNDROME: ICD-10-CM

## 2019-09-23 DIAGNOSIS — M77.12 LATERAL EPICONDYLITIS OF LEFT ELBOW: ICD-10-CM

## 2019-09-23 DIAGNOSIS — M25.532 ARTHRALGIA OF LEFT WRIST: Primary | ICD-10-CM

## 2019-09-23 PROCEDURE — 99204 OFFICE O/P NEW MOD 45 MIN: CPT | Performed by: ORTHOPAEDIC SURGERY

## 2019-09-23 NOTE — PROGRESS NOTES
Subjective   Patient ID: Mary Lunsford is a 48 y.o. female  Pain of the Left Elbow (Patient is here today for left elbow pain, she denies any injury or trauma. 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  She research carpal tunnel syndrome and does not feel she has the symptoms completely of that, she is treating some of the issues to her generalized thyroid disorder.  Denies neck pain history of neck trauma.  Has noticed occasional weakness and difficulty reaching out with left elbow.  Is being followed by neurology for facial paresthesias MRI brain is pending, has been tested in the past with Dr. Jimenez ordering inflammatory markers which have only shown mildly positive for lupus but not definitive.  Currently taking Motrin 600 mg intermittently with slight improvement in her left arm pain.  She also recalls applying a warm heating pad to her left forearm which seems to help while its applied but after she takes it off the elbow feels more stiff, also feels some pain relief when she elevates her left arm on a blanket while working.      Review of Systems   Constitutional: Negative for fever.   HENT: Negative for voice change.    Eyes: Negative for visual disturbance.   Respiratory: Negative for shortness of breath.    Cardiovascular: Negative for chest pain.   Gastrointestinal: Negative for abdominal pain.   Genitourinary: Negative for dysuria.   Musculoskeletal: Positive for arthralgias. Negative for gait problem and joint swelling.   Skin: Negative for rash.   Neurological: Negative for speech difficulty.    Hematological: Does not bruise/bleed easily.   Psychiatric/Behavioral: Negative for confusion.       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   • Essential hypertension 3/30/2018   • Gallstone    • Kidney stones    • Migraine    • Obesity    • Ovarian cyst    • Restless leg syndrome    • Urinary tract infection    • Vitamin B12 deficiency         Past Surgical History:   Procedure Laterality Date   • BREAST SURGERY Bilateral 1987   • CARDIAC CATHETERIZATION N/A 3/15/2017    Procedure: Coronary angiography;  Surgeon: Moustapha Peñaloza MD;  Location: [facility] CATH INVASIVE LOCATION;  Service:    • CARDIAC CATHETERIZATION N/A 3/15/2017    Procedure: Left ventriculography;  Surgeon: Moustapha Peñaloza MD;  Location: [facility] CATH INVASIVE LOCATION;  Service:    •  SECTION      1991 & 1995   • CHOLECYSTECTOMY  2013   • GASTRIC BYPASS  2011       Family History   Problem Relation Age of Onset   • Cancer Father    • Heart attack Father    • Cancer Other    • Other Other    • No Known Problems Mother    • Asthma Brother    • Ulcerative colitis Brother    • Arthritis Paternal Grandmother    • Colon cancer Neg Hx        Social History     Socioeconomic History   • Marital status:      Spouse name: Not on file   • Number of children: Not on file   • Years of education: Not on file   • Highest education level: Not on file   Tobacco Use   • Smoking status: Former Smoker     Packs/day: 1.00     Years: 2.00     Pack years: 2.00     Last attempt to quit: 10/1995     Years since quittin.9   • Smokeless tobacco: Never Used   Substance and Sexual Activity   • Alcohol use: Yes     Alcohol/week: 7.2 oz     Types: 12 Cans of beer per week     Comment: drinks beer 4 times a week    • Drug use: No   • Sexual activity: Defer     Partners: Male     Birth control/protection: OCP     Comment: birth control pill       I have reviewed the above medical  "and surgical history, family history, social history, medications, allergies and review of systems.    Allergies   Allergen Reactions   • Sulfa Antibiotics Rash and Other (See Comments)     blind         Current Outpatient Medications:   •  carbidopa-levodopa CR (SINEMET CR)  MG per CR tablet, Take 1 tablet by mouth 3 (Three) Times a Day. May take 1 extra prn breakthrough RLS symptoms, Disp: 120 tablet, Rfl: 5  •  cyanocobalamin 1000 MCG/ML injection, Inject 1 mL into the appropriate muscle as directed by prescriber Every 14 (Fourteen) Days., Disp: 2 mL, Rfl: 5  •  diclofenac (VOLTAREN) 1 % gel gel, APPLY 2 G TO AFFECTED AREA 4 TIMES DAILY (MAX 8 G PER JOINT PER DAY), Disp: 100 g, Rfl: 5  •  Ferrous Fumarate (FERROCITE) 324 (106 Fe) MG tablet, Take 1 tablet by mouth 2 (Two) Times a Day., Disp: 60 tablet, Rfl: 5  •  levothyroxine (SYNTHROID, LEVOTHROID) 88 MCG tablet, Take 1 tablet by mouth Daily., Disp: 90 tablet, Rfl: 3  •  LYRICA 75 MG capsule, Take 1 capsule by mouth Every Night., Disp: 30 capsule, Rfl: 5  •  metoprolol tartrate (LOPRESSOR) 25 MG tablet, take 1 tablet by mouth every 12 hours, Disp: 180 tablet, Rfl: 3  •  pramipexole (MIRAPEX) 0.5 MG tablet, Take 0.5 tablets by mouth 3 (Three) Times a Day., Disp: 45 tablet, Rfl: 5  •  RA ASPIRIN ADULT LOW STRENGTH 81 MG chewable tablet, chew and swallow 1 tablet by mouth once daily, Disp: , Rfl: 0  •  Syringe, Disposable, 1 ML misc, Use to inject vitamin B12 every 14 days, Disp: 2 each, Rfl: 5    Objective   Resp 18   Ht 162.6 cm (64\")   Wt 114 kg (251 lb)   LMP 12/01/2018   BMI 43.08 kg/m²    Physical Exam  Constitutional: Patient is oriented to person, place, and time. Patient appears well-developed and well-nourished.   HENT:Head: Normocephalic and atraumatic.   Eyes: EOM are normal. Pupils are equal, round, and reactive to light.   Neck: Normal range of motion. Neck supple.   Cardiovascular: Normal rate.    Pulmonary/Chest: Effort normal and breath " sounds normal.   Abdominal: Soft.   Neurological: Patient is alert and oriented to person, place, and time.   Skin: Skin is warm and dry.   Psychiatric: Patient has a normal mood and affect.   Nursing note and vitals reviewed.       [unfilled]   Left elbow: Some tenderness at the anterolateral epicondylar region no swelling skin appears normal loss of extension 5 to 8 degrees compared to the contralateral elbow, slight loss of supination with pain at the radiocapitellar joint, no instability with radial ulnar stress, no medial epicondylar tenderness olecranon tenderness atrophy or tenderness of the biceps tendon.    Left wrist: Some tenderness with dorsiflexion and supination ulnar deviation over the proximal ulnar side of the carpal row, no tenderness acutely over the TFCC or anatomic snuffbox or trapezial joint.  No palpable ganglion cyst skin is normal, Tinel's Phalen's and carpal compression test at the median nerve are negative for paresthesias in the fingers circulation of the hand appears normal.    Right hand: No restriction range of motion no palpable cystic change and stability negative Tinel's Phalen's and carpal compression test over the median nerve.    Assessment/Plan   Review of Radiographic Studies:    Radiographic images today of affected area I personally viewed and showed no sign of acute fracture or dislocation.      Procedures     Mary was seen today for pain.    Diagnoses and all orders for this visit:    Arthralgia of left wrist  -     XR Wrist 3+ View Left  -     EMG & Nerve Conduction Test  -     Ambulatory Referral to Occupational Therapy    Lateral epicondylitis of left elbow    Bilateral carpal tunnel syndrome       Orthopedic activities reviewed and patient expressed appreciation, Using illustrations and models, the nature of the pathology was explained to the patient, Physical therapy referral given and Use brace as instructed      Recommendations/Plan:   Work/Activity Status: May  perform usual activities as tolerated    Patient agreeable to call or return sooner for any concerns.             Impression:  Left nondominant forearm pain probable lateral epicondylitis with possible early carpal tunnel syndrome, history of lupus positive markers, possible inflammatory generalized arthritis, rule out carpal tunnel syndrome left and right hands.  Plan:  EMG study both hands, night splinting to the left wrist, continue use of Motrin, referral to occupational therapy for treatment for lateral epicondylitis of the left elbow follow-up after studies complete.  She will follow-up with neurology regarding her facial paresthesias and brain scan

## 2019-10-03 ENCOUNTER — ANESTHESIA (OUTPATIENT)
Dept: GASTROENTEROLOGY | Facility: HOSPITAL | Age: 49
End: 2019-10-03

## 2019-10-03 ENCOUNTER — TELEPHONE (OUTPATIENT)
Dept: GASTROENTEROLOGY | Facility: CLINIC | Age: 49
End: 2019-10-03

## 2019-10-03 ENCOUNTER — ANESTHESIA EVENT (OUTPATIENT)
Dept: GASTROENTEROLOGY | Facility: HOSPITAL | Age: 49
End: 2019-10-03

## 2019-10-03 ENCOUNTER — HOSPITAL ENCOUNTER (OUTPATIENT)
Facility: HOSPITAL | Age: 49
Setting detail: HOSPITAL OUTPATIENT SURGERY
Discharge: HOME | End: 2019-10-03
Attending: INTERNAL MEDICINE | Admitting: INTERNAL MEDICINE

## 2019-10-03 VITALS
HEART RATE: 54 BPM | DIASTOLIC BLOOD PRESSURE: 86 MMHG | TEMPERATURE: 97.8 F | OXYGEN SATURATION: 99 % | SYSTOLIC BLOOD PRESSURE: 150 MMHG | BODY MASS INDEX: 42.85 KG/M2 | WEIGHT: 251 LBS | HEIGHT: 64 IN | RESPIRATION RATE: 18 BRPM

## 2019-10-03 DIAGNOSIS — Z12.11 COLON CANCER SCREENING: ICD-10-CM

## 2019-10-03 DIAGNOSIS — R10.32 BILATERAL LOWER ABDOMINAL CRAMPING: ICD-10-CM

## 2019-10-03 DIAGNOSIS — R19.7 DIARRHEA, UNSPECIFIED TYPE: Primary | ICD-10-CM

## 2019-10-03 DIAGNOSIS — R10.31 BILATERAL LOWER ABDOMINAL CRAMPING: ICD-10-CM

## 2019-10-03 DIAGNOSIS — R19.7 DIARRHEA, UNSPECIFIED TYPE: ICD-10-CM

## 2019-10-03 DIAGNOSIS — K62.5 BRIGHT RED BLOOD PER RECTUM: ICD-10-CM

## 2019-10-03 LAB
B-HCG UR QL: NEGATIVE
INTERNAL NEGATIVE CONTROL: NEGATIVE
INTERNAL POSITIVE CONTROL: POSITIVE
Lab: NORMAL

## 2019-10-03 PROCEDURE — 45380 COLONOSCOPY AND BIOPSY: CPT | Performed by: INTERNAL MEDICINE

## 2019-10-03 PROCEDURE — S0260 H&P FOR SURGERY: HCPCS | Performed by: INTERNAL MEDICINE

## 2019-10-03 PROCEDURE — 81025 URINE PREGNANCY TEST: CPT | Performed by: INTERNAL MEDICINE

## 2019-10-03 PROCEDURE — 25010000002 PROPOFOL 10 MG/ML EMULSION: Performed by: NURSE ANESTHETIST, CERTIFIED REGISTERED

## 2019-10-03 RX ORDER — SIMETHICONE 20 MG/.3ML
EMULSION ORAL AS NEEDED
Status: DISCONTINUED | OUTPATIENT
Start: 2019-10-03 | End: 2019-10-03 | Stop reason: HOSPADM

## 2019-10-03 RX ORDER — KETAMINE HYDROCHLORIDE 50 MG/ML
INJECTION, SOLUTION, CONCENTRATE INTRAMUSCULAR; INTRAVENOUS AS NEEDED
Status: DISCONTINUED | OUTPATIENT
Start: 2019-10-03 | End: 2019-10-03 | Stop reason: SURG

## 2019-10-03 RX ORDER — PROPOFOL 10 MG/ML
VIAL (ML) INTRAVENOUS AS NEEDED
Status: DISCONTINUED | OUTPATIENT
Start: 2019-10-03 | End: 2019-10-03 | Stop reason: SURG

## 2019-10-03 RX ORDER — SODIUM CHLORIDE 9 MG/ML
70 INJECTION, SOLUTION INTRAVENOUS CONTINUOUS PRN
Status: DISCONTINUED | OUTPATIENT
Start: 2019-10-03 | End: 2019-10-03 | Stop reason: HOSPADM

## 2019-10-03 RX ORDER — MAGNESIUM HYDROXIDE 1200 MG/15ML
LIQUID ORAL AS NEEDED
Status: DISCONTINUED | OUTPATIENT
Start: 2019-10-03 | End: 2019-10-03 | Stop reason: HOSPADM

## 2019-10-03 RX ORDER — LIDOCAINE 50 MG/G
OINTMENT TOPICAL AS NEEDED
Status: DISCONTINUED | OUTPATIENT
Start: 2019-10-03 | End: 2019-10-03 | Stop reason: HOSPADM

## 2019-10-03 RX ORDER — CHOLESTYRAMINE LIGHT 4 G/5.7G
POWDER, FOR SUSPENSION ORAL
Qty: 239.4 G | Refills: 3 | Status: SHIPPED | OUTPATIENT
Start: 2019-10-03 | End: 2020-01-27

## 2019-10-03 RX ADMIN — PROPOFOL 100 MG: 10 INJECTION, EMULSION INTRAVENOUS at 12:40

## 2019-10-03 RX ADMIN — KETAMINE HYDROCHLORIDE 40 MG: 50 INJECTION, SOLUTION INTRAMUSCULAR; INTRAVENOUS at 12:47

## 2019-10-03 RX ADMIN — PROPOFOL 140 MCG/KG/MIN: 10 INJECTION, EMULSION INTRAVENOUS at 12:45

## 2019-10-03 RX ADMIN — PROPOFOL 200 MG: 10 INJECTION, EMULSION INTRAVENOUS at 12:45

## 2019-10-03 NOTE — DISCHARGE INSTRUCTIONS
Rest today  No pushing,pulling,tugging,heavy lifting, or strenuous activity   No major decision making,driving,or drinking alcoholic beverages for 24 hours due to the sedation you received  Always use good hand hygiene/washing technique  No driving on pain medication.     To assist you in voiding:  Drink plenty of fluids  Listen to running water while attempting to void.    If you are unable to urinate and you have an uncomfortable urge to void or it has been   6 hours since you were discharged, return to the Emergency Room    ***********************************************************************************    Postprocedure instructions:  1. Nothing by mouth to fully alert.  2. Once fully alert may have clear liquid diet.  3. Advance diet as tolerated.  4. Vital signs as routine.    Diet:   1. Low-fat low lactose diet.    Blood Thinner Directions:  Avoid Aspirin & other NSAIDS for _7__ days. Tylenol is okay.    Treatments:  Questran Light.  To start out take 1/4 scoop on affected at night.  The dose may be increased to 1/2  schoop or half a packet, 3 field of schoop or even up to 1 scoop or packet at night.  Adjust the dose to have 1-2 soft stools a day.    Other Instructions:  Call [facility] at [phone] or come to the Emergency Department if you experience the following: Chest pain, abdominal pain, bleeding (vomiting of blood or coffee colored material, black stools or conor blood in stools), fever/chills, nausea and vomiting or dizziness.    Follow-up:  DR. PARUL PAEZ in 3-4 weeks.Office phone # [phone].    Follow-up colonoscopy: 10 years.      ************************************************************************************    Notes to the patient and the family from Dr. Paez.    Dear patient/family member,    Today your colon was examined extensively from rectum to cecum and beyond into the small intestine twice.   Findings on today's procedure are as follows:    1. Scant early  diverticular change in the left colon.  These are benign outpouchings that can often be seen in the colon.    2. No polyps.  No cancer. No inflammation or colitis. No suggestion of Crohn's disease.  3. Internal hemorrhoids.  4. Small superficial anal tear.    Recommendations:  1. As above.  2. SITZ bath 3 times a day.  20 minutes each time for 2 days.  3. If pain in the anorectal area then use 5% lidocaine-Xylocaine ointment that has been given to you by the nursing staff in the same day surgery area.  Small amounts on the finger and apply outside and inside of the anorectal area.    4. CORTIZONE 10 OINTMENT (hydrocortisone 1% ointment) over the counter. AVOID CREAM OR GEL.  Apply anorectally  2-3 times a day for 1 week.  May need to use a liner to protect the garments.          Should you have more questions please do not hesitate to talk to the nurse who can call me and let me talk to you.      I hope you feel better.    Jose Paez M.D., FACP, FACG.

## 2019-10-03 NOTE — H&P
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  states she had testing at , but was told that they didn't \"find anything\"   • Essential hypertension 3/30/2018   • Gallstone    • Kidney stones    • Migraine    • Obesity    • Ovarian cyst    • Restless leg syndrome    • Urinary tract infection    • Vitamin B12 deficiency    • Wears glasses     to read       Surgical history:    Past Surgical History:   Procedure Laterality Date   • BREAST SURGERY Bilateral 1987    breast reduction   • CARDIAC CATHETERIZATION N/A 3/15/2017    Procedure: Coronary angiography;  Surgeon: Moustapha Peñaloza MD;  Location: [facility] CATH INVASIVE LOCATION;  Service:    • CARDIAC CATHETERIZATION N/A 3/15/2017    Procedure: Left ventriculography;  Surgeon: Moustapha Peñaloza MD;  Location: [facility] CATH INVASIVE LOCATION;  Service:    •  SECTION      1991 & 1995   • CHOLECYSTECTOMY  2013   • ENDOSCOPY     • GASTRIC BYPASS  2011       Social history:  Social History     Socioeconomic History   • Marital status:      Spouse name: Not on file   • Number of children: Not on file   • Years of education: Not on file   • Highest education level: Not on file   Tobacco Use   • Smoking status: Former Smoker     Packs/day: 1.00     Years: 2.00     Pack years: 2.00     Last attempt to quit: 10/1995     Years since quittin.0   • Smokeless tobacco: Never Used   Substance and Sexual Activity   • Alcohol use: Yes     Alcohol/week: 7.2 oz     Types: 12 Cans of beer per week     Comment: 16 beers per week   • Drug use: No   • Sexual activity: Defer "       Allergies:  Sulfa antibiotics  Latex allergy: None  Contrast allergy: None    Medications:  Medications Prior to Admission   Medication Sig Dispense Refill Last Dose   • carbidopa-levodopa CR (SINEMET CR)  MG per CR tablet Take 1 tablet by mouth 3 (Three) Times a Day. May take 1 extra prn breakthrough RLS symptoms 120 tablet 5 10/3/2019 at 0400   • diclofenac (VOLTAREN) 1 % gel gel APPLY 2 G TO AFFECTED AREA 4 TIMES DAILY (MAX 8 G PER JOINT PER DAY) (Patient taking differently: 2 (Two) Times a Day As Needed. APPLY 2 G TO AFFECTED AREA 4 TIMES DAILY (MAX 8 G PER JOINT PER DAY)) 100 g 5 10/2/2019 at 0900   • levothyroxine (SYNTHROID, LEVOTHROID) 88 MCG tablet Take 1 tablet by mouth Daily. 90 tablet 3 10/2/2019 at 0830   • LYRICA 75 MG capsule Take 1 capsule by mouth Every Night. 30 capsule 5 10/2/2019 at 2130   • metoprolol tartrate (LOPRESSOR) 25 MG tablet take 1 tablet by mouth every 12 hours 180 tablet 3 10/2/2019 at 2130   • pramipexole (MIRAPEX) 0.5 MG tablet Take 0.5 tablets by mouth 3 (Three) Times a Day. 45 tablet 5 10/3/2019 at 0400   • cyanocobalamin 1000 MCG/ML injection Inject 1 mL into the appropriate muscle as directed by prescriber Every 14 (Fourteen) Days. (Patient not taking: Reported on 10/2/2019) 2 mL 5 More than a month at Unknown time   • Ferrous Fumarate (FERROCITE) 324 (106 Fe) MG tablet Take 1 tablet by mouth 2 (Two) Times a Day. (Patient not taking: Reported on 10/2/2019) 60 tablet 5 9/27/2019 at 0800   • RA ASPIRIN ADULT LOW STRENGTH 81 MG chewable tablet chew and swallow 1 tablet by mouth once daily  0 Taking   • Syringe, Disposable, 1 ML misc Use to inject vitamin B12 every 14 days (Patient not taking: Reported on 10/2/2019) 2 each 5 Not Taking at Unknown time       Review of systems:   Constitutional: No recent: Fever, Weight loss  Respiratory: No recent: SOB  Cardiovascular: No recent: Chest Pains, congestive heart failure or arrhythmias. Neurological: No recent: Seizures,  "CVA, TIA.   Genitourinary: No recent: Renal Failure, UTI.  Endocrine: No recent: Worsening of diabetes or thyroid disease.  Musculoskeletal: No recent: Joint swelling.  Hem. Oncology: No recent: Bleeding.  Psychiatric: No recent: Worsening of depression or anxiety.     VITAL SIGNS:    Blood pressure 146/96, pulse 64, temperature 96.6 °F (35.9 °C), temperature source Temporal, resp. rate 16, height 162.6 cm (64\"), weight 114 kg (251 lb), last menstrual period 12/01/2018, SpO2 98 %, not currently breastfeeding.    PHYSICAL EXAMINATION:   HEENT: Normal.   Lungs: Clear to auscultation.  Heart: No S3, no murmur.    Abdomen: Soft.  BS+ ND, NT  Extremities: No edema.  No cyanosis.  Neuro: Alert X 3. No focal deficit.    Assessment:  Colon Cancer Screen, No prior Colonoscopy, Family Hx Ulcerative Colitis(Brother), Diarrhea, BRBPR, Lower Abdominal Cramping    Plan:   Colonoscopy    Risks/Benefits:  The potential benefits, risk and/or side effects of the procedure and alternatives have been discussed with the patient/authorized representative and questions were answered.    "

## 2019-10-03 NOTE — ANESTHESIA POSTPROCEDURE EVALUATION
Patient: Mary Lunsford    Procedure Summary     Date:  10/03/19 Room / Location:  [facility] ENDOSCOPY 2 / [facility] ENDOSCOPY    Anesthesia Start:  1235 Anesthesia Stop:      Procedure:  COLONOSCOPY WITH BIOPSIES (N/A Anus) Diagnosis:       Diarrhea, unspecified type      Bright red blood per rectum      Bilateral lower abdominal cramping      Colon cancer screening      (Diarrhea, unspecified type [R19.7])      (Bright red blood per rectum [K62.5])      (Bilateral lower abdominal cramping [R10.31, R10.32])      (Colon cancer screening [Z12.11])    Surgeon:  Jose Paez MD Provider:  Juan Antonio Diamond CRNA    Anesthesia Type:  MAC ASA Status:  2          Anesthesia Type: MAC  Last vitals  BP   122/55   Temp   97   Pulse   74   Resp   12   SpO2 97     Post Anesthesia Care and Evaluation    Patient location during evaluation: bedside  Patient participation: complete - patient participated  Level of consciousness: awake and alert  Pain score: 0  Pain management: adequate  Airway patency: patent  Anesthetic complications: No anesthetic complications  PONV Status: none  Cardiovascular status: acceptable  Respiratory status: acceptable and nasal cannula  Hydration status: acceptable

## 2019-10-03 NOTE — ANESTHESIA PREPROCEDURE EVALUATION
" Anesthesia Evaluation     Patient summary reviewed and Nursing notes reviewed   no history of anesthetic complications:  NPO Solid Status: > 8 hours  NPO Liquid Status: > 8 hours           Airway   Mallampati: II  TM distance: >3 FB  Neck ROM: full  No difficulty expected  Dental - normal exam     Pulmonary - normal exam   (+) a smoker Former,     ROS comment: QUIT smoking 1995  Cardiovascular - normal exam  Exercise tolerance: good (4-7 METS)    PT is on anticoagulation therapy  Patient on routine beta blocker and Beta blocker given within 24 hours of surgery    (+) hypertension well controlled less than 2 medications, CAD,     ROS comment: Pt on ASA therapy    ECHO 3/2017  Left ventricular function is hyperdynamic (EF > 70%). Estimated EF was in agreement with the calculated EF. Estimated EF appears to be in the range of greater than 70%.   No aortic stenosis    3/2017 cardiac cath    IMPRESSION:  · Angiographically normal coronary arteries.  · Hyperdynamic left ventricular systolic function with normal regional wall motion.  · No evidence of myocardial infarction.  · The left-sided valvular abnormalities appreciated.  · Normal left ventricular filling pressures.  · Serially mild elevation in cardiac troponins with a \"plateau pattern\".  Explanation for this is unknown.  CPK, CPK-MB and CPK-MB fraction are all normal.    Neuro/Psych  (+) headaches,     GI/Hepatic/Renal/Endo    (+) obesity, morbid obesity, GI bleeding, renal disease,     Musculoskeletal (-) negative ROS    Abdominal   (+) obese,    Substance History   (+) alcohol use,   (-) drug use     OB/GYN negative ob/gyn ROS   (-)  Pregnant    Comment: Post menopause   HCG negative DOS      Other        ROS/Med Hx Other: Anemia  Obesity (BMI 30-39.9)  Elevated blood pressure  Fatigue  Insomnia  Iron deficiency anemia  Irregular periods  Pain in the muscles  Rash  Restless legs syndrome (RLS)  Thyroid disorder  Vitamin B12 deficiency  Snoring  Cough  Chest " heaviness  Vitamin D deficiency  Dissection of coronary artery  Subacute idiopathic myocarditis  Abnormal cardiac enzyme level  Essential hypertension  Breast lump  Diarrhea  Bright red blood per rectum  Bilateral lower abdominal cramping  Colon cancer screening  Facial tingling    RLS  Gastric bypass  Cardiac catheterization                  Anesthesia Plan    ASA 2     MAC   (Patient advised that intravenous sedation would be utilized as primary anesthetic technique. Every effort will be made to make sure patient is comfortable. Patient advised that they may experience recall of events of the procedure. Patient verbalized understanding and agreed to plan. )  intravenous induction   Anesthetic plan, all risks, benefits, and alternatives have been provided, discussed and informed consent has been obtained with: patient.    Plan discussed with CRNA.

## 2019-10-03 NOTE — OP NOTE
PROCEDURE:  Colonoscopy to the terminal ileum with biopsies.    DATE OF PROCEDURE:  October 3, 2019    REFERRING PROVIDER:  Floridalma Jimenez MD     INSTRUMENT USED:   Olympus PCF H 190 videocolonoscope.      INDICATIONS OF THE PROCEDURE: This is a 48-year-old white female for colon cancer screening.  There is history of bright red blood per rectum as well as diarrhea.  There is positive family history of ulcerative colitis (brother).    PREVIOUS COLONOSCOPY: None.    BIOPSIES: Biopsies were obtained from the terminal ileum.  Random biopsies were obtained from the colon including rectum.      PHOTOGRAPHS:  Photographs were included in the medical records.     MEDICATIONS:  MAC.       CONSENT/PREPROCEDURE EVALUATION:  Risks, benefits, alternatives and options of the procedure including risks of sedation/anesthesia were discussed with the patient and informed consent was obtained prior to the procedure.  History and physical examination were performed and nothing precluded the test.      REPORT:  The patient was placed in left lateral decubitus position and a digital examination was performed.  Once under the influence of IV sedation, the instrument was inserted into the rectum and advanced under direct vision to cecum which was identified by the ileocecal valve, triradiate folds and appendiceal orifice. The scope was then maneuvered into the terminal ileum.        FINDINGS:      Digital rectal examination:  Good anal tone.  No perianal pathology.  No mass.        Terminal ileum:  7-8 cm.  Somewhat increased nodularity was noted within the terminal ileum which likely represents lymphoid hyperplasia.  Biopsies were obtained.      Cecum and ascending colon: Normal.       Hepatic flexure, transverse colon, splenic flexure:  Normal.         Descending colon, sigmoid colon and rectum: Scant early diverticular change in the left colon.  No endoscopic evidence of colitis was seen.  Random biopsies were obtained from  the colon upon withdrawal of the scope.  A retroflex examination within the rectum revealed internal hemorrhoids.       Anoscopy: Using a clear anoscope and colonoscope as a light source, anoscopy was performed.  Dentate line was noted.  Small superficial anal tear was noted.  The scope was then pulled out of the patient.  The patient tolerated the procedure well.     The scope was then straightened, the lower GI tract was decompressed, and the scope was pulled out of the patient.  The patient tolerated the procedure well.  There were no immediate complications and the patient was transferred in stable condition for post procedure observation.      TECHNICAL DATA:   1. Left Hand prep score: 8 (3+2+3).    2. Anus to cecal time: 4  minutes.  3. Difficulty of examination:  Average.    4. Withdrawal time: 17 minutes.  5. Procedure time: 24 minutes  6. Retroflex examination in right colon: Yes.    7. Second look Rectum to cecum with decompression: Yes.    DIAGNOSES:    1. Scant diverticular change in the left colon.  2. Internal hemorrhoids.  3. Small superficial anal tear.  4. No endoscopic evidence of colitis was seen.  Random biopsies were obtained from the colon upon withdrawal of the scope.    RECOMMENDATIONS:     1. Dietary instructions.  2. Follow biopsies.    3. Follow-up:   3 to 4 weeks.  4. Followup colonoscopy:  10 years.   5. Hemorrhoidal care.       Thank you very much for letting me participate in the care of this patient. Please do not hesitate to call me if you have any questions.

## 2019-10-09 LAB
LAB AP CASE REPORT: NORMAL
PATH REPORT.FINAL DX SPEC: NORMAL

## 2019-10-16 ENCOUNTER — APPOINTMENT (OUTPATIENT)
Dept: MRI IMAGING | Facility: HOSPITAL | Age: 49
End: 2019-10-16

## 2019-10-23 ENCOUNTER — PROCEDURE VISIT (OUTPATIENT)
Dept: NEUROLOGY | Facility: CLINIC | Age: 49
End: 2019-10-23

## 2019-10-23 VITALS
SYSTOLIC BLOOD PRESSURE: 140 MMHG | HEIGHT: 64 IN | OXYGEN SATURATION: 97 % | HEART RATE: 61 BPM | BODY MASS INDEX: 42.68 KG/M2 | DIASTOLIC BLOOD PRESSURE: 78 MMHG | WEIGHT: 250 LBS

## 2019-10-23 DIAGNOSIS — M79.10 PAIN IN THE MUSCLES: Primary | ICD-10-CM

## 2019-10-23 PROCEDURE — 95910 NRV CNDJ TEST 7-8 STUDIES: CPT | Performed by: PSYCHIATRY & NEUROLOGY

## 2019-10-23 PROCEDURE — 95886 MUSC TEST DONE W/N TEST COMP: CPT | Performed by: PSYCHIATRY & NEUROLOGY

## 2019-10-23 NOTE — PROGRESS NOTES
[facility]   Electrodiagnostic Laboratory    Nerve Conduction & EMG Report        Patient:  Mary Lunsford   Patient ID: [id number]   YOB: 1970  Sex:  female      Exam Physician: Isaias Jane MD  Refer Physician: Floridalma Jimenez MD    Electromyogram and Nerve Conduction Velocity Procedure Note    Hx: 49 y.o. right handed female with complaint of pain involving the the upper extremities. Symptoms have been present for several months and were provoked by no clear event. Significant past medical history includes nothing suggestive of neuropathy. Family history no family history of nerve or muscle disease.    Exam: Motor power is normal. There is no atrophy. There are no fasciculations. Deep tendon reflexes are present and symmetrical. Sensory exam is normal.      Edx studies of the bilateral upper extremities were performed to evaluate for carpal tunnel syndrome.     NCS Examination   For sensory nerve conduction studies, the amplitude is measured peak-to-peak, the latency reported is the distal peak latency, and the conduction velocity, if measured, is determined from onset latencies and is over the forearm.   For motor nerve conduction studies, the amplitude is measured baseline-to-peak, the latency reported is the distal onset latency, the conduction velocity is calculated over the forearm, and the F wave latency is the minimum latency.   Unless otherwise noted, the hand temperature was monitored continuously and remained between 32°C and 36°C during the performance of the NCSs.        Sensory NCS      Nerve / Sites Rec. Site Onset Lat Peak Lat NP Amp PP Amp Segments Distance Velocity     ms ms µV µV  cm m/s   L MEDIAN - Dig II Antidr      Wrist Dig II 2.45 3.15 53.4 57.2 Wrist - Dig II 14 57.1      Ref.   3.70 20.0  Ref.     R MEDIAN - Dig II Antidr      Wrist Dig II 2.95 3.75 30.8 37.6 Wrist - Dig II 14 47.5      Ref.   3.70 20.0  Ref.     L ULNAR - Dig V Antidr       Wrist Dig V 2.15 2.80 23.6 84.3 Wrist - Dig V 14 65.1      Ref.   3.70 10.0  Ref.     R ULNAR - Dig V Antidr      Wrist Dig V 2.20 3.05 23.4 67.7 Wrist - Dig V 14 63.6      Ref.   3.70 10.0  Ref.                      Motor NCS      Nerve / Sites Rec. Site Lat Amp Seq Amp Segments Dist Velocity     ms mV %  cm m/s   L MEDIAN - APB      Wrist APB 3.30 3.6 100 Wrist - APB 8       Ref.  4.40 4.0  Ref.        Elbow APB 7.05 3.4 95.8 Elbow - Wrist 22 58.7      Ref.     Ref.  49.0   R MEDIAN - APB      Wrist APB 3.35 8.1 100 Wrist - APB 8       Ref.  4.40 4.0  Ref.        Elbow APB 6.85 5.9 72.6 Elbow - Wrist 21 60.0      Ref.     Ref.  49.0   L ULNAR - ADM      Wrist ADM 2.55 7.3 100 Wrist - ADM 8       Ref.  3.90 5.0  Ref.        B.Elbow ADM 5.35 6.9 94.2 B.Elbow - Wrist 19 67.9      Ref.     Ref.  49.0      A.Elbow ADM 7.05 8.2 119 A.Elbow - B.Elbow 9 52.9   R ULNAR - ADM      Wrist ADM 2.50 9.5 100 Wrist - ADM 8       Ref.  3.90 5.0  Ref.        B.Elbow ADM 6.10 6.8 71.4 B.Elbow - Wrist 19 52.8      Ref.     Ref.  49.0      A.Elbow ADM 7.30 7.5 111 A.Elbow - B.Elbow 9 75.0                 F  Wave      Nerve Fmin    ms   L MEDIAN 29.20   REF 33.00   L ULNAR 26.30   REF 33.00   R MEDIAN 25.55   REF 33.00   R ULNAR 26.95   REF 33.00                       EMG Examination   The study was performed with a concentric needle electrode. Fibrillation and fasciculation activity is graded from none (0) to continuous (4+). The configuration and recruitment pattern of motor unit action potentials under voluntary control, if not normal, are described bel        EMG Summary Table     Spontaneous MUAP Recruitment    IA Fib PSW Fasc H.F. Amp Dur. PPP Pattern   L. DELTOID N None None None None N N N N   R. DELTOID N None None None None N N N N   L. BICEPS N None None None None N N N N   R. BICEPS N None None None None N N N N   L. TRICEPS N None None None None N N N N   R. TRICEPS N None None None None N N N N   L. PRON TERES N None  None None None N N N N   R. PRON TERES N None None None None N N N N   L. FIRST D INTEROSS N None None None None N N N N   R. FIRST D INTEROSS N None None None None N N N N        Conclusion: Normal NCV and EMG of the right upper extremity and left upper extremity          Instrument used:  [employer]        Performed by:          Isaias Jane MD

## 2019-10-29 RX ORDER — LEVOTHYROXINE SODIUM 0.07 MG/1
75 TABLET ORAL DAILY
Qty: 30 TABLET | Refills: 0 | Status: SHIPPED | OUTPATIENT
Start: 2019-10-29 | End: 2020-02-24 | Stop reason: DRUGHIGH

## 2019-11-19 ENCOUNTER — HOSPITAL ENCOUNTER (OUTPATIENT)
Dept: MAMMOGRAPHY | Facility: HOSPITAL | Age: 49
Discharge: HOME | End: 2019-11-19
Admitting: FAMILY MEDICINE

## 2019-11-19 PROCEDURE — 77063 BREAST TOMOSYNTHESIS BI: CPT

## 2019-11-19 PROCEDURE — 77067 SCR MAMMO BI INCL CAD: CPT

## 2020-01-03 ENCOUNTER — TELEPHONE (OUTPATIENT)
Dept: NEUROLOGY | Facility: CLINIC | Age: 50
End: 2020-01-03

## 2020-01-03 DIAGNOSIS — R20.2 FACIAL TINGLING: Primary | ICD-10-CM

## 2020-01-03 NOTE — TELEPHONE ENCOUNTER
Pt called and would like to reschedule her MRI of Brain.  She did not make it to her appt on time and they would not see her?  It was authorized already but now need a new order placed please

## 2020-01-20 ENCOUNTER — HOSPITAL ENCOUNTER (OUTPATIENT)
Dept: MRI IMAGING | Facility: HOSPITAL | Age: 50
Discharge: HOME | End: 2020-01-20
Admitting: NURSE PRACTITIONER

## 2020-01-20 DIAGNOSIS — R20.2 FACIAL TINGLING: ICD-10-CM

## 2020-01-20 PROCEDURE — 70551 MRI BRAIN STEM W/O DYE: CPT

## 2020-01-23 ENCOUNTER — TELEPHONE (OUTPATIENT)
Dept: NEUROLOGY | Facility: CLINIC | Age: 50
End: 2020-01-23

## 2020-01-23 NOTE — TELEPHONE ENCOUNTER
----- Message from LINNEA Mendoza sent at 1/23/2020 10:57 AM EST -----  Notify the patient that the MRI of her brain is normal for her age.  There is a teeny tiny aging change in the left top of the brain which is not considered significant and is very common to be seen in healthy people. For every decade alive, we can see 1-2 of these and so this is considered normal. Her brain looks very healthy. No evidence of stroke.  We will f/u as scheduled in office. Thanks, LINNEA Lopez

## 2020-01-23 NOTE — PROGRESS NOTES
Notify the patient that the MRI of her brain is normal for her age.  There is a teeny tiny aging change in the left top of the brain which is not considered significant and is very common to be seen in healthy people. For every decade alive, we can see 1-2 of these and so this is considered normal. Her brain looks very healthy. No evidence of stroke.  We will f/u as scheduled in office. Thanks, LINNEA Lopez

## 2020-01-27 ENCOUNTER — OFFICE VISIT (OUTPATIENT)
Dept: OBSTETRICS AND GYNECOLOGY | Facility: CLINIC | Age: 50
End: 2020-01-27

## 2020-01-27 VITALS
WEIGHT: 254 LBS | DIASTOLIC BLOOD PRESSURE: 76 MMHG | SYSTOLIC BLOOD PRESSURE: 132 MMHG | HEIGHT: 64 IN | BODY MASS INDEX: 43.36 KG/M2

## 2020-01-27 DIAGNOSIS — Z12.4 SCREENING FOR CERVICAL CANCER: ICD-10-CM

## 2020-01-27 DIAGNOSIS — Z01.419 ENCOUNTER FOR GYNECOLOGICAL EXAMINATION WITHOUT ABNORMAL FINDING: Primary | ICD-10-CM

## 2020-01-27 PROCEDURE — 99396 PREV VISIT EST AGE 40-64: CPT | Performed by: PHYSICIAN ASSISTANT

## 2020-01-27 NOTE — PROGRESS NOTES
"Subjective   Chief Complaint   Patient presents with   • Gynecologic Exam     Last pap done 18-WNL, MMG done 19-WNL, No complaints       Mary Lunsford is a 49 y.o. year old  presenting to be seen for her annual gynecological exam.   She has no complaints  LMP has been 2018  Had screening mammogram 2019    Past Medical History:   Diagnosis Date   • Anemia    • Body piercing     both ears   • Disease of thyroid gland    • Dissection of coronary artery 3/20/2017    pt denies this 10/2/19.  states she had testing at , but was told that they didn't \"find anything\"   • Essential hypertension 3/30/2018   • Gallstone    • Kidney stones    • Migraine    • Obesity    • Ovarian cyst    • Restless leg syndrome    • Urinary tract infection    • Vitamin B12 deficiency    • Wears glasses     to read        Current Outpatient Medications:   •  carbidopa-levodopa CR (SINEMET CR)  MG per CR tablet, Take 1 tablet by mouth 3 (Three) Times a Day. May take 1 extra prn breakthrough RLS symptoms, Disp: 120 tablet, Rfl: 5  •  cyanocobalamin 1000 MCG/ML injection, Inject 1 mL into the appropriate muscle as directed by prescriber Every 14 (Fourteen) Days., Disp: 2 mL, Rfl: 5  •  Ferrous Fumarate (FERROCITE) 324 (106 Fe) MG tablet, Take 1 tablet by mouth 2 (Two) Times a Day., Disp: 60 tablet, Rfl: 5  •  levothyroxine (SYNTHROID, LEVOTHROID) 88 MCG tablet, Take 1 tablet by mouth Daily., Disp: 90 tablet, Rfl: 3  •  LYRICA 75 MG capsule, Take 1 capsule by mouth Every Night., Disp: 30 capsule, Rfl: 5  •  metoprolol tartrate (LOPRESSOR) 25 MG tablet, take 1 tablet by mouth every 12 hours, Disp: 180 tablet, Rfl: 3  •  pramipexole (MIRAPEX) 0.5 MG tablet, Take 0.5 tablets by mouth 3 (Three) Times a Day., Disp: 45 tablet, Rfl: 5  •  Syringe, Disposable, 1 ML misc, Use to inject vitamin B12 every 14 days, Disp: 2 each, Rfl: 5  •  diclofenac (VOLTAREN) 1 % gel gel, APPLY 2 G TO AFFECTED AREA 4 TIMES " DAILY (MAX 8 G PER JOINT PER DAY) (Patient taking differently: 2 (Two) Times a Day As Needed. APPLY 2 G TO AFFECTED AREA 4 TIMES DAILY (MAX 8 G PER JOINT PER DAY)), Disp: 100 g, Rfl: 5  •  levothyroxine (SYNTHROID, LEVOTHROID) 75 MCG tablet, Take 1 tablet by mouth Daily. Labwork needed for additional refills, Disp: 30 tablet, Rfl: 0   Allergies   Allergen Reactions   • Sulfa Antibiotics Rash and Other (See Comments)     blind      Past Surgical History:   Procedure Laterality Date   • BREAST SURGERY Bilateral 1987    breast reduction   • CARDIAC CATHETERIZATION N/A 3/15/2017    Procedure: Coronary angiography;  Surgeon: Moustapha Peñaloza MD;  Location: [facility] CATH INVASIVE LOCATION;  Service:    • CARDIAC CATHETERIZATION N/A 3/15/2017    Procedure: Left ventriculography;  Surgeon: Moustapha Peñaloza MD;  Location: [facility] CATH INVASIVE LOCATION;  Service:    •  SECTION      1991 & 1995   • CHOLECYSTECTOMY  2013   • COLONOSCOPY N/A 10/3/2019    Procedure: COLONOSCOPY WITH BIOPSIES; ANOSCOPY;  Surgeon: Jose Paez MD;  Location: [facility] ENDOSCOPY;  Service: Gastroenterology   • ENDOSCOPY     • GASTRIC BYPASS  2011   • REDUCTION MAMMAPLASTY        Social History     Socioeconomic History   • Marital status:      Spouse name: Not on file   • Number of children: Not on file   • Years of education: Not on file   • Highest education level: Not on file   Tobacco Use   • Smoking status: Former Smoker     Packs/day: 1.00     Years: 2.00     Pack years: 2.00     Last attempt to quit: 10/1995     Years since quittin.3   • Smokeless tobacco: Never Used   Substance and Sexual Activity   • Alcohol use: Yes     Alcohol/week: 12.0 standard drinks     Types: 12 Cans of beer per week     Comment: 16 beers per week   • Drug use: No   • Sexual activity: Yes     Partners: Male     Birth control/protection: Post-menopausal      Family History   Problem Relation Age of Onset   • Cancer Father    •  "Heart attack Father    • Cancer Other    • Other Other    • No Known Problems Mother    • Asthma Brother    • Ulcerative colitis Brother    • Arthritis Paternal Grandmother    • Colon cancer Neg Hx        Review of Systems   Constitutional: Negative for activity change, chills and fever.   Gastrointestinal: Negative for abdominal pain, nausea and vomiting.   Genitourinary: Negative for difficulty urinating, dysuria, menstrual problem, pelvic pain and vaginal discharge.           Objective   /76   Ht 162.6 cm (64\")   Wt 115 kg (254 lb)   LMP 12/01/2018   Breastfeeding No   BMI 43.60 kg/m²     Physical Exam   Constitutional: She appears well-developed and well-nourished. She is cooperative. No distress.   Eyes: Conjunctivae, EOM and lids are normal.   Pulmonary/Chest: Right breast exhibits no inverted nipple, no mass, no nipple discharge, no skin change and no tenderness. Left breast exhibits no inverted nipple, no mass, no nipple discharge, no skin change and no tenderness.   Abdominal: Soft. Normal appearance. There is no tenderness.   Genitourinary: Uterus normal. There is no tenderness or lesion on the right labia. There is no tenderness or lesion on the left labia. Cervix exhibits no motion tenderness, no discharge and no friability. Right adnexum displays no mass and no tenderness. Left adnexum displays no mass and no tenderness. No erythema or tenderness in the vagina. No signs of injury around the vagina.   Genitourinary Comments: Pap done   Neurological: She is alert.   Skin: Skin is warm and dry. No lesion and no rash noted.   Psychiatric: She has a normal mood and affect. Her behavior is normal. Thought content normal.            Assessment and Plan  Mary was seen today for gynecologic exam.    Diagnoses and all orders for this visit:    Encounter for gynecological examination without abnormal finding    Screening for cervical cancer  -     Liquid-based Pap Smear, Screening; Future      Patient " Instructions   Self breast exam monthly  Annual mammogram  vit D 2000 mg daily  Regular excercise             This note was electronically signed.    Tari Boston PA-C   January 27, 2020

## 2020-01-30 ENCOUNTER — TELEPHONE (OUTPATIENT)
Dept: NEUROLOGY | Facility: CLINIC | Age: 50
End: 2020-01-30

## 2020-01-30 NOTE — TELEPHONE ENCOUNTER
PT CALLED AND SAID SHE WAS NOT ABLE TO  HER MEDICATION lYRICA  ON 1- BECAUSE PHARMACY SAID SHE NEEDED  A PA FOR THE LYRICA.  I CALLED THE PHARMACY AND VERIFIED AND COMPLETED A PA AND CALLED AND LVM FOR PT INFORMING AS SOON AS I HEAR ANYTHING ON APPROVAL OR DENIAL I WILL CALL HER AND ALERT PHARMACY AS WELL.  
Never smoker

## 2020-01-31 DIAGNOSIS — Z12.4 SCREENING FOR CERVICAL CANCER: ICD-10-CM

## 2020-02-06 ENCOUNTER — TELEPHONE (OUTPATIENT)
Dept: NEUROLOGY | Facility: CLINIC | Age: 50
End: 2020-02-06

## 2020-02-06 NOTE — TELEPHONE ENCOUNTER
I went on line and downloaded a lyrica copay card and called the pharmacy and activated it and it went through for $8 pickup, good until 12- and pt is aware.

## 2020-02-10 DIAGNOSIS — D50.9 IRON DEFICIENCY ANEMIA, UNSPECIFIED IRON DEFICIENCY ANEMIA TYPE: ICD-10-CM

## 2020-02-10 DIAGNOSIS — G25.81 RESTLESS LEGS SYNDROME (RLS): ICD-10-CM

## 2020-02-10 RX ORDER — FERROUS FUMARATE 324(106)MG
TABLET ORAL
Qty: 180 TABLET | Refills: 0 | Status: SHIPPED | OUTPATIENT
Start: 2020-02-10 | End: 2020-06-11

## 2020-02-24 ENCOUNTER — OFFICE VISIT (OUTPATIENT)
Dept: INTERNAL MEDICINE | Facility: CLINIC | Age: 50
End: 2020-02-24

## 2020-02-24 ENCOUNTER — PATIENT MESSAGE (OUTPATIENT)
Dept: INTERNAL MEDICINE | Facility: CLINIC | Age: 50
End: 2020-02-24

## 2020-02-24 ENCOUNTER — HOSPITAL ENCOUNTER (OUTPATIENT)
Dept: ULTRASOUND IMAGING | Facility: HOSPITAL | Age: 50
Discharge: HOME | End: 2020-02-24
Admitting: FAMILY MEDICINE

## 2020-02-24 ENCOUNTER — HOSPITAL ENCOUNTER (OUTPATIENT)
Dept: GENERAL RADIOLOGY | Facility: HOSPITAL | Age: 50
Discharge: HOME | End: 2020-02-24
Admitting: FAMILY MEDICINE

## 2020-02-24 VITALS
RESPIRATION RATE: 18 BRPM | DIASTOLIC BLOOD PRESSURE: 65 MMHG | WEIGHT: 253.25 LBS | SYSTOLIC BLOOD PRESSURE: 120 MMHG | HEIGHT: 64 IN | HEART RATE: 62 BPM | TEMPERATURE: 97.1 F | BODY MASS INDEX: 43.23 KG/M2 | OXYGEN SATURATION: 98 %

## 2020-02-24 DIAGNOSIS — R10.2 PELVIC PAIN: ICD-10-CM

## 2020-02-24 DIAGNOSIS — R10.2 PELVIC PAIN: Primary | ICD-10-CM

## 2020-02-24 DIAGNOSIS — R10.31 RLQ ABDOMINAL PAIN: ICD-10-CM

## 2020-02-24 DIAGNOSIS — M53.3 SACROILIAC DYSFUNCTION: ICD-10-CM

## 2020-02-24 DIAGNOSIS — E07.9 THYROID DISORDER: Primary | ICD-10-CM

## 2020-02-24 DIAGNOSIS — R10.32 LEFT LOWER QUADRANT ABDOMINAL PAIN: ICD-10-CM

## 2020-02-24 LAB
T4 FREE SERPL-MCNC: 1.42 NG/DL (ref 0.93–1.7)
TSH SERPL DL<=0.005 MIU/L-ACNC: 2.44 UIU/ML (ref 0.27–4.2)

## 2020-02-24 PROCEDURE — 99214 OFFICE O/P EST MOD 30 MIN: CPT | Performed by: FAMILY MEDICINE

## 2020-02-24 PROCEDURE — 76856 US EXAM PELVIC COMPLETE: CPT

## 2020-02-24 PROCEDURE — 72202 X-RAY EXAM SI JOINTS 3/> VWS: CPT

## 2020-02-24 NOTE — PROGRESS NOTES
"Subjective    Mary Lunsford is a 49 y.o. female here for:  Chief Complaint   Patient presents with   • Back Pain     Pain in her back and right side. Denies trauma to her back. Pain has been on and off for a couple of years. Last time she had this pain her GYN did a vaginal US to see if anything internally is causing the pain. US was normal. Sharp pains sometimes. She does not feel like it is muscle pain because the pain is not on both sides.        History per MA reviewed.    History of Present Illness   Back pain on and off x years. Seems to be bothering her more. Pain since yesterday morning. Back and side on right. Normally may bother her for a few hours or a day. Pain worse with sitting. Could not get comfortable sleeping last night, ended up in recliner. Pain described as sharp sometimes with movement, mostly a dull ache right lower back and some in right pelvic area. Reports having us with gyn approx 2 years ago that was okay, no ovarian lesions. It was suggested she could have scar tissue causing the discomfort. Pain has not kept her awake before like this time. No blood in urine nor bowels. Bowel movements okay, had colonoscopy last year that was okay. Has had a \"lot of gas last couple of days\" but unsure if that is related. Does not recall having that before. Discomfort is constant, not pulsatile. Has had one kidney stone, this does not feel anything like it. No loss of appetite.     Acquired hypothyroidism chronic daily issue, on 88 mcg levothyroxine. Would like to recheck levels. Notes improvement in her polyarthralgias overall with this medicine.    The following portions of the patient's history were reviewed and updated as appropriate: allergies, current medications, past family history, past medical history, past social history, past surgical history and problem list.    Review of Systems   Constitutional: Positive for activity change. Negative for appetite change.   Genitourinary: Negative for " "dysuria, frequency and urgency.   Musculoskeletal: Positive for back pain.   Neurological:        Tingling, followed by neurology       Visit Vitals  /65   Pulse 62   Temp 97.1 °F (36.2 °C) (Temporal)   Resp 18   Ht 162.6 cm (64.02\")   Wt 115 kg (253 lb 4 oz)   LMP 12/01/2018   SpO2 98%   BMI 43.45 kg/m²         Objective   Physical Exam   Constitutional: She is oriented to person, place, and time. Vital signs are normal. She appears well-developed and well-nourished. She is active.  Non-toxic appearance. She does not have a sickly appearance. She does not appear ill. No distress. She is morbidly obese.  HENT:   Head: Normocephalic and atraumatic.   Right Ear: Hearing normal.   Left Ear: Hearing normal.   Mouth/Throat: Mucous membranes are not dry.   Eyes: EOM are normal. No scleral icterus.   Neck: Phonation normal. Neck supple.   Pulmonary/Chest: Effort normal.   Abdominal: Soft. Bowel sounds are normal. She exhibits no distension and no mass. There is tenderness in the right lower quadrant. There is no rigidity, no rebound and no guarding.   Musculoskeletal:        Lumbar back: She exhibits no tenderness, no pain and no spasm.   Neurological: She is alert and oriented to person, place, and time. She displays no tremor. No cranial nerve deficit.   Skin: Skin is warm. No rash noted. She is not diaphoretic. No pallor.   Psychiatric: She has a normal mood and affect. Her speech is normal and behavior is normal. Judgment and thought content normal. Cognition and memory are normal.   Nursing note and vitals reviewed.        Assessment/Plan     Problem List Items Addressed This Visit        Endocrine    Thyroid disorder - Primary    Relevant Orders    TSH+Free T4      Other Visit Diagnoses     Pelvic pain        Relevant Orders    US Pelvis Complete    Sacroiliac dysfunction        Relevant Orders    XR sacroiliac joints 3+ vw          ·     Floridalma Jimenez MD  "

## 2020-02-25 NOTE — TELEPHONE ENCOUNTER
From: Floridalma Jimenez MD  To: Mary Lunsford  Sent: 2/24/2020 12:43 PM EST  Subject: ultrasound result    Ultrasound is also normal. A CT scan is something else to consider, though if this were due to scar tissue as you mentioned you would not see this on scan. Please keep track of this, all the possible associated symptoms. Also mention it to neurology when you go.     Dr. Jimenez

## 2020-03-13 ENCOUNTER — TELEPHONE (OUTPATIENT)
Dept: NEUROLOGY | Facility: CLINIC | Age: 50
End: 2020-03-13

## 2020-03-13 DIAGNOSIS — G25.81 RESTLESS LEGS SYNDROME (RLS): ICD-10-CM

## 2020-03-13 NOTE — TELEPHONE ENCOUNTER
Sent new lyrica script to pharmacy. Not sure why she is filling for lower quantity. The scripts I sent today and always send are #30.

## 2020-03-13 NOTE — TELEPHONE ENCOUNTER
I called and spoke with pharmacy letting them know the script had been sent in and she said she would call pt when script was ready for

## 2020-03-13 NOTE — TELEPHONE ENCOUNTER
Pharmacy called and said pt has been using the co-pay card for Lyrica and getting smaller quantities but for some reason or another it is now requiring a # 30 per month and pt has #16 left on the script.  Pharmacy said only way to use the card is a # of 30 and this will be her last script and she will need a new script for the future.

## 2020-03-16 ENCOUNTER — TELEPHONE (OUTPATIENT)
Dept: INTERNAL MEDICINE | Facility: CLINIC | Age: 50
End: 2020-03-16

## 2020-03-16 NOTE — TELEPHONE ENCOUNTER
Floridalma Jimenez MD Jackson, Emily             yes    Previous Messages      ----- Message -----   From: Stephanie Espinoza   Sent: 3/16/2020   1:18 PM EDT   To: Floridalma Jimenez MD   Subject: Unable to Contact                                 Scheduling tried to call patient on 2/26 2/27 2/28   We mailed a letter on 3/2 with their office contact information   Patient still has not scheduled.   Can we close?

## 2020-05-25 DIAGNOSIS — G25.81 RESTLESS LEGS SYNDROME (RLS): ICD-10-CM

## 2020-05-26 RX ORDER — PRAMIPEXOLE DIHYDROCHLORIDE 0.5 MG/1
TABLET ORAL
Qty: 135 TABLET | Refills: 1 | Status: SHIPPED | OUTPATIENT
Start: 2020-05-26 | End: 2020-07-20

## 2020-06-11 DIAGNOSIS — D50.9 IRON DEFICIENCY ANEMIA, UNSPECIFIED IRON DEFICIENCY ANEMIA TYPE: ICD-10-CM

## 2020-06-11 DIAGNOSIS — G25.81 RESTLESS LEGS SYNDROME (RLS): ICD-10-CM

## 2020-06-11 RX ORDER — FERROUS FUMARATE 324(106)MG
TABLET ORAL
Qty: 180 TABLET | Refills: 0 | Status: SHIPPED | OUTPATIENT
Start: 2020-06-11 | End: 2020-10-14

## 2020-07-20 ENCOUNTER — OFFICE VISIT (OUTPATIENT)
Dept: NEUROLOGY | Facility: CLINIC | Age: 50
End: 2020-07-20

## 2020-07-20 VITALS
HEIGHT: 64 IN | WEIGHT: 250 LBS | HEART RATE: 66 BPM | DIASTOLIC BLOOD PRESSURE: 80 MMHG | OXYGEN SATURATION: 99 % | BODY MASS INDEX: 42.68 KG/M2 | TEMPERATURE: 97.5 F | SYSTOLIC BLOOD PRESSURE: 124 MMHG

## 2020-07-20 DIAGNOSIS — D50.9 IRON DEFICIENCY ANEMIA, UNSPECIFIED IRON DEFICIENCY ANEMIA TYPE: ICD-10-CM

## 2020-07-20 DIAGNOSIS — G25.81 RESTLESS LEGS SYNDROME (RLS): Primary | ICD-10-CM

## 2020-07-20 DIAGNOSIS — E55.9 VITAMIN D DEFICIENCY: ICD-10-CM

## 2020-07-20 DIAGNOSIS — E53.8 VITAMIN B12 DEFICIENCY: ICD-10-CM

## 2020-07-20 PROCEDURE — 82607 VITAMIN B-12: CPT | Performed by: NURSE PRACTITIONER

## 2020-07-20 PROCEDURE — 82728 ASSAY OF FERRITIN: CPT | Performed by: NURSE PRACTITIONER

## 2020-07-20 PROCEDURE — 36415 COLL VENOUS BLD VENIPUNCTURE: CPT | Performed by: NURSE PRACTITIONER

## 2020-07-20 PROCEDURE — 82746 ASSAY OF FOLIC ACID SERUM: CPT | Performed by: NURSE PRACTITIONER

## 2020-07-20 PROCEDURE — 82306 VITAMIN D 25 HYDROXY: CPT | Performed by: NURSE PRACTITIONER

## 2020-07-20 PROCEDURE — 99213 OFFICE O/P EST LOW 20 MIN: CPT | Performed by: NURSE PRACTITIONER

## 2020-07-20 PROCEDURE — 83921 ORGANIC ACID SINGLE QUANT: CPT | Performed by: NURSE PRACTITIONER

## 2020-07-20 RX ORDER — CARBIDOPA AND LEVODOPA 50; 200 MG/1; MG/1
1 TABLET, EXTENDED RELEASE ORAL 3 TIMES DAILY
Qty: 360 TABLET | Refills: 1 | Status: SHIPPED | OUTPATIENT
Start: 2020-07-20 | End: 2020-11-04

## 2020-07-20 RX ORDER — PRAMIPEXOLE DIHYDROCHLORIDE 0.5 MG/1
0.25 TABLET ORAL 3 TIMES DAILY
Qty: 135 TABLET | Refills: 1 | Status: SHIPPED | OUTPATIENT
Start: 2020-07-20 | End: 2020-11-11

## 2020-07-20 NOTE — PROGRESS NOTES
Subjective:     Patient ID: Mary Lunsford is a 49 y.o. female.    CC:   Chief Complaint   Patient presents with   • Restless Legs Syndrome       HPI:   History of Present Illness   This is a very pleasant 50 yo female who presents for 11 month follow up on severe RLS for 10+ years. Her mother has RLS and grandmother also had severe RLS. She is currently taking Sinemet CR TID & 1 extra dose PRN breakthrough symptoms and Mirapex 0.25mg TID. Failed Neupro patch. Previously failed ropinirole. She has been on Lyrica DANAY 75mg QHS for RLS -generic was completely ineffective. She has the standing desk at work. She has confirmed PLMD with sleep study in 2017 and no BASIL. Has had low b12 and ferritin as well as vitamin D in past-has not had replacement or labs recently.Previously tried Ropinirole, Neupro and Horizant without significant improvement in symptoms.She now has a standing desk at work to help with daytime RLS symptoms and this has been beneficial. She is aware of Dopamine Agonists' risks and denies any unusual behavioral changes. She needs refills today.    When we saw her in the fall 2019 she was having facial tingling and she also underwent an EMG and NCVS of her right upper and left upper extremities and this was completely normal on 10/23/2019 with Dr. Buck Lan of our neurologist.  She tells me those symptoms have subsided since that time.  Underwent MRI of the brain with and without contrast which showed 2 very small chronic white matter changes of the brain and otherwise MRI of the brain was completely unremarkable with no abnormal contrast enhancement and no acute intracranial abnormalities.    The following portions of the patient's history were reviewed and updated as appropriate: allergies, current medications, past family history, past medical history, past social history, past surgical history and problem list.    Past Medical History:   Diagnosis Date   • Anemia    • Body piercing     both ears  "  • Disease of thyroid gland    • Dissection of coronary artery 3/20/2017    pt denies this 10/2/19.  states she had testing at , but was told that they didn't \"find anything\"   • Essential hypertension 3/30/2018   • Gallstone    • Kidney stones    • Migraine    • Obesity    • Ovarian cyst    • Restless leg syndrome    • Urinary tract infection    • Vitamin B12 deficiency    • Wears glasses     to read       Past Surgical History:   Procedure Laterality Date   • BREAST SURGERY Bilateral 1987    breast reduction   • CARDIAC CATHETERIZATION N/A 3/15/2017    Procedure: Coronary angiography;  Surgeon: Moustapha Peñaloza MD;  Location: [facility] CATH INVASIVE LOCATION;  Service:    • CARDIAC CATHETERIZATION N/A 3/15/2017    Procedure: Left ventriculography;  Surgeon: Moustapha Peñaloza MD;  Location: [facility] CATH INVASIVE LOCATION;  Service:    •  SECTION      1991 & 1995   • CHOLECYSTECTOMY  2013   • COLONOSCOPY N/A 10/3/2019    Procedure: COLONOSCOPY WITH BIOPSIES; ANOSCOPY;  Surgeon: Jose Paez MD;  Location: [facility] ENDOSCOPY;  Service: Gastroenterology   • ENDOSCOPY     • GASTRIC BYPASS  2011   • REDUCTION MAMMAPLASTY         Social History     Socioeconomic History   • Marital status:      Spouse name: Not on file   • Number of children: Not on file   • Years of education: Not on file   • Highest education level: Not on file   Tobacco Use   • Smoking status: Former Smoker     Packs/day: 1.00     Years: 2.00     Pack years: 2.00     Last attempt to quit: 10/1995     Years since quittin.8   • Smokeless tobacco: Never Used   Substance and Sexual Activity   • Alcohol use: Yes     Alcohol/week: 12.0 standard drinks     Types: 12 Cans of beer per week     Comment: 16 beers per week   • Drug use: No   • Sexual activity: Yes     Partners: Male     Birth control/protection: Post-menopausal       Family History   Problem Relation Age of Onset   • Cancer Father    • Heart attack " "Father    • Cancer Other    • Other Other    • No Known Problems Mother    • Asthma Brother    • Ulcerative colitis Brother    • Arthritis Paternal Grandmother    • Colon cancer Neg Hx         Review of Systems   Constitutional: Negative for chills, fatigue, fever and unexpected weight change.   HENT: Negative for ear pain, hearing loss, nosebleeds, rhinorrhea and sore throat.    Eyes: Negative for photophobia, pain, discharge, itching and visual disturbance.   Respiratory: Negative for cough, chest tightness, shortness of breath and wheezing.    Cardiovascular: Negative for chest pain, palpitations and leg swelling.   Gastrointestinal: Negative for abdominal pain, blood in stool, constipation, diarrhea, nausea and vomiting.   Genitourinary: Negative for dysuria, frequency, hematuria and urgency.   Musculoskeletal: Negative for arthralgias, back pain, gait problem, joint swelling, myalgias, neck pain and neck stiffness.   Skin: Negative for rash and wound.   Allergic/Immunologic: Negative for environmental allergies and food allergies.   Neurological: Negative for dizziness, tremors, seizures, syncope, speech difficulty, weakness, light-headedness, numbness and headaches.   Hematological: Negative for adenopathy. Does not bruise/bleed easily.   Psychiatric/Behavioral: Negative for agitation, confusion, decreased concentration, hallucinations, sleep disturbance and suicidal ideas. The patient is not nervous/anxious.         Objective:  /80   Pulse 66   Temp 97.5 °F (36.4 °C)   Ht 162.6 cm (64\")   Wt 113 kg (250 lb)   LMP 12/01/2018   SpO2 99%   BMI 42.91 kg/m²     Neurologic Exam     Mental Status   Oriented to person, place, and time.   Speech: speech is normal   Level of consciousness: alert    Cranial Nerves   Cranial nerves II through XII intact.     Motor Exam   Muscle bulk: normal  Overall muscle tone: normal    Strength   Strength 5/5 throughout.     Gait, Coordination, and Reflexes "     Gait  Gait: normal    Coordination   Finger to nose coordination: normal    Tremor   Resting tremor: absent  Intention tremor: absent  Action tremor: absent    Reflexes   Right brachioradialis: 2+  Left brachioradialis: 2+  Right biceps: 2+  Left biceps: 2+  Right triceps: 2+  Left triceps: 2+  Right patellar: 2+  Left patellar: 2+  Right achilles: 2+  Left achilles: 2+  Right : 2+  Left : 2+      Physical Exam   Constitutional: She is oriented to person, place, and time.   Neurological: She is oriented to person, place, and time. She has normal strength. She has a normal Finger-Nose-Finger Test. Gait normal.   Reflex Scores:       Tricep reflexes are 2+ on the right side and 2+ on the left side.       Bicep reflexes are 2+ on the right side and 2+ on the left side.       Brachioradialis reflexes are 2+ on the right side and 2+ on the left side.       Patellar reflexes are 2+ on the right side and 2+ on the left side.       Achilles reflexes are 2+ on the right side and 2+ on the left side.  Psychiatric: Her speech is normal.       Assessment/Plan:       Mary was seen today for restless legs syndrome.    Diagnoses and all orders for this visit:    Restless legs syndrome (RLS)  -     carbidopa-levodopa CR (SINEMET CR)  MG per CR tablet; Take 1 tablet by mouth 3 (Three) Times a Day. May take 1 extra prn breakthrough RLS symptoms  -     LYRICA 75 MG capsule; Take 1 capsule by mouth Every Night.  -     pramipexole (MIRAPEX) 0.5 MG tablet; Take 0.5 tablets by mouth 3 (Three) Times a Day.  -     Ferritin; Future  -     Ferritin    Iron deficiency anemia, unspecified iron deficiency anemia type  -     Ferritin; Future  -     Ferritin    Vitamin B12 deficiency  -     Vitamin B12 & Folate; Future  -     Methylmalonic Acid, Serum; Future  -     Vitamin B12 & Folate  -     Methylmalonic Acid, Serum    Vitamin D deficiency  -     Vitamin D 25 Hydroxy; Future  -     Vitamin D 25 Hydroxy           Continue  current meds. F/U in 6 months or sooner if needed. Labs today.  Reviewed medications, potential side effects and signs and symptoms to report. Discussed risk versus benefits of treatment plan with patient and/or family-including medications, labs and radiology that may be ordered. Addressed questions and concerns during visit. Patient and/or family verbalized understanding and agree with plan.    During this visit the following were done:  Labs Reviewed [x]    Labs Ordered [x]    Radiology Reports Reviewed [x]    Radiology Ordered []    PCP Records Reviewed []    Referring Provider Records Reviewed []    ER Records Reviewed []    Hospital Records Reviewed []    History Obtained From Family []    Radiology Images Reviewed [x]    Other Reviewed [x]    Records Requested []      As part of this patient's treatment plan I am prescribing controlled substances. The patient has been made aware of appropriate use of such medications, including potential risk of somnolence, limited ability to drive and/or work safely, and potential for dependence or overdose. It has also been made clear that these medications are for use by the patient only, without concomitant use of alcohol or other substances unless prescribed. Keep out of reach of children.  [place] report has been reviewed. If this is going to be prescribed long term, [facility] Controlled Substance Agreement Contract has also been read and signed by patient and myself.  [place]ley #[id number] reviewed.    Debbie Rodriguez, APRN  7/20/2020

## 2020-07-21 LAB
25(OH)D3 SERPL-MCNC: 48.7 NG/ML (ref 30–100)
FERRITIN SERPL-MCNC: 379 NG/ML (ref 13–150)
FOLATE SERPL-MCNC: 13.7 NG/ML (ref 4.78–24.2)
VIT B12 BLD-MCNC: 304 PG/ML (ref 211–946)

## 2020-07-22 ENCOUNTER — TELEPHONE (OUTPATIENT)
Dept: NEUROLOGY | Facility: CLINIC | Age: 50
End: 2020-07-22

## 2020-07-22 NOTE — TELEPHONE ENCOUNTER
----- Message from LINNEA Mendoza sent at 7/22/2020  9:42 AM EDT -----  Vitamin D level is excellent. Ferritin level is high normal. Vitamin B12 is getting low again-would recommend you ask PCP to send in injection prescriptions again. Thanks, LINNEA Lopez

## 2020-07-22 NOTE — PROGRESS NOTES
Vitamin D level is excellent. Ferritin level is high normal. Vitamin B12 is getting low again-would recommend you ask PCP to send in injection prescriptions again. Thanks, LINNEA Lopez

## 2020-07-23 LAB
Lab: NORMAL
METHYLMALONATE SERPL-SCNC: 253 NMOL/L (ref 0–378)

## 2020-09-08 RX ORDER — LEVOTHYROXINE SODIUM 88 UG/1
TABLET ORAL
Qty: 90 TABLET | Refills: 3 | Status: SHIPPED | OUTPATIENT
Start: 2020-09-08 | End: 2021-09-13

## 2020-10-13 DIAGNOSIS — G25.81 RESTLESS LEGS SYNDROME (RLS): ICD-10-CM

## 2020-10-13 DIAGNOSIS — D50.9 IRON DEFICIENCY ANEMIA, UNSPECIFIED IRON DEFICIENCY ANEMIA TYPE: ICD-10-CM

## 2020-10-14 RX ORDER — FERROUS FUMARATE 324(106)MG
TABLET ORAL
Qty: 180 TABLET | Refills: 1 | Status: SHIPPED | OUTPATIENT
Start: 2020-10-14 | End: 2021-05-26

## 2020-10-22 ENCOUNTER — TRANSCRIBE ORDERS (OUTPATIENT)
Dept: ADMINISTRATIVE | Facility: HOSPITAL | Age: 50
End: 2020-10-22

## 2020-10-22 DIAGNOSIS — Z12.31 VISIT FOR SCREENING MAMMOGRAM: Primary | ICD-10-CM

## 2020-11-03 DIAGNOSIS — G25.81 RESTLESS LEGS SYNDROME (RLS): ICD-10-CM

## 2020-11-04 RX ORDER — CARBIDOPA AND LEVODOPA 50; 200 MG/1; MG/1
TABLET, EXTENDED RELEASE ORAL
Qty: 360 TABLET | Refills: 1 | Status: SHIPPED | OUTPATIENT
Start: 2020-11-04 | End: 2021-05-03

## 2020-11-10 DIAGNOSIS — G25.81 RESTLESS LEGS SYNDROME (RLS): ICD-10-CM

## 2020-11-11 RX ORDER — PRAMIPEXOLE DIHYDROCHLORIDE 0.5 MG/1
TABLET ORAL
Qty: 135 TABLET | Refills: 1 | Status: SHIPPED | OUTPATIENT
Start: 2020-11-11 | End: 2021-05-03

## 2020-12-15 ENCOUNTER — HOSPITAL ENCOUNTER (OUTPATIENT)
Dept: MAMMOGRAPHY | Facility: HOSPITAL | Age: 50
Discharge: HOME | End: 2020-12-15
Admitting: PHYSICIAN ASSISTANT

## 2020-12-15 DIAGNOSIS — Z12.31 VISIT FOR SCREENING MAMMOGRAM: ICD-10-CM

## 2020-12-15 PROCEDURE — 77067 SCR MAMMO BI INCL CAD: CPT

## 2020-12-15 PROCEDURE — 77063 BREAST TOMOSYNTHESIS BI: CPT

## 2020-12-18 DIAGNOSIS — G25.81 RESTLESS LEGS SYNDROME (RLS): ICD-10-CM

## 2021-02-26 ENCOUNTER — TELEPHONE (OUTPATIENT)
Dept: INTERNAL MEDICINE | Facility: CLINIC | Age: 51
End: 2021-02-26

## 2021-02-26 NOTE — TELEPHONE ENCOUNTER
Patient did not complete CT abdomen ordered on 02/25/2020. This order is too old to be used. May I cancel the order?

## 2021-03-03 ENCOUNTER — OFFICE VISIT (OUTPATIENT)
Dept: OBSTETRICS AND GYNECOLOGY | Facility: CLINIC | Age: 51
End: 2021-03-03

## 2021-03-03 VITALS
DIASTOLIC BLOOD PRESSURE: 82 MMHG | BODY MASS INDEX: 42.51 KG/M2 | WEIGHT: 249 LBS | SYSTOLIC BLOOD PRESSURE: 140 MMHG | HEIGHT: 64 IN

## 2021-03-03 DIAGNOSIS — N62 HYPERTROPHY OF BREAST: ICD-10-CM

## 2021-03-03 DIAGNOSIS — L98.8 SKIN LESION OF BREAST: Primary | ICD-10-CM

## 2021-03-03 PROCEDURE — 99212 OFFICE O/P EST SF 10 MIN: CPT | Performed by: PHYSICIAN ASSISTANT

## 2021-03-03 NOTE — PATIENT INSTRUCTIONS
Suspect skin lesion right breast is dry skin/eczema which might respond to topical  Emollients or mild steroid cream however patient is concerned so will refer to general surgeon for further evaluation. Also diagnostic right mammogram

## 2021-03-03 NOTE — PROGRESS NOTES
"Subjective   Chief Complaint   Patient presents with   • Follow-up     Patient c/o lump and itching in right breast       Mary Lunsford is a 50 y.o. year old  presenting to be seen for complaints of some pain and possible lump in the right breast and also an itchy spot and lesion on her right breast.  Patient reports she noticed some itching and a change in the skin on the right breast 2 months ago.  Patient had a normal screening mammogram in 2020.  She does not have any family history of breast cancer.  She is not had any nipple discharge.  She has not really felt a lump in the right breast but feels a fullness in the right upper breast compared to the left breast.    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  states she had testing at , but was told that they didn't \"find anything\"   • Essential hypertension 3/30/2018   • Gallstone    • Kidney stones    • Migraine    • Obesity    • Ovarian cyst    • Restless leg syndrome    • Urinary tract infection    • Vitamin B12 deficiency    • Wears glasses     to read        Current Outpatient Medications:   •  ASPIRIN 81 PO, Take 1 tablet by mouth., Disp: , Rfl:   •  carbidopa-levodopa CR (SINEMET CR)  MG per CR tablet, TAKE 1 TABLET BY MOUTH THREE TIMES A DAY AND MAY TAKE 1 EXTRA IF NEEDED FOR BREAKTHROUGH RESTLESS LEG, Disp: 360 tablet, Rfl: 1  •  Ferrocite 324 MG tablet, TAKE 1 TABLET BY MOUTH TWICE DAILY, Disp: 180 tablet, Rfl: 1  •  levothyroxine (SYNTHROID, LEVOTHROID) 88 MCG tablet, TAKE 1 TABLET BY MOUTH ONCE DAILY, Disp: 90 tablet, Rfl: 3  •  Lyrica 75 MG capsule, Take 1 capsule by mouth Every Night., Disp: 90 capsule, Rfl: 1  •  metoprolol tartrate (LOPRESSOR) 25 MG tablet, TAKE 1 TABLET BY MOUTH EVERY 12 HOURS, Disp: 180 tablet, Rfl: 3  •  pramipexole (MIRAPEX) 0.5 MG tablet, TAKE 1/2 TABLET " BY MOUTH THREE TIMES DAILY, Disp: 135 tablet, Rfl: 1  •  cyanocobalamin 1000 MCG/ML injection, Inject 1 mL into the appropriate muscle as directed by prescriber Every 14 (Fourteen) Days., Disp: 2 mL, Rfl: 5  •  diclofenac (VOLTAREN) 1 % gel gel, APPLY 2 G TO AFFECTED AREA 4 TIMES DAILY (MAX 8 G PER JOINT PER DAY) (Patient taking differently: 2 (Two) Times a Day As Needed. APPLY 2 G TO AFFECTED AREA 4 TIMES DAILY (MAX 8 G PER JOINT PER DAY)), Disp: 100 g, Rfl: 5  •  Syringe, Disposable, 1 ML misc, Use to inject vitamin B12 every 14 days, Disp: 2 each, Rfl: 5   Allergies   Allergen Reactions   • Sulfa Antibiotics Rash and Other (See Comments)     blind      Past Surgical History:   Procedure Laterality Date   • BREAST SURGERY Bilateral 1987    breast reduction   • CARDIAC CATHETERIZATION N/A 3/15/2017    Procedure: Coronary angiography;  Surgeon: Moustapha Peñaloza MD;  Location: [facility] CATH INVASIVE LOCATION;  Service:    • CARDIAC CATHETERIZATION N/A 3/15/2017    Procedure: Left ventriculography;  Surgeon: Moustapha Peñaloza MD;  Location: [facility] CATH INVASIVE LOCATION;  Service:    •  SECTION      1991 & 1995   • CHOLECYSTECTOMY  2013   • COLONOSCOPY N/A 10/3/2019    Procedure: COLONOSCOPY WITH BIOPSIES; ANOSCOPY;  Surgeon: Jose Paez MD;  Location: [facility] ENDOSCOPY;  Service: Gastroenterology   • ENDOSCOPY     • GASTRIC BYPASS  2011   • REDUCTION MAMMAPLASTY        Social History     Socioeconomic History   • Marital status:      Spouse name: Not on file   • Number of children: Not on file   • Years of education: Not on file   • Highest education level: Not on file   Tobacco Use   • Smoking status: Former Smoker     Packs/day: 1.00     Years: 2.00     Pack years: 2.00     Quit date: 10/1995     Years since quittin.4   • Smokeless tobacco: Never Used   Substance and Sexual Activity   • Alcohol use: Yes     Alcohol/week: 12.0 standard drinks     Types: 12 Cans of beer per  "week     Comment: 16 beers per week   • Drug use: No   • Sexual activity: Yes     Partners: Male     Birth control/protection: Post-menopausal      Family History   Problem Relation Age of Onset   • Cancer Father    • Heart attack Father    • Cancer Other    • Other Other    • No Known Problems Mother    • Asthma Brother    • Ulcerative colitis Brother    • Arthritis Paternal Grandmother    • Colon cancer Neg Hx        Review of Systems   Constitutional: Negative for chills, diaphoresis and fever.   Gastrointestinal: Negative for abdominal pain, constipation, diarrhea, nausea and vomiting.   Genitourinary: Negative.            Objective   /82   Ht 162.6 cm (64\")   Wt 113 kg (249 lb)   LMP 12/01/2018 (LMP Unknown)   Breastfeeding No   BMI 42.74 kg/m²     Physical Exam  Constitutional:       Appearance: Normal appearance. She is well-developed and well-groomed.   Eyes:      General: Lids are normal.      Extraocular Movements: Extraocular movements intact.      Conjunctiva/sclera: Conjunctivae normal.   Chest:      Breasts:         Right: Skin change present. No inverted nipple, mass, nipple discharge or tenderness.         Left: No inverted nipple, mass, nipple discharge, skin change or tenderness.          Comments: Oval shaped 3 cm x 2 cm somewhat pink skin lesion right breast at 2-3 oclock adjacent to areola. Dry and scaly appearance. No specific lumps palpable in right breast but upper inner quadrant right breast slightly more pronounced/hypertrophied in appearance    Neurological:      General: No focal deficit present.      Mental Status: She is alert and oriented to person, place, and time.   Psychiatric:         Attention and Perception: Attention normal.         Mood and Affect: Mood normal.         Speech: Speech normal.         Behavior: Behavior is cooperative.         Thought Content: Thought content normal.              Assessment and Plan  Diagnoses and all orders for this visit:    1. Skin " lesion of breast (Primary)  -     Mammo Diagnostic Right With CAD; Future  -     Ambulatory Referral to General Surgery    2. Hypertrophy of breast  -     Mammo Diagnostic Right With CAD; Future      Patient Instructions   Suspect skin lesion right breast is dry skin/eczema which might respond to topical  Emollients or mild steroid cream however patient is concerned so will refer to general surgeon for further evaluation. Also diagnostic right mammogram             This note was electronically signed.    Tari Boston PA-C   March 3, 2021

## 2021-03-05 DIAGNOSIS — N62 HYPERTROPHY OF BREAST: Primary | ICD-10-CM

## 2021-03-11 NOTE — PROGRESS NOTES
Patient: Mary Lunsford    YOB: 1970    Date: 03/15/2021    Primary Care Provider: Floridalma Jimenez MD    Chief Complaint   Patient presents with   • Skin Lesion     of right breast       SUBJECTIVE:    History of present illness:  States she has a place on her right breast that is sensitive near the nipple.  Area is itchy and irritated.  Concerned about underlying malignancy.  Patient had a previous reduction and concern it may be a malignancy.  No nipple drainage or breast mass.    The following portions of the patient's history were reviewed and updated as appropriate: allergies, current medications, past family history, past medical history, past social history, past surgical history and problem list.      Review of Systems   All other systems reviewed and are negative.      Allergies:  Allergies   Allergen Reactions   • Sulfa Antibiotics Rash and Other (See Comments)     blind       Medications:    Current Outpatient Medications:   •  amoxicillin-clavulanate (AUGMENTIN) 875-125 MG per tablet, Take 1 tablet by mouth 2 (Two) Times a Day for 7 days., Disp: 14 tablet, Rfl: 0  •  ASPIRIN 81 PO, Take 1 tablet by mouth., Disp: , Rfl:   •  carbidopa-levodopa CR (SINEMET CR)  MG per CR tablet, TAKE 1 TABLET BY MOUTH THREE TIMES A DAY AND MAY TAKE 1 EXTRA IF NEEDED FOR BREAKTHROUGH RESTLESS LEG, Disp: 360 tablet, Rfl: 1  •  diclofenac (VOLTAREN) 1 % gel gel, APPLY 2 G TO AFFECTED AREA 4 TIMES DAILY (MAX 8 G PER JOINT PER DAY) (Patient taking differently: 2 (Two) Times a Day As Needed. APPLY 2 G TO AFFECTED AREA 4 TIMES DAILY (MAX 8 G PER JOINT PER DAY)), Disp: 100 g, Rfl: 5  •  Ferrocite 324 MG tablet, TAKE 1 TABLET BY MOUTH TWICE DAILY, Disp: 180 tablet, Rfl: 1  •  levothyroxine (SYNTHROID, LEVOTHROID) 88 MCG tablet, TAKE 1 TABLET BY MOUTH ONCE DAILY, Disp: 90 tablet, Rfl: 3  •  Lyrica 75 MG capsule, Take 1 capsule by mouth Every Night., Disp: 90 capsule, Rfl: 1  •  pramipexole (MIRAPEX)  "0.5 MG tablet, TAKE 1/2 TABLET BY MOUTH THREE TIMES DAILY, Disp: 135 tablet, Rfl: 1  •  azelastine (ASTELIN) 0.1 % nasal spray, azelastine 137 mcg (0.1 %) nasal spray aerosol  1 spray each nostril BID, Disp: , Rfl:   •  cyanocobalamin 1000 MCG/ML injection, Inject 1 mL into the appropriate muscle as directed by prescriber Every 14 (Fourteen) Days., Disp: 2 mL, Rfl: 5  •  metoprolol tartrate (LOPRESSOR) 25 MG tablet, TAKE 1 TABLET BY MOUTH EVERY 12 HOURS, Disp: 180 tablet, Rfl: 3  •  Syringe, Disposable, 1 ML misc, Use to inject vitamin B12 every 14 days, Disp: 2 each, Rfl: 5  No current facility-administered medications for this visit.    History:  Past Medical History:   Diagnosis Date   • Anemia    • Body piercing     both ears   • Disease of thyroid gland    • Dissection of coronary artery 3/20/2017    pt denies this 10/2/19.  states she had testing at , but was told that they didn't \"find anything\"   • Essential hypertension 3/30/2018   • Gallstone    • Kidney stones    • Migraine    • Obesity    • Ovarian cyst    • Restless leg syndrome    • Urinary tract infection    • Vitamin B12 deficiency    • Wears glasses     to read       Past Surgical History:   Procedure Laterality Date   • BREAST SURGERY Bilateral 1987    breast reduction   • CARDIAC CATHETERIZATION N/A 3/15/2017    Procedure: Coronary angiography;  Surgeon: Moustapha Peñaloza MD;  Location: [facility] CATH INVASIVE LOCATION;  Service:    • CARDIAC CATHETERIZATION N/A 3/15/2017    Procedure: Left ventriculography;  Surgeon: Moustapha Peñaloza MD;  Location: [facility] CATH INVASIVE LOCATION;  Service:    •  SECTION      1991 & 1995   • CHOLECYSTECTOMY  2013   • COLONOSCOPY N/A 10/3/2019    Procedure: COLONOSCOPY WITH BIOPSIES; ANOSCOPY;  Surgeon: Jose Paez MD;  Location: [facility] ENDOSCOPY;  Service: Gastroenterology   • ENDOSCOPY     • GASTRIC BYPASS  2011   • REDUCTION MAMMAPLASTY         Family History   Problem Relation " "Age of Onset   • Cancer Father    • Heart attack Father    • Cancer Other    • Other Other    • No Known Problems Mother    • Asthma Brother    • Ulcerative colitis Brother    • Arthritis Paternal Grandmother    • Colon cancer Neg Hx        Social History     Tobacco Use   • Smoking status: Former Smoker     Packs/day: 1.00     Years: 2.00     Pack years: 2.00     Quit date: 10/1995     Years since quittin.4   • Smokeless tobacco: Never Used   Substance Use Topics   • Alcohol use: Yes     Alcohol/week: 12.0 standard drinks     Types: 12 Cans of beer per week     Comment: 16 beers per week   • Drug use: No        OBJECTIVE:    Vital Signs:   Vitals:    03/15/21 1532   BP: 118/78   Pulse: 62   Resp: 18   Temp: 97.8 °F (36.6 °C)   SpO2: 98%   Weight: 113 kg (249 lb)   Height: 165.1 cm (65\")       Physical Exam:   General Appearance:    Alert, cooperative, in no acute distress   Head:    Normocephalic, without obvious abnormality, atraumatic   Eyes:            Lids and lashes normal, conjunctivae and sclerae normal, no   icterus, no pallor, corneas clear, PERRLA   Ears:    Ears appear intact with no abnormalities noted   Throat:   No oral lesions, no thrush, oral mucosa moist   Neck:   No adenopathy, supple, trachea midline, no thyromegaly, no   carotid bruit, no JVD   Lungs:     Clear to auscultation,respirations regular, even and                  unlabored    Heart:    Regular rhythm and normal rate, normal S1 and S2, no            murmur, no gallop, no rub, no click   Chest Wall:    No abnormalities observed   Abdomen:     Normal bowel sounds, no masses, no organomegaly, soft        non-tender, non-distended, no guarding, no rebound                tenderness   Extremities:   Moves all extremities well, no edema, no cyanosis, no             redness   Pulses:   Pulses palpable and equal bilaterally   Skin:   No bleeding, bruising or rash.  Right medial breast near the nipple, patient has a 1.5 cm area of " dermatitis.  No pigmentation or ulceration.  No suspicious nodule.   Lymph nodes:   No palpable adenopathy   Neurologic:   Cranial nerves 2 - 12 grossly intact, sensation intact, DTR       present and equal bilaterally     Results Review:   I reviewed the patient's new clinical results.    Review of Systems was reviewed and confirmed as accurate as documented by the MA.    ASSESSMENT/PLAN:    1. Skin neoplasm        Patient reassured, appears to have dermatitis on the right medial nipple region.  Recommend cortisone cream.  If areas not resolve or worsen, she will follow-up in 6-week for excision.    I discussed the patients findings and my recommendations with patient        Electronically signed by Michelle Fuchs MD  03/16/21

## 2021-03-15 ENCOUNTER — APPOINTMENT (OUTPATIENT)
Dept: GENERAL RADIOLOGY | Facility: HOSPITAL | Age: 51
End: 2021-03-15

## 2021-03-15 ENCOUNTER — HOSPITAL ENCOUNTER (EMERGENCY)
Facility: HOSPITAL | Age: 51
Discharge: HOME | End: 2021-03-15
Attending: EMERGENCY MEDICINE | Admitting: EMERGENCY MEDICINE

## 2021-03-15 ENCOUNTER — OFFICE VISIT (OUTPATIENT)
Dept: SURGERY | Facility: CLINIC | Age: 51
End: 2021-03-15

## 2021-03-15 VITALS
HEART RATE: 62 BPM | OXYGEN SATURATION: 98 % | TEMPERATURE: 97.8 F | DIASTOLIC BLOOD PRESSURE: 78 MMHG | SYSTOLIC BLOOD PRESSURE: 118 MMHG | WEIGHT: 249 LBS | RESPIRATION RATE: 18 BRPM | BODY MASS INDEX: 41.48 KG/M2 | HEIGHT: 65 IN

## 2021-03-15 VITALS
TEMPERATURE: 98.4 F | DIASTOLIC BLOOD PRESSURE: 108 MMHG | WEIGHT: 250.2 LBS | HEIGHT: 64 IN | OXYGEN SATURATION: 99 % | BODY MASS INDEX: 42.72 KG/M2 | SYSTOLIC BLOOD PRESSURE: 149 MMHG | RESPIRATION RATE: 18 BRPM | HEART RATE: 71 BPM

## 2021-03-15 DIAGNOSIS — D49.2 SKIN NEOPLASM: Primary | ICD-10-CM
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
## 2021-03-15 PROCEDURE — 25010000002 TDAP 5-2.5-18.5 LF-MCG/0.5 SUSPENSION: Performed by: EMERGENCY MEDICINE

## 2021-03-15 PROCEDURE — 73130 X-RAY EXAM OF HAND: CPT

## 2021-03-15 PROCEDURE — 99283 EMERGENCY DEPT VISIT LOW MDM: CPT

## 2021-03-15 PROCEDURE — 90715 TDAP VACCINE 7 YRS/> IM: CPT | Performed by: EMERGENCY MEDICINE

## 2021-03-15 PROCEDURE — 90471 IMMUNIZATION ADMIN: CPT | Performed by: EMERGENCY MEDICINE

## 2021-03-15 PROCEDURE — 99203 OFFICE O/P NEW LOW 30 MIN: CPT | Performed by: SURGERY

## 2021-03-15 RX ORDER — AMOXICILLIN AND CLAVULANATE POTASSIUM 875; 125 MG/1; MG/1
1 TABLET, FILM COATED ORAL 2 TIMES DAILY
Qty: 14 TABLET | Refills: 0 | Status: SHIPPED | OUTPATIENT
Start: 2021-03-15 | End: 2021-03-22

## 2021-03-15 RX ORDER — AZELASTINE 1 MG/ML
SPRAY, METERED NASAL
COMMUNITY
End: 2022-01-19

## 2021-03-15 RX ORDER — IBUPROFEN 800 MG/1
800 TABLET ORAL ONCE
Status: COMPLETED | OUTPATIENT
Start: 2021-03-15 | End: 2021-03-15

## 2021-03-15 RX ADMIN — TETANUS TOXOID, REDUCED DIPHTHERIA TOXOID AND ACELLULAR PERTUSSIS VACCINE, ADSORBED 0.5 ML: 5; 2.5; 8; 8; 2.5 SUSPENSION INTRAMUSCULAR at 07:36

## 2021-03-15 RX ADMIN — IBUPROFEN 800 MG: 800 TABLET ORAL at 07:36

## 2021-03-15 NOTE — ED PROVIDER NOTES
"Subjective   50-year-old female presenting with cat bite.  She states that this morning she was breaking up a fight between her cat and her dog.  She was bitten on the left hand.  Less so on the right hand.  She has a few puncture wounds to the left hand, associated mild achy pain.  No real alleviating or aggravating factors.  The cat is a \"neighborhood cat\".  Cat is currently in their garage.  She has no other complaints or concerns.  Her tetanus is not up-to-date.          Review of Systems   Constitutional: Negative.    HENT: Negative.    Eyes: Negative.    Respiratory: Negative.    Cardiovascular: Negative.    Gastrointestinal: Negative.    Genitourinary: Negative.    Musculoskeletal: Positive for arthralgias.   Skin: Positive for wound.   Neurological: Negative.    Psychiatric/Behavioral: Negative.        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  states she had testing at , but was told that they didn't \"find anything\"   • Essential hypertension 3/30/2018   • Gallstone    • Kidney stones    • Migraine    • Obesity    • Ovarian cyst    • Restless leg syndrome    • Urinary tract infection    • Vitamin B12 deficiency    • Wears glasses     to read       Allergies   Allergen Reactions   • Sulfa Antibiotics Rash and Other (See Comments)     blind       Past Surgical History:   Procedure Laterality Date   • BREAST SURGERY Bilateral 1987    breast reduction   • CARDIAC CATHETERIZATION N/A 3/15/2017    Procedure: Coronary angiography;  Surgeon: Moustapha Peñaloza MD;  Location: [facility] INVASIVE LOCATION;  Service:    • CARDIAC CATHETERIZATION N/A 3/15/2017    Procedure: Left ventriculography;  Surgeon: Moustapha Peñaloza MD;  Location: [facility] INVASIVE LOCATION;  Service:    •  SECTION      1991 & 1995   • CHOLECYSTECTOMY  2013   • COLONOSCOPY " N/A 10/3/2019    Procedure: COLONOSCOPY WITH BIOPSIES; ANOSCOPY;  Surgeon: Jose Paez MD;  Location: [facility] ENDOSCOPY;  Service: Gastroenterology   • ENDOSCOPY     • GASTRIC BYPASS  2011   • REDUCTION MAMMAPLASTY         Family History   Problem Relation Age of Onset   • Cancer Father    • Heart attack Father    • Cancer Other    • Other Other    • No Known Problems Mother    • Asthma Brother    • Ulcerative colitis Brother    • Arthritis Paternal Grandmother    • Colon cancer Neg Hx        Social History     Socioeconomic History   • Marital status:      Spouse name: Not on file   • Number of children: Not on file   • Years of education: Not on file   • Highest education level: Not on file   Tobacco Use   • Smoking status: Former Smoker     Packs/day: 1.00     Years: 2.00     Pack years: 2.00     Quit date: 10/1995     Years since quittin.4   • Smokeless tobacco: Never Used   Substance and Sexual Activity   • Alcohol use: Yes     Alcohol/week: 12.0 standard drinks     Types: 12 Cans of beer per week     Comment: 16 beers per week   • Drug use: No   • Sexual activity: Yes     Partners: Male     Birth control/protection: Post-menopausal           Objective   Physical Exam  Constitutional:       General: She is not in acute distress.     Appearance: Normal appearance. She is not ill-appearing, toxic-appearing or diaphoretic.   HENT:      Head: Normocephalic and atraumatic.      Right Ear: External ear normal.      Left Ear: External ear normal.      Nose: Nose normal.      Mouth/Throat:      Mouth: Mucous membranes are moist.      Pharynx: Oropharynx is clear.   Eyes:      Extraocular Movements: Extraocular movements intact.      Conjunctiva/sclera: Conjunctivae normal.      Pupils: Pupils are equal, round, and reactive to light.   Cardiovascular:      Rate and Rhythm: Normal rate and regular rhythm.      Pulses: Normal pulses.      Heart sounds: Normal heart sounds.      Comments: Capillary refill  normal  Pulmonary:      Effort: Pulmonary effort is normal. No respiratory distress.      Breath sounds: Normal breath sounds.   Abdominal:      General: Bowel sounds are normal. There is no distension.      Tenderness: There is no abdominal tenderness.   Musculoskeletal:         General: No swelling, tenderness or deformity. Normal range of motion.      Cervical back: Normal range of motion.   Skin:     General: Skin is warm and dry.      Capillary Refill: Capillary refill takes less than 2 seconds.      Findings: No rash.      Comments: 2 puncture wounds to the dorsum of the left hand with mild surrounding tenderness, puncture wound to the left middle finger distal tip laterally, small scratch to the right hand around the thumb base   Neurological:      General: No focal deficit present.      Mental Status: She is alert and oriented to person, place, and time.   Psychiatric:         Mood and Affect: Mood normal.         Behavior: Behavior normal.         Procedures           ED Course                                           MDM  Number of Diagnoses or Management Options  Cat bite, initial encounter  Diagnosis management comments: 50-year-old female with cat bite.  Well-developed, well-nourished lady in no distress with exam as above.  Will obtain x-ray, cleanse wounds.  She will need prophylactic antibiotics.  Will update tetanus as well.  Will treat pain.  Disposition is likely home.    DDx: Cat bite    X-ray per radiology is unremarkable. Wounds have been irrigated by nursing. Health department will be in contact with patient about quarantining the cat.  We will start her on antibiotics.  Return precautions discussed.  Patient and  are comfortable with and understanding the plan.       Amount and/or Complexity of Data Reviewed  Tests in the radiology section of CPT®: reviewed        Final diagnoses:   Cat bite, initial encounter            Campbell Stephens MD  03/15/21 0847

## 2021-03-15 NOTE — ED NOTES
At this time, Health Department contacted. A voicemail was left. Awaiting call back.     [facility]  03/15/21 8387

## 2021-03-15 NOTE — ED NOTES
At this time, Health Dept called back and stated they would be in contact with the patient regarding the animal being quarantined.     Leena Shahid  03/15/21 0821

## 2021-03-16 ENCOUNTER — OFFICE VISIT (OUTPATIENT)
Dept: INTERNAL MEDICINE | Facility: CLINIC | Age: 51
End: 2021-03-16

## 2021-03-16 ENCOUNTER — NURSE TRIAGE (OUTPATIENT)
Dept: CALL CENTER | Facility: HOSPITAL | Age: 51
End: 2021-03-16

## 2021-03-16 VITALS
OXYGEN SATURATION: 99 % | HEIGHT: 64 IN | HEART RATE: 79 BPM | TEMPERATURE: 98.6 F | WEIGHT: 248.8 LBS | DIASTOLIC BLOOD PRESSURE: 84 MMHG | BODY MASS INDEX: 42.47 KG/M2 | SYSTOLIC BLOOD PRESSURE: 140 MMHG
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
## 2021-03-16 DIAGNOSIS — L03.119 CELLULITIS OF HAND: ICD-10-CM

## 2021-03-16 PROCEDURE — 99213 OFFICE O/P EST LOW 20 MIN: CPT | Performed by: NURSE PRACTITIONER

## 2021-03-16 RX ORDER — AZITHROMYCIN 250 MG/1
TABLET, FILM COATED ORAL
Qty: 6 TABLET | Refills: 0 | Status: SHIPPED | OUTPATIENT
Start: 2021-03-16 | End: 2021-04-27

## 2021-03-16 NOTE — PROGRESS NOTES
"  Office Visit      Patient Name: Mary Lunsford  : 1970   MRN: [id number]   Care Team: Patient Care Team:  Floridalma Jimenez MD as PCP - General (Family Medicine)    Chief Complaint  Animal Bite (Patient was bit on her left hand after trying to get a neighborhood cat away from her dog. She did go to the ER yesterday and had an updated Tetanus shot and was given Amoxicillen. Hurts to bend hand and painfull. She has been taking ibuprofen with little relief.)    Subjective     Subjective      Mary Lunsford presents to [facility] for cat bite. Bitten by a neighborhood cat yesterday when getting between it and her dog. She was bitten on the left hand and scratched on the right hand. This cat has not been vaccinated that she knows of and is currently with animal control but not acting rabid. Was seen in the ER yesterday underwent x-ray which were negative, received tetanus, and was started on augmentin. Since her visit the wound has worsened. She has worsening swelling, redness, and pain. She also felt feverish all night, didn't actually take her temperature. Has received 2 doses of augmentin so far. Has tried ibuprofen for pain and it hasn't helped much with her pain.     Objective     Objective   Vital Signs:   /84   Pulse 79   Temp 98.6 °F (37 °C)   Ht 162.6 cm (64\")   Wt 113 kg (248 lb 12.8 oz)   SpO2 99%   BMI 42.71 kg/m²     Physical Exam  Vitals and nursing note reviewed.   Constitutional:       Appearance: Normal appearance.   Cardiovascular:      Rate and Rhythm: Normal rate and regular rhythm.      Heart sounds: Normal heart sounds. No murmur.   Pulmonary:      Effort: Pulmonary effort is normal. No respiratory distress.      Breath sounds: Normal breath sounds.   Lymphadenopathy:      Cervical: No cervical adenopathy.      Upper Body:      Left upper body: No supraclavicular or axillary adenopathy.   Skin:     General: Skin is warm and dry.     "  Findings: Erythema and wound present.             Comments: Erythema, swelling, and bite marks present-- skin is warm to touch.   Left middle finger with scratch near nail bed that is erythematous, tender, and warm to touch  Left hand with several superficial scratches    Neurological:      Mental Status: She is alert.   Psychiatric:         Mood and Affect: Mood normal.         Behavior: Behavior normal.          Assessment / Plan         Assessment  Problem List Items Addressed This Visit     None      Visit Diagnoses     Cat bite, subsequent encounter    -  Primary    Cellulitis of hand                Plan  - Continue augmentin and will add azithromycin for coverage of cat scratch fever. Instructed that GI upset is likely to occur with dual antibiotic therapy and I recommend taking with food as well as using a probiotic. Keep wound clean, dry, and covered. Will return in 2 days for wound recheck.   - Discussed worsening signs and symptoms and she will present to ED if this occurs.     Follow Up   Return in about 2 days (around 3/18/2021) for Recheck.  Patient was given instructions and counseling regarding her condition or for health maintenance advice. Please see specific information pulled into the AVS if appropriate.     LINNEA Torres  [facility]

## 2021-03-17 ENCOUNTER — HOSPITAL ENCOUNTER (EMERGENCY)
Facility: HOSPITAL | Age: 51
Discharge: HOME | End: 2021-03-17
Attending: EMERGENCY MEDICINE | Admitting: EMERGENCY MEDICINE

## 2021-03-17 VITALS
HEART RATE: 74 BPM | TEMPERATURE: 98.2 F | BODY MASS INDEX: 41.35 KG/M2 | DIASTOLIC BLOOD PRESSURE: 93 MMHG | RESPIRATION RATE: 20 BRPM | WEIGHT: 248.2 LBS | HEIGHT: 65 IN | SYSTOLIC BLOOD PRESSURE: 142 MMHG | OXYGEN SATURATION: 96 %

## 2021-03-17 DIAGNOSIS — L03.114 CELLULITIS OF LEFT HAND: ICD-10-CM
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
## 2021-03-17 DIAGNOSIS — S61.452S CAT BITE OF HAND, LEFT, SEQUELA: Primary | ICD-10-CM

## 2021-03-17 PROCEDURE — 99283 EMERGENCY DEPT VISIT LOW MDM: CPT

## 2021-03-17 RX ORDER — HYDROCODONE BITARTRATE AND ACETAMINOPHEN 5; 325 MG/1; MG/1
1 TABLET ORAL EVERY 6 HOURS PRN
Qty: 12 TABLET | Refills: 0 | Status: SHIPPED | OUTPATIENT
Start: 2021-03-17 | End: 2021-04-27

## 2021-03-17 RX ORDER — ONDANSETRON 4 MG/1
4 TABLET, ORALLY DISINTEGRATING ORAL EVERY 8 HOURS PRN
Qty: 12 TABLET | Refills: 0 | Status: SHIPPED | OUTPATIENT
Start: 2021-03-17 | End: 2021-04-27

## 2021-03-22 NOTE — ED PROVIDER NOTES
"Subjective   History of Present Illness    Chief Complaint: Wound check for cat bite left hand  History of Present Illness: 50-year-old female presents with hand swelling, wound check for cat bite and cat scratch to the left hand.  On Augmentin and Zithromax.  Reports more swelling and redness to the dorsum of the hand  Onset: 3/15/2021  Duration: Persistent  Exacerbating / Alleviating factors: Use of antibiotics with worsening symptoms  Associated symptoms: None pain and finger swelling      Nurses Notes reviewed and agree, including vitals, allergies, social history and prior medical history.     REVIEW OF SYSTEMS: All systems reviewed and not pertinent unless noted.    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  states she had testing at , but was told that they didn't \"find anything\"   • Essential hypertension 3/30/2018   • Gallstone    • Kidney stones    • Migraine    • Obesity    • Ovarian cyst    • Restless leg syndrome    • Urinary tract infection    • Vitamin B12 deficiency    • Wears glasses     to read       Allergies   Allergen Reactions   • Sulfa Antibiotics Rash and Other (See Comments)     blind       Past Surgical History:   Procedure Laterality Date   • BREAST SURGERY Bilateral 1987    breast reduction   • CARDIAC CATHETERIZATION N/A 3/15/2017    Procedure: Coronary angiography;  Surgeon: Moustapha Peñaloza MD;  Location: [facility] INVASIVE LOCATION;  Service:    • CARDIAC CATHETERIZATION N/A 3/15/2017    Procedure: Left ventriculography;  Surgeon: Moustapha Peñaloza MD;  Location: [facility] INVASIVE LOCATION;  Service:    •  SECTION      " 1991 & 1995   • CHOLECYSTECTOMY  2013   • COLONOSCOPY N/A 10/3/2019    Procedure: COLONOSCOPY WITH BIOPSIES; ANOSCOPY;  Surgeon: Jose Paez MD;  Location: [facility] ENDOSCOPY;  Service: Gastroenterology   • ENDOSCOPY     • GASTRIC BYPASS  2011   • REDUCTION MAMMAPLASTY         Family History   Problem Relation Age of Onset   • Cancer Father    • Heart attack Father    • Cancer Other    • Other Other    • No Known Problems Mother    • Asthma Brother    • Ulcerative colitis Brother    • Arthritis Paternal Grandmother    • Colon cancer Neg Hx        Social History     Socioeconomic History   • Marital status:      Spouse name: Not on file   • Number of children: Not on file   • Years of education: Not on file   • Highest education level: Not on file   Tobacco Use   • Smoking status: Former Smoker     Packs/day: 1.00     Years: 2.00     Pack years: 2.00     Quit date: 10/1995     Years since quittin.4   • Smokeless tobacco: Never Used   Substance and Sexual Activity   • Alcohol use: Yes     Alcohol/week: 12.0 standard drinks     Types: 12 Cans of beer per week     Comment: 16 beers per week   • Drug use: No   • Sexual activity: Yes     Partners: Male     Birth control/protection: Post-menopausal           Objective   Physical Exam  GENERAL APPEARANCE: Well developed, well nourished, 50-year-old white female,  in acute distress.  VITAL SIGNS: per nursing, reviewed and noted  SKIN: Exposed skin with left dorsal hand cellulitis with small puncture without fluctuance or abscess associated mild diffuse soft tissue swelling.  Linear abrasion/Scratch to the lateral aspect of the distal left second finger without purulence or drainage.  2 rings on the left fourth finger with distal swelling left fourth finger.  Head: Normocephalic, atraumatic.   EYES:  EOMI.  ENT: Normal voice.  Patient maintained wearing mask throughout patient encounter due to coronavirus pandemic  LUNGS: No increased work of  breathing. No retractions.   CARDIOVASCULAR: Good Peripheral pulses. Good capillary refill. Pink and warm extremities.   MUSCULOSKELETAL: No compartment syndrome. Intact sensation and negative Kanavel signs.  Full range of motion of the hand..   NEUROLOGIC: Alert, oriented x 3. No gross deficits. GCS 15.   NECK: Supple, symmetric. No tenderness, Full ROM  Psychiatric: normal affect.   Back: full rom, no paraspinal spasm.     Procedures           ED Course      Rings removed due to soft tissue swelling the left fourth finger, attempted umbilical tape but was unsuccessful, nurse was able to use ring cutter.  Bedside ultrasound revealed no abscess, only cellulitic changes of the dorsum of the hand, deferred incision and drainage.                                     MDM  50-year-old female presents with cat bite with cellulitis of the dorsum of left hand.  Rest continue antibiotics, and compression wraps elevation.  No indications for IV antibiotics or admission at this time.  Close outpatient follow-up return precautions were discussed.  Continue her dual antibiotic coverage.  Final diagnoses:   Cat bite of hand, left, sequela   Cellulitis of left hand       ED Disposition  ED Disposition     ED Disposition Condition Comment    Discharge Stable           Floridalma Jimenez MD  [address]  [facility] [address]  [phone]          [facility] Emergency Department  [address] [facility] [address] [phone]    As needed, If symptoms worsen         Medication List      New Prescriptions    HYDROcodone-acetaminophen 5-325 MG per tablet  Commonly known as: NORCO  Take 1 tablet by mouth Every 6 (Six) Hours As Needed for Moderate Pain .     ondansetron ODT 4 MG disintegrating tablet  Commonly known as: ZOFRAN-ODT  Place 1 tablet on the tongue Every 8 (Eight) Hours As Needed for Nausea.        Changed    diclofenac 1 % gel gel  Commonly known as: Voltaren  APPLY 2 G TO AFFECTED  AREA 4 TIMES DAILY (MAX 8 G PER JOINT PER DAY)  What changed:   · when to take this  · reasons to take this           Where to Get Your Medications      These medications were sent to [id number] - [place], [place] - [address] [place] AT [facility] [address] [facility] - [phone]  - [phone] FX  [address] [place], [place] [address] [facility] [address]    Phone: [phone]   · HYDROcodone-acetaminophen 5-325 MG per tablet  · ondansetron ODT 4 MG disintegrating tablet          Devyn Lindquist,   03/22/21 0748

## 2021-03-29 ENCOUNTER — IMMUNIZATION (OUTPATIENT)
Dept: VACCINE CLINIC | Facility: HOSPITAL | Age: 51
End: 2021-03-29

## 2021-03-29 PROCEDURE — 0001A: CPT | Performed by: INTERNAL MEDICINE

## 2021-03-29 PROCEDURE — 91300 HC SARSCOV02 VAC 30MCG/0.3ML IM: CPT | Performed by: INTERNAL MEDICINE

## 2021-03-31 ENCOUNTER — APPOINTMENT (OUTPATIENT)
Dept: MAMMOGRAPHY | Facility: HOSPITAL | Age: 51
End: 2021-03-31

## 2021-04-19 ENCOUNTER — IMMUNIZATION (OUTPATIENT)
Dept: VACCINE CLINIC | Facility: HOSPITAL | Age: 51
End: 2021-04-19

## 2021-04-19 PROCEDURE — 91300 HC SARSCOV02 VAC 30MCG/0.3ML IM: CPT | Performed by: INTERNAL MEDICINE

## 2021-04-19 PROCEDURE — 0002A: CPT | Performed by: INTERNAL MEDICINE

## 2021-04-27 ENCOUNTER — OFFICE VISIT (OUTPATIENT)
Dept: INTERNAL MEDICINE | Facility: CLINIC | Age: 51
End: 2021-04-27

## 2021-04-27 VITALS
HEART RATE: 81 BPM | SYSTOLIC BLOOD PRESSURE: 141 MMHG | BODY MASS INDEX: 41.29 KG/M2 | TEMPERATURE: 97.1 F | OXYGEN SATURATION: 99 % | HEIGHT: 65 IN | DIASTOLIC BLOOD PRESSURE: 99 MMHG | WEIGHT: 247.8 LBS

## 2021-04-27 DIAGNOSIS — J01.10 ACUTE NON-RECURRENT FRONTAL SINUSITIS: Primary | ICD-10-CM

## 2021-04-27 DIAGNOSIS — J40 BRONCHITIS: ICD-10-CM

## 2021-04-27 PROCEDURE — 99214 OFFICE O/P EST MOD 30 MIN: CPT | Performed by: NURSE PRACTITIONER

## 2021-04-27 RX ORDER — ALBUTEROL SULFATE 90 UG/1
2 AEROSOL, METERED RESPIRATORY (INHALATION) EVERY 4 HOURS PRN
Qty: 18 G | Refills: 0 | Status: SHIPPED | OUTPATIENT
Start: 2021-04-27 | End: 2021-05-03

## 2021-04-27 RX ORDER — FLUCONAZOLE 150 MG/1
150 TABLET ORAL
Qty: 2 TABLET | Refills: 0 | Status: SHIPPED | OUTPATIENT
Start: 2021-04-27 | End: 2021-05-03

## 2021-04-27 RX ORDER — CHLORHEXIDINE GLUCONATE 0.12 MG/ML
RINSE ORAL
COMMUNITY
End: 2021-06-22

## 2021-04-27 RX ORDER — IPRATROPIUM BROMIDE 42 UG/1
SPRAY, METERED NASAL
COMMUNITY
End: 2022-01-19

## 2021-04-27 RX ORDER — METHYLPREDNISOLONE 4 MG/1
TABLET ORAL
Qty: 21 EACH | Refills: 0 | Status: SHIPPED | OUTPATIENT
Start: 2021-04-27 | End: 2021-05-03

## 2021-04-27 RX ORDER — GUAIFENESIN AND PSEUDOEPHEDRINE HYDROCHLORIDE 600; 60 MG/1; MG/1
1 TABLET, EXTENDED RELEASE ORAL DAILY
COMMUNITY
Start: 2021-04-20 | End: 2021-10-04

## 2021-04-27 RX ORDER — CHLORCYCLIZINE HYDROCHLORIDE AND PSEUDOEPHEDRINE HYDROCHLORIDE 25; 60 MG/1; MG/1
TABLET ORAL
COMMUNITY
Start: 2021-04-23 | End: 2021-05-03

## 2021-04-27 RX ORDER — AMOXICILLIN 875 MG/1
875 TABLET, COATED ORAL EVERY 12 HOURS
COMMUNITY
Start: 2021-04-22 | End: 2021-05-03

## 2021-04-27 RX ORDER — DOXYCYCLINE HYCLATE 100 MG/1
100 CAPSULE ORAL 2 TIMES DAILY
Qty: 20 CAPSULE | Refills: 0 | Status: SHIPPED | OUTPATIENT
Start: 2021-04-27 | End: 2021-05-07

## 2021-05-03 ENCOUNTER — TELEMEDICINE (OUTPATIENT)
Dept: NEUROLOGY | Facility: CLINIC | Age: 51
End: 2021-05-03

## 2021-05-03 DIAGNOSIS — G25.81 RESTLESS LEGS SYNDROME (RLS): Primary | ICD-10-CM

## 2021-05-03 DIAGNOSIS — E53.8 VITAMIN B12 DEFICIENCY: ICD-10-CM

## 2021-05-03 PROCEDURE — 99213 OFFICE O/P EST LOW 20 MIN: CPT | Performed by: NURSE PRACTITIONER

## 2021-05-03 RX ORDER — PRAMIPEXOLE DIHYDROCHLORIDE 0.5 MG/1
0.25 TABLET ORAL 3 TIMES DAILY
Qty: 135 TABLET | Refills: 3 | Status: SHIPPED | OUTPATIENT
Start: 2021-05-03 | End: 2021-11-04

## 2021-05-03 RX ORDER — CARBIDOPA AND LEVODOPA 50; 200 MG/1; MG/1
TABLET, EXTENDED RELEASE ORAL
Qty: 360 TABLET | Refills: 3 | Status: SHIPPED | OUTPATIENT
Start: 2021-05-03 | End: 2021-11-04

## 2021-05-03 NOTE — PROGRESS NOTES
"Subjective:     Patient ID: Mary Lunsford is a 50 y.o. female.    CC:   Chief Complaint   Patient presents with   • Restless Legs Syndrome       HPI:   History of Present Illness     Due to COVID-19 Pandemic, this visit was completed face to face via VIDEO by Epic/Modusly with verbal consent to treat obtained from patient.      You have chosen to receive care through a telehealth visit.  Do you consent to use a video/audio connection for your medical care today? Yes    This is a very pleasant 50-year-old female who presents for 10-month follow-up restless leg syndrome present for about 11 years.  Her mother and grandmother also has severe RLS.  She is currently take CR 3 times daily rarely requires an extra dose as needed for breakthrough symptoms.  She also takes Mirapex 0.25 mg 3 times daily.  Sometimes she changes her dosing but does not require more than 3/day.  She has failed the Neupro patch as well as ropinirole.  She also takes Lyrica branded only 75 mg at night for RLS.  The generic form was completely ineffective for her symptoms.  She continues to use a standing desk at work which has been beneficial for her.  She has had low B12 levels level checked vitamin B12 304 and folate 13.7.  Has had previous low iron but normal ferritin 379 7/20/2020.  She feels like she is doing well overall.  She tells me \"my restless legs are not perfect but they are tolerable\".  She does need refills today.    She has been recently evaluated by ENT for tinnitus and eustachian tube dysfunction.  She is trying to use her nasal spray consistently.  If this does not improve she plans to follow-up with an ENT.      Previous neuro workup included- facial tingling 2019 and she also underwent an EMG and NCVS of her right upper and left upper extremities and this was completely normal on 10/23/2019 with Dr. Jane 1 of our neurologist.  Underwent MRI of the brain with and without contrast which showed 2 very small chronic white " "matter changes of the brain and otherwise MRI of the brain was completely unremarkable with no abnormal contrast enhancement and no acute intracranial abnormalities.    The following portions of the patient's history were reviewed and updated as appropriate: allergies, current medications, past family history, past medical history, past social history, past surgical history and problem list.    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  states she had testing at , but was told that they didn't \"find anything\"   • Essential hypertension 3/30/2018   • Gallstone    • Kidney stones    • Migraine    • Obesity    • Ovarian cyst    • Restless leg syndrome    • Urinary tract infection    • Vitamin B12 deficiency    • Wears glasses     to read       Past Surgical History:   Procedure Laterality Date   • BREAST SURGERY Bilateral 1987    breast reduction   • CARDIAC CATHETERIZATION N/A 3/15/2017    Procedure: Coronary angiography;  Surgeon: Moustapha Peñaloza MD;  Location: [facility] CATH INVASIVE LOCATION;  Service:    • CARDIAC CATHETERIZATION N/A 3/15/2017    Procedure: Left ventriculography;  Surgeon: Moustapha Peñaloza MD;  Location: [facility] CATH INVASIVE LOCATION;  Service:    •  SECTION      1991 & 1995   • CHOLECYSTECTOMY  2013   • COLONOSCOPY N/A 10/3/2019    Procedure: COLONOSCOPY WITH BIOPSIES; ANOSCOPY;  Surgeon: Jose Paez MD;  Location: [facility] ENDOSCOPY;  Service: Gastroenterology   • ENDOSCOPY     • GASTRIC BYPASS  2011   • REDUCTION MAMMAPLASTY         Social History     Socioeconomic History   • Marital status:      Spouse name: Not on file   • Number of children: Not on file   • Years of education: Not on file   • Highest education level: Not on file   Tobacco Use   • Smoking status: Former Smoker     Packs/day: 1.00     Years: 2.00 "     Pack years: 2.00     Quit date: 10/1995     Years since quittin.6   • Smokeless tobacco: Never Used   Substance and Sexual Activity   • Alcohol use: Yes     Alcohol/week: 12.0 standard drinks     Types: 12 Cans of beer per week     Comment: 16 beers per week   • Drug use: No   • Sexual activity: Yes     Partners: Male     Birth control/protection: Post-menopausal       Family History   Problem Relation Age of Onset   • Cancer Father    • Heart attack Father    • Cancer Other    • Other Other    • No Known Problems Mother    • Asthma Brother    • Ulcerative colitis Brother    • Arthritis Paternal Grandmother    • Colon cancer Neg Hx         Review of Systems   Musculoskeletal: Negative for gait problem.        RLS   Neurological: Negative for numbness.   Psychiatric/Behavioral: Positive for sleep disturbance.   All other systems reviewed and are negative.       Objective:  LMP 2018 (LMP Unknown)   Not obtained due to video visit    Neurologic Exam     Mental Status   Oriented to person, place, and time.   Speech: speech is normal   Level of consciousness: alert    Cranial Nerves     CN II   Visual fields full to confrontation.     CN III, IV, VI   Pupils are equal, round, and reactive to light.  Extraocular motions are normal.     CN VII   Facial expression full, symmetric.       Physical Exam  Eyes:      Extraocular Movements: EOM normal.      Pupils: Pupils are equal, round, and reactive to light.   Neurological:      Mental Status: She is oriented to person, place, and time.   Psychiatric:         Mood and Affect: Mood and affect normal.         Speech: Speech normal.         Behavior: Behavior normal.         Thought Content: Thought content normal.         Cognition and Memory: Cognition and memory normal.         Judgment: Judgment normal.     TRACEY fully d/t video visit    Assessment/Plan:       Diagnoses and all orders for this visit:    1. Restless legs syndrome (RLS) (Primary)  -      carbidopa-levodopa CR (SINEMET CR)  MG per CR tablet; 1 tab tid and 1 extra if needed prn breakthrough rls symptoms  Dispense: 360 tablet; Refill: 3  -     pramipexole (MIRAPEX) 0.5 MG tablet; Take 0.5 tablets by mouth 3 (Three) Times a Day.  Dispense: 135 tablet; Refill: 3  -     Lyrica 75 MG capsule; Take 1 capsule by mouth Every Night.  Dispense: 90 capsule; Refill: 1  -     Ferritin; Future    2. Vitamin B12 deficiency  -     Vitamin B12 & Folate; Future  -     Methylmalonic Acid, Serum; Future           Continue meds. Can get labs anytime when she is by a [facility] in the next few months. F/U in 6 months or sooner if needed.    Reviewed medications, potential side effects and signs and symptoms to report. Discussed risk versus benefits of treatment plan with patient and/or family-including medications, labs and radiology that may be ordered. Addressed questions and concerns during visit. Patient and/or family verbalized understanding and agree with plan.    As part of this patient's treatment plan I am prescribing controlled substances. The patient has been made aware of appropriate use of such medications, including potential risk of somnolence, limited ability to drive and/or work safely, and potential for dependence or overdose. It has also been made clear that these medications are for use by the patient only, without concomitant use of alcohol or other substances unless prescribed. Keep out of reach of children.  [place] report has been reviewed. If this is going to be prescribed long term, [facility] has also been read and signed by patient and myself.    During this visit the following were done:  Labs Reviewed [x]    Labs Ordered [x]    Radiology Reports Reviewed []    Radiology Ordered []    PCP Records Reviewed []    Referring Provider Records Reviewed []    ER Records Reviewed []    Hospital Records Reviewed []    History Obtained From Family []    Radiology  Images Reviewed []    Other Reviewed []    Records Requested []      Debbie Rodriguez, APRN  5/3/2021

## 2021-05-04 ENCOUNTER — TELEPHONE (OUTPATIENT)
Dept: NEUROLOGY | Facility: CLINIC | Age: 51
End: 2021-05-04

## 2021-05-04 NOTE — TELEPHONE ENCOUNTER
----- Message from LINNEA Mendoza sent at 5/3/2021  4:04 PM EDT -----  Please call and schedule 6 months IN OFFICE. Latest appointment possible 4pm please and call her. Thanks.

## 2021-05-25 DIAGNOSIS — D50.9 IRON DEFICIENCY ANEMIA, UNSPECIFIED IRON DEFICIENCY ANEMIA TYPE: ICD-10-CM

## 2021-05-25 DIAGNOSIS — G25.81 RESTLESS LEGS SYNDROME (RLS): ICD-10-CM

## 2021-05-26 RX ORDER — FERROUS FUMARATE 324(106)MG
TABLET ORAL
Qty: 180 TABLET | Refills: 1 | Status: SHIPPED | OUTPATIENT
Start: 2021-05-26 | End: 2022-03-11

## 2021-06-14 PROBLEM — I25.42 DISSECTION OF CORONARY ARTERY: Status: RESOLVED | Noted: 2017-03-20 | Resolved: 2021-06-14

## 2021-06-21 ENCOUNTER — OFFICE VISIT (OUTPATIENT)
Dept: CARDIOLOGY | Facility: CLINIC | Age: 51
End: 2021-06-21

## 2021-06-21 VITALS
WEIGHT: 244 LBS | OXYGEN SATURATION: 97 % | DIASTOLIC BLOOD PRESSURE: 64 MMHG | HEIGHT: 64 IN | RESPIRATION RATE: 18 BRPM | BODY MASS INDEX: 41.66 KG/M2 | HEART RATE: 64 BPM | SYSTOLIC BLOOD PRESSURE: 112 MMHG

## 2021-06-21 DIAGNOSIS — I10 ESSENTIAL HYPERTENSION: Primary | ICD-10-CM

## 2021-06-21 DIAGNOSIS — E66.9 OBESITY (BMI 30-39.9): ICD-10-CM

## 2021-06-21 PROCEDURE — 99213 OFFICE O/P EST LOW 20 MIN: CPT | Performed by: NURSE PRACTITIONER

## 2021-06-21 NOTE — PROGRESS NOTES
"             [facility] Cardiology Office Follow Up Note    Mary Lunsford  [id number]  2021    Primary Care Provider: Floridalma Jimenez MD   Referring Provider: No ref. provider found    Chief Complaint: Regular follow-up    History of Present Illness:   Mrs. Mary Lunsford is a 50 y.o. female who presents to the Cardiology Clinic for regular follow-up.  The patient has a past medical history of hypertension, thyroid disease, morbid obesity with a BMI of 41.25, and remote history of tobacco abuse.  In 2017, she had serially elevated cardiac troponins, but subsequent left heart catheterization demonstrated normal coronary arteries.  She presents today for regular follow-up.  The patient reports doing well from a cardiac standpoint.  She was recently treated for cat bite with subsequent cellulitis in the emergency department 2 to 3 months ago with complete recovery, but denies any other significant changes in her health since her appointment last year.  She specifically denies chest pain and exertional chest pain.  She denies shortness of breath and dyspnea on exertion.  She denies palpitations, dizziness, syncope.  She denies orthopnea, PND, lower extremity edema.  She offers no other complaints or concerns at this time.         Past Cardiac Testin. Last Coronary Angio: 3/15/2017   1. Angiographically normal coronary arteries.   2. Hyperdynamic left ventricular systolic function with normal regional wall motion.   3. No evidence of myocardial infarction.   4. The left-sided valvular abnormalities appreciated.   5. Normal left ventricular filling pressures.   6. Serially mild elevation in cardiac troponins with a \"plateau pattern\".  Explanation for this is unknown.  CPK, CPK-MB and CPK-MB fraction are all normal.  2. Prior Stress Testing: None  3. Last Echo: 3/22/2017   1. Left ventricular function is hyperdynamic (EF > 70)  4. Prior Holter Monitor: None    Review of Systems: "   Review of Systems   Constitutional: Negative for activity change, chills, diaphoresis, fatigue, fever and unexpected weight gain.   Eyes: Negative for blurred vision and visual disturbance.   Respiratory: Negative for apnea, cough, chest tightness, shortness of breath and wheezing.    Cardiovascular: Negative for chest pain, palpitations and leg swelling.   Gastrointestinal: Negative for abdominal distention, blood in stool, GERD and indigestion.   Endocrine: Negative for cold intolerance and heat intolerance.   Genitourinary: Negative for hematuria.   Musculoskeletal: Negative for gait problem, joint swelling and myalgias.   Skin: Negative for color change, pallor and bruise.   Neurological: Negative for dizziness, seizures, syncope, weakness, light-headedness, numbness, headache and confusion.   Hematological: Does not bruise/bleed easily.   Psychiatric/Behavioral: Negative for behavioral problems, sleep disturbance, suicidal ideas and depressed mood.     I have reviewed and confirmed the accuracy of the ROS as documented by the MA/LPN/RN LINNEA Araujo    I have reviewed and/or updated the patient's past medical, past surgical, family, social history, problem list and allergies as appropriate.     Medications:     Current Outpatient Medications:   •  ASPIRIN 81 PO, Take 1 tablet by mouth., Disp: , Rfl:   •  azelastine (ASTELIN) 0.1 % nasal spray, azelastine 137 mcg (0.1 %) nasal spray aerosol  1 spray each nostril BID, Disp: , Rfl:   •  carbidopa-levodopa CR (SINEMET CR)  MG per CR tablet, 1 tab tid and 1 extra if needed prn breakthrough rls symptoms, Disp: 360 tablet, Rfl: 3  •  Ferrocite 324 MG tablet, TAKE 1 TABLET BY MOUTH TWICE DAILY, Disp: 180 tablet, Rfl: 1  •  levothyroxine (SYNTHROID, LEVOTHROID) 88 MCG tablet, TAKE 1 TABLET BY MOUTH ONCE DAILY, Disp: 90 tablet, Rfl: 3  •  Lyrica 75 MG capsule, Take 1 capsule by mouth Every Night., Disp: 90 capsule, Rfl: 1  •  metoprolol tartrate  "(LOPRESSOR) 25 MG tablet, Take 1 tablet by mouth Every 12 (Twelve) Hours., Disp: 60 tablet, Rfl: 0  •  Mucinex D  MG per 12 hr tablet, Take 1 tablet by mouth Daily., Disp: , Rfl:   •  pramipexole (MIRAPEX) 0.5 MG tablet, Take 0.5 tablets by mouth 3 (Three) Times a Day., Disp: 135 tablet, Rfl: 3  •  chlorhexidine (PERIDEX) 0.12 % solution, chlorhexidine gluconate 0.12 % mouthwash  SWISH AS DIRECTED FOR 1 MINUTE THEN SPIT TID, Disp: , Rfl:   •  ipratropium (ATROVENT) 0.06 % nasal spray, ipratropium bromide 42 mcg (0.06 %) nasal spray  one spray each nostril twice a day, Disp: , Rfl:   •  Syringe, Disposable, 1 ML misc, Use to inject vitamin B12 every 14 days, Disp: 2 each, Rfl: 5    Physical Exam:  Vital Signs:   Vitals:    06/21/21 1524   BP: 112/64   BP Location: Left arm   Patient Position: Sitting   Cuff Size: Adult   Pulse: 64   Resp: 18   SpO2: 97%   Weight: 111 kg (244 lb)   Height: 163.8 cm (64.49\")     Body mass index is 41.25 kg/m².    Physical Exam  Vitals and nursing note reviewed.   Constitutional:       General: She is not in acute distress.     Appearance: Normal appearance. She is well-developed. She is not ill-appearing or toxic-appearing.      Comments: BMI 41.25   HENT:      Head: Normocephalic and atraumatic.   Eyes:      General: No scleral icterus.     Extraocular Movements: Extraocular movements intact.   Neck:      Trachea: Trachea normal.   Cardiovascular:      Rate and Rhythm: Normal rate and regular rhythm.      Pulses: Normal pulses.      Heart sounds: Normal heart sounds. No murmur heard.   No friction rub. No gallop.    Pulmonary:      Effort: Pulmonary effort is normal.      Breath sounds: Normal breath sounds. No wheezing, rhonchi or rales.   Chest:      Chest wall: No tenderness.   Abdominal:      Palpations: Abdomen is soft.      Tenderness: There is no abdominal tenderness.   Musculoskeletal:         General: Normal range of motion.      Cervical back: Neck supple.      Right " lower leg: No edema.      Left lower leg: No edema.   Skin:     General: Skin is warm and dry.      Findings: No bruising, lesion or rash.   Neurological:      Mental Status: She is alert and oriented to person, place, and time.      Motor: No weakness.      Gait: Gait normal.   Psychiatric:         Mood and Affect: Mood normal.         Behavior: Behavior normal. Behavior is cooperative.         Thought Content: Thought content does not include suicidal ideation.         Results Review:   I reviewed the patient's new clinical results.    Procedures    Assessment / Plan:   Diagnoses and all orders for this visit:    1. Essential hypertension (Primary)  --Excellent blood pressure control  --Continue metoprolol      Preventative Cardiology:   Tobacco Cessation: N/A   Advance Care Planning: ACP discussion was declined by the patient. Patient does not have an advance directive, declines further assistance.     Follow Up:   Return in about 1 year (around 6/21/2022) for Follow-up with Dr. Peñaloza.      Thank you for allowing me to participate in the care of your patient. Please to not hesitate to contact me with additional questions or concerns.     LINNEA Lewis

## 2021-09-13 RX ORDER — LEVOTHYROXINE SODIUM 88 UG/1
88 TABLET ORAL DAILY
Qty: 90 TABLET | Refills: 0 | Status: SHIPPED | OUTPATIENT
Start: 2021-09-13 | End: 2021-12-23

## 2021-09-13 NOTE — TELEPHONE ENCOUNTER
Rx Refill Note  Requested Prescriptions     Pending Prescriptions Disp Refills   • levothyroxine (SYNTHROID, LEVOTHROID) 88 MCG tablet [Pharmacy Med Name: LEVOTHYROXINE 0.088MG (88MCG) TAB] 90 tablet 3     Sig: TAKE 1 TABLET BY MOUTH EVERY DAY      Last office visit with prescribing clinician: 2/24/2020      Next office visit with prescribing clinician: Visit date not found            Tamiko Tineo MA  09/13/21, 10:58 EDT

## 2021-09-30 DIAGNOSIS — G25.81 RESTLESS LEGS SYNDROME (RLS): ICD-10-CM

## 2021-10-01 NOTE — TELEPHONE ENCOUNTER
Rx Refill Note  Requested Prescriptions     Pending Prescriptions Disp Refills   • Lyrica 75 MG capsule [Pharmacy Med Name: LYRICA 75MG CAPSULES] 90 capsule      Sig: TAKE 1 CAPSULE BY MOUTH EVERY NIGHT      Last office visit with prescribing clinician: 7/20/2020      Next office visit with prescribing clinician: 11/4/2021            Kamilla Avila CMA  10/01/21, 08:23 EDT

## 2021-10-04 ENCOUNTER — OFFICE VISIT (OUTPATIENT)
Dept: OBSTETRICS AND GYNECOLOGY | Facility: CLINIC | Age: 51
End: 2021-10-04

## 2021-10-04 VITALS
WEIGHT: 250.6 LBS | HEIGHT: 65 IN | SYSTOLIC BLOOD PRESSURE: 130 MMHG | DIASTOLIC BLOOD PRESSURE: 82 MMHG | BODY MASS INDEX: 41.75 KG/M2

## 2021-10-04 DIAGNOSIS — Z01.419 ENCOUNTER FOR GYNECOLOGICAL EXAMINATION WITHOUT ABNORMAL FINDING: Primary | ICD-10-CM

## 2021-10-04 DIAGNOSIS — Z12.31 ENCOUNTER FOR SCREENING MAMMOGRAM FOR MALIGNANT NEOPLASM OF BREAST: ICD-10-CM

## 2021-10-04 DIAGNOSIS — Z12.4 SCREENING FOR CERVICAL CANCER: ICD-10-CM

## 2021-10-04 PROCEDURE — 99396 PREV VISIT EST AGE 40-64: CPT | Performed by: PHYSICIAN ASSISTANT

## 2021-10-04 NOTE — PROGRESS NOTES
"Subjective   Chief Complaint   Patient presents with   • Gynecologic Exam     Last Pap 20 WNL, Last MMG 12/15/20, patient has no complaints       Mary Lunsford is a 50 y.o. year old  presenting to be seen for her annual gynecological exam.   She has no complaints or concerns  Last menstrual period was 2018  Patient had a normal screening mammogram 2020.      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  states she had testing at , but was told that they didn't \"find anything\"   • Essential hypertension 3/30/2018   • Gallstone    • Kidney stones    • Migraine    • Obesity    • Ovarian cyst    • Restless leg syndrome    • Urinary tract infection    • Vitamin B12 deficiency    • Wears glasses     to read        Current Outpatient Medications:   •  ASPIRIN 81 PO, Take 1 tablet by mouth., Disp: , Rfl:   •  azelastine (ASTELIN) 0.1 % nasal spray, azelastine 137 mcg (0.1 %) nasal spray aerosol  1 spray each nostril BID, Disp: , Rfl:   •  carbidopa-levodopa CR (SINEMET CR)  MG per CR tablet, 1 tab tid and 1 extra if needed prn breakthrough rls symptoms, Disp: 360 tablet, Rfl: 3  •  Ferrocite 324 MG tablet, TAKE 1 TABLET BY MOUTH TWICE DAILY, Disp: 180 tablet, Rfl: 1  •  ipratropium (ATROVENT) 0.06 % nasal spray, ipratropium bromide 42 mcg (0.06 %) nasal spray  one spray each nostril twice a day, Disp: , Rfl:   •  levothyroxine (SYNTHROID, LEVOTHROID) 88 MCG tablet, Take 1 tablet by mouth Daily. Please schedule annual exam/labs for further refills., Disp: 90 tablet, Rfl: 0  •  Lyrica 75 MG capsule, TAKE 1 CAPSULE BY MOUTH EVERY NIGHT, Disp: 90 capsule, Rfl: 0  •  metoprolol tartrate (LOPRESSOR) 25 MG tablet, TAKE 1 TABLET BY MOUTH EVERY 12 HOURS, Disp: 180 tablet, Rfl: 3  •  pramipexole (MIRAPEX) 0.5 MG tablet, Take 0.5 tablets by mouth 3 " (Three) Times a Day., Disp: 135 tablet, Rfl: 3  •  Syringe, Disposable, 1 ML misc, Use to inject vitamin B12 every 14 days, Disp: 2 each, Rfl: 5   Allergies   Allergen Reactions   • Sulfa Antibiotics Rash and Other (See Comments)     blind      Past Surgical History:   Procedure Laterality Date   • BREAST SURGERY Bilateral 1987    breast reduction   • CARDIAC CATHETERIZATION N/A 3/15/2017    Procedure: Coronary angiography;  Surgeon: Moustapha Peñaloza MD;  Location: [facility] CATH INVASIVE LOCATION;  Service:    • CARDIAC CATHETERIZATION N/A 3/15/2017    Procedure: Left ventriculography;  Surgeon: Moustapha Peñaloza MD;  Location: [facility] CATH INVASIVE LOCATION;  Service:    •  SECTION      1991 & 1995   • CHOLECYSTECTOMY  2013   • COLONOSCOPY N/A 10/3/2019    Procedure: COLONOSCOPY WITH BIOPSIES; ANOSCOPY;  Surgeon: Jose Paez MD;  Location: [facility] ENDOSCOPY;  Service: Gastroenterology   • ENDOSCOPY     • GASTRIC BYPASS  2011   • REDUCTION MAMMAPLASTY        Social History     Socioeconomic History   • Marital status:      Spouse name: Not on file   • Number of children: Not on file   • Years of education: Not on file   • Highest education level: Not on file   Tobacco Use   • Smoking status: Former Smoker     Packs/day: 1.00     Years: 2.00     Pack years: 2.00     Quit date: 10/1995     Years since quittin.0   • Smokeless tobacco: Never Used   Vaping Use   • Vaping Use: Never used   Substance and Sexual Activity   • Alcohol use: Yes     Alcohol/week: 12.0 standard drinks     Types: 12 Cans of beer per week     Comment: 16 beers per week   • Drug use: No   • Sexual activity: Yes     Partners: Male     Birth control/protection: Post-menopausal      Family History   Problem Relation Age of Onset   • Cancer Father    • Heart attack Father    • Cancer Other    • Other Other    • No Known Problems Mother    • Asthma Brother    • Ulcerative colitis Brother    • Arthritis Paternal  "Grandmother    • Colon cancer Neg Hx        Review of Systems   Constitutional: Negative for chills, diaphoresis and fever.   Gastrointestinal: Negative.    Genitourinary: Negative for difficulty urinating, dysuria, pelvic pain, vaginal bleeding and vaginal discharge.   All other systems reviewed and are negative.          Objective   /82   Ht 163.8 cm (64.5\")   Wt 114 kg (250 lb 9.6 oz)   LMP 12/01/2018 (LMP Unknown)   BMI 42.35 kg/m²     Physical Exam  Constitutional:       Appearance: Normal appearance. She is well-developed and well-groomed. She is morbidly obese.   Eyes:      General: Lids are normal.      Extraocular Movements: Extraocular movements intact.      Conjunctiva/sclera: Conjunctivae normal.   Neck:      Thyroid: No thyroid mass or thyromegaly.   Chest:      Breasts: Breasts are symmetrical.         Right: No inverted nipple, mass, nipple discharge, skin change or tenderness.         Left: No inverted nipple, mass, nipple discharge, skin change or tenderness.   Abdominal:      General: There is no distension.      Palpations: Abdomen is soft. There is no hepatomegaly or splenomegaly.      Tenderness: There is no abdominal tenderness.   Genitourinary:     Exam position: Lithotomy position.      Labia:         Right: No rash, tenderness or lesion.         Left: No rash, tenderness or lesion.       Urethra: No prolapse, urethral pain, urethral swelling or urethral lesion.      Vagina: No vaginal discharge, tenderness or lesions.      Cervix: No cervical motion tenderness, discharge, friability or lesion.      Uterus: Not enlarged and not tender.       Adnexa:         Right: No mass or tenderness.          Left: No mass or tenderness.     Musculoskeletal:      Cervical back: Neck supple.   Lymphadenopathy:      Upper Body:      Right upper body: No axillary adenopathy.      Left upper body: No axillary adenopathy.   Skin:     General: Skin is warm and dry.      Findings: No lesion. "   Neurological:      General: No focal deficit present.      Mental Status: She is alert and oriented to person, place, and time.   Psychiatric:         Attention and Perception: Attention normal.         Mood and Affect: Mood normal.         Speech: Speech normal.         Behavior: Behavior normal.         Thought Content: Thought content normal.            Result Review :                   Assessment and Plan  Diagnoses and all orders for this visit:    1. Encounter for gynecological examination without abnormal finding (Primary)    2. Screening for cervical cancer  -     Pap IG, Rfx HPV ASCU; Future    3. Encounter for screening mammogram for malignant neoplasm of breast  -     Mammo Screening Digital Tomosynthesis Bilateral With CAD; Future      Patient Instructions   Self breast exam monthly  Annual mammogram  Calcium 1200 mg daily and vit D 2000 mg daily  Regular excercise               This note was electronically signed.    Tari Boston PA-C   October 4, 2021

## 2021-10-11 ENCOUNTER — TELEMEDICINE (OUTPATIENT)
Dept: INTERNAL MEDICINE | Facility: CLINIC | Age: 51
End: 2021-10-11

## 2021-10-11 ENCOUNTER — LAB (OUTPATIENT)
Dept: LAB | Facility: HOSPITAL | Age: 51
End: 2021-10-11

## 2021-10-11 VITALS — TEMPERATURE: 99.1 F

## 2021-10-11 DIAGNOSIS — J06.9 VIRAL UPPER RESPIRATORY TRACT INFECTION: ICD-10-CM

## 2021-10-11 DIAGNOSIS — J06.9 VIRAL UPPER RESPIRATORY TRACT INFECTION: Primary | ICD-10-CM

## 2021-10-11 PROCEDURE — 99213 OFFICE O/P EST LOW 20 MIN: CPT | Performed by: INTERNAL MEDICINE

## 2021-10-11 PROCEDURE — U0004 COV-19 TEST NON-CDC HGH THRU: HCPCS

## 2021-10-11 NOTE — PROGRESS NOTES
Chief Complaint   Patient presents with   • Abstract     Pt states she's had sore throat, low grade fever, left ear pain, runny nose X Saturday. States she took OTC Sudafed and IBU.      Subjective   Mary Lunsford is a 50 y.o. female.     Telemedicine video visit with patient today.  She was seen in her car from parking lot due to viral illness.    URI   This is a new problem. The current episode started in the past 7 days. The problem has been gradually worsening. Maximum temperature: 99.1. Associated symptoms include ear pain, rhinorrhea, a sore throat and swollen glands. Pertinent negatives include no coughing, diarrhea, nausea, vomiting or wheezing. Associated symptoms comments: Teeth hurt also  Left ear and left cheek pain. She has tried decongestant and NSAIDs for the symptoms. The treatment provided no relief.        The following portions of the patient's history were reviewed and updated as appropriate: allergies, current medications, past family history, past medical history, past social history, past surgical history and problem list.    Review of Systems   Constitutional: Positive for fever.   HENT: Positive for ear pain, rhinorrhea and sore throat.    Respiratory: Negative for cough and wheezing.    Gastrointestinal: Negative for diarrhea, nausea and vomiting.       Objective   Temp 99.1 °F (37.3 °C)   LMP 12/01/2018 (LMP Unknown)   There is no height or weight on file to calculate BMI.  Physical Exam  Nursing note reviewed.   Constitutional:       General: She is not in acute distress.     Appearance: Normal appearance. She is not ill-appearing.      Comments: Kind and pleasant female, appears her age and in no distress today   HENT:      Head: Normocephalic and atraumatic.      Right Ear: External ear normal.      Left Ear: External ear normal.      Nose:      Comments: Left maxillary sinus tenderness per patient directed exam  Eyes:      General:         Right eye: No discharge.         Left  eye: No discharge.      Extraocular Movements: Extraocular movements intact.   Neck:      Comments: Goal lymph node tenderness per patient directed exam  Pulmonary:      Effort: Pulmonary effort is normal. No respiratory distress.      Comments: Patient speaks in full sentences, no respiratory distress noted, no cough throughout interview today  Neurological:      Mental Status: She is oriented to person, place, and time.      Cranial Nerves: No cranial nerve deficit.   Psychiatric:         Mood and Affect: Mood normal.         Behavior: Behavior normal.         Thought Content: Thought content normal.         Judgment: Judgment normal.         Assessment/Plan   Mary Lunsford is here today and the following problems have been addressed:      Diagnoses and all orders for this visit:    1. Viral upper respiratory tract infection (Primary)  -     COVID-19 PCR, [facility], NP SWAB IN [facility] VIRAL TRANSPORT MEDIA/ORAL SWISH 24-30 HR TAT - Swab, Nasopharynx; Future    History most consistent with viral URI  Will send for Covid testing today  Recommend Coricidin HBP for cold and flu, take as directed  Increase fluids and rest  If symptoms worsen, encourage patient to call me at end of week at which time I will call in Amoxil 875 mg twice daily for 10 days for sinus symptoms  Work excuse provided, patient states she cannot return to work without excuse and cannot miss any further work days  Recommend she await Covid testing before returning to work and if negative may return at that time    Time devoted to visit: 17 minutes including time to review previous record, with 75% of time devoted to direct discussion.  Patient presents during COVID-19 pandemic/federally declared state of public health emergency.  This service was conducted via telemedicine video visit via [employer].  Patient did not come into clinic due to viral illness today.    Please note that portions of this note were completed with a voice recognition  program.  Efforts were made to edit dictation, but occasionally words are mistranscribed.You have chosen to receive care through a telehealth visit.  Do you consent to use a video connection for your medical care today? Yes  Active Parties: Marilyn Vermeesch (PCP), Kacy Desai (CMA), and (Pt).  Video visit done via [email or web address] on cell phones.

## 2021-10-12 LAB — SARS-COV-2 RNA NOSE QL NAA+PROBE: NOT DETECTED

## 2021-10-18 DIAGNOSIS — Z12.4 SCREENING FOR CERVICAL CANCER: ICD-10-CM

## 2021-11-04 ENCOUNTER — OFFICE VISIT (OUTPATIENT)
Dept: NEUROLOGY | Facility: CLINIC | Age: 51
End: 2021-11-04

## 2021-11-04 ENCOUNTER — LAB (OUTPATIENT)
Dept: LAB | Facility: HOSPITAL | Age: 51
End: 2021-11-04

## 2021-11-04 VITALS
BODY MASS INDEX: 43.36 KG/M2 | DIASTOLIC BLOOD PRESSURE: 78 MMHG | WEIGHT: 254 LBS | OXYGEN SATURATION: 99 % | HEIGHT: 64 IN | SYSTOLIC BLOOD PRESSURE: 128 MMHG | HEART RATE: 73 BPM | TEMPERATURE: 97.9 F

## 2021-11-04 DIAGNOSIS — G43.109 MIGRAINE WITH AURA AND WITHOUT STATUS MIGRAINOSUS, NOT INTRACTABLE: ICD-10-CM

## 2021-11-04 DIAGNOSIS — D50.9 IRON DEFICIENCY ANEMIA, UNSPECIFIED IRON DEFICIENCY ANEMIA TYPE: ICD-10-CM

## 2021-11-04 DIAGNOSIS — G25.81 RESTLESS LEGS SYNDROME (RLS): Primary | ICD-10-CM

## 2021-11-04 DIAGNOSIS — G25.81 RESTLESS LEGS SYNDROME (RLS): ICD-10-CM

## 2021-11-04 LAB
FERRITIN SERPL-MCNC: 563 NG/ML (ref 13–150)
VIT B12 BLD-MCNC: 212 PG/ML (ref 211–946)

## 2021-11-04 PROCEDURE — 83921 ORGANIC ACID SINGLE QUANT: CPT

## 2021-11-04 PROCEDURE — 82607 VITAMIN B-12: CPT

## 2021-11-04 PROCEDURE — 36415 COLL VENOUS BLD VENIPUNCTURE: CPT

## 2021-11-04 PROCEDURE — 99214 OFFICE O/P EST MOD 30 MIN: CPT | Performed by: NURSE PRACTITIONER

## 2021-11-04 PROCEDURE — 82728 ASSAY OF FERRITIN: CPT

## 2021-11-04 RX ORDER — CARBIDOPA AND LEVODOPA 50; 200 MG/1; MG/1
TABLET, EXTENDED RELEASE ORAL
Qty: 360 TABLET | Refills: 3 | Status: SHIPPED | OUTPATIENT
Start: 2021-11-04 | End: 2022-05-05

## 2021-11-04 RX ORDER — PRAMIPEXOLE DIHYDROCHLORIDE 0.5 MG/1
0.25 TABLET ORAL 3 TIMES DAILY
Qty: 135 TABLET | Refills: 3 | Status: SHIPPED | OUTPATIENT
Start: 2021-11-04 | End: 2022-05-05

## 2021-11-04 NOTE — PROGRESS NOTES
"Subjective:     Patient ID: Mary Lunsford is a 51 y.o. female.    CC:   Chief Complaint   Patient presents with   • RESTLESS LEG SYNDROME       HPI:   History of Present Illness   This is a very pleasant 51-year-old female who presents for 6-month follow-up restless leg syndrome present since 2009.  Her mother and grandmother also has severe RLS. She is currently take Sinemet CR 3 times daily rarely requires an extra dose as needed for breakthrough symptoms.  She also takes Mirapex 0.25 mg 3 times daily. She does not wish to change her medications at this time. She states her RLS symptoms are tolerable. She is due for repeat labs today. She continues to use her standing desk for work which is helpful. She is sleeping relatively well. She has failed the Neupro patch as well as ropinirole.  She also takes Lyrica branded only 75 mg at night for RLS.  The generic form was completely ineffective for her symptoms.     Her daughter recently got  in June 2021 and this was certainly a highlight of her years.    She does also experience migraines with aura for several years.  She typically has used Excedrin Migraine along with ibuprofen.  She will get \"squiggly lines\" in both eyes and then about 20 minutes later will start to have the band of headache around her head with nausea, photophobia and phonophobia.  This typically last 5 to 6 hours.  Sometimes she does have to lay down and go to sleep.  She gets moderate throbbing pain.  She has not noticed a pattern.  Typically this will occur 5-6 times per year.  She used to take sumatriptan/Imitrex for many years and this became ineffective.  She switched to rizatriptan but this caused her to be very tired and did not seem to work well.  She did not like the side effects of either of the triptan medications since she felt like she could not focus and had to go to sleep.  Also caused some nausea.  She may have tried a third triptan but cannot recall exactly which one. " "Daughter also gets migraines.     She continues to have some tinnitus and has been diagnosed with eustachian tube dysfunction and completed nasal spray but this did not seem to help at all.  She may consider following up with ENT again if this becomes more bothersome.  It is essentially unchanged.      Previous neuro workup included- facial tingling 2019 and she also underwent an EMG and NCVS of her right upper and left upper extremities and this was completely normal on 10/23/2019 with Dr. Jane 1 of our neurologist.  Underwent MRI of the brain with and without contrast which showed 2 very small chronic white matter changes of the brain and otherwise MRI of the brain was completely unremarkable with no abnormal contrast enhancement and no acute intracranial abnormalities.     The following portions of the patient's history were reviewed and updated as appropriate: allergies, current medications, past family history, past medical history, past social history, past surgical history and problem list.    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  states she had testing at , but was told that they didn't \"find anything\"   • Essential hypertension 3/30/2018   • Gallstone    • Kidney stones    • Migraine    • Obesity    • Ovarian cyst    • Restless leg syndrome    • Urinary tract infection    • Vitamin B12 deficiency    • Wears glasses     to read       Past Surgical History:   Procedure Laterality Date   • BREAST SURGERY Bilateral 07/1987    breast reduction   • CARDIAC CATHETERIZATION N/A 3/15/2017    Procedure: Coronary angiography;  Surgeon: Moustapha Peñaloza MD;  Location: [facility] CATH INVASIVE LOCATION;  Service:    • CARDIAC CATHETERIZATION N/A 3/15/2017    Procedure: Left ventriculography;  Surgeon: Moustapha Peñaloza MD;  Location: [facility] INVASIVE LOCATION;  Service: "    •  SECTION      1991 & 1995   • CHOLECYSTECTOMY  2013   • COLONOSCOPY N/A 10/3/2019    Procedure: COLONOSCOPY WITH BIOPSIES; ANOSCOPY;  Surgeon: Jose Paez MD;  Location: [facility] ENDOSCOPY;  Service: Gastroenterology   • ENDOSCOPY     • GASTRIC BYPASS  2011   • REDUCTION MAMMAPLASTY         Social History     Socioeconomic History   • Marital status:    Tobacco Use   • Smoking status: Former Smoker     Packs/day: 1.00     Years: 2.00     Pack years: 2.00     Quit date: 10/1995     Years since quittin.1   • Smokeless tobacco: Never Used   Vaping Use   • Vaping Use: Never used   Substance and Sexual Activity   • Alcohol use: Yes     Alcohol/week: 12.0 standard drinks     Types: 12 Cans of beer per week     Comment: 16 beers per week   • Drug use: No   • Sexual activity: Yes     Partners: Male     Birth control/protection: Post-menopausal       Family History   Problem Relation Age of Onset   • Cancer Father    • Heart attack Father    • Cancer Other    • Other Other    • No Known Problems Mother    • Asthma Brother    • Ulcerative colitis Brother    • Arthritis Paternal Grandmother    • Colon cancer Neg Hx         Review of Systems   Constitutional: Negative for chills, fatigue, fever and unexpected weight change.   HENT: Negative for ear pain, hearing loss, nosebleeds, rhinorrhea and sore throat.    Eyes: Negative for photophobia, pain, discharge, itching and visual disturbance.   Respiratory: Negative for cough, chest tightness, shortness of breath and wheezing.    Cardiovascular: Negative for chest pain, palpitations and leg swelling.   Gastrointestinal: Negative for abdominal pain, blood in stool, constipation, diarrhea, nausea and vomiting.   Genitourinary: Negative for dysuria, frequency, hematuria and urgency.   Musculoskeletal: Negative for arthralgias, back pain, gait problem, joint swelling, myalgias, neck pain and neck stiffness.   Skin: Negative for rash and  "wound.   Allergic/Immunologic: Negative for environmental allergies and food allergies.   Neurological: Negative for dizziness, tremors, seizures, syncope, speech difficulty, weakness, light-headedness, numbness and headaches.   Hematological: Negative for adenopathy. Does not bruise/bleed easily.   Psychiatric/Behavioral: Negative for agitation, confusion, decreased concentration, hallucinations, sleep disturbance and suicidal ideas. The patient is not nervous/anxious.    All other systems reviewed and are negative.       Objective:  /78   Pulse 73   Temp 97.9 °F (36.6 °C)   Ht 162.6 cm (64\")   Wt 115 kg (254 lb)   LMP 12/01/2018 (LMP Unknown)   SpO2 99%   BMI 43.60 kg/m²     Neurologic Exam     Mental Status   Oriented to person, place, and time.   Speech: speech is normal   Level of consciousness: alert    Cranial Nerves   Cranial nerves II through XII intact.     Motor Exam   Muscle bulk: normal  Overall muscle tone: normal    Strength   Strength 5/5 throughout.     Gait, Coordination, and Reflexes     Gait  Gait: normal    Coordination   Finger to nose coordination: normal    Tremor   Resting tremor: absent  Intention tremor: absent  Action tremor: absent    Reflexes   Right brachioradialis: 2+  Left brachioradialis: 2+  Right biceps: 2+  Left biceps: 2+  Right patellar: 2+  Left patellar: 2+  Right achilles: 2+  Left achilles: 2+  Right : 2+  Left : 2+      Physical Exam  Constitutional:       Appearance: Normal appearance.   Neurological:      Mental Status: She is alert and oriented to person, place, and time.      Coordination: Finger-Nose-Finger Test normal.      Gait: Gait is intact.      Deep Tendon Reflexes: Strength normal.      Reflex Scores:       Bicep reflexes are 2+ on the right side and 2+ on the left side.       Brachioradialis reflexes are 2+ on the right side and 2+ on the left side.       Patellar reflexes are 2+ on the right side and 2+ on the left side.       Achilles " reflexes are 2+ on the right side and 2+ on the left side.  Psychiatric:         Mood and Affect: Mood and affect normal.         Speech: Speech normal.         Behavior: Behavior normal.         Thought Content: Thought content normal.         Cognition and Memory: Cognition and memory normal.         Judgment: Judgment normal.         Assessment/Plan:       Diagnoses and all orders for this visit:    1. Restless legs syndrome (RLS) (Primary)  -     carbidopa-levodopa CR (SINEMET CR)  MG per CR tablet; 1 tab tid and 1 extra if needed prn breakthrough rls symptoms  Dispense: 360 tablet; Refill: 3  -     Lyrica 75 MG capsule; Take 1 capsule by mouth Every Night.  Dispense: 90 capsule; Refill: 1  -     pramipexole (MIRAPEX) 0.5 MG tablet; Take 0.5 tablets by mouth 3 (Three) Times a Day.  Dispense: 135 tablet; Refill: 3  -     Ferritin; Future  -     Vitamin B12; Future  -     Methylmalonic Acid, Serum; Future    2. Iron deficiency anemia, unspecified iron deficiency anemia type  -     Ferritin; Future    3. Migraine with aura and without status migrainosus, not intractable  -     ubrogepant (UBRELVY) 100 MG tablet; Take 1 tablet at onset of migraine, may repeat once in 2 hours if needed  Dispense: 10 tablet; Refill: 5           We will continue current medications for restless leg syndrome.  No changes today.  Repeat labs today.  Gave her 2 samples of Ubrelvy to use at onset of migraine.  Gave her co-pay savings program card to activate.  Will complete prior authorization since she has been intolerant or has seen ineffective treatment of her migraines with at least 2 triptans and likely a third 1 in the past.  We will follow-up in 6 months in clinic or sooner if needed.  Reviewed medications, potential side effects and signs and symptoms to report. Discussed risk versus benefits of treatment plan with patient and/or family-including medications, labs and radiology that may be ordered. Addressed questions and  concerns during visit. Patient and/or family verbalized understanding and agree with plan.    AS THE PROVIDER, I PERSONALLY WORE PPE DURING ENTIRE FACE TO FACE ENCOUNTER IN CLINIC WITH THE PATIENT. PATIENT ALSO WORE PPE DURING ENTIRE FACE TO FACE ENCOUNTER EXCEPT FOR A MAX OF 30 SECONDS DURING NEUROLOGICAL EVALUATION OF CRANIAL NERVES AND THEN MASK WAS PLACED BACK OVER PATIENT FACE FOR REMAINDER OF VISIT. I WASHED MY HANDS BEFORE AND AFTER VISIT.    As part of this patient's treatment plan I am prescribing controlled substances. The patient has been made aware of appropriate use of such medications, including potential risk of somnolence, limited ability to drive and/or work safely, and potential for dependence or overdose. It has also been made clear that these medications are for use by the patient only, without concomitant use of alcohol or other substances unless prescribed. Keep out of reach of children.  [place] report has been reviewed. If this is going to be prescribed long term, [facility] Controlled Substance Agreement Contract has also been read and signed by patient and myself.    During this visit the following were done:  Labs Reviewed []    Labs Ordered [x]    Radiology Reports Reviewed []    Radiology Ordered []    PCP Records Reviewed [x]    Referring Provider Records Reviewed []    ER Records Reviewed []    Hospital Records Reviewed []    History Obtained From Family []    Radiology Images Reviewed []    Other Reviewed []    Records Requested []      LINNEA Mendoza  11/4/2021

## 2021-11-05 ENCOUNTER — TELEPHONE (OUTPATIENT)
Dept: NEUROLOGY | Facility: CLINIC | Age: 51
End: 2021-11-05

## 2021-11-05 ENCOUNTER — PRIOR AUTHORIZATION (OUTPATIENT)
Dept: NEUROLOGY | Facility: CLINIC | Age: 51
End: 2021-11-05

## 2021-11-05 NOTE — TELEPHONE ENCOUNTER
Provider: KATRINA  Caller: MICHAELA  Relationship to Patient: SELF  Phone Number: [phone]  Reason for Call: PT CALLED IN ASKING FOR A DRVivian NOTE FOR 11/4/21 FOR WORK.. OR MY CHART   PT SAID IT CAN BE FAXED AS WELL  [phone]

## 2021-11-05 NOTE — TELEPHONE ENCOUNTER
----- Message from LINNEA Mendoza sent at 11/5/2021  2:43 PM EDT -----  Mary, The test results show abnormal values which need to be addressed.  In particular, the results for ferritin are still in the high level showing that your iron stores are good and you do not need additional iron at this time. Your vitamin B12 level is in the very low range of normal. I have ordered 1 additional lab that confirms whether you have a true B12 deficiency and whether you would benefit from oral vitamin B12 replacement or vitamin B12 injections. Once I have these test results, we will notify you as well as your PCP Dr. Floridalma Jimenez's office with our findings and recommendations. If Vitamin B12 shots are necessary, we will contact your PCP to set these up. If you do not hear from us within the next 7 days, please contact our office. Thanks, LINNEA Lopez can mail results letter to her. Once we have MMA results we will let her know further instructions. CHICA Jimenez.

## 2021-11-05 NOTE — PROGRESS NOTES
Mary, The test results show abnormal values which need to be addressed.  In particular, the results for ferritin are still in the high level showing that your iron stores are good and you do not need additional iron at this time. Your vitamin B12 level is in the very low range of normal. I have ordered 1 additional lab that confirms whether you have a true B12 deficiency and whether you would benefit from oral vitamin B12 replacement or vitamin B12 injections. Once I have these test results, we will notify you as well as your PCP Dr. Floridalma Jimenez's office with our findings and recommendations. If Vitamin B12 shots are necessary, we will contact your PCP to set these up. If you do not hear from us within the next 7 days, please contact our office. Thanks, LINNEA Lopez can mail results letter to her. Once we have MMA results we will let her know further instructions. CHICA Jimenez.

## 2021-11-11 LAB
Lab: NORMAL
METHYLMALONATE SERPL-SCNC: 367 NMOL/L (ref 0–378)

## 2021-11-29 ENCOUNTER — HOSPITAL ENCOUNTER (OUTPATIENT)
Dept: CT IMAGING | Facility: HOSPITAL | Age: 51
Discharge: HOME | End: 2021-11-29

## 2021-11-29 ENCOUNTER — OFFICE VISIT (OUTPATIENT)
Dept: INTERNAL MEDICINE | Facility: CLINIC | Age: 51
End: 2021-11-29

## 2021-11-29 ENCOUNTER — LAB (OUTPATIENT)
Dept: LAB | Facility: HOSPITAL | Age: 51
End: 2021-11-29

## 2021-11-29 VITALS
RESPIRATION RATE: 16 BRPM | HEIGHT: 64 IN | TEMPERATURE: 97.7 F | DIASTOLIC BLOOD PRESSURE: 82 MMHG | HEART RATE: 61 BPM | BODY MASS INDEX: 42.51 KG/M2 | SYSTOLIC BLOOD PRESSURE: 130 MMHG | OXYGEN SATURATION: 97 % | WEIGHT: 249 LBS

## 2021-11-29 DIAGNOSIS — N64.4 BREAST PAIN, RIGHT: Primary | ICD-10-CM

## 2021-11-29 DIAGNOSIS — M79.621 PAIN IN RIGHT AXILLA: ICD-10-CM

## 2021-11-29 DIAGNOSIS — R22.31 MASS OF RIGHT ELBOW: ICD-10-CM

## 2021-11-29 DIAGNOSIS — M54.6 ACUTE MIDLINE THORACIC BACK PAIN: ICD-10-CM

## 2021-11-29 DIAGNOSIS — Z28.21 INFLUENZA VACCINATION DECLINED: ICD-10-CM

## 2021-11-29 DIAGNOSIS — R00.1 BRADYCARDIA: ICD-10-CM

## 2021-11-29 DIAGNOSIS — R94.31 PROLONGED Q-T INTERVAL ON ECG: ICD-10-CM

## 2021-11-29 DIAGNOSIS — E53.8 VITAMIN B12 DEFICIENCY: ICD-10-CM

## 2021-11-29 DIAGNOSIS — R00.1 BRADYCARDIA, SINUS: ICD-10-CM

## 2021-11-29 LAB
ALBUMIN SERPL-MCNC: 4.4 G/DL (ref 3.5–5.2)
ALBUMIN/GLOB SERPL: 1.6 G/DL
ALP SERPL-CCNC: 74 U/L (ref 39–117)
ALT SERPL W P-5'-P-CCNC: <5 U/L (ref 1–33)
ANION GAP SERPL CALCULATED.3IONS-SCNC: 9.5 MMOL/L (ref 5–15)
AST SERPL-CCNC: 18 U/L (ref 1–32)
BASOPHILS # BLD AUTO: 0.03 10*3/MM3 (ref 0–0.2)
BASOPHILS NFR BLD AUTO: 0.5 % (ref 0–1.5)
BILIRUB SERPL-MCNC: 0.5 MG/DL (ref 0–1.2)
BUN SERPL-MCNC: 15 MG/DL (ref 6–20)
BUN/CREAT SERPL: 16.3 (ref 7–25)
CALCIUM SPEC-SCNC: 9.3 MG/DL (ref 8.6–10.5)
CHLORIDE SERPL-SCNC: 103 MMOL/L (ref 98–107)
CO2 SERPL-SCNC: 27.5 MMOL/L (ref 22–29)
CREAT SERPL-MCNC: 0.92 MG/DL (ref 0.57–1)
DEPRECATED RDW RBC AUTO: 46.6 FL (ref 37–54)
EOSINOPHIL # BLD AUTO: 0.2 10*3/MM3 (ref 0–0.4)
EOSINOPHIL NFR BLD AUTO: 3.5 % (ref 0.3–6.2)
ERYTHROCYTE [DISTWIDTH] IN BLOOD BY AUTOMATED COUNT: 12.6 % (ref 12.3–15.4)
GFR SERPL CREATININE-BSD FRML MDRD: 64 ML/MIN/1.73
GLOBULIN UR ELPH-MCNC: 2.7 GM/DL
GLUCOSE SERPL-MCNC: 111 MG/DL (ref 65–99)
HCT VFR BLD AUTO: 44.5 % (ref 34–46.6)
HGB BLD-MCNC: 14.5 G/DL (ref 12–15.9)
IMM GRANULOCYTES # BLD AUTO: 0.01 10*3/MM3 (ref 0–0.05)
IMM GRANULOCYTES NFR BLD AUTO: 0.2 % (ref 0–0.5)
LYMPHOCYTES # BLD AUTO: 2.12 10*3/MM3 (ref 0.7–3.1)
LYMPHOCYTES NFR BLD AUTO: 36.9 % (ref 19.6–45.3)
MAGNESIUM SERPL-MCNC: 2.1 MG/DL (ref 1.6–2.6)
MCH RBC QN AUTO: 32.5 PG (ref 26.6–33)
MCHC RBC AUTO-ENTMCNC: 32.6 G/DL (ref 31.5–35.7)
MCV RBC AUTO: 99.8 FL (ref 79–97)
MONOCYTES # BLD AUTO: 0.46 10*3/MM3 (ref 0.1–0.9)
MONOCYTES NFR BLD AUTO: 8 % (ref 5–12)
NEUTROPHILS NFR BLD AUTO: 2.93 10*3/MM3 (ref 1.7–7)
NEUTROPHILS NFR BLD AUTO: 50.9 % (ref 42.7–76)
NRBC BLD AUTO-RTO: 0 /100 WBC (ref 0–0.2)
PLATELET # BLD AUTO: 235 10*3/MM3 (ref 140–450)
PMV BLD AUTO: 9.6 FL (ref 6–12)
POTASSIUM SERPL-SCNC: 4.6 MMOL/L (ref 3.5–5.2)
PROT SERPL-MCNC: 7.1 G/DL (ref 6–8.5)
RBC # BLD AUTO: 4.46 10*6/MM3 (ref 3.77–5.28)
SODIUM SERPL-SCNC: 140 MMOL/L (ref 136–145)
T4 FREE SERPL-MCNC: 1.33 NG/DL (ref 0.93–1.7)
TSH SERPL DL<=0.05 MIU/L-ACNC: 2.77 UIU/ML (ref 0.27–4.2)
WBC NRBC COR # BLD: 5.75 10*3/MM3 (ref 3.4–10.8)

## 2021-11-29 PROCEDURE — 80053 COMPREHEN METABOLIC PANEL: CPT | Performed by: FAMILY MEDICINE

## 2021-11-29 PROCEDURE — 25010000002 IOPAMIDOL 61 % SOLUTION: Performed by: FAMILY MEDICINE

## 2021-11-29 PROCEDURE — 84443 ASSAY THYROID STIM HORMONE: CPT | Performed by: FAMILY MEDICINE

## 2021-11-29 PROCEDURE — 84439 ASSAY OF FREE THYROXINE: CPT | Performed by: FAMILY MEDICINE

## 2021-11-29 PROCEDURE — 85025 COMPLETE CBC W/AUTO DIFF WBC: CPT | Performed by: FAMILY MEDICINE

## 2021-11-29 PROCEDURE — 80061 LIPID PANEL: CPT | Performed by: FAMILY MEDICINE

## 2021-11-29 PROCEDURE — 36415 COLL VENOUS BLD VENIPUNCTURE: CPT | Performed by: FAMILY MEDICINE

## 2021-11-29 PROCEDURE — 71275 CT ANGIOGRAPHY CHEST: CPT

## 2021-11-29 PROCEDURE — 83735 ASSAY OF MAGNESIUM: CPT | Performed by: FAMILY MEDICINE

## 2021-11-29 PROCEDURE — 93000 ELECTROCARDIOGRAM COMPLETE: CPT | Performed by: FAMILY MEDICINE

## 2021-11-29 PROCEDURE — 99215 OFFICE O/P EST HI 40 MIN: CPT | Performed by: FAMILY MEDICINE

## 2021-11-29 RX ORDER — CYANOCOBALAMIN 1000 UG/ML
1000 INJECTION, SOLUTION INTRAMUSCULAR; SUBCUTANEOUS
Qty: 3 ML | Refills: 3 | Status: SHIPPED | OUTPATIENT
Start: 2021-11-29

## 2021-11-29 RX ADMIN — IOPAMIDOL 100 ML: 612 INJECTION, SOLUTION INTRAVENOUS at 13:09

## 2021-11-29 NOTE — PROGRESS NOTES
No evidence of aortic aneurysm or dissection. Would like her to get back in with cardiology given prolonged QT and bradycardia. If she has a particular provider she wants to see please let me know.

## 2021-11-29 NOTE — PROGRESS NOTES
"Subjective    Mary Lunsford is a 51 y.o. female here for:  Chief Complaint   Patient presents with   • Back Pain     top middle of back for about a week   • Pain     under right armpit       History per MA reviewed.    Back pain along thoracic area x 1 week  Father worried as he had similar symptoms before having a heart attack  No injuries. Started on Tuesday last week approx, or maybe earlier than that came and went, became consistent since last week  Heating pad-- not sure if it helps  Pain isn't sharp but just \"aches\"  Not worsened by anything.     Arm pit pain on right unsure if any lymph nodes swollen, \"tenderness\"  Right breast discomfort.  Cat bite in march on left hand.    Still has mass right elbow area, larger than it was.     Exposed to covid but has had multiple negative tests.          The following portions of the patient's history were reviewed and updated as appropriate: allergies, current medications, past family history, past medical history, past social history, past surgical history and problem list.    Review of Systems   Constitutional: Negative for chills and fever.   Musculoskeletal: Positive for back pain.         Objective   Visit Vitals  /82 (BP Location: Right arm)   Pulse 61   Temp 97.7 °F (36.5 °C) (Temporal)   Resp 16   Ht 162.6 cm (64\")   Wt 113 kg (249 lb)   LMP 12/01/2018 (LMP Unknown)   SpO2 97%   BMI 42.74 kg/m²     Blood pressure rechecked bilaterally. 134/82 left, 130/82 right.    Physical Exam  Vitals and nursing note reviewed.   Constitutional:       General: She is not in acute distress.     Appearance: Normal appearance. She is well-developed and well-groomed. She is morbidly obese. She is not ill-appearing, toxic-appearing or diaphoretic.      Interventions: Face mask in place.   HENT:      Head: Normocephalic and atraumatic.      Right Ear: Hearing normal.      Left Ear: Hearing normal.   Eyes:      General: Lids are normal. No scleral icterus.     Extraocular " Movements: Extraocular movements intact.   Neck:      Trachea: Phonation normal.   Pulmonary:      Effort: Pulmonary effort is normal.   Chest:   Breasts:      Right: No axillary adenopathy.       Musculoskeletal:      Right elbow: Deformity (mass antecubital fossa) present.      Cervical back: Neck supple.   Lymphadenopathy:      Upper Body:      Right upper body: Pectoral adenopathy (tenderness) present. No axillary adenopathy.   Skin:     Coloration: Skin is not jaundiced or pale.   Neurological:      General: No focal deficit present.      Mental Status: She is alert and oriented to person, place, and time.      Motor: Motor function is intact.   Psychiatric:         Attention and Perception: Attention and perception normal.         Mood and Affect: Mood and affect normal.         Speech: Speech normal.         Behavior: Behavior normal. Behavior is cooperative.         Thought Content: Thought content normal.         Cognition and Memory: Cognition and memory normal.         Judgment: Judgment normal.         For medical decision making review of the following was required:  Lab Results   Component Value Date    WBC 5.75 11/29/2021    HGB 14.5 11/29/2021    HCT 44.5 11/29/2021    MCV 99.8 (H) 11/29/2021     11/29/2021     Lab Results   Component Value Date    GLUCOSE 111 (H) 11/29/2021    BUN 15 11/29/2021    CREATININE 0.92 11/29/2021    EGFRIFNONA 64 11/29/2021    EGFRIFAFRI 63 08/12/2019    BCR 16.3 11/29/2021    K 4.6 11/29/2021    CO2 27.5 11/29/2021    CALCIUM 9.3 11/29/2021    PROTENTOTREF 6.8 09/16/2019    ALBUMIN 4.40 11/29/2021    LABIL2 1.4 09/16/2019    AST 18 11/29/2021    ALT <5 11/29/2021     Lab Results   Component Value Date    JGNILOEY44 212 11/04/2021     Cardiac Catheterization/Vascular Study (03/15/2017 11:15)  IMPRESSION:  · Angiographically normal coronary arteries.  · Hyperdynamic left ventricular systolic function with normal regional wall motion.  · No evidence of myocardial  "infarction.  · The left-sided valvular abnormalities appreciated.  · Normal left ventricular filling pressures.  · Serially mild elevation in cardiac troponins with a \"plateau pattern\".  Explanation for this is unknown.  CPK, CPK-MB and CPK-MB fraction are all normal.     RECOMMENDATIONS:  · Evaluate noncardiac causes of arm pain.      EXAMINATION / PROCEDURE:   MR Cardiac Stress Lexiscan May  8 2017  - 09:38     MR Cardiac Flow Mapping May  8 2017  - 09:38      IMPRESSION:   1. No evidence of ischemia on this vasodilator (regadenoson) stress CMR   perfusion.     2. Normal resting global and regional LV function.     3. Normal immediate post-vasodilator LV global and regional function with     an LVEF of 68 %.     4. Focal area of subendocardial enhancement of the distal anterior   segment. This location of late gadolinium enhancement may represent   infarct (possibly embolic) in a small branch vessel, but would be   atypical for any of the  3 major epicardial vessels.          ECG 12 Lead    Date/Time: 11/29/2021 11:30 AM  Performed by: Floridalma Jimenez MD  Authorized by: Floridalma Jimenez MD   Comparison: compared with previous ECG from 3/1/2018  Similar to previous ECG  Comparison to previous ECG: Rate lower and QTc longer on today's EKG but otherwise similar.  Rhythm: sinus rhythm  Rate: bradycardic  BPM: 51  Conduction: conduction normal  ST Segments: ST segments normal  T Waves: T waves normal  QRS axis: normal  Other findings: prolonged QTc interval  Other findings comments: QTc 499 ms    Clinical impression: abnormal EKG            Assessment/Plan     Problem List Items Addressed This Visit        Endocrine and Metabolic    Vitamin B12 deficiency    Relevant Medications    cyanocobalamin 1000 MCG/ML injection    Syringe, Disposable, 1 ML misc       Other    Influenza vaccination declined      Other Visit Diagnoses     Breast pain, right    -  Primary    Relevant Orders    US breast right complete    " Mammo Diagnostic Digital Tomosynthesis Right With CAD    Pain in right axilla        Relevant Orders    US breast right complete    Mammo Diagnostic Digital Tomosynthesis Right With CAD    Mass of right elbow        Relevant Orders    US Nonvascular Extremity Limited    Acute midline thoracic back pain        Relevant Orders    CT angiogram chest w contrast (Completed)    ECG 12 Lead    Bradycardia        Relevant Orders    TSH+Free T4    CT angiogram chest w contrast (Completed)    ECG 12 Lead    Prolonged Q-T interval on ECG        Relevant Orders    TSH+Free T4    Magnesium (Completed)    CBC & Differential (Completed)    Comprehensive Metabolic Panel (Completed)    CT angiogram chest w contrast (Completed)    ECG 12 Lead          · Due to persistent thoracic pain aortic dissection/aneursym ruled out. Encouraged follow up with cardiology due to bradycardia (though beta blocked) and new finding of prolonged QTc. Magnesium level, potassium normal. TSH pending. Not on any medicines worrisome for qt prolongation.    I spent 51 minutes caring for Mary Lunsford on this date of service. This time includes time spent by me in the following activities: preparing for the visit, reviewing tests, performing a medically appropriate examination and/or evaluation, counseling and educating the patient/family/caregiver, ordering medications, tests, or procedures and documenting information in the medical record.      Floridalma Jimenez MD

## 2021-11-30 ENCOUNTER — OFFICE VISIT (OUTPATIENT)
Dept: CARDIOLOGY | Facility: CLINIC | Age: 51
End: 2021-11-30

## 2021-11-30 VITALS
HEIGHT: 64 IN | OXYGEN SATURATION: 99 % | SYSTOLIC BLOOD PRESSURE: 110 MMHG | RESPIRATION RATE: 18 BRPM | WEIGHT: 250 LBS | DIASTOLIC BLOOD PRESSURE: 70 MMHG | HEART RATE: 60 BPM | BODY MASS INDEX: 42.68 KG/M2

## 2021-11-30 DIAGNOSIS — M54.6 ACUTE MIDLINE THORACIC BACK PAIN: Primary | ICD-10-CM

## 2021-11-30 DIAGNOSIS — R55 NEAR SYNCOPE: ICD-10-CM

## 2021-11-30 DIAGNOSIS — R42 DIZZINESS: ICD-10-CM

## 2021-11-30 DIAGNOSIS — R00.1 BRADYCARDIA, SINUS: ICD-10-CM

## 2021-11-30 LAB
CHOLEST SERPL-MCNC: 216 MG/DL (ref 0–200)
HDLC SERPL-MCNC: 84 MG/DL (ref 40–60)
LDLC SERPL CALC-MCNC: 121 MG/DL (ref 0–100)
LDLC/HDLC SERPL: 1.42 {RATIO}
TRIGL SERPL-MCNC: 62 MG/DL (ref 0–150)
VLDLC SERPL-MCNC: 11 MG/DL (ref 5–40)

## 2021-11-30 PROCEDURE — 99214 OFFICE O/P EST MOD 30 MIN: CPT | Performed by: NURSE PRACTITIONER

## 2021-12-01 ENCOUNTER — TELEPHONE (OUTPATIENT)
Dept: CARDIOLOGY | Facility: CLINIC | Age: 51
End: 2021-12-01

## 2021-12-01 DIAGNOSIS — E78.5 HYPERLIPIDEMIA LDL GOAL <100: Primary | ICD-10-CM

## 2021-12-01 RX ORDER — ATORVASTATIN CALCIUM 40 MG/1
40 TABLET, FILM COATED ORAL NIGHTLY
Qty: 90 TABLET | Refills: 3 | Status: SHIPPED | OUTPATIENT
Start: 2021-12-01 | End: 2022-12-12

## 2021-12-01 NOTE — TELEPHONE ENCOUNTER
Will you please let this patient know that her LDL is 121 which is essentially unchanged from the last lipid panel that we have on file from 4 years ago.  LDL goal< 100 mg/dL.  START atorvastatin.  Follow-up with Dr. Peñaloza as scheduled.

## 2021-12-02 DIAGNOSIS — N64.4 BREAST PAIN, RIGHT: Primary | ICD-10-CM

## 2021-12-06 ENCOUNTER — TRANSCRIBE ORDERS (OUTPATIENT)
Dept: ADMINISTRATIVE | Facility: HOSPITAL | Age: 51
End: 2021-12-06

## 2021-12-21 ENCOUNTER — APPOINTMENT (OUTPATIENT)
Dept: ULTRASOUND IMAGING | Facility: HOSPITAL | Age: 51
End: 2021-12-21

## 2021-12-23 DIAGNOSIS — E07.9 THYROID DISORDER: Primary | ICD-10-CM

## 2021-12-23 RX ORDER — LEVOTHYROXINE SODIUM 88 UG/1
TABLET ORAL
Qty: 90 TABLET | Refills: 1 | Status: SHIPPED | OUTPATIENT
Start: 2021-12-23 | End: 2022-06-23

## 2021-12-23 NOTE — TELEPHONE ENCOUNTER
Rx Refill Note  Requested Prescriptions     Pending Prescriptions Disp Refills   • levothyroxine (SYNTHROID, LEVOTHROID) 88 MCG tablet [Pharmacy Med Name: LEVOTHYROXINE 0.088MG (88MCG) TAB] 90 tablet 0     Sig: TAKE 1 TABLET BY MOUTH EVERY DAY      Last office visit with prescribing clinician: 11/29/2021      Next office visit with prescribing clinician: Visit date not found            JOEL COLIN MA  12/23/21, 16:41 EST

## 2022-01-04 ENCOUNTER — TELEMEDICINE (OUTPATIENT)
Dept: INTERNAL MEDICINE | Facility: CLINIC | Age: 52
End: 2022-01-04

## 2022-01-04 ENCOUNTER — CLINICAL SUPPORT (OUTPATIENT)
Dept: INTERNAL MEDICINE | Facility: CLINIC | Age: 52
End: 2022-01-04

## 2022-01-04 DIAGNOSIS — J06.9 ACUTE URI: Primary | ICD-10-CM

## 2022-01-04 PROCEDURE — U0004 COV-19 TEST NON-CDC HGH THRU: HCPCS | Performed by: INTERNAL MEDICINE

## 2022-01-04 PROCEDURE — 99214 OFFICE O/P EST MOD 30 MIN: CPT | Performed by: INTERNAL MEDICINE

## 2022-01-04 NOTE — PROGRESS NOTES
Subjective   Mary Lunsford is a 51 y.o. female.     Chief Complaint   Patient presents with   • URI     2 days; extreme fatigue, earache, sore throat, body aches, fever (100.5), cough, nasal drainage       History of Present Illness   Fatigue 4 days worse now achy allover, fever and chill, sore throat eye pain. No GI sx. No change in taste or smell. No exposure to covid. Ibuprofen helps some. running nose, mild cough, no soa, no wheeze.     Current Outpatient Medications:   •  ASPIRIN 81 PO, Take 1 tablet by mouth., Disp: , Rfl:   •  atorvastatin (LIPITOR) 40 MG tablet, Take 1 tablet by mouth Every Night., Disp: 90 tablet, Rfl: 3  •  carbidopa-levodopa CR (SINEMET CR)  MG per CR tablet, 1 tab tid and 1 extra if needed prn breakthrough rls symptoms, Disp: 360 tablet, Rfl: 3  •  cyanocobalamin 1000 MCG/ML injection, Inject 1 mL into the appropriate muscle as directed by prescriber Every 30 (Thirty) Days., Disp: 3 mL, Rfl: 3  •  Ferrocite 324 MG tablet, TAKE 1 TABLET BY MOUTH TWICE DAILY, Disp: 180 tablet, Rfl: 1  •  levothyroxine (SYNTHROID, LEVOTHROID) 88 MCG tablet, TAKE 1 TABLET BY MOUTH EVERY DAY, Disp: 90 tablet, Rfl: 1  •  Lyrica 75 MG capsule, Take 1 capsule by mouth Every Night., Disp: 90 capsule, Rfl: 1  •  metoprolol tartrate (LOPRESSOR) 25 MG tablet, TAKE 1 TABLET BY MOUTH EVERY 12 HOURS, Disp: 180 tablet, Rfl: 3  •  pramipexole (MIRAPEX) 0.5 MG tablet, Take 0.5 tablets by mouth 3 (Three) Times a Day., Disp: 135 tablet, Rfl: 3  •  Syringe, Disposable, 1 ML misc, Use to inject vitamin B12 every 14 days, Disp: 2 each, Rfl: 5  •  Syringe, Disposable, 1 ML misc, Use to inject vitamin B12 every 30 days, Disp: 3 each, Rfl: 3  •  ubrogepant (UBRELVY) 100 MG tablet, Take 1 tablet at onset of migraine, may repeat once in 2 hours if needed, Disp: 10 tablet, Rfl: 5  •  azelastine (ASTELIN) 0.1 % nasal spray, azelastine 137 mcg (0.1 %) nasal spray aerosol  1 spray each nostril BID, Disp: , Rfl:   •   ipratropium (ATROVENT) 0.06 % nasal spray, ipratropium bromide 42 mcg (0.06 %) nasal spray  one spray each nostril twice a day, Disp: , Rfl:     The following portions of the patient's history were reviewed and updated as appropriate: allergies, current medications, past family history, past medical history, past social history, past surgical history and problem list.    Review of Systems   Constitutional: Positive for chills, fatigue and fever.   HENT: Positive for rhinorrhea.    Respiratory: Positive for cough. Negative for shortness of breath and wheezing.    Cardiovascular: Negative.    Gastrointestinal: Negative.    Musculoskeletal: Positive for arthralgias and myalgias.   Skin: Negative.    Neurological: Negative.    Psychiatric/Behavioral: Negative.        Objective   Physical Exam  Pulmonary:      Effort: Pulmonary effort is normal.   Neurological:      Mental Status: She is alert.         All tests have been reviewed.    Assessment/Plan   Diagnoses and all orders for this visit:    Acute URI  -     POCT Influenza A/B  -     COVID-19 PCR, [facility], NP SWAB IN [facility]SP[facility] MEDIA/ORAL SWISH 24-30 HR TAT - Swab, Nasopharynx      Cetirizine and Mucinex as needed.  Plenty fluids rest.  Call if no better.       You have chosen to receive care through a telehealth visit.  Do you consent to use a video/audio connection for your medical care today? Yes    Individuals involved in this encounter:    KARRI Trimble Dr.

## 2022-01-05 LAB — SARS-COV-2 RNA NOSE QL NAA+PROBE: DETECTED

## 2022-01-06 ENCOUNTER — HOSPITAL ENCOUNTER (OUTPATIENT)
Dept: ULTRASOUND IMAGING | Facility: HOSPITAL | Age: 52
End: 2022-01-06

## 2022-01-19 ENCOUNTER — HOSPITAL ENCOUNTER (OUTPATIENT)
Dept: MAMMOGRAPHY | Facility: HOSPITAL | Age: 52
Discharge: HOME | End: 2022-01-19
Admitting: FAMILY MEDICINE

## 2022-01-19 ENCOUNTER — TELEPHONE (OUTPATIENT)
Dept: CARDIOLOGY | Facility: CLINIC | Age: 52
End: 2022-01-19

## 2022-01-19 ENCOUNTER — OFFICE VISIT (OUTPATIENT)
Dept: CARDIOLOGY | Facility: CLINIC | Age: 52
End: 2022-01-19

## 2022-01-19 VITALS
DIASTOLIC BLOOD PRESSURE: 70 MMHG | BODY MASS INDEX: 42.34 KG/M2 | WEIGHT: 248 LBS | OXYGEN SATURATION: 98 % | SYSTOLIC BLOOD PRESSURE: 102 MMHG | HEART RATE: 65 BPM | HEIGHT: 64 IN

## 2022-01-19 DIAGNOSIS — N64.4 BREAST PAIN, RIGHT: ICD-10-CM

## 2022-01-19 DIAGNOSIS — R06.09 DOE (DYSPNEA ON EXERTION): Primary | ICD-10-CM

## 2022-01-19 PROCEDURE — 77066 DX MAMMO INCL CAD BI: CPT

## 2022-01-19 PROCEDURE — 99214 OFFICE O/P EST MOD 30 MIN: CPT | Performed by: NURSE PRACTITIONER

## 2022-01-19 PROCEDURE — 93000 ELECTROCARDIOGRAM COMPLETE: CPT | Performed by: NURSE PRACTITIONER

## 2022-01-19 PROCEDURE — G0279 TOMOSYNTHESIS, MAMMO: HCPCS

## 2022-01-19 NOTE — PROGRESS NOTES
"             [facility] Cardiology Office Follow Up Note    Mary Lunsford  [id number]  01/19/2022    Primary Care Provider: Floridalma Jimenez MD   Referring Provider: No ref. provider found    Chief Complaint: Follow-up after cardiac testing    History of Present Illness:   Mrs. Mary Lunsford is a 51 y.o. female who presents to the Cardiology Clinic for follow up after cardiac testing.  She was initially referred by Dr. Jimenez for prolonged QT interval and bradycardia.  She did undergo a heart catheterization in 2017 after she presented to the ER with right arm pain and mildly elevated cardiac troponins.  Her coronary arteries were angiographically normal.  She did follow-up with a cardiologist at the [facility] in [place] who was unable to offer any further explanation as to why this occurred.  Her past medical history includes hypertension, hypothyroidism, migraine headaches, gastric bypass, and restless leg syndrome.  She does report having smoked about 1 pack per day for 2 years during the 1990s with complete cessation in 1995.  At her initially evaluation, the patient reported a 1-2 week history of nonexertional, intermittent pain between her shoulder blades that may radiate under her right arm/right breast area.  She previously states that her father, who had an MI at age 53, told her he had this same symptom before his heart attack.  Duration of pain was variable and the patient was not able to elaborate.  She described the discomfort as \"achy\" and rated it 5/10 on the pain scale.  She did get \"some\" relief after taking ibuprofen.  Her family wanted her to go to the ER, but she chose to see her PCP who ordered a chest CT which ruled out aortic aneurysm and dissection.  Her EKG showed bradycardia (51 bpm) and a prolonged QTc interval (499 ms).  The patient previously reported she thought for a \"split second\" she might \"pass out\" at [facility].  She said that she began to " "look around to see where she could sit down.  Instead, she opened up chips and a soda and was able to quickly recover (within minutes).  The entire episode lasted less than 1 minute.  Finally, the patient stated that she once abruptly stopped her beta-blocker when she ran out and began to have right arm pain as a result.  She was seen in the ED and discouraged from doing this again.  She subsequently underwent a treadmill stress test and a 7-day Holter study, both of which were normal.  A lipid panel demonstrated an elevated LDL (121) for which the patient was started on atorvastatin.  She presents today for follow-up.  Since her last clinic visit, the patient reports having underwent a routine mammogram this morning (results pending).  She denies any significant changes in her health, but reports dyspnea on exertion and general malaise.  She considers the possibility that this is part of the normal aging process.  She does express an interest in weight loss.  She offers no other complaints or concerns at this time.          Past Cardiac Testin. Last Coronary Angio: 3/15/2017, NSTEMI              1. Angiographically normal coronary arteries.              2. Hyperdynamic left ventricular systolic function with normal regional wall motion.              3. No evidence of myocardial infarction.              4. The left-sided valvular abnormalities appreciated.              5. Normal left ventricular filling pressures.              6. Serially mild elevation in cardiac troponins with a \"plateau pattern\".  Explanation for this is unknown.  CPK, CPK-MB and CPK-MB fraction are all normal.  2. Prior Stress Testin2021   1. The patient reported dyspnea during the stress test.   2. Arrhythmias were not significant during stress.   3. Normal treadmill exercise stress test  3. Last Echo: 3/22/2017   1. Left ventricular function is hyperdynamic (EF > 70).  4. Prior Holter Monitor: 2021   1. Normal cardiac " monitor      Review of Systems:   Review of Systems   Constitutional: Positive for fatigue. Negative for activity change, chills, diaphoresis, fever and unexpected weight gain.   Eyes: Negative for blurred vision and visual disturbance.   Respiratory: Positive for shortness of breath. Negative for apnea, cough, chest tightness and wheezing.    Cardiovascular: Positive for chest pain. Negative for palpitations and leg swelling.   Gastrointestinal: Negative for abdominal distention, blood in stool, GERD and indigestion.   Endocrine: Negative for cold intolerance and heat intolerance.   Genitourinary: Negative for hematuria.   Musculoskeletal: Negative for gait problem, joint swelling and myalgias.   Skin: Negative for color change, pallor and wound.   Neurological: Positive for dizziness and light-headedness. Negative for seizures, syncope, weakness, numbness, headache and confusion.   Hematological: Does not bruise/bleed easily.   Psychiatric/Behavioral: Negative for behavioral problems, sleep disturbance, suicidal ideas and depressed mood.     I have reviewed and confirmed the accuracy of the ROS as documented by the MA/LPN/RN LINNEA Araujo    I have reviewed and/or updated the patient's past medical, past surgical, family, social history, problem list and allergies as appropriate.     Medications:     Current Outpatient Medications:   •  ASPIRIN 81 PO, Take 1 tablet by mouth., Disp: , Rfl:   •  atorvastatin (LIPITOR) 40 MG tablet, Take 1 tablet by mouth Every Night., Disp: 90 tablet, Rfl: 3  •  carbidopa-levodopa CR (SINEMET CR)  MG per CR tablet, 1 tab tid and 1 extra if needed prn breakthrough rls symptoms, Disp: 360 tablet, Rfl: 3  •  cyanocobalamin 1000 MCG/ML injection, Inject 1 mL into the appropriate muscle as directed by prescriber Every 30 (Thirty) Days., Disp: 3 mL, Rfl: 3  •  Ferrocite 324 MG tablet, TAKE 1 TABLET BY MOUTH TWICE DAILY, Disp: 180 tablet, Rfl: 1  •  levothyroxine  "(SYNTHROID, LEVOTHROID) 88 MCG tablet, TAKE 1 TABLET BY MOUTH EVERY DAY, Disp: 90 tablet, Rfl: 1  •  Lyrica 75 MG capsule, Take 1 capsule by mouth Every Night., Disp: 90 capsule, Rfl: 1  •  metoprolol tartrate (LOPRESSOR) 25 MG tablet, TAKE 1 TABLET BY MOUTH EVERY 12 HOURS, Disp: 180 tablet, Rfl: 3  •  pramipexole (MIRAPEX) 0.5 MG tablet, Take 0.5 tablets by mouth 3 (Three) Times a Day., Disp: 135 tablet, Rfl: 3  •  Syringe, Disposable, 1 ML misc, Use to inject vitamin B12 every 14 days, Disp: 2 each, Rfl: 5  •  Syringe, Disposable, 1 ML misc, Use to inject vitamin B12 every 30 days, Disp: 3 each, Rfl: 3  •  ubrogepant (UBRELVY) 100 MG tablet, Take 1 tablet at onset of migraine, may repeat once in 2 hours if needed, Disp: 10 tablet, Rfl: 5    Physical Exam:  Vital Signs:   Vitals:    01/19/22 1557   BP: 102/70   BP Location: Right arm   Patient Position: Sitting   Cuff Size: Adult   Pulse: 65   SpO2: 98%   Weight: 112 kg (248 lb)   Height: 162.6 cm (64\")     Body mass index is 42.57 kg/m².    Physical Exam  Vitals and nursing note reviewed.   Constitutional:       General: She is not in acute distress.     Appearance: She is well-developed. She is obese.   HENT:      Head: Normocephalic and atraumatic.   Eyes:      General: No scleral icterus.     Extraocular Movements: Extraocular movements intact.   Neck:      Trachea: Trachea normal.   Cardiovascular:      Rate and Rhythm: Normal rate and regular rhythm.      Pulses: Normal pulses.      Heart sounds: Normal heart sounds. No murmur heard.  No friction rub. No gallop.    Pulmonary:      Effort: Pulmonary effort is normal.      Breath sounds: Normal breath sounds.   Abdominal:      Palpations: Abdomen is soft.      Tenderness: There is no abdominal tenderness.   Musculoskeletal:         General: Normal range of motion.      Cervical back: Neck supple.      Right lower leg: No edema.      Left lower leg: No edema.   Skin:     General: Skin is warm and dry.      " "Findings: No bruising, lesion or rash.   Neurological:      Mental Status: She is alert and oriented to person, place, and time.      Motor: No weakness.      Gait: Gait normal.   Psychiatric:         Mood and Affect: Mood normal.         Behavior: Behavior normal. Behavior is cooperative.         Thought Content: Thought content does not include suicidal ideation.         Results Review:   I reviewed the patient's new clinical results.      ECG 12 Lead    Date/Time: 1/19/2022 6:30 PM  Performed by: Tamiko Armando APRN  Authorized by: Tamiko Armando APRN   Comparison: compared with previous ECG from 11/29/2021  Similar to previous ECG  Rhythm: sinus rhythm  Rate: bradycardic  BPM: 55  QRS axis: normal  Other findings: low voltage and prolonged QTc interval  Comments: Prolonged QT interval 480 ms  QTc interval 472 ms  Borderline EKG            Assessment / Plan:     1. BRYSON (dyspnea on exertion) (Primary)  --Multifactorial (obesity, deconditioning, mildly bradycardic)  --Normal treadmill stress test  --Normal 7-day Holter study  --Plan for echocardiogram (compare to 2017 results)    2. Acute midline thoracic back pain  --Recent EKG shows sinus bradycardia with prolonged QTc interval  --CT angiogram is negative for aortic aneurysm and dissection  --3/17, Serially mild elevation in cardiac troponins with a \"plateau pattern\".  Explanation for this is unknown.                --CPK, CPK-MB and CPK-MB fraction are all normal              --Normal coronary arteries per angiogram  --Normal treadmill stress test  --Lipid panel demonstrated elevated LDL; started atorvastatin     3. Dizziness and near syncope  --Normal 7-day Holter monitor  --No recurrence of symptoms  --Plan for echocardiogram to assess heart structure and function     4. Bradycardia, sinus  --Continue low-dose metoprolol  --7-day Holter monitor as previously described   -- Average HR:  71. Min HR:  53. Max HR:  127.    5. Abnormal EKG  --QTc " interval is 472 ms today  --QT interval is 480 ms today    Plan for follow-up if one year if her echocardiogram is normal.  Will consult Dr. Peñaloza regarding treatment plan via telephone encounter.    Preventative Cardiology:   Tobacco Cessation: N/A   Advance Care Planning: ACP discussion was declined by the patient. Patient does not have an advance directive, declines further assistance.     Follow Up:   Return in about 1 year (around 1/19/2023) for Follow-up with Dr. Peñaloza.      Thank you for allowing me to participate in the care of your patient. Please to not hesitate to contact me with additional questions or concerns.     Tamiko Armando, APRN

## 2022-01-19 NOTE — TELEPHONE ENCOUNTER
Dr. Peñaloza,  Will you please review this patient's treatment plan and see if you have anything else to offer.  She was referred by Dr. Jimenez, but was switched back to my schedule to allow you to cover the hospital.  I have also left her EKG on your desk to review.  If there is anything further you would like to add or have done, just let me know.  Thanks.

## 2022-01-20 NOTE — TELEPHONE ENCOUNTER
Will you please let this patient know that I ordered an echocardiogram to reassess her heart structure and function based on her shortness of breath.    I specifically sent my office note and EKG to Dr. Peñaloza for review and he didn't have anything else to add.  The patient seemed concerned yesterday and I just wanted to reassure her that he had an opportunity to review her EKG/office note.    Someone will call her to schedule her echo and we will compare that to her 2017 one.  I will call her with that result when it becomes available.    Thanks!

## 2022-03-10 DIAGNOSIS — G25.81 RESTLESS LEGS SYNDROME (RLS): ICD-10-CM

## 2022-03-10 DIAGNOSIS — D50.9 IRON DEFICIENCY ANEMIA, UNSPECIFIED IRON DEFICIENCY ANEMIA TYPE: ICD-10-CM

## 2022-03-11 RX ORDER — FERROUS FUMARATE 324(106)MG
TABLET ORAL
Qty: 180 TABLET | Refills: 1 | Status: SHIPPED | OUTPATIENT
Start: 2022-03-11 | End: 2022-11-07 | Stop reason: SDUPTHER

## 2022-03-11 NOTE — TELEPHONE ENCOUNTER
Rx Refill Note  Requested Prescriptions     Pending Prescriptions Disp Refills   • Ferrocite 324 MG tablet [Pharmacy Med Name: FERROCITE 324MG TABLETS] 180 tablet 1     Sig: TAKE 1 TABLET BY MOUTH TWICE DAILY      Last office visit with prescribing clinician: 11/4/2021      Next office visit with prescribing clinician: 5/5/2022            Kamilla Avila CMA  03/11/22, 08:16 EST

## 2022-05-05 ENCOUNTER — OFFICE VISIT (OUTPATIENT)
Dept: NEUROLOGY | Facility: CLINIC | Age: 52
End: 2022-05-05

## 2022-05-05 VITALS
SYSTOLIC BLOOD PRESSURE: 120 MMHG | BODY MASS INDEX: 42.85 KG/M2 | HEIGHT: 64 IN | OXYGEN SATURATION: 94 % | HEART RATE: 74 BPM | WEIGHT: 251 LBS | DIASTOLIC BLOOD PRESSURE: 82 MMHG

## 2022-05-05 DIAGNOSIS — G25.81 RESTLESS LEGS SYNDROME (RLS): Primary | ICD-10-CM

## 2022-05-05 DIAGNOSIS — G43.109 MIGRAINE WITH AURA AND WITHOUT STATUS MIGRAINOSUS, NOT INTRACTABLE: ICD-10-CM

## 2022-05-05 DIAGNOSIS — R22.31 ARM MASS, RIGHT: ICD-10-CM

## 2022-05-05 PROCEDURE — 99214 OFFICE O/P EST MOD 30 MIN: CPT | Performed by: NURSE PRACTITIONER

## 2022-05-05 RX ORDER — CARBIDOPA AND LEVODOPA 50; 200 MG/1; MG/1
TABLET, EXTENDED RELEASE ORAL
Qty: 360 TABLET | Refills: 3 | Status: SHIPPED | OUTPATIENT
Start: 2022-05-05 | End: 2022-11-07 | Stop reason: SDUPTHER

## 2022-05-05 RX ORDER — PRAMIPEXOLE DIHYDROCHLORIDE 0.5 MG/1
0.25 TABLET ORAL 3 TIMES DAILY
Qty: 135 TABLET | Refills: 3 | Status: SHIPPED | OUTPATIENT
Start: 2022-05-05 | End: 2022-11-07 | Stop reason: SDUPTHER

## 2022-05-05 NOTE — PROGRESS NOTES
Subjective:     Patient ID: Mary Lunsford is a 51 y.o. female.    CC:   Chief Complaint   Patient presents with   • Restless Legs Syndrome       HPI:   History of Present Illness   Today 5/5/2022-    This is a very pleasant 51-year-old female who presents for 6-month neurology follow-up on restless legs syndrome, present since 2009. She is currently managing her symptoms with, carbidopa-levodopa CR  mg 3 times daily, pramipexole 0.5 mg 3 times daily, and Lyrica 1 capsule nightly. She also has migraines and at the last visit, we did prescribe Ubrelvy to use as needed at onset of migraine. When we last saw her in clinic, we completed an updated ferritin level, which was 563 ng/mL and vitamin B12 level, which is 212 pg/mL, and methylmalonic acid level, which was 367, normal. She is treated with iron supplement and has previously had iron infusions in the past. Her mom and grandmother also have severe restless leg syndrome.     The patient notes that her restless leg symptoms have resolved. She continues to take Lyrica and Mirapex. She also continues to take her iron supplement twice daily. The patient had a b12 injection approximately 2 weeks ago. She sleeps fairly well, but some mornings she has to wake up at 6:30 AM if her legs are bothersome. There are also nights where she has to get up and walk around while watching television. She has been taking her 9:00 PM dose at 8:30 PM in attempt to prevent restless legs symptoms, but it is manageable overall.     She does not get migraines frequently, but she did have a headache on 04/30/2022. The patient's migraines are not debilitating, with the help of medication, she is usually better within 4 hours. She can tell when she is about to get a migraine because she sees zigzagging lines, which last about 20 minutes in the eyes and then she may get a headache following this. The patient took an Excedrin Migraine, and approximately 2 hours later, she took 2  "ibuprofen. She has not tried the Ubrelvy yet.    The patient has a mass on her right elbow. She had an ultrasound in 2019, which was normal. The mass at that time was 6mm. It is tender to palpation on the lateral epicondyle. She is concerned about a DVT.    Recently followed up with Cardiology with overall normal cardiac stress test. She is to follow up with them in 1 year.    Previous neuro workup included- She also underwent an EMG and NCVS of her right upper and left upper extremities and this was completely normal on 10/23/2019 with Dr. Jane 1 of our neurologist.  Underwent MRI of the brain with and without contrast which showed 2 very small chronic white matter changes of the brain and otherwise MRI of the brain was completely unremarkable with no abnormal contrast enhancement and no acute intracranial abnormalities.     She does also experience migraines with aura for several years.  She typically has used Excedrin Migraine along with ibuprofen.  She will get \"squiggly lines\" in both eyes and then about 20 minutes later will start to have the band of headache around her head with nausea, photophobia and phonophobia.  This typically last 5 to 6 hours.  Sometimes she does have to lay down and go to sleep.  She gets moderate throbbing pain.  She has not noticed a pattern.  Typically this will occur 5-6 times per year.  She used to take sumatriptan/Imitrex for many years and this became ineffective.  She switched to rizatriptan but this caused her to be very tired and did not seem to work well.  She did not like the side effects of either of the triptan medications since she felt like she could not focus and had to go to sleep.  Also caused some nausea.  She may have tried a third triptan but cannot recall exactly which one. Daughter also gets migraines.    The following portions of the patient's history were reviewed and updated as appropriate: allergies, current medications, past family history, past medical " "history, past social history, past surgical history and problem list.     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  states she had testing at , but was told that they didn't \"find anything\"   • Essential hypertension 3/30/2018   • Gallstone    • Kidney stones    • Migraine    • Obesity    • Ovarian cyst    • Restless leg syndrome    • Urinary tract infection    • Vitamin B12 deficiency    • Wears glasses     to read       Past Surgical History:   Procedure Laterality Date   • BREAST SURGERY Bilateral 1987    breast reduction   • CARDIAC CATHETERIZATION N/A 3/15/2017    Procedure: Coronary angiography;  Surgeon: Moustapha Peñaloza MD;  Location: [facility] CATH INVASIVE LOCATION;  Service:    • CARDIAC CATHETERIZATION N/A 3/15/2017    Procedure: Left ventriculography;  Surgeon: Moustapha Peñaloza MD;  Location: [facility] CATH INVASIVE LOCATION;  Service:    •  SECTION      1991 & 1995   • CHOLECYSTECTOMY  2013   • COLONOSCOPY N/A 10/3/2019    Procedure: COLONOSCOPY WITH BIOPSIES; ANOSCOPY;  Surgeon: Jose Paez MD;  Location: [facility] ENDOSCOPY;  Service: Gastroenterology   • ENDOSCOPY     • GASTRIC BYPASS  2011   • REDUCTION MAMMAPLASTY         Social History     Socioeconomic History   • Marital status:    Tobacco Use   • Smoking status: Former Smoker     Packs/day: 1.00     Years: 2.00     Pack years: 2.00     Quit date: 10/1995     Years since quittin.6   • Smokeless tobacco: Never Used   Vaping Use   • Vaping Use: Never used   Substance and Sexual Activity   • Alcohol use: Yes     Alcohol/week: 12.0 standard drinks     Types: 12 Cans of beer per week     Comment: 16 beers per week   • Drug use: No   • Sexual activity: Yes     Partners: Male     Birth control/protection: Post-menopausal       Family History   Problem Relation Age of " Onset   • Cancer Father    • Heart attack Father    • Cancer Other    • Other Other    • Breast cancer Mother    • Asthma Brother    • Ulcerative colitis Brother    • Arthritis Paternal Grandmother    • Colon cancer Neg Hx           Current Outpatient Medications:   •  ASPIRIN 81 PO, Take 1 tablet by mouth., Disp: , Rfl:   •  atorvastatin (LIPITOR) 40 MG tablet, Take 1 tablet by mouth Every Night., Disp: 90 tablet, Rfl: 3  •  carbidopa-levodopa CR (SINEMET CR)  MG per CR tablet, 1 tab tid and 1 extra if needed prn breakthrough rls symptoms, Disp: 360 tablet, Rfl: 3  •  cyanocobalamin 1000 MCG/ML injection, Inject 1 mL into the appropriate muscle as directed by prescriber Every 30 (Thirty) Days., Disp: 3 mL, Rfl: 3  •  Ferrocite 324 MG tablet, TAKE 1 TABLET BY MOUTH TWICE DAILY, Disp: 180 tablet, Rfl: 1  •  levothyroxine (SYNTHROID, LEVOTHROID) 88 MCG tablet, TAKE 1 TABLET BY MOUTH EVERY DAY, Disp: 90 tablet, Rfl: 1  •  Lyrica 75 MG capsule, Take 1 capsule by mouth Every Night., Disp: 90 capsule, Rfl: 1  •  metoprolol tartrate (LOPRESSOR) 25 MG tablet, TAKE 1 TABLET BY MOUTH EVERY 12 HOURS, Disp: 180 tablet, Rfl: 3  •  pramipexole (MIRAPEX) 0.5 MG tablet, Take 0.5 tablets by mouth 3 (Three) Times a Day., Disp: 135 tablet, Rfl: 3  •  Syringe, Disposable, 1 ML misc, Use to inject vitamin B12 every 14 days, Disp: 2 each, Rfl: 5  •  Syringe, Disposable, 1 ML misc, Use to inject vitamin B12 every 30 days, Disp: 3 each, Rfl: 3  •  ubrogepant (UBRELVY) 100 MG tablet, Take 1 tablet at onset of migraine, may repeat once in 2 hours if needed, Disp: 10 tablet, Rfl: 5     Review of Systems   Constitutional: Negative for chills, fatigue, fever and unexpected weight change.   HENT: Negative for ear pain, hearing loss, nosebleeds, rhinorrhea and sore throat.    Eyes: Negative for photophobia, pain, discharge, itching and visual disturbance.   Respiratory: Negative for cough, chest tightness, shortness of breath and wheezing.   "  Cardiovascular: Negative for chest pain, palpitations and leg swelling.   Gastrointestinal: Negative for abdominal pain, blood in stool, constipation, diarrhea, nausea and vomiting.   Genitourinary: Negative for dysuria, frequency, hematuria and urgency.   Musculoskeletal: Negative for arthralgias, back pain, gait problem, joint swelling, myalgias, neck pain and neck stiffness.   Skin: Negative for rash and wound.        Right elbow mass present a while but bigger and tender to touch at times   Allergic/Immunologic: Negative for environmental allergies and food allergies.   Neurological: Negative for dizziness, tremors, seizures, syncope, speech difficulty, weakness, light-headedness, numbness and headaches.        Restless leg syndrome, sleeping fine currently   Hematological: Negative for adenopathy. Does not bruise/bleed easily.   Psychiatric/Behavioral: Negative for agitation, confusion, decreased concentration, hallucinations, sleep disturbance and suicidal ideas. The patient is not nervous/anxious.    All other systems reviewed and are negative.       Objective:  /82   Pulse 74   Ht 162.6 cm (64\")   Wt 114 kg (251 lb)   LMP 12/01/2018 (LMP Unknown)   SpO2 94%   BMI 43.08 kg/m²     Neurologic Exam     Mental Status   Oriented to person, place, and time.   Speech: speech is normal   Level of consciousness: alert    Cranial Nerves     CN II   Visual fields full to confrontation.     CN III, IV, VI   Pupils are equal, round, and reactive to light.  Extraocular motions are normal.     CN VII   Facial expression full, symmetric.     CN VIII   CN VIII normal.     Gait, Coordination, and Reflexes     Gait  Gait: normal      Physical Exam  Constitutional:       Appearance: Normal appearance.   Eyes:      Extraocular Movements: EOM normal.      Pupils: Pupils are equal, round, and reactive to light.   Lymphadenopathy:      Upper Body:      Right upper body: Epitrochlear adenopathy (right side tenderness " antecubital) present.      Left upper body: Epitrochlear adenopathy (left side non tender antecubital) present.   Neurological:      Mental Status: She is alert and oriented to person, place, and time.      Gait: Gait is intact.   Psychiatric:         Mood and Affect: Mood and affect normal.         Speech: Speech normal.         Assessment/Plan:       Diagnoses and all orders for this visit:    1. Restless legs syndrome (RLS) (Primary)  -     Lyrica 75 MG capsule; Take 1 capsule by mouth Every Night.  Dispense: 90 capsule; Refill: 1  -     carbidopa-levodopa CR (SINEMET CR)  MG per CR tablet; 1 tab tid and 1 extra if needed prn breakthrough rls symptoms  Dispense: 360 tablet; Refill: 3  -     pramipexole (MIRAPEX) 0.5 MG tablet; Take 0.5 tablets by mouth 3 (Three) Times a Day.  Dispense: 135 tablet; Refill: 3    2. Migraine with aura and without status migrainosus, not intractable  Comments:  ubrelvy prn    3. Arm mass, right  -     US nonvascular extremity limited; Future        She will continue her current medications. We are not making changes today. She will follow up in 6 months or sooner if needed. Continue following with PCP and cardiology as scheduled.     We will complete ultrasound on her right arm to reevaluate the mass felt today. We will notify her of those results. I also reviewed signs and symptoms of DVTs as well as pulmonary embolisms and when to present to the ER for further evaluation.    Reviewed medications, potential side effects and signs and symptoms to report. Discussed risk versus benefits of treatment plan with patient and/or family-including medications, labs and radiology that may be ordered. Addressed questions and concerns during visit. Patient and/or family verbalized understanding and agree with plan.    AS THE PROVIDER, I PERSONALLY WORE PPE DURING ENTIRE FACE TO FACE ENCOUNTER IN CLINIC WITH THE PATIENT. PATIENT ALSO WORE PPE DURING ENTIRE FACE TO FACE ENCOUNTER EXCEPT FOR  A MAX OF 30 SECONDS DURING NEUROLOGICAL EVALUATION OF CRANIAL NERVES AND THEN MASK WAS PLACED BACK OVER PATIENT FACE FOR REMAINDER OF VISIT. I WASHED MY HANDS BEFORE AND AFTER VISIT.    As part of this patient's treatment plan I am prescribing controlled substances. The patient has been made aware of appropriate use of such medications, including potential risk of somnolence, limited ability to drive and/or work safely, and potential for dependence or overdose. It has also been made clear that these medications are for use by the patient only, without concomitant use of alcohol or other substances unless prescribed. Keep out of reach of children.  [place] report has been reviewed. If this is going to be prescribed long term, [facility] Controlled Substance Agreement Contract has also been read and signed by patient and myself.    During this visit the following were done:  Labs Reviewed [x]    Labs Ordered []    Radiology Reports Reviewed [x]  2019 u/s right arm showed small 6mm benign lymph node but otherwise wnl  Radiology Ordered []    PCP Records Reviewed [x]    Referring Provider Records Reviewed []    ER Records Reviewed []    Hospital Records Reviewed []    History Obtained From Family []    Radiology Images Reviewed []    Other Reviewed []    Records Requested []      Transcribed from ambient dictation for LINNEA Mendoza by Arnol Terry.   05/05/22   20:18 EDT    Patient verbalized consent to the visit recording.  I have personally performed the services described in this document as transcribed by the above individual, and it is both accurate and complete.  LINNEA Mendoza  5/5/2022  22:21 EDT

## 2022-05-26 ENCOUNTER — OFFICE VISIT (OUTPATIENT)
Dept: INTERNAL MEDICINE | Facility: CLINIC | Age: 52
End: 2022-05-26

## 2022-05-26 ENCOUNTER — HOSPITAL ENCOUNTER (OUTPATIENT)
Dept: GENERAL RADIOLOGY | Facility: HOSPITAL | Age: 52
Discharge: HOME | End: 2022-05-26
Admitting: NURSE PRACTITIONER

## 2022-05-26 VITALS
BODY MASS INDEX: 41.83 KG/M2 | HEIGHT: 64 IN | DIASTOLIC BLOOD PRESSURE: 77 MMHG | HEART RATE: 63 BPM | OXYGEN SATURATION: 100 % | TEMPERATURE: 97.1 F | SYSTOLIC BLOOD PRESSURE: 134 MMHG | WEIGHT: 245 LBS | RESPIRATION RATE: 17 BRPM

## 2022-05-26 DIAGNOSIS — R10.32 BILATERAL LOWER ABDOMINAL CRAMPING: Primary | ICD-10-CM

## 2022-05-26 DIAGNOSIS — R19.7 ACUTE DIARRHEA: ICD-10-CM

## 2022-05-26 DIAGNOSIS — K59.00 CONSTIPATION, UNSPECIFIED CONSTIPATION TYPE: ICD-10-CM

## 2022-05-26 DIAGNOSIS — R10.31 BILATERAL LOWER ABDOMINAL CRAMPING: Primary | ICD-10-CM

## 2022-05-26 DIAGNOSIS — R11.2 NAUSEA AND VOMITING, UNSPECIFIED VOMITING TYPE: ICD-10-CM

## 2022-05-26 LAB
BILIRUB BLD-MCNC: NEGATIVE MG/DL
CLARITY, POC: CLEAR
COLOR UR: YELLOW
EXPIRATION DATE: ABNORMAL
GLUCOSE UR STRIP-MCNC: NEGATIVE MG/DL
KETONES UR QL: NEGATIVE
LEUKOCYTE EST, POC: NEGATIVE
Lab: ABNORMAL
NITRITE UR-MCNC: NEGATIVE MG/ML
PH UR: 6 [PH] (ref 5–8)
PROT UR STRIP-MCNC: NEGATIVE MG/DL
RBC # UR STRIP: ABNORMAL /UL
SP GR UR: 1.01 (ref 1–1.03)
UROBILINOGEN UR QL: NORMAL

## 2022-05-26 PROCEDURE — 74018 RADEX ABDOMEN 1 VIEW: CPT

## 2022-05-26 PROCEDURE — 99214 OFFICE O/P EST MOD 30 MIN: CPT | Performed by: NURSE PRACTITIONER

## 2022-05-26 PROCEDURE — 81003 URINALYSIS AUTO W/O SCOPE: CPT | Performed by: NURSE PRACTITIONER

## 2022-05-26 RX ORDER — DOCUSATE SODIUM 100 MG/1
100 CAPSULE, LIQUID FILLED ORAL 2 TIMES DAILY
Qty: 120 CAPSULE | Refills: 0 | Status: SHIPPED | OUTPATIENT
Start: 2022-05-26 | End: 2023-01-10

## 2022-05-26 RX ORDER — ONDANSETRON 4 MG/1
4 TABLET, ORALLY DISINTEGRATING ORAL EVERY 8 HOURS PRN
Qty: 9 TABLET | Refills: 0 | Status: SHIPPED | OUTPATIENT
Start: 2022-05-26 | End: 2022-10-04

## 2022-05-26 NOTE — PROGRESS NOTES
Office Visit      Patient Name: Mary Lunsford  : 1970   MRN: [id number]   Care Team: Patient Care Team:  Floridalma Jimenez MD as PCP - General (Family Medicine)    Chief Complaint  Back Pain (Left lower pain), Abdominal Pain (With heaving/vomiting), and Diarrhea (Started on Tuesday, unusual for pt.  Pt is also having abdominal cramping.  Has a orange color to it.  Has eaten very little.)    Subjective     Subjective      Mary Lunsford presents to [facility] for abdominal pain. Onset 20. Location: epigastric, bilateral lower quadrant. Nature: cramping. Comes and goes. Associated symptoms: started with having some issues with constipation and straining to go to the bathroom (feels pressure as if she has to go), diarrhea, nausea, an episode of vomiting, loss of appetite, back pain. Eating bland foods and keeping them down. No sick contacts. No gallbladder. Has appendix. No recent antibiotics. No fever, body aches, chills. Reports a graduation to go to tomorrow evening and leaving for [place] this weekend but no recent travel.       Review of Systems   Constitutional: Positive for appetite change. Negative for chills, fatigue and fever.   HENT: Negative for congestion, rhinorrhea, sinus pressure and sore throat.    Eyes: Negative for visual disturbance.   Respiratory: Negative for cough, chest tightness and shortness of breath.    Cardiovascular: Negative for chest pain.   Gastrointestinal: Positive for abdominal pain, constipation, diarrhea, nausea and vomiting. Negative for abdominal distention, blood in stool, GERD and indigestion.   Genitourinary: Negative for decreased urine volume, difficulty urinating, frequency, hematuria and urgency.   Musculoskeletal: Negative for myalgias.   Neurological: Negative for dizziness, light-headedness and headache.   All other systems reviewed and are negative.      Objective     Objective   Vital Signs:   BP  "134/77   Pulse 63   Temp 97.1 °F (36.2 °C)   Resp 17   Ht 162.6 cm (64\")   Wt 111 kg (245 lb)   SpO2 100%   BMI 42.05 kg/m²     Physical Exam  Constitutional:       General: She is not in acute distress.     Appearance: Normal appearance. She is obese. She is not ill-appearing.   HENT:      Head: Normocephalic and atraumatic.      Mouth/Throat:      Mouth: Mucous membranes are moist.      Pharynx: Oropharynx is clear.   Eyes:      Extraocular Movements: Extraocular movements intact.      Pupils: Pupils are equal, round, and reactive to light.   Cardiovascular:      Rate and Rhythm: Normal rate and regular rhythm.      Heart sounds: Normal heart sounds.   Pulmonary:      Effort: Pulmonary effort is normal.      Breath sounds: Normal breath sounds.   Abdominal:      General: Abdomen is flat. Bowel sounds are decreased.      Palpations: Abdomen is soft. There is no hepatomegaly or splenomegaly.      Tenderness: There is abdominal tenderness in the right lower quadrant, epigastric area and left lower quadrant. There is no right CVA tenderness, left CVA tenderness, guarding or rebound. Negative signs include Rovsing's sign and McBurney's sign.      Hernia: No hernia is present.   Musculoskeletal:         General: Normal range of motion.      Cervical back: Normal range of motion and neck supple.   Lymphadenopathy:      Cervical: No cervical adenopathy.   Skin:     General: Skin is warm and dry.      Capillary Refill: Capillary refill takes less than 2 seconds.   Neurological:      Mental Status: She is alert and oriented to person, place, and time.   Psychiatric:         Mood and Affect: Mood normal. Affect is tearful.         Behavior: Behavior normal.          Assessment / Plan      Assessment & Plan   Problem List Items Addressed This Visit        Gastrointestinal Abdominal     Bilateral lower abdominal cramping - Primary    Relevant Orders    XR Abdomen KUB (Completed)    CBC w AUTO Differential    " "Comprehensive Metabolic Panel    Lipase    Sedimentation rate, automated    C-reactive protein    POCT urinalysis dipstick, automated (Completed)    Urine Culture - Urine, Urine, Clean Catch      Other Visit Diagnoses     Constipation, unspecified constipation type        Relevant Medications    docusate sodium (Colace) 100 MG capsule    Other Relevant Orders    XR Abdomen KUB (Completed)    Acute diarrhea        Nausea and vomiting, unspecified vomiting type        Relevant Medications    ondansetron ODT (Zofran ODT) 4 MG disintegrating tablet    Other Relevant Orders    CBC w AUTO Differential    Comprehensive Metabolic Panel    Lipase    Sedimentation rate, automated    C-reactive protein    POCT urinalysis dipstick, automated (Completed)           Discussion Summary  1. Bilateral lower abdominal cramping - Stat KUB and labs ordered. Differential diagnoses discussed. No red flags noted on exam or vitals.  Based on symptoms and exam, feel as though symptoms could be related to constipation. Stat KUB came back with nonobstructive bowel gas pattern and moderate stool present  throughout the colon. Urine showed trace blood present, will send for culture and contact with results which takes a few days. Discussed kidney stones as possibility. Patient has had stones before but states pain is different. This is more \"cramp-like\" with urge to stool. Symptoms and exam appear to be more related to constipation and no stone present in xray, will treat constipation. Patient understands when to go to ER.   2. Constipation - Colace BID sent to preferred pharmacy. Recommend miralax in addition to colace for next 3 days. Increase water and fiber intake. Walk and stay active, this helps bowels move stool through colon. If severe abdominal pain, fever go to ER. Patient agrees with plan of care and verbalizes understanding.  3. Acute diarrhea - Diarrhea can occur with constipation, usually goes around the hardened stool. Increase " fiber, water with electrolytes. Sodus diet.  4. N/V - Sent short term zofran, only take as needed. Nausea can occur with constipation. To ER with any worsening symptoms, unable to keep down food or drink, fever, severe abdominal pain.     I spent 30 minutes caring for Mary Lunsford on this date of service. This time includes time spent by me in the following activities: preparing for the visit, reviewing tests, performing a medically appropriate examination and/or evaluation, counseling and educating the patient/family/caregiver, ordering medications, tests, or procedures and documenting information in the medical record.    Follow Up   Return if symptoms worsen or fail to improve, to ER with worsening symptoms.  Patient was given instructions and counseling regarding her condition or for health maintenance advice. Please see specific information pulled into the AVS if appropriate.     LINNEA Nichols  [facility] [place]

## 2022-05-26 NOTE — PROGRESS NOTES
Called and spoke with patient about xray results. Verified patient ID. Shows moderate stool is presents throughout the colon with nonobstructive gas pattern present. These findings are consistent with constipation, presentation and exam findings. Will send colace to take BID, recommend miralax, lots of water and walking. Discussed s/s of when to go to ER; if pain worsens or fever. Patient agrees with POC and verbalizes understanding.

## 2022-05-27 LAB
ALBUMIN SERPL-MCNC: 4.3 G/DL (ref 3.5–5.2)
ALBUMIN/GLOB SERPL: 1.6 G/DL
ALP SERPL-CCNC: 75 U/L (ref 39–117)
ALT SERPL-CCNC: 7 U/L (ref 1–33)
AST SERPL-CCNC: 17 U/L (ref 1–32)
BASOPHILS # BLD AUTO: 0.04 10*3/MM3 (ref 0–0.2)
BASOPHILS NFR BLD AUTO: 0.7 % (ref 0–1.5)
BILIRUB SERPL-MCNC: 1.1 MG/DL (ref 0–1.2)
BUN SERPL-MCNC: 12 MG/DL (ref 6–20)
BUN/CREAT SERPL: 13 (ref 7–25)
CALCIUM SERPL-MCNC: 10 MG/DL (ref 8.6–10.5)
CHLORIDE SERPL-SCNC: 104 MMOL/L (ref 98–107)
CO2 SERPL-SCNC: 27.1 MMOL/L (ref 22–29)
CREAT SERPL-MCNC: 0.92 MG/DL (ref 0.57–1)
CRP SERPL-MCNC: 0.83 MG/DL (ref 0–0.5)
EGFRCR SERPLBLD CKD-EPI 2021: 74.6 ML/MIN/1.73
EOSINOPHIL # BLD AUTO: 0.16 10*3/MM3 (ref 0–0.4)
EOSINOPHIL NFR BLD AUTO: 2.9 % (ref 0.3–6.2)
ERYTHROCYTE [DISTWIDTH] IN BLOOD BY AUTOMATED COUNT: 12.1 % (ref 12.3–15.4)
ERYTHROCYTE [SEDIMENTATION RATE] IN BLOOD BY WESTERGREN METHOD: 9 MM/HR (ref 0–30)
GLOBULIN SER CALC-MCNC: 2.7 GM/DL
GLUCOSE SERPL-MCNC: 97 MG/DL (ref 65–99)
HCT VFR BLD AUTO: 41.8 % (ref 34–46.6)
HGB BLD-MCNC: 14.2 G/DL (ref 12–15.9)
IMM GRANULOCYTES # BLD AUTO: 0.01 10*3/MM3 (ref 0–0.05)
IMM GRANULOCYTES NFR BLD AUTO: 0.2 % (ref 0–0.5)
LIPASE SERPL-CCNC: 27 U/L (ref 13–60)
LYMPHOCYTES # BLD AUTO: 1.42 10*3/MM3 (ref 0.7–3.1)
LYMPHOCYTES NFR BLD AUTO: 25.4 % (ref 19.6–45.3)
MCH RBC QN AUTO: 32.6 PG (ref 26.6–33)
MCHC RBC AUTO-ENTMCNC: 34 G/DL (ref 31.5–35.7)
MCV RBC AUTO: 95.9 FL (ref 79–97)
MONOCYTES # BLD AUTO: 0.41 10*3/MM3 (ref 0.1–0.9)
MONOCYTES NFR BLD AUTO: 7.3 % (ref 5–12)
NEUTROPHILS # BLD AUTO: 3.56 10*3/MM3 (ref 1.7–7)
NEUTROPHILS NFR BLD AUTO: 63.5 % (ref 42.7–76)
NRBC BLD AUTO-RTO: 0 /100 WBC (ref 0–0.2)
PLATELET # BLD AUTO: 216 10*3/MM3 (ref 140–450)
POTASSIUM SERPL-SCNC: 4.3 MMOL/L (ref 3.5–5.2)
PROT SERPL-MCNC: 7 G/DL (ref 6–8.5)
RBC # BLD AUTO: 4.36 10*6/MM3 (ref 3.77–5.28)
SODIUM SERPL-SCNC: 140 MMOL/L (ref 136–145)
WBC # BLD AUTO: 5.6 10*3/MM3 (ref 3.4–10.8)

## 2022-05-28 LAB
BACTERIA UR CULT: NORMAL
BACTERIA UR CULT: NORMAL

## 2022-06-14 ENCOUNTER — APPOINTMENT (OUTPATIENT)
Dept: ULTRASOUND IMAGING | Facility: HOSPITAL | Age: 52
End: 2022-06-14

## 2022-06-15 DIAGNOSIS — G25.81 RESTLESS LEGS SYNDROME (RLS): ICD-10-CM

## 2022-06-15 NOTE — TELEPHONE ENCOUNTER
Rx Refill Note  Requested Prescriptions     Pending Prescriptions Disp Refills   • Lyrica 75 MG capsule [Pharmacy Med Name: LYRICA 75MG CAPSULES] 90 capsule      Sig: TAKE 1 CAPSULE BY MOUTH EVERY NIGHT      Last office visit with prescribing clinician: 5/5/2022      Next office visit with prescribing clinician: 11/7/2022            Kamilla Avila CMA  06/15/22, 14:37 EDT

## 2022-06-23 DIAGNOSIS — E07.9 THYROID DISORDER: ICD-10-CM

## 2022-06-23 RX ORDER — LEVOTHYROXINE SODIUM 88 UG/1
TABLET ORAL
Qty: 90 TABLET | Refills: 1 | Status: SHIPPED | OUTPATIENT
Start: 2022-06-23 | End: 2022-12-12

## 2022-06-23 NOTE — TELEPHONE ENCOUNTER
Rx Refill Note  Requested Prescriptions     Pending Prescriptions Disp Refills   • levothyroxine (SYNTHROID, LEVOTHROID) 88 MCG tablet [Pharmacy Med Name: LEVOTHYROXINE 0.088MG (88MCG) TAB] 90 tablet 1     Sig: TAKE 1 TABLET BY MOUTH EVERY DAY      Last office visit with prescribing clinician: 11/29/2021      Next office visit with prescribing clinician: Visit date not found            AILYN FELIZ MA  06/23/22, 18:05 EDT

## 2022-10-04 ENCOUNTER — OFFICE VISIT (OUTPATIENT)
Dept: INTERNAL MEDICINE | Facility: CLINIC | Age: 52
End: 2022-10-04

## 2022-10-04 VITALS
DIASTOLIC BLOOD PRESSURE: 80 MMHG | OXYGEN SATURATION: 96 % | TEMPERATURE: 97.7 F | HEIGHT: 65 IN | WEIGHT: 248 LBS | SYSTOLIC BLOOD PRESSURE: 120 MMHG | HEART RATE: 69 BPM | BODY MASS INDEX: 41.32 KG/M2

## 2022-10-04 DIAGNOSIS — M54.50 PAIN IN RIGHT LUMBAR REGION OF BACK: ICD-10-CM

## 2022-10-04 DIAGNOSIS — I10 ESSENTIAL HYPERTENSION: ICD-10-CM

## 2022-10-04 DIAGNOSIS — R10.31 RIGHT GROIN PAIN: Primary | ICD-10-CM

## 2022-10-04 DIAGNOSIS — K59.00 CONSTIPATION, UNSPECIFIED CONSTIPATION TYPE: ICD-10-CM

## 2022-10-04 PROCEDURE — 99214 OFFICE O/P EST MOD 30 MIN: CPT | Performed by: NURSE PRACTITIONER

## 2022-10-04 NOTE — PROGRESS NOTES
Office Visit      Patient Name: Mary Lunsford  : 1970   MRN: [id number]     Chief Complaint:    Chief Complaint   Patient presents with   • Groin Pain     X 3-4 weeks       History of Present Illness: Mary Lunsford is a 51 y.o. female who is here today with reports of right groin pain that has been present for about 3 weeks. She did fall 2 months ago, does not recall injury of groin. When her groin started hurting she also noted pain in her elbows and hands.  Pain in upper extremities has improved since then.  Worse when she stands after sitting for long periods of time. Describes pain as aching, occasional sharp.        Constipated.  Taking stool softener 5 days a week.  Plans to start taking miralax daily or every other day rather than using stool softeners as they have not provided relief.      Right lumbar spine pain. Chronic.  No lower back injury.      Subjective      I have reviewed and the following portions of the patient's history were updated as appropriate: past family history, past medical history, past social history, past surgical history and problem list.      Current Outpatient Medications:   •  ASPIRIN 81 PO, Take 1 tablet by mouth., Disp: , Rfl:   •  atorvastatin (LIPITOR) 40 MG tablet, Take 1 tablet by mouth Every Night., Disp: 90 tablet, Rfl: 3  •  carbidopa-levodopa CR (SINEMET CR)  MG per CR tablet, 1 tab tid and 1 extra if needed prn breakthrough rls symptoms, Disp: 360 tablet, Rfl: 3  •  docusate sodium (Colace) 100 MG capsule, Take 1 capsule by mouth 2 (Two) Times a Day. (Patient taking differently: Take 100 mg by mouth 2 (Two) Times a Day. PRN), Disp: 120 capsule, Rfl: 0  •  Ferrocite 324 MG tablet, TAKE 1 TABLET BY MOUTH TWICE DAILY, Disp: 180 tablet, Rfl: 1  •  levothyroxine (SYNTHROID, LEVOTHROID) 88 MCG tablet, TAKE 1 TABLET BY MOUTH EVERY DAY, Disp: 90 tablet, Rfl: 1  •  Lyrica 75 MG capsule, TAKE 1 CAPSULE BY MOUTH EVERY NIGHT, Disp: 90 capsule, Rfl:  "1  •  metoprolol tartrate (LOPRESSOR) 25 MG tablet, TAKE 1 TABLET BY MOUTH EVERY 12 HOURS (Patient taking differently: 12.5 mg 2 (Two) Times a Day.), Disp: 180 tablet, Rfl: 3  •  pramipexole (MIRAPEX) 0.5 MG tablet, Take 0.5 tablets by mouth 3 (Three) Times a Day., Disp: 135 tablet, Rfl: 3  •  Syringe, Disposable, 1 ML misc, Use to inject vitamin B12 every 14 days, Disp: 2 each, Rfl: 5  •  Syringe, Disposable, 1 ML misc, Use to inject vitamin B12 every 30 days, Disp: 3 each, Rfl: 3  •  ubrogepant (UBRELVY) 100 MG tablet, Take 1 tablet at onset of migraine, may repeat once in 2 hours if needed, Disp: 10 tablet, Rfl: 5  •  cyanocobalamin 1000 MCG/ML injection, Inject 1 mL into the appropriate muscle as directed by prescriber Every 30 (Thirty) Days., Disp: 3 mL, Rfl: 3    Allergies   Allergen Reactions   • Sulfa Antibiotics Rash and Other (See Comments)     blind       Objective     Physical Exam:  Vital Signs:   Vitals:    10/04/22 1535   BP: 120/80   Pulse: 69   Temp: 97.7 °F (36.5 °C)   TempSrc: Temporal   SpO2: 96%   Weight: 112 kg (248 lb)   Height: 163.8 cm (64.5\")   PainSc: 1  Comment: worse upon movement     Body mass index is 41.91 kg/m².    Physical Exam  Constitutional:       Appearance: She is morbidly obese. She is not ill-appearing.   HENT:      Head: Normocephalic.      Right Ear: External ear normal.      Left Ear: External ear normal.   Eyes:      Conjunctiva/sclera: Conjunctivae normal.      Pupils: Pupils are equal, round, and reactive to light.   Cardiovascular:      Rate and Rhythm: Normal rate and regular rhythm.      Pulses:           Radial pulses are 2+ on the right side and 2+ on the left side.        Dorsalis pedis pulses are 2+ on the right side and 2+ on the left side.      Heart sounds: Normal heart sounds.   Pulmonary:      Effort: Pulmonary effort is normal.      Breath sounds: Normal breath sounds.   Musculoskeletal:      Cervical back: Normal range of motion and neck supple.      " Lumbar back: Normal. Negative right straight leg raise test and negative left straight leg raise test.      Right lower leg: No edema.      Left lower leg: No edema.      Comments: Right groin tender to palpation, pain with extension and flexion of right hip   Skin:     General: Skin is warm.      Capillary Refill: Capillary refill takes less than 2 seconds.   Neurological:      Mental Status: She is alert and oriented to person, place, and time.      Coordination: Coordination normal.      Gait: Gait normal.   Psychiatric:         Mood and Affect: Mood normal.         Behavior: Behavior normal.         Thought Content: Thought content normal.         Assessment / Plan      Assessment/Plan:   Diagnoses and all orders for this visit:    1. Right groin pain (Primary)        - Use heat for 30 minutes every 4 hours prn pain, with barrier between skin and heat source        - Gentle stretches of bilateral legs, lower back every morning and evening    2. Pain in right lumbar region of back  -     MRI Lumbar Spine Without Contrast; Future    3. Essential hypertension        - Follow heart healthy diet.  Keep sodium intake < 1500 mg per day.  Avoid processed & fast foods.          - Exercise as tolerated, with a goal of 30 minutes of moderate exercise most days.         - Take medications as prescribed.    4. Constipation, unspecified constipation type         - Take miralax every other day for 5-7 days, then may increase to daily use if ineffective         - Stay well hydrated, be physically active as tolerated most days, eat healthy/well balanced diet              Follow Up:   Return for Next scheduled follow up.    Patient was given instructions and counseling regarding her condition or for health maintenance advice. Please see specific information pulled into the AVS if appropriate.       [facility]     Please note that portions of this note may have been completed with a voice recognition  program. Efforts were made to edit dictation, but occasionally words are mistranscribed.

## 2022-10-06 ENCOUNTER — TELEPHONE (OUTPATIENT)
Dept: INTERNAL MEDICINE | Facility: CLINIC | Age: 52
End: 2022-10-06

## 2022-10-06 NOTE — TELEPHONE ENCOUNTER
Caller: Mary Lunsford    Relationship: Self    Best call back number: [phone]    What form or medical record are you requesting: WORK EXCUSE    Who is requesting this form or medical record from you: [employer]    How would you like to receive the form or medical records (pick-up, mail, fax): FAX  If fax, what is the fax number: [phone]    Timeframe paperwork needed: ASAP    Additional notes: WORK NOTE FROM 10/4

## 2022-11-07 ENCOUNTER — OFFICE VISIT (OUTPATIENT)
Dept: NEUROLOGY | Facility: CLINIC | Age: 52
End: 2022-11-07

## 2022-11-07 VITALS
SYSTOLIC BLOOD PRESSURE: 128 MMHG | HEART RATE: 65 BPM | BODY MASS INDEX: 39.27 KG/M2 | HEIGHT: 64 IN | DIASTOLIC BLOOD PRESSURE: 80 MMHG | OXYGEN SATURATION: 99 % | WEIGHT: 230 LBS

## 2022-11-07 DIAGNOSIS — D50.9 IRON DEFICIENCY ANEMIA, UNSPECIFIED IRON DEFICIENCY ANEMIA TYPE: ICD-10-CM

## 2022-11-07 DIAGNOSIS — G43.109 MIGRAINE WITH AURA AND WITHOUT STATUS MIGRAINOSUS, NOT INTRACTABLE: Primary | ICD-10-CM

## 2022-11-07 DIAGNOSIS — G25.81 RESTLESS LEGS SYNDROME (RLS): ICD-10-CM

## 2022-11-07 PROCEDURE — 99214 OFFICE O/P EST MOD 30 MIN: CPT | Performed by: NURSE PRACTITIONER

## 2022-11-07 RX ORDER — PRAMIPEXOLE DIHYDROCHLORIDE 0.5 MG/1
0.25 TABLET ORAL 3 TIMES DAILY
Qty: 135 TABLET | Refills: 3 | Status: SHIPPED | OUTPATIENT
Start: 2022-11-07

## 2022-11-07 RX ORDER — CARBIDOPA AND LEVODOPA 50; 200 MG/1; MG/1
TABLET, EXTENDED RELEASE ORAL
Qty: 360 TABLET | Refills: 3 | Status: SHIPPED | OUTPATIENT
Start: 2022-11-07

## 2022-11-07 RX ORDER — FERROUS FUMARATE 324(106)MG
1 TABLET ORAL 2 TIMES DAILY
Qty: 180 TABLET | Refills: 3 | Status: SHIPPED | OUTPATIENT
Start: 2022-11-07

## 2022-11-07 NOTE — PROGRESS NOTES
Subjective:     Patient ID: Mary Lunsford is a 52 y.o. female.    CC:   Chief Complaint   Patient presents with   • Restless Legs Syndrome       HPI:   History of Present Illness   Today 11/7/2022- This is a 52-year-old female who presents for 6-month neurology follow-up on chronic restless leg syndrome present since 2009. She also has intermittent migraine headaches. She has been taking Lyrica brand name necessary 75 mg at night. Pregabalin generic did not help her symptoms. She takes carbidopa-levodopa CR 50/200 mg 1 pill 3 times a day and 1 extra if needed. She takes pramipexole 0.5 mg 0.5 tablets 3 times a day. She is here for follow-up. She continues to see her primary care provider as well as her cardiologist.    She states that her restless legs are about the same. She states that she struggles in the evenings around 7 to 8pm when she is trying to sit down and watch television. She states that she is sleeping well. She denies any other changes in her health since her last visit.    She states that she may have had 1 headache since her last visit, but she did not have the Ubrelvy with her. This medication was prescribed 1 year ago. She states that she has not needed to take the medication. She states that she may have 3 headaches over a 3-month period. She states that her headaches come in phases, where sometimes she will have some, and other times she will not have any.      She states that she has been experiencing right groin pain. She states that she was nervous about it because she thought she had swollen lymph nodes. She states that she was told that it was probably connected to her back. She states that she has had trouble with her back in the past. She states that she used to think it was when she was lifting, but lately it started bothering her more. She states that sometimes when she gets up from sitting, she has to stop because it hurts. She states that when she is driving she sometimes has to  redirect her weight to the left side. She states that it is not a shooting pain, but it is an ache. She states that it does not shoot down her legs. She denies any numbness or tingling in her perineal area. She denies any loss of bowel or bladder control. She denies weakness. She reports that she is having an MRI of her back on 11/29/2022. She states that she fell in the last 4 months, but she does not believe that it was connected to her back pain. She states that her back has been bothering her for a long time and it has gotten worse.      She states that she is still taking the pramipexole half a pill 3 times a day. She states that it makes her sleepy. She states that she is still taking the Lyrica every night. She states that she takes the carbidopa- levodopa 3 times a day. She states that she has not had to take any extra recently. She states that her legs do wake her up sometimes on the weekends. She states that she is used to being up at 5:15 AM to 5:30 AM. She states that she will have her medicine by the bed if she wants to sleep in. She states that she has a puppy, so she does not get to sleep in anyway.    She states that for about 2 weeks, she started having a sharp pain in her right nostril. She states that she would get a burning sensation. She states that it would radiate up into her head on the right side. She states that it lasted for about 2 weeks. She states that it has not happened again since she made the doctor's appointment with her PCP.    She states that she has had constipation. She states that it has not been going on as long as her back pain started. She states that she threw up one night, so she went to the doctor the next day because she was miserable. She states that she was at work and all of a sudden she started having diarrhea and stomach pain. She states that when she got home, she knew she had to go to the bathroom, but she could not. She states that she was going to the beach and  "she sat in the bathroom for 2 hours. She states that she threw up once, but it was not anything but bile. She states that she was also dry heaving. She states that she did not go to work the next day. She states that she went to the doctor and she was told that she was backed up with stool. She states that she was sent home on stool softeners and MiraLAX. She states that she was at the beach on 05/30/2022, and she was home for almost a week, but had not used the bathroom yet. She states that she used a suppository and then she went to the bathroom. She states that she does not have a bowel movement every day. She states that she has not been very good about taking the stool softener. She states that she bought the packs of MiraLAX to put in her water bottles. She states that she did take them for a bit, but then she quit. She states that she is still taking the iron. She states that this is the first time she has struggled with constipation. She denies any changes in her medications.     Previous neuro workup included- She also underwent an EMG and NCVS of her right upper and left upper extremities and this was completely normal on 10/23/2019 with Dr. Buck Lan of our neurologist.  Underwent MRI of the brain with and without contrast which showed 2 very small chronic white matter changes of the brain and otherwise MRI of the brain was completely unremarkable with no abnormal contrast enhancement and no acute intracranial abnormalities.     She does also experience migraines with aura for several years.  She typically has used Excedrin Migraine along with ibuprofen.  She will get \"squiggly lines\" in both eyes and then about 20 minutes later will start to have the band of headache around her head with nausea, photophobia and phonophobia.  This typically last 5 to 6 hours.  Sometimes she does have to lay down and go to sleep.  She gets moderate throbbing pain.  She has not noticed a pattern.  Typically this will occur " "5-6 times per year.  She used to take sumatriptan/Imitrex for many years and this became ineffective.  She switched to rizatriptan but this caused her to be very tired and did not seem to work well.  She did not like the side effects of either of the triptan medications since she felt like she could not focus and had to go to sleep.  Also caused some nausea.  She may have tried a third triptan but cannot recall exactly which one. Daughter also gets migraines. Episodic migraines-a few in a few months and then resolve for several months. Has not used ubrelvy but has samples.    2021 labs-ferritin level, which was 563 ng/mL and vitamin B12 level, which is 212 pg/mL, and methylmalonic acid level, which was 367, normal.    She is treated with iron supplement and has previously had iron infusions in the past. Her mom and grandmother also have severe restless leg syndrome. Ropinirole and neupro patch as well as generic pregabalin, gabapentin and Horizant all ineffective for RLS.    The following portions of the patient's history were reviewed and updated as appropriate: allergies, current medications, past family history, past medical history, past social history, past surgical history and problem list.    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  states she had testing at [facility], but was told that they didn't \"find anything\"   • Essential hypertension 3/30/2018   • Gallstone    • Kidney stones    • Migraine    • Obesity    • Ovarian cyst    • Restless leg syndrome    • Urinary tract infection    • Vitamin B12 deficiency    • Wears glasses     to read       Past Surgical History:   Procedure Laterality Date   • BREAST SURGERY Bilateral 07/1987    breast reduction   • CARDIAC CATHETERIZATION N/A 3/15/2017    Procedure: Coronary angiography;  Surgeon: Moustapha Peñaloza MD;  Location: Health system" [facility] CATH INVASIVE LOCATION;  Service:    • CARDIAC CATHETERIZATION N/A 3/15/2017    Procedure: Left ventriculography;  Surgeon: Moustapha Peñaloza MD;  Location: [facility] CATH INVASIVE LOCATION;  Service:    •  SECTION      1991 & 1995   • CHOLECYSTECTOMY  2013   • COLONOSCOPY N/A 10/3/2019    Procedure: COLONOSCOPY WITH BIOPSIES; ANOSCOPY;  Surgeon: Jose Paez MD;  Location: [facility] ENDOSCOPY;  Service: Gastroenterology   • ENDOSCOPY     • GASTRIC BYPASS  2011   • REDUCTION MAMMAPLASTY         Social History     Socioeconomic History   • Marital status:    Tobacco Use   • Smoking status: Former     Packs/day: 1.00     Years: 2.00     Pack years: 2.00     Types: Cigarettes     Quit date: 10/1995     Years since quittin.1   • Smokeless tobacco: Never   Vaping Use   • Vaping Use: Never used   Substance and Sexual Activity   • Alcohol use: Yes     Alcohol/week: 12.0 standard drinks     Types: 12 Cans of beer per week     Comment: 16 beers per week   • Drug use: No   • Sexual activity: Yes     Partners: Male     Birth control/protection: Post-menopausal       Family History   Problem Relation Age of Onset   • Cancer Father    • Heart attack Father    • Cancer Other    • Other Other    • Breast cancer Mother    • Asthma Brother    • Ulcerative colitis Brother    • Arthritis Paternal Grandmother    • Colon cancer Neg Hx           Current Outpatient Medications:   •  ASPIRIN 81 PO, Take 1 tablet by mouth., Disp: , Rfl:   •  atorvastatin (LIPITOR) 40 MG tablet, Take 1 tablet by mouth Every Night., Disp: 90 tablet, Rfl: 3  •  carbidopa-levodopa CR (SINEMET CR)  MG per CR tablet, 1 tab tid and 1 extra if needed prn breakthrough rls symptoms, Disp: 360 tablet, Rfl: 3  •  Ferrous Fumarate (Ferrocite) 324 (106 Fe) MG tablet, Take 1 tablet by mouth 2 (Two) Times a Day., Disp: 180 tablet, Rfl: 3  •  levothyroxine (SYNTHROID, LEVOTHROID) 88 MCG tablet, TAKE 1 TABLET BY MOUTH EVERY DAY,  Disp: 90 tablet, Rfl: 1  •  Lyrica 75 MG capsule, Take 1 capsule by mouth Every Night., Disp: 90 capsule, Rfl: 1  •  metoprolol tartrate (LOPRESSOR) 25 MG tablet, TAKE 1 TABLET BY MOUTH EVERY 12 HOURS (Patient taking differently: 12.5 mg 2 (Two) Times a Day.), Disp: 180 tablet, Rfl: 3  •  pramipexole (MIRAPEX) 0.5 MG tablet, Take 0.5 tablets by mouth 3 (Three) Times a Day., Disp: 135 tablet, Rfl: 3  •  Syringe, Disposable, 1 ML misc, Use to inject vitamin B12 every 14 days, Disp: 2 each, Rfl: 5  •  Syringe, Disposable, 1 ML misc, Use to inject vitamin B12 every 30 days, Disp: 3 each, Rfl: 3  •  ubrogepant (UBRELVY) 100 MG tablet, Take 1 tablet at onset of migraine, may repeat once in 2 hours if needed, Disp: 10 tablet, Rfl: 5  •  cyanocobalamin 1000 MCG/ML injection, Inject 1 mL into the appropriate muscle as directed by prescriber Every 30 (Thirty) Days., Disp: 3 mL, Rfl: 3  •  docusate sodium (Colace) 100 MG capsule, Take 1 capsule by mouth 2 (Two) Times a Day. (Patient taking differently: Take 1 capsule by mouth 2 (Two) Times a Day. PRN), Disp: 120 capsule, Rfl: 0     Review of Systems   Constitutional: Negative for chills, fatigue, fever and unexpected weight change.   HENT: Negative for ear pain, hearing loss, nosebleeds, rhinorrhea and sore throat.    Eyes: Negative for photophobia, pain, discharge, itching and visual disturbance.   Respiratory: Negative for cough, chest tightness, shortness of breath and wheezing.    Cardiovascular: Negative for chest pain, palpitations and leg swelling.   Gastrointestinal: Negative for abdominal pain, blood in stool, constipation, diarrhea, nausea and vomiting.   Genitourinary: Negative for dysuria, frequency, hematuria and urgency.        Right groin pain, pending MRI L spine   Musculoskeletal: Negative for arthralgias, back pain, gait problem, joint swelling, myalgias, neck pain and neck stiffness.   Skin: Negative for rash and wound.   Allergic/Immunologic: Negative for  "environmental allergies and food allergies.   Neurological: Negative for dizziness, tremors, seizures, syncope, speech difficulty, weakness, light-headedness, numbness and headaches.   Hematological: Negative for adenopathy. Does not bruise/bleed easily.   Psychiatric/Behavioral: Negative for agitation, confusion, decreased concentration, hallucinations, sleep disturbance and suicidal ideas. The patient is not nervous/anxious.    All other systems reviewed and are negative.       Objective:  /80   Pulse 65   Ht 162.6 cm (64\")   Wt 104 kg (230 lb)   LMP 12/01/2018 (LMP Unknown)   SpO2 99%   BMI 39.48 kg/m²     Neurologic Exam     Mental Status   Oriented to person, place, and time.   Speech: speech is normal   Level of consciousness: alert    Cranial Nerves   Cranial nerves II through XII intact.     Motor Exam   Muscle bulk: normal  Overall muscle tone: normal    Strength   Strength 5/5 throughout.     Sensory Exam   Vibration normal.     Gait, Coordination, and Reflexes     Gait  Gait: normal    Coordination   Finger to nose coordination: normal    Tremor   Resting tremor: absent  Intention tremor: absent  Action tremor: absent    Reflexes   Reflexes 2+ except as noted.   Right : 2+  Left : 2+  Right ankle clonus: absent  Left ankle clonus: absent      Physical Exam  Constitutional:       Appearance: Normal appearance.      Comments: BMI 39.5   Musculoskeletal:      Lumbar back: Tenderness (mild right sciatic region pain with turning to right) present. No swelling, edema, deformity, signs of trauma, lacerations, spasms or bony tenderness. Decreased range of motion. Negative right straight leg raise test and negative left straight leg raise test. No scoliosis.      Right hip: Tenderness present.        Legs:    Neurological:      Mental Status: She is alert and oriented to person, place, and time.      Cranial Nerves: Cranial nerves 2-12 are intact.      Motor: Motor strength is normal.      " Coordination: Finger-Nose-Finger Test normal.      Gait: Gait is intact.   Psychiatric:         Speech: Speech normal.       Results: X-rays of sacroiliac joints in 02/2020, which was read as normal. A pelvic ultrasound in 2020, which was also read as normal.    She has MRI of the lumbar spine without contrast pending 05/26/2022.     She did have some moderate stool burden on an x-ray of the abdomen.    Assessment/Plan:       Diagnoses and all orders for this visit:    1. Migraine with aura and without status migrainosus, not intractable (Primary)  Comments:  ubrelvy as needed if reoccurs, she has samples, has not required    2. Restless legs syndrome (RLS)  -     pramipexole (MIRAPEX) 0.5 MG tablet; Take 0.5 tablets by mouth 3 (Three) Times a Day.  Dispense: 135 tablet; Refill: 3  -     Lyrica 75 MG capsule; Take 1 capsule by mouth Every Night.  Dispense: 90 capsule; Refill: 1  -     carbidopa-levodopa CR (SINEMET CR)  MG per CR tablet; 1 tab tid and 1 extra if needed prn breakthrough rls symptoms  Dispense: 360 tablet; Refill: 3  -     Ferrous Fumarate (Ferrocite) 324 (106 Fe) MG tablet; Take 1 tablet by mouth 2 (Two) Times a Day.  Dispense: 180 tablet; Refill: 3    3. Iron deficiency anemia, unspecified iron deficiency anemia type  Comments:  Continue iron replacement  Orders:  -     Ferrous Fumarate (Ferrocite) 324 (106 Fe) MG tablet; Take 1 tablet by mouth 2 (Two) Times a Day.  Dispense: 180 tablet; Refill: 3           She is going to continue her current medications. She recently saw her PCP clinic for some right groin pain which has been intermittent but persistent and progressively worsened over the past couple of years. She previously had x-rays of sacroiliac joints in 02/2021 which was read as normal and a pelvic ultrasound in 2020 which was also read as normal. She has an MRI of the lumbar spine without contrast pending 05/26/2023. She did have some moderate stool burden on an x-ray of the abdomen  at the time and was treated with stool softener and MiraLAX. She continues to have some difficulty with constipation. Encouraged her to use the stool softener daily, MiraLAX as needed and increase fluid intake. She does have to take iron replacement chronically with previous iron infusions required. She will continue her current medication. She is sleeping well with her Lyrica. She will call us with any questions or concerns prior to follow up in clinic. Recommend follow up with PCP in regards to her constipation and groin pain if not improved.    Reviewed medications, potential side effects and signs and symptoms to report. Discussed risk versus benefits of treatment plan with patient and/or family-including medications, labs and radiology that may be ordered. Addressed questions and concerns during visit. Patient and/or family verbalized understanding and agree with plan.    As part of this patient's treatment plan I am prescribing controlled substances. The patient has been made aware of appropriate use of such medications, including potential risk of somnolence, limited ability to drive and/or work safely, and potential for dependence or overdose. It has also been made clear that these medications are for use by the patient only, without concomitant use of alcohol or other substances unless prescribed. Keep out of reach of children.  [place] report has been reviewed. If this is going to be prescribed long term, [facility] Controlled Substance Agreement Contract has also been read and signed by patient and myself.    AS THE PROVIDER, I PERSONALLY WORE PPE DURING ENTIRE FACE TO FACE ENCOUNTER IN CLINIC WITH THE PATIENT. PATIENT ALSO WORE PPE DURING ENTIRE FACE TO FACE ENCOUNTER EXCEPT FOR A MAX OF 30 SECONDS DURING NEUROLOGICAL EVALUATION OF CRANIAL NERVES AND THEN MASK WAS PLACED BACK OVER PATIENT FACE FOR REMAINDER OF VISIT. I WASHED MY HANDS BEFORE AND AFTER VISIT.    During this visit the following were done:  Labs  Reviewed [x]    Labs Ordered []    Radiology Reports Reviewed []    Radiology Ordered []    PCP Records Reviewed [x]    Referring Provider Records Reviewed []    ER Records Reviewed []    Hospital Records Reviewed []    History Obtained From Family []    Radiology Images Reviewed []    Other Reviewed [x]    Records Requested []      Transcribed from ambient dictation for LINNEA Mendoza by Yolande Ling.  11/08/22   10:31 EST    Patient or patient representative verbalized consent to the visit recording.  I have personally performed the services described in this document as transcribed by the above individual, and it is both accurate and complete.  LINNEA Mendoza  11/9/2022  10:24 EST

## 2022-11-29 ENCOUNTER — HOSPITAL ENCOUNTER (OUTPATIENT)
Dept: MRI IMAGING | Facility: HOSPITAL | Age: 52
Discharge: HOME | End: 2022-11-29

## 2022-11-29 DIAGNOSIS — M54.50 PAIN IN RIGHT LUMBAR REGION OF BACK: ICD-10-CM

## 2022-12-05 ENCOUNTER — TELEPHONE (OUTPATIENT)
Dept: INTERNAL MEDICINE | Facility: CLINIC | Age: 52
End: 2022-12-05

## 2022-12-05 NOTE — TELEPHONE ENCOUNTER
Patient did not complete below ordered on below. This order is too old to be used and has been cancelled.

## 2022-12-10 DIAGNOSIS — E78.5 HYPERLIPIDEMIA LDL GOAL <100: ICD-10-CM

## 2022-12-10 DIAGNOSIS — E07.9 THYROID DISORDER: ICD-10-CM

## 2022-12-12 DIAGNOSIS — E07.9 THYROID DISORDER: ICD-10-CM

## 2022-12-12 DIAGNOSIS — E78.5 HYPERLIPIDEMIA LDL GOAL <100: ICD-10-CM

## 2022-12-12 RX ORDER — ATORVASTATIN CALCIUM 40 MG/1
40 TABLET, FILM COATED ORAL NIGHTLY
Qty: 90 TABLET | OUTPATIENT
Start: 2022-12-12

## 2022-12-12 RX ORDER — LEVOTHYROXINE SODIUM 88 MCG
TABLET ORAL
Qty: 90 TABLET | Refills: 1 | Status: SHIPPED | OUTPATIENT
Start: 2022-12-12 | End: 2023-03-14 | Stop reason: ALTCHOICE

## 2022-12-12 RX ORDER — LEVOTHYROXINE SODIUM 88 MCG
TABLET ORAL
Qty: 90 TABLET | Refills: 1 | OUTPATIENT
Start: 2022-12-12

## 2022-12-12 RX ORDER — ATORVASTATIN CALCIUM 40 MG/1
40 TABLET, FILM COATED ORAL NIGHTLY
Qty: 30 TABLET | Refills: 0 | Status: SHIPPED | OUTPATIENT
Start: 2022-12-12 | End: 2023-01-09

## 2022-12-12 NOTE — TELEPHONE ENCOUNTER
Rx Refill Note  Requested Prescriptions     Pending Prescriptions Disp Refills   • Synthroid 88 MCG tablet [Pharmacy Med Name: SYNTHROID 0.088MG (88MCG)TABLETS] 90 tablet 1     Sig: TAKE 1 TABLET BY MOUTH EVERY DAY      Last office visit with prescribing clinician: 10/04/2022   Last telemedicine visit with prescribing clinician: 12/12/2022   Next office visit with prescribing clinician: 12/12/2022                         Would you like a call back once the refill request has been completed: [] Yes [] No    If the office needs to give you a call back, can they leave a voicemail: [] Yes [] No    Jax Dimas MA  12/12/22, 12:32 EST

## 2022-12-14 DIAGNOSIS — G25.81 RESTLESS LEGS SYNDROME (RLS): ICD-10-CM

## 2022-12-15 NOTE — TELEPHONE ENCOUNTER
Already refilled in November. Unsure why the duplicate request. Pharmacy confirms they already received. LINNEA Lopez

## 2022-12-15 NOTE — TELEPHONE ENCOUNTER
Rx Refill Note  Requested Prescriptions     Pending Prescriptions Disp Refills   • Lyrica 75 MG capsule [Pharmacy Med Name: LYRICA 75MG CAPSULES] 90 capsule      Sig: TAKE 1 CAPSULE BY MOUTH EVERY NIGHT      Last office visit with prescribing clinician: 11/7/2022   Last telemedicine visit with prescribing clinician: 5/11/2023   Next office visit with prescribing clinician: 5/11/2023                         Would you like a call back once the refill request has been completed: [] Yes [] No    If the office needs to give you a call back, can they leave a voicemail: [] Yes [] No    Kamilla Avila CMA  12/15/22, 08:39 EST

## 2022-12-15 NOTE — TELEPHONE ENCOUNTER
I refilled this on 11/7/2022 for 90 days with 1 refill to her pharmacy. Did they not get it??? I am getting a lot of duplicate requests. If I still need to approve, let me know, but it does show confirmation from last visit that pharmacy received scripts. Debbie Ramos    E-Prescribing Status    Outpatient Medication Detail    Lyrica 75 MG capsule        Sig: Take 1 capsule by mouth Every Night.        Sent to pharmacy as: Lyrica 75 MG Oral Capsule        Class: Normal        Route: Oral        E-Prescribing Status: Receipt confirmed by pharmacy (11/7/2022  4:29 PM EST)

## 2022-12-29 ENCOUNTER — HOSPITAL ENCOUNTER (OUTPATIENT)
Dept: MRI IMAGING | Facility: HOSPITAL | Age: 52
Discharge: HOME | End: 2022-12-29
Admitting: NURSE PRACTITIONER

## 2022-12-29 PROCEDURE — 72148 MRI LUMBAR SPINE W/O DYE: CPT

## 2023-01-09 DIAGNOSIS — E78.5 HYPERLIPIDEMIA LDL GOAL <100: ICD-10-CM

## 2023-01-09 RX ORDER — ATORVASTATIN CALCIUM 40 MG/1
40 TABLET, FILM COATED ORAL NIGHTLY
Qty: 90 TABLET | Refills: 0 | Status: SHIPPED | OUTPATIENT
Start: 2023-01-09 | End: 2023-01-10 | Stop reason: SDUPTHER

## 2023-01-09 RX ORDER — ATORVASTATIN CALCIUM 40 MG/1
40 TABLET, FILM COATED ORAL NIGHTLY
Qty: 30 TABLET | Refills: 0 | Status: SHIPPED | OUTPATIENT
Start: 2023-01-09 | End: 2023-01-09

## 2023-01-10 ENCOUNTER — OFFICE VISIT (OUTPATIENT)
Dept: CARDIOLOGY | Facility: CLINIC | Age: 53
End: 2023-01-10
Payer: COMMERCIAL

## 2023-01-10 VITALS — OXYGEN SATURATION: 99 % | HEIGHT: 64 IN | BODY MASS INDEX: 42 KG/M2 | WEIGHT: 246 LBS | HEART RATE: 68 BPM

## 2023-01-10 DIAGNOSIS — E66.01 MORBID OBESITY: ICD-10-CM

## 2023-01-10 DIAGNOSIS — E78.5 DYSLIPIDEMIA: ICD-10-CM

## 2023-01-10 DIAGNOSIS — I40.1 SUBACUTE IDIOPATHIC MYOCARDITIS: ICD-10-CM

## 2023-01-10 DIAGNOSIS — I10 ESSENTIAL HYPERTENSION: Primary | ICD-10-CM

## 2023-01-10 DIAGNOSIS — E78.5 HYPERLIPIDEMIA LDL GOAL <100: ICD-10-CM

## 2023-01-10 PROCEDURE — 99213 OFFICE O/P EST LOW 20 MIN: CPT | Performed by: INTERNAL MEDICINE

## 2023-01-10 RX ORDER — LORATADINE 10 MG
CAPSULE ORAL
COMMUNITY
Start: 2023-01-05 | End: 2023-01-10

## 2023-01-10 RX ORDER — ATORVASTATIN CALCIUM 40 MG/1
40 TABLET, FILM COATED ORAL NIGHTLY
Qty: 90 TABLET | Refills: 4 | Status: SHIPPED | OUTPATIENT
Start: 2023-01-10

## 2023-01-10 RX ORDER — DOXYCYCLINE HYCLATE 100 MG/1
100 CAPSULE ORAL 2 TIMES DAILY
COMMUNITY
Start: 2023-01-05 | End: 2023-01-10

## 2023-01-10 NOTE — PROGRESS NOTES
Subjective:     Encounter Date:01/10/2023      Patient ID: Mary Lunsford is a 52 y.o. female.    Chief Complaint: Hypertension  HPI  This is a 52-year-old female patient who presents to cardiology clinic for routine follow-up.  Since her last visit she has had no active cardiovascular issues, symptoms or hospitalizations.  She reports compliance with her medications with no perceived side effects.  She remains a non-smoker.  She has gained a significant amount of weight since her last visit.  The following portions of the patient's history were reviewed and updated as appropriate: allergies, current medications, past family history, past medical history, past social history, past surgical history and problem  Review of Systems   Constitutional: Negative for chills, diaphoresis, fever, malaise/fatigue, weight gain and weight loss.   HENT: Negative for ear discharge, hearing loss, hoarse voice and nosebleeds.    Eyes: Negative for discharge, double vision, pain and photophobia.   Cardiovascular: Negative for chest pain, claudication, cyanosis, dyspnea on exertion, irregular heartbeat, leg swelling, near-syncope, orthopnea, palpitations, paroxysmal nocturnal dyspnea and syncope.   Respiratory: Negative for cough, hemoptysis, shortness of breath, sputum production and wheezing.    Endocrine: Negative for cold intolerance, heat intolerance, polydipsia, polyphagia and polyuria.   Hematologic/Lymphatic: Negative for adenopathy and bleeding problem. Does not bruise/bleed easily.   Skin: Negative for color change, flushing, itching and rash.   Musculoskeletal: Negative for muscle cramps, muscle weakness, myalgias and stiffness.   Gastrointestinal: Negative for abdominal pain, diarrhea, hematemesis, hematochezia, nausea and vomiting.   Genitourinary: Negative for dysuria, frequency and nocturia.   Neurological: Negative for focal weakness, loss of balance, numbness, paresthesias and seizures.    Psychiatric/Behavioral: Negative for altered mental status, hallucinations and suicidal ideas.   Allergic/Immunologic: Negative for HIV exposure, hives and persistent infections.           Current Outpatient Medications:   •  ASPIRIN 81 PO, Take 1 tablet by mouth., Disp: , Rfl:   •  atorvastatin (LIPITOR) 40 MG tablet, TAKE 1 TABLET BY MOUTH EVERY NIGHT, Disp: 90 tablet, Rfl: 0  •  carbidopa-levodopa CR (SINEMET CR)  MG per CR tablet, 1 tab tid and 1 extra if needed prn breakthrough rls symptoms, Disp: 360 tablet, Rfl: 3  •  cyanocobalamin 1000 MCG/ML injection, Inject 1 mL into the appropriate muscle as directed by prescriber Every 30 (Thirty) Days., Disp: 3 mL, Rfl: 3  •  Ferrous Fumarate (Ferrocite) 324 (106 Fe) MG tablet, Take 1 tablet by mouth 2 (Two) Times a Day., Disp: 180 tablet, Rfl: 3  •  Lyrica 75 MG capsule, Take 1 capsule by mouth Every Night., Disp: 90 capsule, Rfl: 1  •  metoprolol tartrate (LOPRESSOR) 25 MG tablet, TAKE 1 TABLET BY MOUTH EVERY 12 HOURS (Patient taking differently: 12.5 mg 2 (Two) Times a Day.), Disp: 180 tablet, Rfl: 3  •  pramipexole (MIRAPEX) 0.5 MG tablet, Take 0.5 tablets by mouth 3 (Three) Times a Day., Disp: 135 tablet, Rfl: 3  •  Synthroid 88 MCG tablet, TAKE 1 TABLET BY MOUTH EVERY DAY, Disp: 90 tablet, Rfl: 1  •  Syringe, Disposable, 1 ML misc, Use to inject vitamin B12 every 14 days, Disp: 2 each, Rfl: 5  •  Syringe, Disposable, 1 ML misc, Use to inject vitamin B12 every 30 days, Disp: 3 each, Rfl: 3  •  ubrogepant (UBRELVY) 100 MG tablet, Take 1 tablet at onset of migraine, may repeat once in 2 hours if needed, Disp: 10 tablet, Rfl: 5    Objective:   Vitals and nursing note reviewed.   Constitutional:       Appearance: Healthy appearance. Not in distress.   Neck:      Vascular: No JVR. JVD normal.   Pulmonary:      Effort: Pulmonary effort is normal.      Breath sounds: Normal breath sounds. No wheezing. No rhonchi. No rales.   Chest:      Chest wall: Not tender to  palpatation.   Cardiovascular:      PMI at left midclavicular line. Normal rate. Regular rhythm. Normal S1. Normal S2.      Murmurs: There is no murmur.      No gallop. No click. No rub.   Pulses:     Intact distal pulses.   Edema:     Peripheral edema absent.   Abdominal:      General: Bowel sounds are normal.      Palpations: Abdomen is soft.      Tenderness: There is no abdominal tenderness.   Musculoskeletal: Normal range of motion.         General: No tenderness. Skin:     General: Skin is warm and dry.   Neurological:      General: No focal deficit present.      Mental Status: Alert and oriented to person, place and time.       Pulse 68,BP: 115/80,  height 162.6 cm (64\"), weight 112 kg (246 lb), last menstrual period 12/01/2018, SpO2 99 %, not currently breastfeeding.   Lab Review:     Assessment:       1. Hyperlipidemia LDL goal <100  Tolerating statin based cholesterol-lowering therapy without side effects.    2. Essential hypertension  Acceptable blood pressure control.    3. Subacute idiopathic myocarditis  Presumed post-COVID-19 associated transient myocardial inflammation.  Complete recovery of LV systolic function with no signs or symptoms to suggest congestive heart failure.    4. Morbid obesity (HCC)  Body mass index is now greater than 42.  The obesity pattern is central.  This is due to excess calorie intake.  There is evidence of multiple comorbidities.    5. Dyslipidemia      Procedures    Plan:     The importance of diet exercise and weight management has been stressed to the patient.    No changes in medications have been made at today's visit.    No cardiovascular testing is warranted at this time.

## 2023-01-25 ENCOUNTER — TRANSCRIBE ORDERS (OUTPATIENT)
Dept: ADMINISTRATIVE | Facility: HOSPITAL | Age: 53
End: 2023-01-25
Payer: COMMERCIAL

## 2023-01-25 DIAGNOSIS — Z12.39 SCREENING BREAST EXAMINATION: Primary | ICD-10-CM

## 2023-02-03 ENCOUNTER — OFFICE VISIT (OUTPATIENT)
Dept: INTERNAL MEDICINE | Facility: CLINIC | Age: 53
End: 2023-02-03
Payer: COMMERCIAL

## 2023-02-03 VITALS
HEIGHT: 64 IN | SYSTOLIC BLOOD PRESSURE: 120 MMHG | HEART RATE: 86 BPM | OXYGEN SATURATION: 99 % | WEIGHT: 241 LBS | BODY MASS INDEX: 41.15 KG/M2 | TEMPERATURE: 98.3 F | DIASTOLIC BLOOD PRESSURE: 74 MMHG

## 2023-02-03 DIAGNOSIS — M54.50 PAIN IN RIGHT LUMBAR REGION OF BACK: Primary | ICD-10-CM

## 2023-02-03 PROCEDURE — 99213 OFFICE O/P EST LOW 20 MIN: CPT | Performed by: NURSE PRACTITIONER

## 2023-02-03 RX ORDER — HYDROCODONE BITARTRATE AND ACETAMINOPHEN 5; 325 MG/1; MG/1
1 TABLET ORAL EVERY 6 HOURS PRN
Qty: 12 TABLET | Refills: 0 | Status: SHIPPED | OUTPATIENT
Start: 2023-02-03

## 2023-02-03 NOTE — PROGRESS NOTES
Office Visit      Patient Name: Mary Lunsford  : 1970   MRN: [id number]     Chief Complaint:    Chief Complaint   Patient presents with   • Back Pain       History of Present Illness: Mary Lunsford is a 52 y.o. female who is here today with worsening of chronic right side lumbar pain. Worse with lying down flat and when standing up after sitting for a while.  She has taken ibuprofen in the past few days, was not effective.  Having difficulty walking, sitting up straight.  Does not recall recent overuse/overextension or injury of lower back.  Denies dysuria, change in bowel or bladder habits.    Subjective      I have reviewed and the following portions of the patient's history were updated as appropriate: past family history, past medical history, past social history, past surgical history and problem list.      Current Outpatient Medications:   •  ASPIRIN 81 PO, Take 1 tablet by mouth., Disp: , Rfl:   •  atorvastatin (LIPITOR) 40 MG tablet, Take 1 tablet by mouth Every Night., Disp: 90 tablet, Rfl: 4  •  carbidopa-levodopa CR (SINEMET CR)  MG per CR tablet, 1 tab tid and 1 extra if needed prn breakthrough rls symptoms, Disp: 360 tablet, Rfl: 3  •  cyanocobalamin 1000 MCG/ML injection, Inject 1 mL into the appropriate muscle as directed by prescriber Every 30 (Thirty) Days., Disp: 3 mL, Rfl: 3  •  Ferrous Fumarate (Ferrocite) 324 (106 Fe) MG tablet, Take 1 tablet by mouth 2 (Two) Times a Day., Disp: 180 tablet, Rfl: 3  •  Lyrica 75 MG capsule, Take 1 capsule by mouth Every Night., Disp: 90 capsule, Rfl: 1  •  metoprolol tartrate (LOPRESSOR) 25 MG tablet, TAKE 1 TABLET BY MOUTH EVERY 12 HOURS (Patient taking differently: 12.5 mg 2 (Two) Times a Day.), Disp: 180 tablet, Rfl: 3  •  pramipexole (MIRAPEX) 0.5 MG tablet, Take 0.5 tablets by mouth 3 (Three) Times a Day., Disp: 135 tablet, Rfl: 3  •  Synthroid 88 MCG tablet, TAKE 1 TABLET BY MOUTH EVERY DAY, Disp: 90 tablet, Rfl: 1  •   "Syringe, Disposable, 1 ML misc, Use to inject vitamin B12 every 14 days, Disp: 2 each, Rfl: 5  •  Syringe, Disposable, 1 ML misc, Use to inject vitamin B12 every 30 days, Disp: 3 each, Rfl: 3  •  ubrogepant (UBRELVY) 100 MG tablet, Take 1 tablet at onset of migraine, may repeat once in 2 hours if needed, Disp: 10 tablet, Rfl: 5  •  benzonatate (TESSALON) 100 MG capsule, Take 1 capsule by mouth 3 (Three) Times a Day As Needed for Cough., Disp: 30 capsule, Rfl: 0  •  doxycycline (VIBRAMYCIN) 100 MG capsule, Take 1 capsule by mouth 2 (Two) Times a Day., Disp: 20 capsule, Rfl: 0  •  HYDROcodone-acetaminophen (Norco) 5-325 MG per tablet, Take 1 tablet by mouth Every 6 (Six) Hours As Needed for Moderate Pain., Disp: 12 tablet, Rfl: 0  •  predniSONE (DELTASONE) 20 MG tablet, Take 2 tablets by mouth Daily for 4 days., Disp: 8 tablet, Rfl: 0  •  promethazine-dextromethorphan (PROMETHAZINE-DM) 6.25-15 MG/5ML syrup, Take 5 mL by mouth 4 (Four) Times a Day As Needed for Cough., Disp: 118 mL, Rfl: 0    Allergies   Allergen Reactions   • Sulfa Antibiotics Rash and Other (See Comments)     blind       Objective     Physical Exam:  Vital Signs:   Vitals:    02/03/23 1141   BP: 120/74   Pulse: 86   Temp: 98.3 °F (36.8 °C)   SpO2: 99%   Weight: 109 kg (241 lb)   Height: 162.6 cm (64.02\")     Body mass index is 41.35 kg/m².    Physical Exam  Constitutional:       Appearance: She is not ill-appearing.   HENT:      Head: Normocephalic.      Right Ear: External ear normal.      Left Ear: External ear normal.   Eyes:      Conjunctiva/sclera: Conjunctivae normal.      Pupils: Pupils are equal, round, and reactive to light.   Cardiovascular:      Rate and Rhythm: Normal rate and regular rhythm.      Pulses:           Radial pulses are 2+ on the right side and 2+ on the left side.        Dorsalis pedis pulses are 2+ on the right side and 2+ on the left side.      Heart sounds: Normal heart sounds.   Pulmonary:      Effort: Pulmonary effort " is normal.      Breath sounds: Normal breath sounds.   Musculoskeletal:      Cervical back: Normal range of motion and neck supple.      Lumbar back: Tenderness present. No swelling or deformity. Decreased range of motion. Positive left straight leg raise test. Negative right straight leg raise test.   Skin:     General: Skin is warm.      Capillary Refill: Capillary refill takes less than 2 seconds.   Neurological:      Mental Status: She is alert and oriented to person, place, and time.      Coordination: Coordination normal.      Gait: Gait normal.   Psychiatric:         Mood and Affect: Mood normal.         Behavior: Behavior normal.         Thought Content: Thought content normal.             Assessment / Plan      Assessment/Plan:   Diagnoses and all orders for this visit:    1. Pain in right lumbar region of back (Primary)  -     Ambulatory Referral to Orthopedic Surgery  -     HYDROcodone-acetaminophen (Norco) 5-325 MG per tablet; Take 1 tablet by mouth Every 6 (Six) Hours As Needed for Moderate Pain.  Dispense: 12 tablet; Refill: 0  - May continue to take ibuprofen prn pain      Follow Up:   Return if symptoms worsen or fail to improve.    Patient was given instructions and counseling regarding her condition or for health maintenance advice. Please see specific information pulled into the AVS if appropriate.       [facility]     Please note that portions of this note may have been completed with a voice recognition program. Efforts were made to edit dictation, but occasionally words are mistranscribed.

## 2023-02-10 ENCOUNTER — OFFICE VISIT (OUTPATIENT)
Dept: INTERNAL MEDICINE | Facility: CLINIC | Age: 53
End: 2023-02-10
Payer: COMMERCIAL

## 2023-02-10 VITALS
WEIGHT: 244 LBS | BODY MASS INDEX: 40.65 KG/M2 | OXYGEN SATURATION: 97 % | SYSTOLIC BLOOD PRESSURE: 140 MMHG | HEART RATE: 87 BPM | DIASTOLIC BLOOD PRESSURE: 90 MMHG | RESPIRATION RATE: 16 BRPM | HEIGHT: 65 IN | TEMPERATURE: 98.1 F

## 2023-02-10 DIAGNOSIS — J06.9 UPPER RESPIRATORY TRACT INFECTION, UNSPECIFIED TYPE: Primary | ICD-10-CM

## 2023-02-10 LAB
EXPIRATION DATE: NORMAL
FLUAV AG UPPER RESP QL IA.RAPID: NOT DETECTED
FLUBV AG UPPER RESP QL IA.RAPID: NOT DETECTED
INTERNAL CONTROL: NORMAL
Lab: NORMAL
SARS-COV-2 AG UPPER RESP QL IA.RAPID: NOT DETECTED

## 2023-02-10 PROCEDURE — 99214 OFFICE O/P EST MOD 30 MIN: CPT | Performed by: INTERNAL MEDICINE

## 2023-02-10 PROCEDURE — 87428 SARSCOV & INF VIR A&B AG IA: CPT | Performed by: INTERNAL MEDICINE

## 2023-02-10 RX ORDER — DOXYCYCLINE HYCLATE 100 MG/1
100 CAPSULE ORAL 2 TIMES DAILY
Qty: 20 CAPSULE | Refills: 0 | Status: SHIPPED | OUTPATIENT
Start: 2023-02-10 | End: 2023-02-20

## 2023-02-10 NOTE — PROGRESS NOTES
Subjective     Patient ID: Mary Lunsford is a 52 y.o. female. Patient is here for management of multiple medical problems.     Chief Complaint   Patient presents with   • Headache   • Earache   • Generalized Body Aches   • Cough     History of Present Illness     Cough.  Chest congestion. Started Tuesday night.  Wed got worse at work. Fever wed night. UTC.   Given steroids and no abx.    Cough meds.              The following portions of the patient's history were reviewed and updated as appropriate: allergies, current medications, past family history, past medical history, past social history, past surgical history and problem list.    Review of Systems    Current Outpatient Medications:   •  ASPIRIN 81 PO, Take 1 tablet by mouth., Disp: , Rfl:   •  atorvastatin (LIPITOR) 40 MG tablet, Take 1 tablet by mouth Every Night., Disp: 90 tablet, Rfl: 4  •  benzonatate (TESSALON) 100 MG capsule, Take 1 capsule by mouth 3 (Three) Times a Day As Needed for Cough., Disp: 30 capsule, Rfl: 0  •  carbidopa-levodopa CR (SINEMET CR)  MG per CR tablet, 1 tab tid and 1 extra if needed prn breakthrough rls symptoms, Disp: 360 tablet, Rfl: 3  •  cyanocobalamin 1000 MCG/ML injection, Inject 1 mL into the appropriate muscle as directed by prescriber Every 30 (Thirty) Days., Disp: 3 mL, Rfl: 3  •  Ferrous Fumarate (Ferrocite) 324 (106 Fe) MG tablet, Take 1 tablet by mouth 2 (Two) Times a Day., Disp: 180 tablet, Rfl: 3  •  HYDROcodone-acetaminophen (Norco) 5-325 MG per tablet, Take 1 tablet by mouth Every 6 (Six) Hours As Needed for Moderate Pain., Disp: 12 tablet, Rfl: 0  •  Lyrica 75 MG capsule, Take 1 capsule by mouth Every Night., Disp: 90 capsule, Rfl: 1  •  metoprolol tartrate (LOPRESSOR) 25 MG tablet, TAKE 1 TABLET BY MOUTH EVERY 12 HOURS (Patient taking differently: 12.5 mg 2 (Two) Times a Day.), Disp: 180 tablet, Rfl: 3  •  pramipexole (MIRAPEX) 0.5 MG tablet, Take 0.5 tablets by mouth 3 (Three) Times a Day., Disp:  "135 tablet, Rfl: 3  •  predniSONE (DELTASONE) 20 MG tablet, Take 2 tablets by mouth Daily for 4 days., Disp: 8 tablet, Rfl: 0  •  promethazine-dextromethorphan (PROMETHAZINE-DM) 6.25-15 MG/5ML syrup, Take 5 mL by mouth 4 (Four) Times a Day As Needed for Cough., Disp: 118 mL, Rfl: 0  •  Synthroid 88 MCG tablet, TAKE 1 TABLET BY MOUTH EVERY DAY, Disp: 90 tablet, Rfl: 1  •  Syringe, Disposable, 1 ML misc, Use to inject vitamin B12 every 14 days, Disp: 2 each, Rfl: 5  •  Syringe, Disposable, 1 ML misc, Use to inject vitamin B12 every 30 days, Disp: 3 each, Rfl: 3  •  ubrogepant (UBRELVY) 100 MG tablet, Take 1 tablet at onset of migraine, may repeat once in 2 hours if needed, Disp: 10 tablet, Rfl: 5  •  doxycycline (VIBRAMYCIN) 100 MG capsule, Take 1 capsule by mouth 2 (Two) Times a Day., Disp: 20 capsule, Rfl: 0    Objective      Blood pressure 140/90, pulse 87, temperature 98.1 °F (36.7 °C), resp. rate 16, height 163.8 cm (64.5\"), weight 111 kg (244 lb), last menstrual period 12/01/2018, SpO2 97 %, not currently breastfeeding.    Physical Exam     General Appearance:    Alert, cooperative, no distress, appears stated age   Head:    Normocephalic, without obvious abnormality, atraumatic   Eyes:    PERRL, conjunctiva/corneas clear, EOM's intact   Ears:    Normal TM's and external ear canals, both ears   Nose:   Nares normal, septum midline, mucosa normal, no drainage   or sinus tenderness   Throat:   Lips, mucosa, and tongue normal; teeth and gums normal   Neck:   Supple, symmetrical, trachea midline, no adenopathy;        thyroid:  No enlargement/tenderness/nodules; no carotid    bruit or JVD   Back:     Symmetric, no curvature, ROM normal, no CVA tenderness   Lungs:     Clear to auscultation bilaterally, respirations unlabored   Chest wall:    No tenderness or deformity   Heart:    Regular rate and rhythm, S1 and S2 normal, no murmur,        rub or gallop   Abdomen:     Soft, non-tender, bowel sounds active all four " quadrants,     no masses, no organomegaly   Extremities:   Extremities normal, atraumatic, no cyanosis or edema   Pulses:   2+ and symmetric all extremities   Skin:   Skin color, texture, turgor normal, no rashes or lesions   Lymph nodes:   Cervical, supraclavicular, and axillary nodes normal   Neurologic:   CNII-XII intact. Normal strength, sensation and reflexes       throughout      Results for orders placed or performed during the hospital encounter of 02/09/23   POC Influenza A/B    Specimen: Swab   Result Value Ref Range    Rapid Influenza A Ag Negative     Rapid Influenza B Ag Negative     Internal Control Passed     Lot Number [id number]     Expiration Date 07-31-24          Assessment & Plan     Now with yellow sputum on cough.  Give steroids without coverage for other infectious dz. Pt will stop steroids.     Neg labs      Diagnoses and all orders for this visit:    1. Upper respiratory tract infection, unspecified type (Primary)  -     POCT SARS-CoV-2 Antigen MELISSA    Other orders  -     doxycycline (VIBRAMYCIN) 100 MG capsule; Take 1 capsule by mouth 2 (Two) Times a Day.  Dispense: 20 capsule; Refill: 0      No follow-ups on file.          There are no Patient Instructions on file for this visit.     Campbell Bonilla MD    Assessment & Plan

## 2023-02-13 ENCOUNTER — OFFICE VISIT (OUTPATIENT)
Dept: INTERNAL MEDICINE | Facility: CLINIC | Age: 53
End: 2023-02-13
Payer: COMMERCIAL

## 2023-02-13 VITALS
BODY MASS INDEX: 40.48 KG/M2 | TEMPERATURE: 98 F | DIASTOLIC BLOOD PRESSURE: 84 MMHG | HEART RATE: 78 BPM | HEIGHT: 65 IN | OXYGEN SATURATION: 97 % | SYSTOLIC BLOOD PRESSURE: 128 MMHG | WEIGHT: 243 LBS

## 2023-02-13 DIAGNOSIS — J40 BRONCHITIS: Primary | ICD-10-CM

## 2023-02-13 PROCEDURE — 96372 THER/PROPH/DIAG INJ SC/IM: CPT | Performed by: FAMILY MEDICINE

## 2023-02-13 PROCEDURE — 99213 OFFICE O/P EST LOW 20 MIN: CPT | Performed by: FAMILY MEDICINE

## 2023-02-13 RX ORDER — METHYLPREDNISOLONE SODIUM SUCCINATE 125 MG/2ML
125 INJECTION, POWDER, LYOPHILIZED, FOR SOLUTION INTRAMUSCULAR; INTRAVENOUS ONCE
Status: COMPLETED | OUTPATIENT
Start: 2023-02-13 | End: 2023-02-13

## 2023-02-13 RX ORDER — HYDROCODONE POLISTIREX AND CHLORPHENIRAMINE POLISTIREX 10; 8 MG/5ML; MG/5ML
5 SUSPENSION, EXTENDED RELEASE ORAL EVERY 12 HOURS PRN
Qty: 60 ML | Refills: 0 | Status: SHIPPED | OUTPATIENT
Start: 2023-02-13

## 2023-02-13 RX ADMIN — METHYLPREDNISOLONE SODIUM SUCCINATE 125 MG: 125 INJECTION, POWDER, LYOPHILIZED, FOR SOLUTION INTRAMUSCULAR; INTRAVENOUS at 14:25

## 2023-02-13 NOTE — PROGRESS NOTES
Subjective:     Patient ID: Mary Lunsford is a 48 y.o. female.    CC:   Chief Complaint   Patient presents with   • restless leg syndrome       HPI:   History of Present Illness   This is a very pleasant 47 yo female who presents for 6 month follow up on severe RLS for 10+ years. Her mother has RLS and grandmother also had severe RLS. She is currently taking Sinemet CR TID & 1 extra dose PRN breakthrough symptoms and Mirapex 0.25mg TID. Failed Neupro patch. Previously failed ropinirole. She has been on Lyrica 75mg QHS for RLS and this was helping significantly with sleep and restlessness. On 8/20/19 she had the generic pregabalin filled and has taken this for a month and is not sleeping as wlel, has a lot more leg restlessness and has more restlessness in the mornings causing her to struggle with concentration. She is wondering if she can switch back to branded Lyrica since this was helping a lot more. She has the standing desk at work. She has confirmed PLMD with sleep study in 2017 and no BASIL. She is taking iron BID for long term RICHAR-ferritin stable required iron infusions in past. B12 better with PCP replacing. Previously tried Ropinirole, Neupro and Horizant without significant improvement in symptoms.She now has a standing desk at work to help with daytime RLS symptoms and this has been beneficial. She is aware of Dopamine Agonists' risks and denies any unusual behavioral changes.     He tells me she has had a lot of other health issues going on recently.  She tells me that she is having a lot of joint pain, swelling in both of her elbow regions, no numbness or tingling in her arms, pain in her left hand, some thyroid issues and she tells me she has had 3 episodes in the past 2 weeks of tingling in the right side of her face which just lasted a few minutes and goes away completely, denies any vision changes, denies a headache, denies slurred speech, denies weakness or other symptoms associated with the  episodes.  She tells me it starts at her forehead and goes down into her cheek but no pain.  She denies any rashes.  She has had extensive lab work-up with her primary care provider earlier this week including TSH with free T4 and TSH is elevated at 7.24 and primary care is increasing her levothyroxine.  CRP was elevated at 0.82.  Sed rate was normal at 13.  Ferritin is elevated at 290 with replacement iron profile was normal.  CMP was essentially within normal limits with creatinine of 1.02.  Vitamin B12 level was 276.  DESIRE was positive.  DESIRE reflex showed a speckled pattern of 1: 80 but no specific antibodies were positive.  Ionized calcium was normal.  Intact PTH was elevated at 68.2.  Protein electrophoresis was normal.  Vitamin D level was normal at 48.  Her primary care provider is going to plan to refer her to rheumatology if her symptoms do not improve with the increase in levothyroxine.  She is never had any imaging of her brain.  She has had a few migraines in her life but the symptoms were not related to that.  She has been under stress but she tells me this is not out of the ordinary.  She has had a soft tissue scan of her neck which was normal and she has had an ultrasound of her right arm soft tissue and this did not show any lymph node.  Her primary care provider is following with her very closely for these symptoms.       The following portions of the patient's history were reviewed and updated as appropriate: allergies, current medications, past family history, past medical history, past social history, past surgical history and problem list.    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   • Essential hypertension 3/30/2018   • Gallstone    • Kidney stones    • Migraine    • Obesity    • Ovarian cyst    • Restless leg syndrome    • Urinary tract infection    • Vitamin B12 deficiency        Past Surgical History:   Procedure Laterality Date   •  BREAST SURGERY Bilateral 1987   • CARDIAC CATHETERIZATION N/A 3/15/2017    Procedure: Coronary angiography;  Surgeon: Moustapha Peñaloza MD;  Location: [facility] CATH INVASIVE LOCATION;  Service:    • CARDIAC CATHETERIZATION N/A 3/15/2017    Procedure: Left ventriculography;  Surgeon: Moustapha Peñaloza MD;  Location: [facility] INVASIVE LOCATION;  Service:    •  SECTION      1991 & 1995   • CHOLECYSTECTOMY  2013   • GASTRIC BYPASS  2011       Social History     Socioeconomic History   • Marital status:      Spouse name: Not on file   • Number of children: Not on file   • Years of education: Not on file   • Highest education level: Not on file   Tobacco Use   • Smoking status: Former Smoker     Packs/day: 1.00     Years: 2.00     Pack years: 2.00     Last attempt to quit: 10/1995     Years since quittin.9   • Smokeless tobacco: Never Used   Substance and Sexual Activity   • Alcohol use: Yes     Alcohol/week: 7.2 oz     Types: 12 Cans of beer per week     Comment: drinks beer 4 times a week    • Drug use: No   • Sexual activity: Defer     Partners: Male     Birth control/protection: OCP     Comment: birth control pill       Family History   Problem Relation Age of Onset   • Cancer Father    • Heart attack Father    • Cancer Other    • Other Other    • No Known Problems Mother    • Asthma Brother    • Ulcerative colitis Brother    • Arthritis Paternal Grandmother    • Colon cancer Neg Hx         Review of Systems   Constitutional: Negative for chills, fatigue, fever and unexpected weight change.   HENT: Negative for ear pain, hearing loss, nosebleeds, rhinorrhea and sore throat.    Eyes: Negative for photophobia, pain, discharge, itching and visual disturbance.   Respiratory: Negative for cough, chest tightness, shortness of breath and wheezing.    Cardiovascular: Negative for chest pain, palpitations and leg swelling.   Gastrointestinal: Negative for abdominal pain, blood in  stool, constipation, diarrhea, nausea and vomiting.   Genitourinary: Negative for dysuria, frequency, hematuria and urgency.   Musculoskeletal: Negative for arthralgias, back pain, gait problem, joint swelling, myalgias, neck pain and neck stiffness.   Skin: Negative for rash and wound.   Allergic/Immunologic: Negative for environmental allergies and food allergies.   Neurological: Negative for dizziness, tremors, seizures, syncope, speech difficulty, weakness, light-headedness, numbness and headaches.   Hematological: Negative for adenopathy. Does not bruise/bleed easily.   Psychiatric/Behavioral: Negative for agitation, confusion, decreased concentration, hallucinations, sleep disturbance and suicidal ideas. The patient is not nervous/anxious.    All other systems reviewed and are negative.       Objective:    Neurologic Exam     Mental Status   Oriented to person, place, and time.   Level of consciousness: alert    Cranial Nerves   Cranial nerves II through XII intact.     Motor Exam   Muscle bulk: normal  Overall muscle tone: normal    Strength   Strength 5/5 throughout.   Reports pain and swelling in right elbow and left elbow/hand, no rashes     Sensory Exam   Light touch normal.   Vibration normal.   Proprioception normal.   Pinprick normal.     Gait, Coordination, and Reflexes     Gait  Gait: normal    Coordination   Finger to nose coordination: normal    Tremor   Resting tremor: absent  Intention tremor: absent  Action tremor: absent    Reflexes   Right brachioradialis: 2+  Left brachioradialis: 2+  Right biceps: 2+  Left biceps: 2+  Right triceps: 2+  Left triceps: 2+  Right patellar: 2+  Left patellar: 2+  Right achilles: 2+  Left achilles: 2+  Right : 2+  Left : 2+      Physical Exam   Constitutional: She is oriented to person, place, and time.   Neurological: She is oriented to person, place, and time. She has normal strength. She has a normal Finger-Nose-Finger Test. Gait normal.   Reflex  Scores:       Tricep reflexes are 2+ on the right side and 2+ on the left side.       Bicep reflexes are 2+ on the right side and 2+ on the left side.       Brachioradialis reflexes are 2+ on the right side and 2+ on the left side.       Patellar reflexes are 2+ on the right side and 2+ on the left side.       Achilles reflexes are 2+ on the right side and 2+ on the left side.      Assessment/Plan:       Mary was seen today for restless leg syndrome.    Diagnoses and all orders for this visit:    Restless legs syndrome (RLS)  -     LYRICA 75 MG capsule; Take 1 capsule by mouth Every Night.  -     carbidopa-levodopa CR (SINEMET CR)  MG per CR tablet; Take 1 tablet by mouth 3 (Three) Times a Day. May take 1 extra prn breakthrough RLS symptoms  -     pramipexole (MIRAPEX) 0.5 MG tablet; Take 0.5 tablets by mouth 3 (Three) Times a Day.    Facial tingling  Comments:  hx of migraines but not present during sx. MRI Brain to r/o other etiology.  Orders:  -     MRI Brain Without Contrast; Future    Iron deficiency anemia, unspecified iron deficiency anemia type  Comments:  continue iron replacement as prescribed by PCP-helps RLS significantly.         I have recommended MRI Brain without contrast with 3 spells in 2 weeks of intermittent right facial tingling. Continue current meds. Continue close follow up with PCP for joint pains and +DESIRE and new levothyroxine dosage. Continue aspirin 81mg daily. Recommend fasting lipid panel when able with PCP. F/U in 6 months, but if MRI Brain shows any abnormalities we will see her sooner with further workup if indicated. Also reviewed s&s of stroke and when to call 911 or go to ER immediately and she verbalizes understanding. Reviewed medications, potential side effects and signs and symptoms to report. Discussed risk versus benefits of treatment plan with patient and/or family-including medications, labs and radiology that may be ordered. Addressed questions and concerns during  visit. Patient and/or family verbalized understanding and agree with plan.    During this visit the following were done:  Labs Reviewed []    Labs Ordered []    Radiology Reports Reviewed []    Radiology Ordered []    PCP Records Reviewed []    Referring Provider Records Reviewed []    ER Records Reviewed []    Hospital Records Reviewed []    History Obtained From Family []    Radiology Images Reviewed []    Other Reviewed []    Records Requested []      As part of this patient's treatment plan I am prescribing controlled substances. The patient has been made aware of appropriate use of such medications, including potential risk of somnolence, limited ability to drive and/or work safely, and potential for dependence or overdose. It has also been made clear that these medications are for use by the patient only, without concomitant use of alcohol or other substances unless prescribed. Keep out of reach of children.  [place] report has been reviewed. If this is going to be prescribed long term, [facility] [place] Contract has also been read and signed by patient and myself.  Kingsley #[id number] reviewed.    EMR Dragon/Transcription Disclaimer:  Much of this encounter note is an electronic transcription of spoken language to printed text. Electronic transcription of spoken language may permit erroneous words or phrases to be inadvertently transcribed. Although I have reviewed the note for such errors, some may still exist in this documentation.        Debibe Rodriguez, APRN  9/19/2019         Bcc Infiltrative Histology Text: There were numerous aggregates of basaloid cells demonstrating an infiltrative pattern.

## 2023-02-13 NOTE — ASSESSMENT & PLAN NOTE
Patient advised to continue doxycycline. She has been given a steroid injection in the office and given a tapered schedule to start back on oral steroids tomorrow. May take Tussionex as needed for cough as well.

## 2023-02-13 NOTE — PROGRESS NOTES
aMry Lunsford is a 52 y.o. female.    Chief Complaint   Patient presents with   • Cough   • Nasal Congestion       HPI   Patient reports they have not been feeling well for 6 days. She admits to a cough, sore throat, fever, and otalgia. They have tried Mucinex-D. She went to the urgent care on 02/09/2023 and was started on prednisone. Patient reports she was seen by a doctor in this office on 2/10/2023 and was advised to stop prednisone. She states she was given Tessalon Perles but has not been taking them consistently. Patient was also given Promethazine DM and doxycycline which she has been taking with no relief. She explains she was feeling distraught last night about missing work. She states she is sore from coughing and feeling fatigued. She denies any emesis from coughing. Patient reports she is unable to lay down in bed as well as sleep, she states she has had to sleep in her chair and last night was able to sleep 2 hours without interruption. She reports she tried taking NyQuil but it did not help. She denies ever having a steroid injection. She states she has taken a cough syrup with codeine in the past, but does not recall if it helped her.    The following portions of the patient's history were reviewed and updated as appropriate: allergies, current medications, past family history, past medical history, past social history, past surgical history and problem list.     Allergies   Allergen Reactions   • Sulfa Antibiotics Rash and Other (See Comments)     blind         Current Outpatient Medications:   •  ASPIRIN 81 PO, Take 1 tablet by mouth., Disp: , Rfl:   •  atorvastatin (LIPITOR) 40 MG tablet, Take 1 tablet by mouth Every Night., Disp: 90 tablet, Rfl: 4  •  carbidopa-levodopa CR (SINEMET CR)  MG per CR tablet, 1 tab tid and 1 extra if needed prn breakthrough rls symptoms, Disp: 360 tablet, Rfl: 3  •  cyanocobalamin 1000 MCG/ML injection, Inject 1 mL into the appropriate muscle as directed  by prescriber Every 30 (Thirty) Days., Disp: 3 mL, Rfl: 3  •  doxycycline (VIBRAMYCIN) 100 MG capsule, Take 1 capsule by mouth 2 (Two) Times a Day., Disp: 20 capsule, Rfl: 0  •  Ferrous Fumarate (Ferrocite) 324 (106 Fe) MG tablet, Take 1 tablet by mouth 2 (Two) Times a Day., Disp: 180 tablet, Rfl: 3  •  HYDROcodone-acetaminophen (Norco) 5-325 MG per tablet, Take 1 tablet by mouth Every 6 (Six) Hours As Needed for Moderate Pain., Disp: 12 tablet, Rfl: 0  •  Lyrica 75 MG capsule, Take 1 capsule by mouth Every Night., Disp: 90 capsule, Rfl: 1  •  metoprolol tartrate (LOPRESSOR) 25 MG tablet, TAKE 1 TABLET BY MOUTH EVERY 12 HOURS (Patient taking differently: 12.5 mg 2 (Two) Times a Day.), Disp: 180 tablet, Rfl: 3  •  pramipexole (MIRAPEX) 0.5 MG tablet, Take 0.5 tablets by mouth 3 (Three) Times a Day., Disp: 135 tablet, Rfl: 3  •  Synthroid 88 MCG tablet, TAKE 1 TABLET BY MOUTH EVERY DAY, Disp: 90 tablet, Rfl: 1  •  Syringe, Disposable, 1 ML misc, Use to inject vitamin B12 every 14 days, Disp: 2 each, Rfl: 5  •  Syringe, Disposable, 1 ML misc, Use to inject vitamin B12 every 30 days, Disp: 3 each, Rfl: 3  •  ubrogepant (UBRELVY) 100 MG tablet, Take 1 tablet at onset of migraine, may repeat once in 2 hours if needed, Disp: 10 tablet, Rfl: 5  •  benzonatate (TESSALON) 100 MG capsule, Take 1 capsule by mouth 3 (Three) Times a Day As Needed for Cough., Disp: 30 capsule, Rfl: 0  •  Hydrocod Joey-Chlorphe Joey ER (Tussionex Pennkinetic ER) 10-8 MG/5ML ER suspension, Take 5 mL by mouth Every 12 (Twelve) Hours As Needed for Cough., Disp: 60 mL, Rfl: 0  •  promethazine-dextromethorphan (PROMETHAZINE-DM) 6.25-15 MG/5ML syrup, Take 5 mL by mouth 4 (Four) Times a Day As Needed for Cough., Disp: 118 mL, Rfl: 0  No current facility-administered medications for this visit.    ROS    Review of Systems   Constitutional: Positive for fatigue and fever (On 02/08/2023). Negative for chills.   HENT: Positive for ear pain, postnasal drip and  "sore throat. Negative for congestion.    Respiratory: Positive for cough and wheezing. Negative for shortness of breath.    Cardiovascular: Negative for chest pain and leg swelling.   Skin: Negative for color change and rash.   Allergic/Immunologic: Negative for environmental allergies.   Neurological: Negative for headache.       Vitals:    02/13/23 1332   BP: 128/84   BP Location: Right arm   Patient Position: Sitting   Cuff Size: Adult   Pulse: 78   Temp: 98 °F (36.7 °C)   SpO2: 97%   Weight: 110 kg (243 lb)   Height: 163.8 cm (64.5\")     Body mass index is 41.07 kg/m².    Physical Exam     Physical Exam  Constitutional:       General: She is not in acute distress.     Appearance: Normal appearance. She is well-developed.   HENT:      Head: Normocephalic and atraumatic.      Right Ear: Tympanic membrane and external ear normal.      Left Ear: Tympanic membrane and external ear normal.      Mouth/Throat:      Pharynx: Posterior oropharyngeal erythema (trace oropharyngeal erythema) present.      Comments: Minimal postnasal drip   Eyes:      Conjunctiva/sclera: Conjunctivae normal.      Pupils: Pupils are equal, round, and reactive to light.   Cardiovascular:      Rate and Rhythm: Normal rate and regular rhythm.      Heart sounds: No murmur heard.  Pulmonary:      Effort: Pulmonary effort is normal. No respiratory distress.      Breath sounds: Examination of the right-lower field reveals wheezing. Examination of the left-lower field reveals wheezing. Wheezing present.   Abdominal:      General: Bowel sounds are normal. There is no distension.      Palpations: Abdomen is soft.      Tenderness: There is no abdominal tenderness.   Skin:     General: Skin is warm and dry.   Neurological:      Mental Status: She is alert and oriented to person, place, and time.      Cranial Nerves: No cranial nerve deficit.   Psychiatric:         Mood and Affect: Mood normal.         Behavior: Behavior normal. "         Assessment/Plan    Diagnoses and all orders for this visit:    1. Bronchitis (Primary)  Assessment & Plan:  Patient advised to continue doxycycline. She has been given a steroid injection in the office and given a tapered schedule to start back on oral steroids tomorrow. May take Tussionex as needed for cough as well.    Orders:  -     methylPREDNISolone sodium succinate (SOLU-Medrol) injection 125 mg    Other orders  -     Hydrocod Joey-Chlorphe Joey ER (Tussionex Pennkinetic ER) 10-8 MG/5ML ER suspension; Take 5 mL by mouth Every 12 (Twelve) Hours As Needed for Cough.  Dispense: 60 mL; Refill: 0      New Medications Ordered This Visit   Medications   • methylPREDNISolone sodium succinate (SOLU-Medrol) injection 125 mg   • Hydrocod Joey-Chlorphe Joey ER (Tussionex Pennkinetic ER) 10-8 MG/5ML ER suspension     Sig: Take 5 mL by mouth Every 12 (Twelve) Hours As Needed for Cough.     Dispense:  60 mL     Refill:  0       No orders of the defined types were placed in this encounter.      Return if symptoms worsen or fail to improve.    Transcribed from ambient dictation for Lexie Cedillo DO by Sirena Cisneros.  02/13/23   18:11 EST    Patient or patient representative verbalized consent to the visit recording.  I have personally performed the services described in this document as transcribed by the above individual, and it is both accurate and complete.  Lexie Cedillo DO  2/13/2023  21:17 EST

## 2023-02-20 ENCOUNTER — OFFICE VISIT (OUTPATIENT)
Dept: INTERNAL MEDICINE | Facility: CLINIC | Age: 53
End: 2023-02-20
Payer: COMMERCIAL

## 2023-02-20 ENCOUNTER — TELEPHONE (OUTPATIENT)
Dept: INTERNAL MEDICINE | Facility: CLINIC | Age: 53
End: 2023-02-20

## 2023-02-20 VITALS
DIASTOLIC BLOOD PRESSURE: 76 MMHG | RESPIRATION RATE: 16 BRPM | BODY MASS INDEX: 40.22 KG/M2 | HEIGHT: 65 IN | OXYGEN SATURATION: 98 % | WEIGHT: 241.4 LBS | SYSTOLIC BLOOD PRESSURE: 120 MMHG | HEART RATE: 73 BPM | TEMPERATURE: 97.6 F

## 2023-02-20 DIAGNOSIS — T36.95XA ANTIBIOTIC-INDUCED YEAST INFECTION: ICD-10-CM

## 2023-02-20 DIAGNOSIS — B37.9 ANTIBIOTIC-INDUCED YEAST INFECTION: ICD-10-CM

## 2023-02-20 DIAGNOSIS — J22 LOWER RESPIRATORY INFECTION: Primary | ICD-10-CM

## 2023-02-20 PROCEDURE — 99214 OFFICE O/P EST MOD 30 MIN: CPT | Performed by: FAMILY MEDICINE

## 2023-02-20 RX ORDER — ALBUTEROL SULFATE 90 UG/1
2 AEROSOL, METERED RESPIRATORY (INHALATION) EVERY 6 HOURS PRN
Qty: 8 G | Refills: 1 | Status: SHIPPED | OUTPATIENT
Start: 2023-02-20

## 2023-02-20 RX ORDER — SACCHAROMYCES BOULARDII 250 MG
250 CAPSULE ORAL 2 TIMES DAILY
Qty: 30 CAPSULE | Refills: 0 | Status: SHIPPED | OUTPATIENT
Start: 2023-02-20

## 2023-02-20 RX ORDER — FLUCONAZOLE 150 MG/1
TABLET ORAL
Qty: 2 TABLET | Refills: 0 | Status: SHIPPED | OUTPATIENT
Start: 2023-02-20

## 2023-02-20 RX ORDER — AMOXICILLIN AND CLAVULANATE POTASSIUM 875; 125 MG/1; MG/1
1 TABLET, FILM COATED ORAL 2 TIMES DAILY
Qty: 20 TABLET | Refills: 0 | Status: SHIPPED | OUTPATIENT
Start: 2023-02-20 | End: 2023-03-02

## 2023-02-20 RX ORDER — PREDNISONE 20 MG/1
TABLET ORAL
Qty: 18 TABLET | Refills: 0 | Status: SHIPPED | OUTPATIENT
Start: 2023-02-20

## 2023-02-20 RX ORDER — GUAIFENESIN 600 MG/1
1200 TABLET, EXTENDED RELEASE ORAL 2 TIMES DAILY
Qty: 30 TABLET | Refills: 0 | Status: SHIPPED | OUTPATIENT
Start: 2023-02-20

## 2023-02-20 NOTE — PROGRESS NOTES
"Chief Complaint  Cough (Causing a wheeze, pt diagnosed bronchitis about 2 weeks ago, has not gone away)  Answers for HPI/ROS submitted by the patient on 2/20/2023  What is the primary reason for your visit?: Cough      Subjective        Mary Lunsford presents to [facility]  History of Present Illness  [facility]--short course of prednisone burst  Next day saw mikel who had her stop steroid, started doxycycline  Few days later saw ash, encouraged finishing steroid, continue doxycycline, cough syrup prn for sleep  She's spacing out syrup, still has a supply  Continues to have deep cough sometimes productive of clear sputum  Cough  This is a recurrent problem. The current episode started 1 to 4 weeks ago. The problem has been waxing and waning. The problem occurs hourly. The cough is non-productive. Associated symptoms include ear congestion, nasal congestion, rhinorrhea and wheezing. Pertinent negatives include no chest pain, chills, ear pain, fever, headaches, heartburn, hemoptysis, myalgias, postnasal drip, rash, sore throat, shortness of breath, sweats or weight loss. The symptoms are aggravated by lying down.       Objective   Vital Signs:  /76 (BP Location: Left arm, Patient Position: Sitting, Cuff Size: Adult)   Pulse 73   Temp 97.6 °F (36.4 °C) (Temporal)   Resp 16   Ht 163.8 cm (64.5\")   Wt 109 kg (241 lb 6.4 oz)   SpO2 98%   BMI 40.80 kg/m²   Estimated body mass index is 40.8 kg/m² as calculated from the following:    Height as of this encounter: 163.8 cm (64.5\").    Weight as of this encounter: 109 kg (241 lb 6.4 oz).             Physical Exam  Vitals and nursing note reviewed.   Constitutional:       General: She is not in acute distress.     Appearance: Normal appearance. She is well-developed and well-groomed. She is ill-appearing. She is not toxic-appearing or diaphoretic.      Interventions: Face mask in place.   HENT:      Head: Normocephalic and " atraumatic.      Right Ear: Hearing normal.      Left Ear: Hearing normal.   Eyes:      General: Lids are normal. No scleral icterus.     Extraocular Movements: Extraocular movements intact.   Neck:      Trachea: Phonation normal.   Pulmonary:      Effort: No tachypnea, accessory muscle usage, prolonged expiration or respiratory distress.      Breath sounds: Transmitted upper airway sounds (congestion) present. No stridor. Wheezing (diffuse) present. No rhonchi (no one area of rhonchi) or rales.      Comments: Frequent deep cough  Musculoskeletal:      Cervical back: Neck supple.   Skin:     Coloration: Skin is not jaundiced or pale.   Neurological:      General: No focal deficit present.      Mental Status: She is alert and oriented to person, place, and time.      Motor: Motor function is intact.   Psychiatric:         Attention and Perception: Attention and perception normal.         Mood and Affect: Mood and affect normal.         Speech: Speech normal.         Behavior: Behavior normal. Behavior is cooperative.         Thought Content: Thought content normal.         Cognition and Memory: Cognition and memory normal.         Judgment: Judgment normal.        Result Review :                   Assessment and Plan   Diagnoses and all orders for this visit:    1. Lower respiratory infection (Primary)  -     amoxicillin-clavulanate (Augmentin) 875-125 MG per tablet; Take 1 tablet by mouth 2 (Two) Times a Day for 10 days.  Dispense: 20 tablet; Refill: 0  -     predniSONE (DELTASONE) 20 MG tablet; 3 TAB PO QDAY X 3 DAYS, 2 TAB PO QDAY X 3 DAYS, 1 TAB PO QDAY X 2 DAYS, 1/2 TAB PO QDAY X 2 DAYS.  Dispense: 18 tablet; Refill: 0  -     albuterol sulfate  (90 Base) MCG/ACT inhaler; Inhale 2 puffs Every 6 (Six) Hours As Needed for Wheezing or Shortness of Air.  Dispense: 8 g; Refill: 1  -     saccharomyces boulardii (Florastor) 250 MG capsule; Take 1 capsule by mouth 2 (Two) Times a Day.  Dispense: 30 capsule;  Refill: 0  -     guaiFENesin (Mucinex) 600 MG 12 hr tablet; Take 2 tablets by mouth 2 (Two) Times a Day. As needed for congestion  Dispense: 30 tablet; Refill: 0    2. Antibiotic-induced yeast infection  -     fluconazole (Diflucan) 150 MG tablet; TAKE ONE TAB PO X ONCE, CAN REPEAT IN 4 DAYS IF NEEDED FOR YEAST INFECTION  Dispense: 2 tablet; Refill: 0    stop doxycyline (had about 2 days left). May continue tussinex prn cough (at night to help with sleep). Adding Augmentin, take with food. Encouraged probiotic use to lessen risk of C diff. Discussed albuterol, Mucinex, hydration.  Adding steroid taper.          Follow Up   Return in about 3 months (around 5/20/2023) for Annual physical.  Patient was given instructions and counseling regarding her condition or for health maintenance advice. Please see specific information pulled into the AVS if appropriate.

## 2023-02-20 NOTE — TELEPHONE ENCOUNTER
Patient was diagnosed with bronchitis on 2/13 by Dr. Cedillo and is still having a bad cough, rattling in chest, and off and on wheezing and would like a same day appointment to be reevaluated to make sure it is getting better. Patient states she has to work through these conditions and it is very hard. Patient states she needs an afternoon appointment if available.

## 2023-03-14 DIAGNOSIS — E07.9 THYROID DISORDER: Primary | ICD-10-CM

## 2023-03-14 RX ORDER — LEVOTHYROXINE SODIUM 88 UG/1
88 TABLET ORAL DAILY
Qty: 90 TABLET | Refills: 1 | Status: SHIPPED | OUTPATIENT
Start: 2023-03-14

## 2023-03-21 ENCOUNTER — APPOINTMENT (OUTPATIENT)
Dept: OTHER | Facility: HOSPITAL | Age: 53
End: 2023-03-21
Payer: COMMERCIAL

## 2023-03-21 ENCOUNTER — HOSPITAL ENCOUNTER (OUTPATIENT)
Dept: MAMMOGRAPHY | Facility: HOSPITAL | Age: 53
Discharge: HOME | End: 2023-03-21
Payer: COMMERCIAL

## 2023-03-21 DIAGNOSIS — Z12.39 SCREENING BREAST EXAMINATION: ICD-10-CM

## 2023-03-21 PROCEDURE — 77067 SCR MAMMO BI INCL CAD: CPT

## 2023-03-21 PROCEDURE — 77063 BREAST TOMOSYNTHESIS BI: CPT

## 2023-03-21 PROCEDURE — 77067 SCR MAMMO BI INCL CAD: CPT | Performed by: RADIOLOGY

## 2023-03-21 PROCEDURE — 77063 BREAST TOMOSYNTHESIS BI: CPT | Performed by: RADIOLOGY

## 2023-04-11 ENCOUNTER — OFFICE VISIT (OUTPATIENT)
Dept: OBSTETRICS AND GYNECOLOGY | Facility: CLINIC | Age: 53
End: 2023-04-11
Payer: COMMERCIAL

## 2023-04-11 VITALS
WEIGHT: 246 LBS | HEIGHT: 65 IN | DIASTOLIC BLOOD PRESSURE: 78 MMHG | SYSTOLIC BLOOD PRESSURE: 128 MMHG | BODY MASS INDEX: 40.98 KG/M2

## 2023-04-11 DIAGNOSIS — Z12.4 SCREENING FOR CERVICAL CANCER: ICD-10-CM

## 2023-04-11 DIAGNOSIS — Z01.419 ROUTINE GYNECOLOGICAL EXAMINATION: Primary | ICD-10-CM

## 2023-04-11 NOTE — PROGRESS NOTES
"Subjective   Chief Complaint   Patient presents with   • Gynecologic Exam     Last pap done 10/4/21-WNL, MMG done 3/21/23-WNL,        Mary Renuka Lunsford is a 52 y.o. year old  presenting to be seen for her annual gynecological exam.   Has no complaints  Last menses was around 2019. 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  states she had testing at , but was told that they didn't \"find anything\"   • Essential hypertension 3/30/2018   • Gallstone    • Kidney stones    • Migraine    • Obesity    • Ovarian cyst    • Restless leg syndrome    • Urinary tract infection    • Vitamin B12 deficiency    • Wears glasses     to read        Current Outpatient Medications:   •  albuterol sulfate  (90 Base) MCG/ACT inhaler, Inhale 2 puffs Every 6 (Six) Hours As Needed for Wheezing or Shortness of Air., Disp: 8 g, Rfl: 1  •  ASPIRIN 81 PO, Take 1 tablet by mouth., Disp: , Rfl:   •  atorvastatin (LIPITOR) 40 MG tablet, Take 1 tablet by mouth Every Night., Disp: 90 tablet, Rfl: 4  •  carbidopa-levodopa CR (SINEMET CR)  MG per CR tablet, 1 tab tid and 1 extra if needed prn breakthrough rls symptoms, Disp: 360 tablet, Rfl: 3  •  cyanocobalamin 1000 MCG/ML injection, Inject 1 mL into the appropriate muscle as directed by prescriber Every 30 (Thirty) Days., Disp: 3 mL, Rfl: 3  •  Ferrous Fumarate (Ferrocite) 324 (106 Fe) MG tablet, Take 1 tablet by mouth 2 (Two) Times a Day., Disp: 180 tablet, Rfl: 3  •  levothyroxine (SYNTHROID, LEVOTHROID) 88 MCG tablet, Take 1 tablet by mouth Daily., Disp: 90 tablet, Rfl: 1  •  Lyrica 75 MG capsule, Take 1 capsule by mouth Every Night., Disp: 90 capsule, Rfl: 1  •  metoprolol tartrate (LOPRESSOR) 25 MG tablet, TAKE 1 TABLET BY MOUTH EVERY 12 HOURS (Patient taking " differently: 12.5 mg 2 (Two) Times a Day.), Disp: 180 tablet, Rfl: 3  •  pramipexole (MIRAPEX) 0.5 MG tablet, Take 0.5 tablets by mouth 3 (Three) Times a Day., Disp: 135 tablet, Rfl: 3  •  predniSONE (DELTASONE) 20 MG tablet, 3 TAB PO QDAY X 3 DAYS, 2 TAB PO QDAY X 3 DAYS, 1 TAB PO QDAY X 2 DAYS, 1/2 TAB PO QDAY X 2 DAYS., Disp: 18 tablet, Rfl: 0  •  saccharomyces boulardii (Florastor) 250 MG capsule, Take 1 capsule by mouth 2 (Two) Times a Day., Disp: 30 capsule, Rfl: 0  •  Syringe, Disposable, 1 ML misc, Use to inject vitamin B12 every 14 days, Disp: 2 each, Rfl: 5  •  Syringe, Disposable, 1 ML misc, Use to inject vitamin B12 every 30 days, Disp: 3 each, Rfl: 3  •  ubrogepant (UBRELVY) 100 MG tablet, Take 1 tablet at onset of migraine, may repeat once in 2 hours if needed, Disp: 10 tablet, Rfl: 5  •  fluconazole (Diflucan) 150 MG tablet, TAKE ONE TAB PO X ONCE, CAN REPEAT IN 4 DAYS IF NEEDED FOR YEAST INFECTION, Disp: 2 tablet, Rfl: 0  •  Hydrocod Joey-Chlorphe Joey ER (Tussionex Pennkinetic ER) 10-8 MG/5ML ER suspension, Take 5 mL by mouth Every 12 (Twelve) Hours As Needed for Cough., Disp: 60 mL, Rfl: 0  •  HYDROcodone-acetaminophen (Norco) 5-325 MG per tablet, Take 1 tablet by mouth Every 6 (Six) Hours As Needed for Moderate Pain., Disp: 12 tablet, Rfl: 0   Allergies   Allergen Reactions   • Sulfa Antibiotics Rash and Other (See Comments)     blind      Past Surgical History:   Procedure Laterality Date   • BREAST SURGERY Bilateral 1987    breast reduction   • CARDIAC CATHETERIZATION N/A 3/15/2017    Procedure: Coronary angiography;  Surgeon: Moustapha Peñaloza MD;  Location: [facility] CATH INVASIVE LOCATION;  Service:    • CARDIAC CATHETERIZATION N/A 3/15/2017    Procedure: Left ventriculography;  Surgeon: Moustapha Peñaloza MD;  Location: [facility] CATH INVASIVE LOCATION;  Service:    •  SECTION      1991 & 1995   • CHOLECYSTECTOMY  2013   • COLONOSCOPY N/A 10/3/2019    Procedure:  "COLONOSCOPY WITH BIOPSIES; ANOSCOPY;  Surgeon: Jose Paez MD;  Location: [facility] ENDOSCOPY;  Service: Gastroenterology   • ENDOSCOPY     • GASTRIC BYPASS  2011   • REDUCTION MAMMAPLASTY        Social History     Socioeconomic History   • Marital status:    Tobacco Use   • Smoking status: Former     Packs/day: 1.00     Years: 2.00     Pack years: 2.00     Types: Cigarettes     Quit date: 10/1/1995     Years since quittin.5   • Smokeless tobacco: Never   Vaping Use   • Vaping Use: Never used   Substance and Sexual Activity   • Alcohol use: Yes     Alcohol/week: 12.0 standard drinks     Types: 12 Cans of beer per week     Comment: 16 beers per week   • Drug use: No   • Sexual activity: Yes     Partners: Male     Birth control/protection: None, Post-menopausal      Family History   Problem Relation Age of Onset   • Cancer Father    • Heart attack Father    • Cancer Other    • Other Other    • Breast cancer Mother    • Asthma Brother    • Ulcerative colitis Brother    • Arthritis Paternal Grandmother    • Colon cancer Neg Hx        Review of Systems   Constitutional: Negative for chills, diaphoresis and fever.   Gastrointestinal: Negative.    Genitourinary: Negative for difficulty urinating, dysuria, pelvic pain, vaginal bleeding and vaginal discharge.           Objective   /78   Ht 163.8 cm (64.5\")   Wt 112 kg (246 lb)   LMP 2018 (LMP Unknown)   BMI 41.57 kg/m²     Physical Exam  Exam conducted with a chaperone present.   Constitutional:       Appearance: Normal appearance. She is well-developed and well-groomed.   Eyes:      General: Lids are normal.      Extraocular Movements: Extraocular movements intact.      Conjunctiva/sclera: Conjunctivae normal.   Chest:   Breasts:     Breasts are symmetrical.      Right: No inverted nipple, mass, nipple discharge, skin change or tenderness.      Left: No inverted nipple, mass, nipple discharge, skin change or tenderness.   Abdominal:      " Palpations: Abdomen is soft.      Tenderness: There is no abdominal tenderness.   Genitourinary:     Labia:         Right: No rash, tenderness or lesion.         Left: No rash, tenderness or lesion.       Urethra: No prolapse, urethral pain, urethral swelling or urethral lesion.      Vagina: No vaginal discharge, tenderness or lesions.      Cervix: No cervical motion tenderness, discharge, friability or lesion.      Uterus: Not enlarged and not tender.       Adnexa:         Right: No mass or tenderness.          Left: No mass or tenderness.     Lymphadenopathy:      Upper Body:      Right upper body: No axillary adenopathy.      Left upper body: No axillary adenopathy.   Skin:     General: Skin is warm and dry.      Findings: No bruising or lesion.   Neurological:      General: No focal deficit present.      Mental Status: She is alert and oriented to person, place, and time.   Psychiatric:         Attention and Perception: Attention normal.         Mood and Affect: Mood normal.         Speech: Speech normal.         Behavior: Behavior is cooperative.            Result Review :                   Assessment and Plan  Diagnoses and all orders for this visit:    1. Routine gynecological examination (Primary)    2. Screening for cervical cancer  -     LIQUID-BASED PAP SMEAR WITH HPV GENOTYPING REGARDLESS OF INTERPRETATION (CINTIA,COR,MAD)      Patient Instructions   Self breast exam monthly  Annual mammogram  Calcium 1200 mg daily and vit D 2000 mg daily  Regular excercise              This note was electronically signed.    Tari Boston PA-C   April 11, 2023

## 2023-04-13 LAB — REF LAB TEST METHOD: NORMAL

## 2023-04-24 ENCOUNTER — PATIENT MESSAGE (OUTPATIENT)
Dept: INTERNAL MEDICINE | Facility: CLINIC | Age: 53
End: 2023-04-24
Payer: COMMERCIAL

## 2023-04-24 RX ORDER — LUBIPROSTONE 24 UG/1
24 CAPSULE ORAL 2 TIMES DAILY WITH MEALS
Qty: 20 CAPSULE | Refills: 0 | OUTPATIENT
Start: 2023-04-24 | End: 2023-04-29

## 2023-04-25 ENCOUNTER — OFFICE VISIT (OUTPATIENT)
Dept: INTERNAL MEDICINE | Facility: CLINIC | Age: 53
End: 2023-04-25
Payer: COMMERCIAL

## 2023-04-25 VITALS
TEMPERATURE: 97.2 F | HEART RATE: 78 BPM | OXYGEN SATURATION: 97 % | HEIGHT: 65 IN | BODY MASS INDEX: 40.82 KG/M2 | SYSTOLIC BLOOD PRESSURE: 120 MMHG | RESPIRATION RATE: 16 BRPM | DIASTOLIC BLOOD PRESSURE: 80 MMHG | WEIGHT: 245 LBS

## 2023-04-25 DIAGNOSIS — E66.01 CLASS 3 SEVERE OBESITY WITH SERIOUS COMORBIDITY AND BODY MASS INDEX (BMI) OF 40.0 TO 44.9 IN ADULT, UNSPECIFIED OBESITY TYPE: ICD-10-CM

## 2023-04-25 DIAGNOSIS — K59.00 CONSTIPATION, UNSPECIFIED CONSTIPATION TYPE: Primary | ICD-10-CM

## 2023-04-25 PROCEDURE — 99214 OFFICE O/P EST MOD 30 MIN: CPT | Performed by: FAMILY MEDICINE

## 2023-04-25 NOTE — PROGRESS NOTES
"Chief Complaint  Diarrhea (Pt states she has not had a normal bowel movement and has had \"loose stools\" and diarrhea unsure of when exactly it started )    Subjective        Mary Lunsford presents to [facility]  History of Present Illness  Realized last week she wasn't having bowel movements like \"normal\" (bristol 4). Reports they were looser than normal. Starting using Miralax in water. Stool softeners as well. Felt like everything wasn't coming out. Started feeling worse at end of week, hemorrhoids started to bother her. Got to where she felt like she needed to go to bathroom but nothing was coming out. Used a suppository and it led to diarrhea. Stomach was cramping. Went to [facility] on Sunday, Linzess sent but was going to be too expensive. They later sent lactulose but it only caused diarrhea.     Did this a year ago. Had large stool burden that eventually came out. Feels same.       Objective   Vital Signs:  /80 (BP Location: Right arm, Patient Position: Sitting, Cuff Size: Adult)   Pulse 78   Temp 97.2 °F (36.2 °C) (Temporal)   Resp 16   Ht 163.8 cm (64.5\")   Wt 111 kg (245 lb)   SpO2 97%   BMI 41.40 kg/m²   Estimated body mass index is 41.4 kg/m² as calculated from the following:    Height as of this encounter: 163.8 cm (64.5\").    Weight as of this encounter: 111 kg (245 lb).       Class 3 Severe Obesity (BMI >=40). Obesity-related health conditions include the following: hypertension and dyslipidemias. Obesity is unchanged. BMI is is above average; BMI management plan is completed. We discussed info via avs.      Physical Exam  Vitals and nursing note reviewed.   Constitutional:       General: She is not in acute distress.     Appearance: Normal appearance. She is well-developed and well-groomed. She is morbidly obese. She is not ill-appearing, toxic-appearing or diaphoretic.   HENT:      Head: Normocephalic and atraumatic.      Right Ear: Hearing normal.      " Left Ear: Hearing normal.   Eyes:      General: Lids are normal. No scleral icterus.     Extraocular Movements: Extraocular movements intact.   Neck:      Trachea: Phonation normal.   Pulmonary:      Effort: Pulmonary effort is normal.   Abdominal:      General: Bowel sounds are increased. There is distension (full feeling).      Palpations: There is no mass.      Tenderness: There is no abdominal tenderness.   Musculoskeletal:      Cervical back: Neck supple.   Skin:     Coloration: Skin is not jaundiced or pale.   Neurological:      General: No focal deficit present.      Mental Status: She is alert and oriented to person, place, and time.      Motor: Motor function is intact.   Psychiatric:         Attention and Perception: Attention and perception normal.         Mood and Affect: Mood and affect normal.         Speech: Speech normal.         Behavior: Behavior normal. Behavior is cooperative.         Thought Content: Thought content normal.         Cognition and Memory: Cognition and memory normal.         Judgment: Judgment normal.        Result Review :  The following data was reviewed by: Floridalma Jimenez MD on 04/25/2023:  Op Note by Jose Paez MD (10/03/2019 12:44) follow up 10 years.  Tissue Pathology Exam (10/03/2019 12:55) benign    XR Abdomen KUB (05/26/2022 10:12)    Lab Results   Component Value Date    TSH 2.770 11/29/2021     Lab Results   Component Value Date    FREET4 1.33 11/29/2021                   Assessment and Plan   Diagnoses and all orders for this visit:    1. Constipation, unspecified constipation type (Primary)  -     linaclotide (Linzess) 145 MCG capsule capsule; Take 1 capsule by mouth Every Morning Before Breakfast.  Dispense: 30 capsule; Refill: 0    2. Class 3 severe obesity with serious comorbidity and body mass index (BMI) of 40.0 to 44.9 in adult, unspecified obesity type  Comments:  info via avs      Describing large stool burden with liquid stool leaking out from  around , similar to last year. Could try enema. Discussed Amitiza (sent yesterday), Linzess. Take one or other not both. Magnesium citrate over the counter another option. If gets to where she is not able to hold down food/liquids (or severe pain) go to ER. Water intake stressed. After this is cleared suggest increased fiber intake, water intake, consider Miralax daily or every other day to keep stools soft.      I spent 31 minutes caring for Mary on this date of service. This time includes time spent by me in the following activities:preparing for the visit, reviewing tests, performing a medically appropriate examination and/or evaluation , counseling and educating the patient/family/caregiver, ordering medications, tests, or procedures and documenting information in the medical record  Follow Up   Return in about 2 months (around 6/25/2023) for Annual physical.  Patient was given instructions and counseling regarding her condition or for health maintenance advice. Please see specific information pulled into the AVS if appropriate.

## 2023-04-26 ENCOUNTER — TELEPHONE (OUTPATIENT)
Dept: INTERNAL MEDICINE | Facility: CLINIC | Age: 53
End: 2023-04-26
Payer: COMMERCIAL

## 2023-04-26 DIAGNOSIS — K92.1 MELENA: Primary | ICD-10-CM

## 2023-04-26 DIAGNOSIS — R19.4 CHANGE IN BOWEL HABIT: ICD-10-CM

## 2023-04-26 DIAGNOSIS — K59.00 CONSTIPATION, UNSPECIFIED CONSTIPATION TYPE: ICD-10-CM

## 2023-04-27 NOTE — TELEPHONE ENCOUNTER
Mary Lunsford ([place]: [id number])  Rx #: [id number]  Linzess 145MCG capsules     Form  [facility] Exception to Coverage Request Form   Plan Contact  [phone] phone  [phone] fax  Created  22 hours ago  Sent to Plan

## 2023-04-27 NOTE — TELEPHONE ENCOUNTER
I called patient to verify address for PA, she was starting to send you another message about her Bowel issues. She states she is using Miralax and stool softeners and having diarrhea but really no relief. She would like testing because she is concerned. She also mentioned that her stool is Black and Goopy at times.

## 2023-04-28 ENCOUNTER — TELEPHONE (OUTPATIENT)
Dept: INTERNAL MEDICINE | Facility: CLINIC | Age: 53
End: 2023-04-28
Payer: COMMERCIAL

## 2023-04-28 NOTE — TELEPHONE ENCOUNTER
Caller: Mary Lunsford    Relationship to patient: Self    Best call back number: [phone]    PATIENT STATES THAT SHE WAS TO HAVE A REFERRAL FOR A GASTROENTEROLOGIST, BUT SHE IS WANTING TO KNOW IF SHE CAN EXPEDITE THIS.

## 2023-04-29 ENCOUNTER — HOSPITAL ENCOUNTER (EMERGENCY)
Facility: HOSPITAL | Age: 53
Discharge: HOME | End: 2023-04-29
Attending: EMERGENCY MEDICINE
Payer: COMMERCIAL

## 2023-04-29 ENCOUNTER — APPOINTMENT (OUTPATIENT)
Dept: CT IMAGING | Facility: HOSPITAL | Age: 53
End: 2023-04-29
Payer: COMMERCIAL

## 2023-04-29 VITALS
TEMPERATURE: 98.3 F | RESPIRATION RATE: 16 BRPM | HEIGHT: 65 IN | HEART RATE: 79 BPM | DIASTOLIC BLOOD PRESSURE: 93 MMHG | SYSTOLIC BLOOD PRESSURE: 156 MMHG | OXYGEN SATURATION: 98 % | WEIGHT: 245 LBS | BODY MASS INDEX: 40.82 KG/M2

## 2023-04-29 DIAGNOSIS — K52.9 COLITIS: Primary | ICD-10-CM

## 2023-04-29 PROCEDURE — 74176 CT ABD & PELVIS W/O CONTRAST: CPT

## 2023-04-29 PROCEDURE — 99282 EMERGENCY DEPT VISIT SF MDM: CPT

## 2023-04-29 RX ORDER — DICYCLOMINE HCL 20 MG
20 TABLET ORAL EVERY 6 HOURS
Qty: 20 TABLET | Refills: 0 | Status: SHIPPED | OUTPATIENT
Start: 2023-04-29

## 2023-04-29 RX ORDER — CIPROFLOXACIN 500 MG/1
500 TABLET, FILM COATED ORAL 2 TIMES DAILY
Qty: 14 TABLET | Refills: 0 | Status: SHIPPED | OUTPATIENT
Start: 2023-04-29

## 2023-04-29 RX ORDER — METRONIDAZOLE 500 MG/1
500 TABLET ORAL 3 TIMES DAILY
Qty: 21 TABLET | Refills: 0 | Status: SHIPPED | OUTPATIENT
Start: 2023-04-29

## 2023-04-29 NOTE — ED PROVIDER NOTES
"Subjective  History of Present Illness:    Chief Complaint: Diarrhea  History of Present Illness: 52-year-old female plagued by chronic constipation, states that she is now having abdominal cramping, persistent watery diarrhea, over the last week.  History of internal and external hemorrhoids has noticed some intermittent blood.  No fever no vomiting no travel.  States she has had 3-4 episodes of loose watery stools since 3 AM as well.  No fever.  Feels as though she might be constipated because she is has pressure in the rectal region even though she is having a lot of diarrhea    Nurses Notes reviewed and agree, including vitals, allergies, social history and prior medical history.     REVIEW OF SYSTEMS: All systems reviewed and not pertinent unless noted.  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  states she had testing at , but was told that they didn't \"find anything\"   • Essential hypertension 3/30/2018   • Gallstone    • Kidney stones    • Migraine    • Obesity    • Ovarian cyst    • Restless leg syndrome    • Urinary tract infection    • Vitamin B12 deficiency    • Wears glasses     to read       Allergies:    Sulfa antibiotics      Past Surgical History:   Procedure Laterality Date   • BREAST SURGERY Bilateral 1987    breast reduction   • CARDIAC CATHETERIZATION N/A 3/15/2017    Procedure: Coronary angiography;  Surgeon: Moustapha Peñaloza MD;  Location: [facility] INVASIVE LOCATION;  Service:    • CARDIAC CATHETERIZATION N/A 3/15/2017    Procedure: Left ventriculography;  Surgeon: Moustapha Peñaloza MD;  Location: [facility] INVASIVE LOCATION;  Service:    •  SECTION      1991 & 1995   • CHOLECYSTECTOMY  2013   • COLONOSCOPY N/A " "10/3/2019    Procedure: COLONOSCOPY WITH BIOPSIES; ANOSCOPY;  Surgeon: Jose Paez MD;  Location: [facility] ENDOSCOPY;  Service: Gastroenterology   • ENDOSCOPY     • GASTRIC BYPASS  2011   • REDUCTION MAMMAPLASTY           Social History     Socioeconomic History   • Marital status:    Tobacco Use   • Smoking status: Former     Packs/day: 1.00     Years: 2.00     Pack years: 2.00     Types: Cigarettes     Quit date: 10/1/1995     Years since quittin.5   • Smokeless tobacco: Never   Vaping Use   • Vaping Use: Never used   Substance and Sexual Activity   • Alcohol use: Yes     Alcohol/week: 12.0 standard drinks     Types: 12 Cans of beer per week     Comment: 16 beers per week   • Drug use: No   • Sexual activity: Yes     Partners: Male     Birth control/protection: None, Post-menopausal         Family History   Problem Relation Age of Onset   • Cancer Father    • Heart attack Father    • Cancer Other    • Other Other    • Breast cancer Mother    • Asthma Brother    • Ulcerative colitis Brother    • Arthritis Paternal Grandmother    • Colon cancer Neg Hx        Objective  Physical Exam:  /93 (BP Location: Left arm, Patient Position: Sitting)   Pulse 79   Temp 98.3 °F (36.8 °C) (Oral)   Resp 16   Ht 163.8 cm (64.5\")   Wt 111 kg (245 lb)   LMP 2018 (LMP Unknown)   SpO2 98%   BMI 41.40 kg/m²      Physical Exam    CONSTITUTIONAL: Well developed, nontoxic 52-year-old female,  in no no acute distress.  VITAL SIGNS: per nursing, reviewed and noted  SKIN: exposed skin with no rashes, ulcerations or petechiae  EYES: Grossly EOMI, no icterus  ENT: Normal voice.  Moist mucous membranes   RESPIRATORY:  No increased work of breathing. No retractions.   CARDIOVASCULAR:   Extremities pink and warm.  Good cap refill to extremities.   GI: Abdomen without distention.  No rebound tenderness or guarding.  RN chaperoned rectal examination with external hemorrhoids without inflammation.  No stool in the " rectal vault.  MUSCULOSKELETAL: Age-appropriate bulk and tone, moves all 4 extremities  NEUROLOGIC: Alert, oriented x 3. No gross deficits. GCS 15.   PSYCH: appropriate affect.  : no bladder tenderness or distention, no CVA tenderness    Procedures  No attending physician procedures were performed on this patient.  ED Course:    Lab Results (last 24 hours)     Procedure Component Value Units Date/Time    Clostridioides difficile Toxin - Stool, Per Rectum [[id number]]  (Normal) Collected: 04/30/23 0630    Specimen: Stool from Per Rectum Updated: 04/30/23 0819    Narrative:      The following orders were created for panel order Clostridioides difficile Toxin - Stool, Per Rectum.  Procedure                               Abnormality         Status                     ---------                               -----------         ------                     Clostridioides difficile...[[id number]]  Normal              Final result                 Please view results for these tests on the individual orders.    Gastrointestinal Panel, PCR - Stool, Per Rectum [[id number]]  (Abnormal) Collected: 04/30/23 0630    Specimen: Stool from Per Rectum Updated: 04/30/23 0918     Campylobacter Not Detected     Plesiomonas shigelloides Not Detected     Salmonella Not Detected     Vibrio Not Detected     Vibrio cholerae Not Detected     Yersinia enterocolitica Not Detected     Enteroaggregative E. coli (EAEC) Not Detected     Enteropathogenic E. coli (EPEC) Detected     Enterotoxigenic E. coli (ETEC) lt/st Not Detected     Shiga-like toxin-producing E. coli (STEC) stx1/stx2 Not Detected     Shigella/Enteroinvasive E. coli (EIEC) Not Detected     Cryptosporidium Not Detected     Cyclospora cayetanensis Not Detected     Entamoeba histolytica Not Detected     Giardia lamblia Not Detected     Adenovirus F40/41 Not Detected     Astrovirus Not Detected     Norovirus GI/GII Not Detected     Rotavirus A Not Detected     Sapovirus (I, II, IV or V)  Not Detected    Narrative:      Clostridium difficile toxin A/B detected by PCR    Clostridioides difficile EIA - Stool, Per Rectum [[id number]]  (Normal) Collected: 04/30/23 0630    Specimen: Stool from Per Rectum Updated: 04/30/23 0819     C Diff GDH Ag Negative     C.diff Toxin Ag Negative    Narrative:      The result indicates the absence of toxigenic C.difficile from stool specimen.           CT Abdomen Pelvis Without Contrast    Result Date: 4/29/2023  CT SCAN OF THE ABDOMEN AND PELVIS WITHOUT CONTRAST     4/29/2023 8:07 AM   HISTORY: Abdominal cramping and diarrhea  COMPARISON: April 2018.  PROCEDURE: Axial images were obtained from the lung bases to the pubic symphysis by computed tomography. This study was performed with techniques to keep radiation doses as low as reasonably achievable, (ALARA). Individualized dose reduction techniques using automated exposure control or adjustment of mA and/or kV according to the patient size were employed.  FINDINGS:  ABDOMEN: A couple calcified granulomas. No acute findings in the lung bases. Previous gastric bypass without complication. Liver and spleen are unremarkable. Cholecystectomy. Pancreas and adrenals are unremarkable. Kidneys are normal. No urinary tract stone. No bowel obstruction. There is mild wall thickening of the descending and sigmoid colon, likely a very mild colitis. Formation or abscess. A few colonic diverticula are noted but no focal findings of acute diverticulitis. Normal appendix. No free fluid or adenopathy.  PELVIS: Decompressed urinary bladder. Uterus and adnexa are unremarkable. No free fluid or adenopathy. No acute osseous findings.       Impression: Mild colitis involving the descending and sigmoid colon. No complication.  This report was signed and finalized on 4/29/2023 8:21 AM by Dr González Honeycutt DO.       [facility]         Medical Decision Making:    Initial impression of presenting illness:     DDX: includes but is not limited to: 52-year-old  female with chronic constipation presents with approximately a week of persistent frequent diarrhea.  Occasional blood in the stools.  No history of diverticulitis or colitis.  Does have a history of hemorrhoids.  No travel         Patient arrives hemodynamically stable slightly hypertensive afebrile no tachycardia oxygen saturations 98% with vitals interpreted by myself.     Pertinent features from physical exam: Benign exam no impaction.    Initial diagnostic plan: Performed rectal exam, will add GI panel and C. difficile testing, CT abdomen pelvis    Results from initial plan were reviewed and interpreted by me revealing CT abdomen pelvis revealing colitis added GI panel and C. difficile testing, patient was unable to provide sample.    Diagnostic information from other sources: None    Interventions / Re-evaluation: Discussed results with patient and she is scheduled to see GI in the next few days this week.  We will treat empirically with  Cipro Flagyl and Bentyl.  Advised to keep appointment with GI.  Return precautions discussed.  Patient was unable to provide stool sample, wrote prescription for lab requisition for outpatient testing and provided patient with stool sample collection supplies.      Results/clinical rationale were discussed with patient.    Consultations/Discussion of results with other physicians: None    Discussion: Med rec review, patient is not taking Linzess or Amitiza, due to prior authorizations.  Patient not on any opiates.    -----      Final diagnoses:   Colitis        Devyn Lindquist, DO  04/30/23 2439

## 2023-04-30 LAB
ADV 40+41 DNA STL QL NAA+NON-PROBE: NOT DETECTED
ASTRO TYP 1-8 RNA STL QL NAA+NON-PROBE: NOT DETECTED
C CAYETANENSIS DNA STL QL NAA+NON-PROBE: NOT DETECTED
C COLI+JEJ+UPSA DNA STL QL NAA+NON-PROBE: NOT DETECTED
C DIFF GDH + TOXINS A+B STL QL IA.RAPID: NEGATIVE
C DIFF GDH + TOXINS A+B STL QL IA.RAPID: NEGATIVE
CRYPTOSP DNA STL QL NAA+NON-PROBE: NOT DETECTED
E HISTOLYT DNA STL QL NAA+NON-PROBE: NOT DETECTED
EAEC PAA PLAS AGGR+AATA ST NAA+NON-PRB: NOT DETECTED
EC STX1+STX2 GENES STL QL NAA+NON-PROBE: NOT DETECTED
EPEC EAE GENE STL QL NAA+NON-PROBE: DETECTED
ETEC LTA+ST1A+ST1B TOX ST NAA+NON-PROBE: NOT DETECTED
G LAMBLIA DNA STL QL NAA+NON-PROBE: NOT DETECTED
NOROVIRUS GI+II RNA STL QL NAA+NON-PROBE: NOT DETECTED
P SHIGELLOIDES DNA STL QL NAA+NON-PROBE: NOT DETECTED
RVA RNA STL QL NAA+NON-PROBE: NOT DETECTED
S ENT+BONG DNA STL QL NAA+NON-PROBE: NOT DETECTED
SAPO I+II+IV+V RNA STL QL NAA+NON-PROBE: NOT DETECTED
SHIGELLA SP+EIEC IPAH ST NAA+NON-PROBE: NOT DETECTED
V CHOL+PARA+VUL DNA STL QL NAA+NON-PROBE: NOT DETECTED
V CHOLERAE DNA STL QL NAA+NON-PROBE: NOT DETECTED
Y ENTEROCOL DNA STL QL NAA+NON-PROBE: NOT DETECTED

## 2023-04-30 PROCEDURE — 87507 IADNA-DNA/RNA PROBE TQ 12-25: CPT | Performed by: EMERGENCY MEDICINE

## 2023-04-30 PROCEDURE — 87449 NOS EACH ORGANISM AG IA: CPT | Performed by: EMERGENCY MEDICINE

## 2023-04-30 PROCEDURE — 87324 CLOSTRIDIUM AG IA: CPT | Performed by: EMERGENCY MEDICINE

## 2023-05-01 ENCOUNTER — OFFICE VISIT (OUTPATIENT)
Dept: GASTROENTEROLOGY | Facility: CLINIC | Age: 53
End: 2023-05-01
Payer: COMMERCIAL

## 2023-05-01 VITALS
DIASTOLIC BLOOD PRESSURE: 60 MMHG | HEART RATE: 52 BPM | OXYGEN SATURATION: 100 % | WEIGHT: 248 LBS | BODY MASS INDEX: 41.91 KG/M2 | SYSTOLIC BLOOD PRESSURE: 102 MMHG

## 2023-05-01 DIAGNOSIS — R93.3 ABNORMAL CT SCAN, COLON: Chronic | ICD-10-CM

## 2023-05-01 DIAGNOSIS — R10.30 LOWER ABDOMINAL PAIN: Chronic | ICD-10-CM

## 2023-05-01 DIAGNOSIS — Z83.79 FAMILY HISTORY OF ULCERATIVE COLITIS: ICD-10-CM

## 2023-05-01 DIAGNOSIS — Z12.11 ENCOUNTER FOR SCREENING FOR MALIGNANT NEOPLASM OF COLON: ICD-10-CM

## 2023-05-01 DIAGNOSIS — K62.5 RECTAL BLEEDING: Chronic | ICD-10-CM

## 2023-05-01 DIAGNOSIS — R19.7 DIARRHEA, UNSPECIFIED TYPE: Primary | Chronic | ICD-10-CM

## 2023-05-01 DIAGNOSIS — A04.0 ENTEROPATHOGENIC ESCHERICHIA COLI INFECTION: ICD-10-CM

## 2023-05-01 RX ORDER — SODIUM, POTASSIUM,MAG SULFATES 17.5-3.13G
SOLUTION, RECONSTITUTED, ORAL ORAL
Qty: 344 ML | Refills: 0 | Status: SHIPPED | OUTPATIENT
Start: 2023-05-01

## 2023-05-01 RX ORDER — SODIUM CHLORIDE 9 MG/ML
70 INJECTION, SOLUTION INTRAVENOUS CONTINUOUS PRN
OUTPATIENT
Start: 2023-05-01

## 2023-05-01 NOTE — PROGRESS NOTES
"     New Patient Consult      Date: 2023   Patient Name: Mary Lunsford  MRN: [id number]  : 1970     Primary Care Provider: Floridalma Jimenez MD    Chief Complaint   Patient presents with   • Diarrhea   • Abdominal Pain     History of Present Illness: Mary Lunsford is a 52 y.o. female who is here today to establish care with gastroenterology for diarrhea.     She has a history of severe diarrhea that started a couple of weeks ago. She had lower abdominal cramping associated with bowel movements. She went to the emergency room 2 days ago and had a CT scan that showed mild colitis. She was given Cipro, Flagyl and Bentyl. She did a stool study yesterday that came back positive for enteropathogenic e.coli. Her diarrhea has been improving. Since her ER visit 2 days ago, she had 1 loose stool and 1 semi solid stool. She has not had any bowel movements today. She had some rectal bleeding during the episodes of diarrhea.     Her last colonoscopy was in 2019 by Dr. Paez. Her brother has ulcerative colitis. Her last EGD was in 2011 prior to weight loss surgery that was \"normal\" per patient.  No family history of GI malignancy.     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  states she had testing at , but was told that they didn't \"find anything\"   • Essential hypertension 3/30/2018   • Gallstone    • Kidney stones    • Migraine    • Obesity    • Ovarian cyst    • Restless leg syndrome    • Urinary tract infection    • Vitamin B12 deficiency    • Wears glasses     to read     Past Surgical History:   Procedure Laterality Date   • BREAST SURGERY Bilateral 1987    breast reduction   • CARDIAC CATHETERIZATION N/A 3/15/2017    Procedure: Coronary angiography;  Surgeon: Moustapha Peñaloza MD;  Location: [facility] CATH INVASIVE LOCATION;  " Service:    • CARDIAC CATHETERIZATION N/A 3/15/2017    Procedure: Left ventriculography;  Surgeon: Moustapha Peñaloza MD;  Location: [facility] CATH INVASIVE LOCATION;  Service:    •  SECTION      1991 & 1995   • CHOLECYSTECTOMY  2013   • COLONOSCOPY N/A 10/3/2019    Procedure: COLONOSCOPY WITH BIOPSIES; ANOSCOPY;  Surgeon: Jose Paez MD;  Location: [facility] ENDOSCOPY;  Service: Gastroenterology   • ENDOSCOPY     • GASTRIC BYPASS  2011   • REDUCTION MAMMAPLASTY       Family History   Problem Relation Age of Onset   • Cancer Father    • Heart attack Father    • Cancer Other    • Other Other    • Breast cancer Mother    • Asthma Brother    • Ulcerative colitis Brother    • Arthritis Paternal Grandmother    • Colon cancer Neg Hx      Social History     Socioeconomic History   • Marital status:    Tobacco Use   • Smoking status: Former     Packs/day: 1.00     Years: 2.00     Pack years: 2.00     Types: Cigarettes     Quit date: 10/1/1995     Years since quittin.6   • Smokeless tobacco: Never   Vaping Use   • Vaping Use: Never used   Substance and Sexual Activity   • Alcohol use: Yes     Alcohol/week: 12.0 standard drinks     Types: 12 Cans of beer per week     Comment: 16 beers per week   • Drug use: No   • Sexual activity: Yes     Partners: Male     Birth control/protection: None, Post-menopausal       Current Outpatient Medications:   •  albuterol sulfate  (90 Base) MCG/ACT inhaler, Inhale 2 puffs Every 6 (Six) Hours As Needed for Wheezing or Shortness of Air., Disp: 8 g, Rfl: 1  •  ASPIRIN 81 PO, Take 1 tablet by mouth., Disp: , Rfl:   •  atorvastatin (LIPITOR) 40 MG tablet, Take 1 tablet by mouth Every Night., Disp: 90 tablet, Rfl: 4  •  carbidopa-levodopa CR (SINEMET CR)  MG per CR tablet, 1 tab tid and 1 extra if needed prn breakthrough rls symptoms, Disp: 360 tablet, Rfl: 3  •  ciprofloxacin (CIPRO) 500 MG tablet, Take 1 tablet by mouth 2 (Two) Times a Day., Disp:  14 tablet, Rfl: 0  •  dicyclomine (BENTYL) 20 MG tablet, Take 1 tablet by mouth Every 6 (Six) Hours., Disp: 20 tablet, Rfl: 0  •  Ferrous Fumarate (Ferrocite) 324 (106 Fe) MG tablet, Take 1 tablet by mouth 2 (Two) Times a Day., Disp: 180 tablet, Rfl: 3  •  levothyroxine (SYNTHROID, LEVOTHROID) 88 MCG tablet, Take 1 tablet by mouth Daily., Disp: 90 tablet, Rfl: 1  •  Lyrica 75 MG capsule, Take 1 capsule by mouth Every Night., Disp: 90 capsule, Rfl: 1  •  metoprolol tartrate (LOPRESSOR) 25 MG tablet, TAKE 1 TABLET BY MOUTH EVERY 12 HOURS (Patient taking differently: 12.5 mg 2 (Two) Times a Day.), Disp: 180 tablet, Rfl: 3  •  metroNIDAZOLE (FLAGYL) 500 MG tablet, Take 1 tablet by mouth 3 (Three) Times a Day., Disp: 21 tablet, Rfl: 0  •  pramipexole (MIRAPEX) 0.5 MG tablet, Take 0.5 tablets by mouth 3 (Three) Times a Day., Disp: 135 tablet, Rfl: 3  •  saccharomyces boulardii (Florastor) 250 MG capsule, Take 1 capsule by mouth 2 (Two) Times a Day., Disp: 30 capsule, Rfl: 0  •  ubrogepant (UBRELVY) 100 MG tablet, Take 1 tablet at onset of migraine, may repeat once in 2 hours if needed, Disp: 10 tablet, Rfl: 5  •  sodium-potassium-magnesium sulfates (Suprep Bowel Prep Kit) 17.5-3.13-1.6 GM/177ML solution oral solution, Use as directed for colonoscopy prep. Patient has instructions., Disp: 344 mL, Rfl: 0     Allergies   Allergen Reactions   • Sulfa Antibiotics Rash and Other (See Comments)     blind     The following portions of the patient's history were reviewed and updated as appropriate: allergies, current medications, past family history, past medical history, past social history, past surgical history and problem list.    Objective     Physical Exam  Vitals and nursing note reviewed.   Constitutional:       General: She is not in acute distress.     Appearance: Normal appearance. She is well-developed.   HENT:      Head: Normocephalic and atraumatic.      Mouth/Throat:      Mouth: Mucous membranes are not pale, not  dry and not cyanotic.   Eyes:      General: Lids are normal.   Neck:      Trachea: Trachea normal.   Cardiovascular:      Rate and Rhythm: Normal rate.   Pulmonary:      Effort: Pulmonary effort is normal. No respiratory distress.      Breath sounds: Normal breath sounds.   Abdominal:      General: Bowel sounds are normal.      Palpations: Abdomen is soft. There is no mass.      Tenderness: There is no abdominal tenderness.      Hernia: No hernia is present.   Skin:     General: Skin is warm and dry.   Neurological:      Mental Status: She is alert and oriented to person, place, and time.   Psychiatric:         Mood and Affect: Mood normal.         Speech: Speech normal.         Behavior: Behavior normal. Behavior is cooperative.       Vitals:    05/01/23 1108   BP: 102/60   Pulse: 52   SpO2: 100%   Weight: 112 kg (248 lb)     Body mass index is 41.91 kg/m².     Results Review:   I have reviewed the patient's new clinical and imaging results.    Admission on 04/29/2023, Discharged on 04/29/2023   Component Date Value Ref Range Status   • Campylobacter 04/30/2023 Not Detected  Not Detected Final   • Plesiomonas shigelloides 04/30/2023 Not Detected  Not Detected Final   • Salmonella 04/30/2023 Not Detected  Not Detected Final   • Vibrio 04/30/2023 Not Detected  Not Detected Final   • Vibrio cholerae 04/30/2023 Not Detected  Not Detected Final   • Yersinia enterocolitica 04/30/2023 Not Detected  Not Detected Final   • Enteroaggregative E. coli (EAEC) 04/30/2023 Not Detected  Not Detected Final   • Enteropathogenic E. coli (EPEC) 04/30/2023 Detected (A)  Not Detected Final   • Enterotoxigenic E. coli (ETEC) lt/* 04/30/2023 Not Detected  Not Detected Final   • Shiga-like toxin-producing E. coli* 04/30/2023 Not Detected  Not Detected Final   • Shigella/Enteroinvasive E. coli (E* 04/30/2023 Not Detected  Not Detected Final   • Cryptosporidium 04/30/2023 Not Detected  Not Detected Final   • Cyclospora cayetanensis  04/30/2023 Not Detected  Not Detected Final   • Entamoeba histolytica 04/30/2023 Not Detected  Not Detected Final   • Giardia lamblia 04/30/2023 Not Detected  Not Detected Final   • Adenovirus F40/41 04/30/2023 Not Detected  Not Detected Final   • Astrovirus 04/30/2023 Not Detected  Not Detected Final   • Norovirus GI/GII 04/30/2023 Not Detected  Not Detected Final   • Rotavirus A 04/30/2023 Not Detected  Not Detected Final   • Sapovirus (I, II, IV or V) 04/30/2023 Not Detected  Not Detected Final   • C Diff GDH Ag 04/30/2023 Negative  Negative Final   • C.diff Toxin Ag 04/30/2023 Negative  Negative Final   Office Visit on 04/11/2023   Component Date Value Ref Range Status   • Reference Lab Report 04/11/2023    Final   Office Visit on 02/10/2023   Component Date Value Ref Range Status   • SARS Antigen 02/10/2023 Not Detected  Not Detected, Presumptive Negative Final   • Influenza A Antigen MELISSA 02/10/2023 Not Detected  Not Detected Final   • Influenza B Antigen MELISSA 02/10/2023 Not Detected  Not Detected Final   • Internal Control 02/10/2023 Passed  Passed Final   • Lot Number 02/10/2023 [id number]   Final   • Expiration Date 02/10/2023 01/12/2024   Final   Admission on 02/09/2023, Discharged on 02/09/2023   Component Date Value Ref Range Status   • Rapid Influenza A Ag 02/09/2023 Negative   Final   • Rapid Influenza B Ag 02/09/2023 Negative   Final   • Internal Control 02/09/2023 Passed   Final   • Lot Number 02/09/2023 [id number]   Final   • Expiration Date 02/09/2023 07-31-24   Final      CT Abdomen Pelvis Without Contrast     Result Date: 4/29/2023  Mild colitis involving the descending and sigmoid colon. No complication.     Colonoscopy dated 10/3/2019 per Dr. Paez  1. Scant diverticular change in the left colon.  2. Internal hemorrhoids.  3. Small superficial anal tear.   4. No endoscopic evidence of colitis was seen.  Random biopsies were obtained from the colon upon withdrawal of the scope.  -Terminal ileum biopsy  with benign ileal mucosa with no diagnostic abnormalities.  Random colon biopsies with benign colonic mucosa with no diagnostic abnormality.    Assessment / Plan      1. Diarrhea, unspecified type  2. Lower abdominal pain  3. Rectal bleeding  4. Abnormal CT scan, colon  5. Enteropathogenic Escherichia coli infection  6. Family history of ulcerative colitis  7. Encounter for screening for malignant neoplasm of colon  She has a history of severe diarrhea that started a couple of weeks ago and gradually worsened.  She had lower abdominal cramping associated with bowel movements.  She had some rectal bleeding during the episodes of diarrhea.  She was seen in the emergency room 2 days ago and had CT scan that showed mild colitis involving the descending and sigmoid colon.  Stool studies with negative C. difficile.  Gastrointestinal panel with positive enteropathogenic E. Coli. She was prescribed Cipro, Flagyl and Bentyl and her symptoms have improved.  No diarrhea today.  There is a family history of ulcerative colitis in her brother.  Her last colonoscopy was in October 2019 per Dr. CACERES with no endoscopic evidence of colitis seen at that time, random biopsies unremarkable.  Continue Bentyl and Imodium as needed.  We will schedule colonoscopy for evaluation of abnormal CT scan of sigmoid colon.    - Case Request  - sodium-potassium-magnesium sulfates (Suprep Bowel Prep Kit) 17.5-3.13-1.6 GM/177ML solution oral solution; Use as directed for colonoscopy prep. Patient has instructions.  Dispense: 344 mL; Refill: 0  - sodium-potassium-magnesium sulfates (Suprep Bowel Prep Kit) 17.5-3.13-1.6 GM/177ML solution oral solution; Use as directed for colonoscopy prep. Patient has instructions.  Dispense: 344 mL; Refill: 0    Patient Instructions   1. May take Bentyl as needed for abdominal cramping.   2. May take Imodium as needed for diarrhea.   3. Colonoscopy: The indications, technique, alternatives and potential risk and  complications were discussed with the patient including but not limited to bleeding, perforations, missing lesions and anesthetic complications. The patient understands and wishes to proceed with the procedure and has given their verbal consent. Written patient education information was given to the patient.   4. The patient will call if they have further questions before procedure.       Leo Sykes, APRN  5/1/2023    Please note that portions of this note may have been completed with a voice recognition program.

## 2023-05-01 NOTE — PATIENT INSTRUCTIONS
May take Bentyl as needed for abdominal cramping.   May take Imodium as needed for diarrhea.   Colonoscopy: The indications, technique, alternatives and potential risk and complications were discussed with the patient including but not limited to bleeding, perforations, missing lesions and anesthetic complications. The patient understands and wishes to proceed with the procedure and has given their verbal consent. Written patient education information was given to the patient.   The patient will call if they have further questions before procedure.

## 2023-05-05 ENCOUNTER — PATIENT ROUNDING (BHMG ONLY) (OUTPATIENT)
Dept: GASTROENTEROLOGY | Facility: CLINIC | Age: 53
End: 2023-05-05
Payer: COMMERCIAL

## 2023-05-05 NOTE — PROGRESS NOTES
May 5, 2023    Hello, may I speak with Mary Lunsford?    My name is Miroslava     I am  with [facility]  [address]  [facility] [place] [address] [phone].    Before we get started may I verify your date of birth? 1970    I am calling to officially welcome you to our practice and ask about your recent visit. Is this a good time to talk? yes    Tell me about your visit with us. What things went well?    It went good     We're always looking for ways to make our patients' experiences even better. Do you have recommendations on ways we may improve?  no    Overall were you satisfied with your first visit to our practice?   yes     I appreciate you taking the time to speak with me today. Is there anything else I can do for you?   no    Thank you, and have a great day.

## 2023-05-08 NOTE — PATIENT INSTRUCTIONS
Self breast exam monthly  Annual mammogram  Calcium 1200 mg daily and vit D 2000 mg daily  Regular exercise  Discontinue oc's at end of current pack   Never

## 2023-05-11 ENCOUNTER — OFFICE VISIT (OUTPATIENT)
Dept: NEUROLOGY | Facility: CLINIC | Age: 53
End: 2023-05-11
Payer: COMMERCIAL

## 2023-05-11 VITALS
WEIGHT: 243 LBS | HEIGHT: 64 IN | SYSTOLIC BLOOD PRESSURE: 126 MMHG | BODY MASS INDEX: 41.48 KG/M2 | DIASTOLIC BLOOD PRESSURE: 76 MMHG | OXYGEN SATURATION: 98 % | HEART RATE: 63 BPM

## 2023-05-11 DIAGNOSIS — G43.109 MIGRAINE WITH AURA AND WITHOUT STATUS MIGRAINOSUS, NOT INTRACTABLE: ICD-10-CM

## 2023-05-11 DIAGNOSIS — G25.81 RESTLESS LEGS SYNDROME (RLS): Primary | ICD-10-CM

## 2023-05-11 PROCEDURE — 99213 OFFICE O/P EST LOW 20 MIN: CPT | Performed by: NURSE PRACTITIONER

## 2023-05-11 RX ORDER — CARBIDOPA AND LEVODOPA 50; 200 MG/1; MG/1
TABLET, EXTENDED RELEASE ORAL
Qty: 360 TABLET | Refills: 3 | Status: SHIPPED | OUTPATIENT
Start: 2023-05-11

## 2023-05-11 RX ORDER — MELOXICAM 15 MG/1
15 TABLET ORAL DAILY
COMMUNITY

## 2023-05-11 RX ORDER — PRAMIPEXOLE DIHYDROCHLORIDE 0.5 MG/1
0.25 TABLET ORAL 3 TIMES DAILY
Qty: 135 TABLET | Refills: 3 | Status: SHIPPED | OUTPATIENT
Start: 2023-05-11

## 2023-05-11 NOTE — LETTER
"May 12, 2023     Floridalma Jimenez MD  [address] [facility] [address]  [facility] [address]    Patient: Mary Lunsford   YOB: 1970   Date of Visit: 5/11/2023       Dear Floridalma Jimenez MD    Mary Lunsford was in my office today. Below is a copy of my note.    If you have questions, please do not hesitate to call me. I look forward to following Mary along with you.         Sincerely,        LINNEA Mendoza        CC: MD Leo Cabrera APRN Angela M Smitha, SULEMAN Dailey MD    Subjective:     Patient ID: Mary Lunsford is a 52 y.o. female.    CC:   Chief Complaint   Patient presents with   • Migraine   • Restless Legs Syndrome       HPI:   History of Present Illness   Today 5/11/2023-    This is a very pleasant 52-year-old female who presents for 6-month neurology follow-up on chronic restless legs syndrome, known iron deficiency anemia, and migraine with aura, which is episodic. She is here for follow-up and refills on medications. She was last seen in clinic on 11/07/2022.    In terms she has been taking carbidopa-levodopa CR  mg 3 times per day.  She takes pramipexole 0.5 mg half a tablet 3 times per day and sometimes adjust the way she takes this based on her symptoms.  She continues to take Lyrica 75 mg at bedtime.    Today, she reports her legs are about the same. She denies any major changes in her health since her last visit.    She does not get migraines very often.    She is sleeping okay, but sometimes she wakes up early on the weekends. She still struggles in the early evenings with her legs feeling restless, but she is trying not to make any changes. She expresses she does not want to take more medication. She struggles to concentrate on \"things\" in the evenings. She states it is difficult to concentrate in the evenings or for very long because her legs are bothering her at that time of the night.    She confirms the Lyrica is " "still a good dose at night. She states last night after her legs started moving, she took half of the Mirapex because she wanted to work with her  on making some decisions about the house they are building. She states this made her quite tired, but did help her legs. She reports her  could tell that her legs felt better and she could focus, but it made her drowsy. She states as long as she has been taking the Mirapex, it still makes her drowsy.     She mentions that she has a back problem, and she is going to start physical therapy. She was recently dealing with another stomach issue that she \"just got over\", and she wanted to get all of her medication out of the way. She has been prescribed meloxicam 15mg daily to take for her back and plans to start this soon.    She is trying to lose weight.    She is going on a trip to [facility] [facility] in 06/2023 for 1 week with her  and their good friends.     Previous neurology history and workup included-   Chronic restless leg syndrome present since 2009. She also has intermittent migraine headaches. She has been taking Lyrica brand name necessary 75 mg at night. Pregabalin generic did not help her symptoms. She takes carbidopa-levodopa CR 50/200 mg 1 pill 3 times a day and 1 extra if needed. She takes pramipexole 0.5 mg 0.5 tablets 3 times a day. .     She states that she is still taking the pramipexole half a pill 3 times a day. She states that it makes her sleepy. She states that she is still taking the Lyrica every night. She states that she takes the carbidopa- levodopa 3 times a day. She occasionally needs an extra 1/2 dose of pramipexole for breakthrough symptoms.    She also underwent an EMG and NCVS of her right upper and left upper extremities and this was completely normal on 10/23/2019 with Dr. Buck Lan of our neurologist.    Underwent MRI of the brain with and without contrast which showed 2 very small chronic white matter changes of the brain " "and otherwise MRI of the brain was completely unremarkable with no abnormal contrast enhancement and no acute intracranial abnormalities.     She does also experience migraines with aura for several years. She typically has used Excedrin Migraine along with ibuprofen.  She will get \"squiggly lines\" in both eyes and then about 20 minutes later will start to have the band of headache around her head with nausea, photophobia and phonophobia.  This typically last 5 to 6 hours.  Sometimes she does have to lay down and go to sleep.  She gets moderate throbbing pain.  She has not noticed a pattern. Typically this will occur 5-6 times per year.  She used to take sumatriptan/Imitrex for many years and this became ineffective.  She switched to rizatriptan but this caused her to be very tired and did not seem to work well.  She did not like the side effects of either of the triptan medications since she felt like she could not focus and had to go to sleep.  Also caused some nausea.  She may have tried a third triptan but cannot recall exactly which one. Daughter also gets migraines. Episodic migraines-a few in a few months and then resolve for several months. These are rare now. Has ubrelvy if needed.     2021 labs-ferritin level, which was 563 ng/mL and vitamin B12 level, which is 212 pg/mL, and methylmalonic acid level, which was 367, normal.     She is treated with iron supplement and has previously had iron infusions in the past. Her mom and grandmother also have severe restless leg syndrome. Ropinirole and neupro patch as well as generic pregabalin, gabapentin and Horizant all ineffective for RLS.    Seeing specialist for back pain. Recently prescribed meloxicam and is going to start this soon.    The following portions of the patient's history were reviewed and updated as appropriate: allergies, current medications, past family history, past medical history, past social history, past surgical history and problem " "list.    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  states she had testing at , but was told that they didn't \"find anything\"   • Essential hypertension 3/30/2018   • Gallstone    • Kidney stones    • Migraine    • Obesity    • Ovarian cyst    • Restless leg syndrome    • Urinary tract infection    • Vitamin B12 deficiency    • Wears glasses     to read       Past Surgical History:   Procedure Laterality Date   • BREAST SURGERY Bilateral 1987    breast reduction   • CARDIAC CATHETERIZATION N/A 3/15/2017    Procedure: Coronary angiography;  Surgeon: Moustapha Peñaloza MD;  Location: [facility] CATH INVASIVE LOCATION;  Service:    • CARDIAC CATHETERIZATION N/A 3/15/2017    Procedure: Left ventriculography;  Surgeon: Moustapha Peñaloza MD;  Location: [facility] CATH INVASIVE LOCATION;  Service:    •  SECTION      1991 & 1995   • CHOLECYSTECTOMY  2013   • COLONOSCOPY N/A 10/3/2019    Procedure: COLONOSCOPY WITH BIOPSIES; ANOSCOPY;  Surgeon: Jose Paez MD;  Location: [facility] ENDOSCOPY;  Service: Gastroenterology   • ENDOSCOPY     • GASTRIC BYPASS  2011   • REDUCTION MAMMAPLASTY         Social History     Socioeconomic History   • Marital status:    Tobacco Use   • Smoking status: Former     Packs/day: 1.00     Years: 2.00     Pack years: 2.00     Types: Cigarettes     Quit date: 10/1/1995     Years since quittin.6   • Smokeless tobacco: Never   Vaping Use   • Vaping Use: Never used   Substance and Sexual Activity   • Alcohol use: Yes     Alcohol/week: 12.0 standard drinks     Types: 12 Cans of beer per week     Comment: 16 beers per week   • Drug use: No   • Sexual activity: Yes     Partners: Male     Birth control/protection: None, Post-menopausal       Family History   Problem Relation Age of Onset   • Cancer Father    • Heart attack Father  "   • Cancer Other    • Other Other    • Breast cancer Mother    • Asthma Brother    • Ulcerative colitis Brother    • Arthritis Paternal Grandmother    • Colon cancer Neg Hx           Current Outpatient Medications:   •  albuterol sulfate  (90 Base) MCG/ACT inhaler, Inhale 2 puffs Every 6 (Six) Hours As Needed for Wheezing or Shortness of Air., Disp: 8 g, Rfl: 1  •  ASPIRIN 81 PO, Take 1 tablet by mouth., Disp: , Rfl:   •  atorvastatin (LIPITOR) 40 MG tablet, Take 1 tablet by mouth Every Night., Disp: 90 tablet, Rfl: 4  •  carbidopa-levodopa CR (SINEMET CR)  MG per CR tablet, 1 tab tid and 1 extra if needed prn breakthrough rls symptoms, Disp: 360 tablet, Rfl: 3  •  Ferrous Fumarate (Ferrocite) 324 (106 Fe) MG tablet, Take 1 tablet by mouth 2 (Two) Times a Day., Disp: 180 tablet, Rfl: 3  •  levothyroxine (SYNTHROID, LEVOTHROID) 88 MCG tablet, Take 1 tablet by mouth Daily., Disp: 90 tablet, Rfl: 1  •  Lyrica 75 MG capsule, Take 1 capsule by mouth Every Night., Disp: 90 capsule, Rfl: 1  •  meloxicam (MOBIC) 15 MG tablet, Take 1 tablet by mouth Daily., Disp: , Rfl:   •  metoprolol tartrate (LOPRESSOR) 25 MG tablet, TAKE 1 TABLET BY MOUTH EVERY 12 HOURS (Patient taking differently: 12.5 mg 2 (Two) Times a Day.), Disp: 180 tablet, Rfl: 3  •  pramipexole (MIRAPEX) 0.5 MG tablet, Take 0.5 tablets by mouth 3 (Three) Times a Day., Disp: 135 tablet, Rfl: 3  •  saccharomyces boulardii (Florastor) 250 MG capsule, Take 1 capsule by mouth 2 (Two) Times a Day., Disp: 30 capsule, Rfl: 0  •  sodium-potassium-magnesium sulfates (Suprep Bowel Prep Kit) 17.5-3.13-1.6 GM/177ML solution oral solution, Use as directed for colonoscopy prep. Patient has instructions., Disp: 344 mL, Rfl: 0  •  ubrogepant (UBRELVY) 100 MG tablet, Take 1 tablet at onset of migraine, may repeat once in 2 hours if needed, Disp: 10 tablet, Rfl: 5     Review of Systems   Constitutional: Negative for chills, fatigue, fever and unexpected weight change.  "  HENT: Negative for ear pain, hearing loss, nosebleeds, rhinorrhea and sore throat.    Eyes: Negative for photophobia, pain, discharge, itching and visual disturbance.   Respiratory: Negative for cough, chest tightness, shortness of breath and wheezing.    Cardiovascular: Negative for chest pain, palpitations and leg swelling.   Gastrointestinal: Negative for abdominal pain, blood in stool, constipation, diarrhea, nausea and vomiting.   Genitourinary: Negative for dysuria, frequency, hematuria and urgency.   Musculoskeletal: Negative for arthralgias, back pain, gait problem, joint swelling, myalgias, neck pain and neck stiffness.   Skin: Negative for rash and wound.   Allergic/Immunologic: Negative for environmental allergies and food allergies.   Neurological: Negative for dizziness, tremors, seizures, syncope, speech difficulty, weakness, light-headedness, numbness and headaches.        Restless leg syndrome   Hematological: Negative for adenopathy. Does not bruise/bleed easily.   Psychiatric/Behavioral: Positive for sleep disturbance. Negative for agitation, confusion, decreased concentration, hallucinations and suicidal ideas. The patient is not nervous/anxious.    All other systems reviewed and are negative.       Objective:  /76   Pulse 63   Ht 162.6 cm (64\")   Wt 110 kg (243 lb)   LMP 12/01/2018 (LMP Unknown)   SpO2 98%   BMI 41.71 kg/m²     Neurologic Exam     Mental Status   Oriented to person, place, and time.   Speech: speech is normal   Level of consciousness: alert    Cranial Nerves     CN II   Visual fields full to confrontation.     CN III, IV, VI   Extraocular motions are normal.     CN VII   Facial expression full, symmetric.     CN VIII   CN VIII normal.     Gait, Coordination, and Reflexes     Gait  Gait: normal    Reflexes   Reflexes 2+ except as noted.       Physical Exam  Constitutional:       Appearance: Normal appearance.   Eyes:      Extraocular Movements: EOM normal. "   Neurological:      Mental Status: She is alert and oriented to person, place, and time.      Gait: Gait is intact.   Psychiatric:         Mood and Affect: Mood and affect normal.         Speech: Speech normal.         Assessment/Plan:      Diagnoses and all orders for this visit:    1. Restless legs syndrome (RLS) (Primary)  -     pramipexole (MIRAPEX) 0.5 MG tablet; Take 0.5 tablets by mouth 3 (Three) Times a Day.  Dispense: 135 tablet; Refill: 3  -     Lyrica 75 MG capsule; Take 1 capsule by mouth Every Night.  Dispense: 90 capsule; Refill: 1  -     carbidopa-levodopa CR (SINEMET CR)  MG per CR tablet; 1 tab tid and 1 extra if needed prn breakthrough rls symptoms  Dispense: 360 tablet; Refill: 3    2. Migraine with aura and without status migrainosus, not intractable  Comments:  ubrelvy prn       Right now, she feels that she can tolerate her restless leg symptoms. She really does not want to change her medications at this time. She is really having no migraines at this time. She has Ubrelvy if needed. We have discussed that if she did notice more bothersome symptoms, she can certainly contact us and we will be happy to increase her Lyrica, which is brand medically necessary, to twice a day dosing. She would again prefer to wait for now. She has a big trip coming up in 06/2023 with her  and friends to go to [facility]. She has plenty of refills on her medications. She will continue with all of her specialists and PCP. She will call us with any additional questions or concerns prior to follow up in clinic. We will be seeing her on 12/08/2023 at 4 PM or sooner if needed. She verbalized understanding and agrees with plan moving forward today.    Reviewed medications, potential side effects and signs and symptoms to report. Discussed risk versus benefits of treatment plan with patient and/or family-including medications, labs and radiology that may be ordered. Addressed questions and concerns during  visit. Patient and/or family verbalized understanding and agree with plan.    As part of this patient's treatment plan I am prescribing controlled substances. The patient has been made aware of appropriate use of such medications, including potential risk of somnolence, limited ability to drive and/or work safely, and potential for dependence or overdose. It has also been made clear that these medications are for use by the patient only, without concomitant use of alcohol or other substances unless prescribed. Keep out of reach of children.  [place] report has been reviewed. If this is going to be prescribed long term, [facility] Controlled Substance Agreement Contract has also been read and signed by patient and myself.    During this visit the following were done:  Labs Reviewed []    Labs Ordered []    Radiology Reports Reviewed []    Radiology Ordered []    PCP Records Reviewed [x]    Referring Provider Records Reviewed []    ER Records Reviewed []    Hospital Records Reviewed []    History Obtained From Family []    Radiology Images Reviewed []    Other Reviewed []    Records Requested []      Transcribed from ambient dictation for LINNEA Mendoza by Roma Ryan.  05/11/23   19:37 EDT    Patient or patient representative verbalized consent to the visit recording.  I have personally performed the services described in this document as transcribed by the above individual, and it is both accurate and complete.  LINNEA Mendoza  5/12/2023  07:55 EDT

## 2023-05-11 NOTE — PROGRESS NOTES
"Subjective:     Patient ID: Mary Lunsford is a 52 y.o. female.    CC:   Chief Complaint   Patient presents with   • Migraine   • Restless Legs Syndrome       HPI:   History of Present Illness   Today 5/11/2023-    This is a very pleasant 52-year-old female who presents for 6-month neurology follow-up on chronic restless legs syndrome, known iron deficiency anemia, and migraine with aura, which is episodic. She is here for follow-up and refills on medications. She was last seen in clinic on 11/07/2022.    In terms she has been taking carbidopa-levodopa CR  mg 3 times per day.  She takes pramipexole 0.5 mg half a tablet 3 times per day and sometimes adjust the way she takes this based on her symptoms.  She continues to take Lyrica 75 mg at bedtime.    Today, she reports her legs are about the same. She denies any major changes in her health since her last visit.    She does not get migraines very often.    She is sleeping okay, but sometimes she wakes up early on the weekends. She still struggles in the early evenings with her legs feeling restless, but she is trying not to make any changes. She expresses she does not want to take more medication. She struggles to concentrate on \"things\" in the evenings. She states it is difficult to concentrate in the evenings or for very long because her legs are bothering her at that time of the night.    She confirms the Lyrica is still a good dose at night. She states last night after her legs started moving, she took half of the Mirapex because she wanted to work with her  on making some decisions about the house they are building. She states this made her quite tired, but did help her legs. She reports her  could tell that her legs felt better and she could focus, but it made her drowsy. She states as long as she has been taking the Mirapex, it still makes her drowsy.     She mentions that she has a back problem, and she is going to start physical " "therapy. She was recently dealing with another stomach issue that she \"just got over\", and she wanted to get all of her medication out of the way. She has been prescribed meloxicam 15mg daily to take for her back and plans to start this soon.    She is trying to lose weight.    She is going on a trip to [facility] in 06/2023 for 1 week with her  and their good friends.     Previous neurology history and workup included-   Chronic restless leg syndrome present since 2009. She also has intermittent migraine headaches. She has been taking Lyrica brand name necessary 75 mg at night. Pregabalin generic did not help her symptoms. She takes carbidopa-levodopa CR 50/200 mg 1 pill 3 times a day and 1 extra if needed. She takes pramipexole 0.5 mg 0.5 tablets 3 times a day. .     She states that she is still taking the pramipexole half a pill 3 times a day. She states that it makes her sleepy. She states that she is still taking the Lyrica every night. She states that she takes the carbidopa- levodopa 3 times a day. She occasionally needs an extra 1/2 dose of pramipexole for breakthrough symptoms.    She also underwent an EMG and NCVS of her right upper and left upper extremities and this was completely normal on 10/23/2019 with Dr. Buck Lan of our neurologist.    Underwent MRI of the brain with and without contrast which showed 2 very small chronic white matter changes of the brain and otherwise MRI of the brain was completely unremarkable with no abnormal contrast enhancement and no acute intracranial abnormalities.     She does also experience migraines with aura for several years. She typically has used Excedrin Migraine along with ibuprofen.  She will get \"squiggly lines\" in both eyes and then about 20 minutes later will start to have the band of headache around her head with nausea, photophobia and phonophobia.  This typically last 5 to 6 hours.  Sometimes she does have to lay down and go to sleep.  She gets " "moderate throbbing pain.  She has not noticed a pattern. Typically this will occur 5-6 times per year.  She used to take sumatriptan/Imitrex for many years and this became ineffective.  She switched to rizatriptan but this caused her to be very tired and did not seem to work well.  She did not like the side effects of either of the triptan medications since she felt like she could not focus and had to go to sleep.  Also caused some nausea.  She may have tried a third triptan but cannot recall exactly which one. Daughter also gets migraines. Episodic migraines-a few in a few months and then resolve for several months. These are rare now. Has ubrelvy if needed.     2021 labs-ferritin level, which was 563 ng/mL and vitamin B12 level, which is 212 pg/mL, and methylmalonic acid level, which was 367, normal.     She is treated with iron supplement and has previously had iron infusions in the past. Her mom and grandmother also have severe restless leg syndrome. Ropinirole and neupro patch as well as generic pregabalin, gabapentin and Horizant all ineffective for RLS.    Seeing specialist for back pain. Recently prescribed meloxicam and is going to start this soon.    The following portions of the patient's history were reviewed and updated as appropriate: allergies, current medications, past family history, past medical history, past social history, past surgical history and problem list.    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  states she had testing at [facility], but was told that they didn't \"find anything\"   • Essential hypertension 3/30/2018   • Gallstone    • Kidney stones    • Migraine    • Obesity    • Ovarian cyst    • Restless leg syndrome    • Urinary tract infection    • Vitamin B12 deficiency    • Wears glasses     to read       Past Surgical History:   Procedure " Laterality Date   • BREAST SURGERY Bilateral 1987    breast reduction   • CARDIAC CATHETERIZATION N/A 3/15/2017    Procedure: Coronary angiography;  Surgeon: Moustapha Peñaloza MD;  Location: [facility] CATH INVASIVE LOCATION;  Service:    • CARDIAC CATHETERIZATION N/A 3/15/2017    Procedure: Left ventriculography;  Surgeon: Moustapha Peñaloza MD;  Location: [facility] CATH INVASIVE LOCATION;  Service:    •  SECTION      1991 & 1995   • CHOLECYSTECTOMY  2013   • COLONOSCOPY N/A 10/3/2019    Procedure: COLONOSCOPY WITH BIOPSIES; ANOSCOPY;  Surgeon: Jose Paez MD;  Location: [facility] ENDOSCOPY;  Service: Gastroenterology   • ENDOSCOPY     • GASTRIC BYPASS  2011   • REDUCTION MAMMAPLASTY         Social History     Socioeconomic History   • Marital status:    Tobacco Use   • Smoking status: Former     Packs/day: 1.00     Years: 2.00     Pack years: 2.00     Types: Cigarettes     Quit date: 10/1/1995     Years since quittin.6   • Smokeless tobacco: Never   Vaping Use   • Vaping Use: Never used   Substance and Sexual Activity   • Alcohol use: Yes     Alcohol/week: 12.0 standard drinks     Types: 12 Cans of beer per week     Comment: 16 beers per week   • Drug use: No   • Sexual activity: Yes     Partners: Male     Birth control/protection: None, Post-menopausal       Family History   Problem Relation Age of Onset   • Cancer Father    • Heart attack Father    • Cancer Other    • Other Other    • Breast cancer Mother    • Asthma Brother    • Ulcerative colitis Brother    • Arthritis Paternal Grandmother    • Colon cancer Neg Hx           Current Outpatient Medications:   •  albuterol sulfate  (90 Base) MCG/ACT inhaler, Inhale 2 puffs Every 6 (Six) Hours As Needed for Wheezing or Shortness of Air., Disp: 8 g, Rfl: 1  •  ASPIRIN 81 PO, Take 1 tablet by mouth., Disp: , Rfl:   •  atorvastatin (LIPITOR) 40 MG tablet, Take 1 tablet by mouth Every Night., Disp: 90 tablet, Rfl: 4  •   carbidopa-levodopa CR (SINEMET CR)  MG per CR tablet, 1 tab tid and 1 extra if needed prn breakthrough rls symptoms, Disp: 360 tablet, Rfl: 3  •  Ferrous Fumarate (Ferrocite) 324 (106 Fe) MG tablet, Take 1 tablet by mouth 2 (Two) Times a Day., Disp: 180 tablet, Rfl: 3  •  levothyroxine (SYNTHROID, LEVOTHROID) 88 MCG tablet, Take 1 tablet by mouth Daily., Disp: 90 tablet, Rfl: 1  •  Lyrica 75 MG capsule, Take 1 capsule by mouth Every Night., Disp: 90 capsule, Rfl: 1  •  meloxicam (MOBIC) 15 MG tablet, Take 1 tablet by mouth Daily., Disp: , Rfl:   •  metoprolol tartrate (LOPRESSOR) 25 MG tablet, TAKE 1 TABLET BY MOUTH EVERY 12 HOURS (Patient taking differently: 12.5 mg 2 (Two) Times a Day.), Disp: 180 tablet, Rfl: 3  •  pramipexole (MIRAPEX) 0.5 MG tablet, Take 0.5 tablets by mouth 3 (Three) Times a Day., Disp: 135 tablet, Rfl: 3  •  saccharomyces boulardii (Florastor) 250 MG capsule, Take 1 capsule by mouth 2 (Two) Times a Day., Disp: 30 capsule, Rfl: 0  •  sodium-potassium-magnesium sulfates (Suprep Bowel Prep Kit) 17.5-3.13-1.6 GM/177ML solution oral solution, Use as directed for colonoscopy prep. Patient has instructions., Disp: 344 mL, Rfl: 0  •  ubrogepant (UBRELVY) 100 MG tablet, Take 1 tablet at onset of migraine, may repeat once in 2 hours if needed, Disp: 10 tablet, Rfl: 5     Review of Systems   Constitutional: Negative for chills, fatigue, fever and unexpected weight change.   HENT: Negative for ear pain, hearing loss, nosebleeds, rhinorrhea and sore throat.    Eyes: Negative for photophobia, pain, discharge, itching and visual disturbance.   Respiratory: Negative for cough, chest tightness, shortness of breath and wheezing.    Cardiovascular: Negative for chest pain, palpitations and leg swelling.   Gastrointestinal: Negative for abdominal pain, blood in stool, constipation, diarrhea, nausea and vomiting.   Genitourinary: Negative for dysuria, frequency, hematuria and urgency.   Musculoskeletal:  "Negative for arthralgias, back pain, gait problem, joint swelling, myalgias, neck pain and neck stiffness.   Skin: Negative for rash and wound.   Allergic/Immunologic: Negative for environmental allergies and food allergies.   Neurological: Negative for dizziness, tremors, seizures, syncope, speech difficulty, weakness, light-headedness, numbness and headaches.        Restless leg syndrome   Hematological: Negative for adenopathy. Does not bruise/bleed easily.   Psychiatric/Behavioral: Positive for sleep disturbance. Negative for agitation, confusion, decreased concentration, hallucinations and suicidal ideas. The patient is not nervous/anxious.    All other systems reviewed and are negative.       Objective:  /76   Pulse 63   Ht 162.6 cm (64\")   Wt 110 kg (243 lb)   LMP 12/01/2018 (LMP Unknown)   SpO2 98%   BMI 41.71 kg/m²     Neurologic Exam     Mental Status   Oriented to person, place, and time.   Speech: speech is normal   Level of consciousness: alert    Cranial Nerves     CN II   Visual fields full to confrontation.     CN III, IV, VI   Extraocular motions are normal.     CN VII   Facial expression full, symmetric.     CN VIII   CN VIII normal.     Gait, Coordination, and Reflexes     Gait  Gait: normal    Reflexes   Reflexes 2+ except as noted.       Physical Exam  Constitutional:       Appearance: Normal appearance.   Eyes:      Extraocular Movements: EOM normal.   Neurological:      Mental Status: She is alert and oriented to person, place, and time.      Gait: Gait is intact.   Psychiatric:         Mood and Affect: Mood and affect normal.         Speech: Speech normal.         Assessment/Plan:       Diagnoses and all orders for this visit:    1. Restless legs syndrome (RLS) (Primary)  -     pramipexole (MIRAPEX) 0.5 MG tablet; Take 0.5 tablets by mouth 3 (Three) Times a Day.  Dispense: 135 tablet; Refill: 3  -     Lyrica 75 MG capsule; Take 1 capsule by mouth Every Night.  Dispense: 90 " capsule; Refill: 1  -     carbidopa-levodopa CR (SINEMET CR)  MG per CR tablet; 1 tab tid and 1 extra if needed prn breakthrough rls symptoms  Dispense: 360 tablet; Refill: 3    2. Migraine with aura and without status migrainosus, not intractable  Comments:  ubrelvy prn        Right now, she feels that she can tolerate her restless leg symptoms. She really does not want to change her medications at this time. She is really having no migraines at this time. She has Ubrelvy if needed. We have discussed that if she did notice more bothersome symptoms, she can certainly contact us and we will be happy to increase her Lyrica, which is brand medically necessary, to twice a day dosing. She would again prefer to wait for now. She has a big trip coming up in 06/2023 with her  and friends to go to [facility]. She has plenty of refills on her medications. She will continue with all of her specialists and PCP. She will call us with any additional questions or concerns prior to follow up in clinic. We will be seeing her on 12/08/2023 at 4 PM or sooner if needed. She verbalized understanding and agrees with plan moving forward today.    Reviewed medications, potential side effects and signs and symptoms to report. Discussed risk versus benefits of treatment plan with patient and/or family-including medications, labs and radiology that may be ordered. Addressed questions and concerns during visit. Patient and/or family verbalized understanding and agree with plan.    As part of this patient's treatment plan I am prescribing controlled substances. The patient has been made aware of appropriate use of such medications, including potential risk of somnolence, limited ability to drive and/or work safely, and potential for dependence or overdose. It has also been made clear that these medications are for use by the patient only, without concomitant use of alcohol or other substances unless prescribed. Keep out of reach  of children.  [place] report has been reviewed. If this is going to be prescribed long term, [facility] Controlled Substance Agreement Contract has also been read and signed by patient and myself.    During this visit the following were done:  Labs Reviewed []    Labs Ordered []    Radiology Reports Reviewed []    Radiology Ordered []    PCP Records Reviewed [x]    Referring Provider Records Reviewed []    ER Records Reviewed []    Hospital Records Reviewed []    History Obtained From Family []    Radiology Images Reviewed []    Other Reviewed []    Records Requested []      Transcribed from ambient dictation for LINNEA Mendoza by Roma Ryan.  05/11/23   19:37 EDT    Patient or patient representative verbalized consent to the visit recording.  I have personally performed the services described in this document as transcribed by the above individual, and it is both accurate and complete.  LINNEA Mendoza  5/12/2023  07:55 EDT

## 2023-07-25 ENCOUNTER — TELEPHONE (OUTPATIENT)
Dept: GASTROENTEROLOGY | Facility: CLINIC | Age: 53
End: 2023-07-25
Payer: COMMERCIAL

## 2023-07-31 ENCOUNTER — TELEPHONE (OUTPATIENT)
Dept: GASTROENTEROLOGY | Facility: CLINIC | Age: 53
End: 2023-07-31

## 2023-07-31 NOTE — TELEPHONE ENCOUNTER
Caller: Mary Lunsford    Relationship to patient: Self    Best call back number: [phone]     Patient is needing: PATIENT HAS A PROCEDURE SCHEDULED ON 8/1 WITH DR HOANG AND NEEDS TO KNOW THE ARRIVAL TIME AND ALSO WHEN THEY NEED TO TAKE THE 2ND ROUND OF MEDS. PLEASE CALL PATIENT, IF NOT AVAILABLE LEAVE A MESSAGE.

## 2023-08-01 ENCOUNTER — HOSPITAL ENCOUNTER (OUTPATIENT)
Facility: HOSPITAL | Age: 53
Setting detail: HOSPITAL OUTPATIENT SURGERY
Discharge: HOME | End: 2023-08-01
Attending: INTERNAL MEDICINE | Admitting: INTERNAL MEDICINE
Payer: COMMERCIAL

## 2023-08-01 ENCOUNTER — ANESTHESIA (OUTPATIENT)
Dept: GASTROENTEROLOGY | Facility: HOSPITAL | Age: 53
End: 2023-08-01
Payer: COMMERCIAL

## 2023-08-01 ENCOUNTER — ANESTHESIA EVENT (OUTPATIENT)
Dept: GASTROENTEROLOGY | Facility: HOSPITAL | Age: 53
End: 2023-08-01
Payer: COMMERCIAL

## 2023-08-01 VITALS
RESPIRATION RATE: 16 BRPM | HEART RATE: 50 BPM | TEMPERATURE: 97 F | BODY MASS INDEX: 40.12 KG/M2 | WEIGHT: 235 LBS | DIASTOLIC BLOOD PRESSURE: 75 MMHG | SYSTOLIC BLOOD PRESSURE: 113 MMHG | HEIGHT: 64 IN | OXYGEN SATURATION: 99 %

## 2023-08-01 DIAGNOSIS — R93.3 ABNORMAL CT SCAN, COLON: ICD-10-CM

## 2023-08-01 DIAGNOSIS — K50.00 TERMINAL ILEITIS WITHOUT COMPLICATION: Primary | ICD-10-CM

## 2023-08-01 PROCEDURE — 25010000002 PROPOFOL 10 MG/ML EMULSION: Performed by: NURSE ANESTHETIST, CERTIFIED REGISTERED

## 2023-08-01 PROCEDURE — 45380 COLONOSCOPY AND BIOPSY: CPT | Performed by: INTERNAL MEDICINE

## 2023-08-01 RX ORDER — LIDOCAINE HYDROCHLORIDE 20 MG/ML
INJECTION, SOLUTION INTRAVENOUS AS NEEDED
Status: DISCONTINUED | OUTPATIENT
Start: 2023-08-01 | End: 2023-08-01 | Stop reason: SURG

## 2023-08-01 RX ORDER — SODIUM CHLORIDE 9 MG/ML
70 INJECTION, SOLUTION INTRAVENOUS CONTINUOUS PRN
Status: DISCONTINUED | OUTPATIENT
Start: 2023-08-01 | End: 2023-08-01 | Stop reason: HOSPADM

## 2023-08-01 RX ORDER — SIMETHICONE 20 MG/.3ML
EMULSION ORAL AS NEEDED
Status: DISCONTINUED | OUTPATIENT
Start: 2023-08-01 | End: 2023-08-01 | Stop reason: HOSPADM

## 2023-08-01 RX ORDER — PROPOFOL 10 MG/ML
VIAL (ML) INTRAVENOUS AS NEEDED
Status: DISCONTINUED | OUTPATIENT
Start: 2023-08-01 | End: 2023-08-01 | Stop reason: SURG

## 2023-08-01 RX ADMIN — SODIUM CHLORIDE 70 ML/HR: 9 INJECTION, SOLUTION INTRAVENOUS at 09:21

## 2023-08-01 RX ADMIN — PROPOFOL 140 MCG/KG/MIN: 10 INJECTION, EMULSION INTRAVENOUS at 10:55

## 2023-08-01 RX ADMIN — LIDOCAINE HYDROCHLORIDE 40 MG: 20 INJECTION, SOLUTION INTRAVENOUS at 10:55

## 2023-08-01 RX ADMIN — PROPOFOL 100 MG: 10 INJECTION, EMULSION INTRAVENOUS at 10:55

## 2023-08-01 NOTE — DISCHARGE INSTRUCTIONS
- Discharge patient to home (ambulatory).   - Resume previous diet.   - Continue present medications.   - Await pathology results.   - Pilcam cam as op  - Return to my office in 4 weeks.     No pushing, pulling, tugging,  heavy lifting, or strenuous activity.  No major decision making, driving, or drinking alcoholic beverages for 24 hours. ( due to the medications you have  received)  Always use good hand hygiene/washing techniques.  NO driving while taking pain medications.    * if you have an incision:  Check your incision area every day for signs of infection.   Check for:  * more redness, swelling, or pain  *more fluid or blood  *warmth  *pus or bad smellTo assist you in voiding:  Drink plenty of fluids  Listen to running water while attempting to void.    If you are unable to urinate and you have an uncomfortable urge to void or it has been   6 hours since you were discharged, return to the Emergency Room

## 2023-08-01 NOTE — H&P
"    [facility]  HISTORY AND PHYSICAL    Patient Name: Mary Lunsford  : 1970  MRN: [id number]    Chief Complaint:   For colonoscopy    History Of Presenting Illness:    Chronic diarrhea  Abnormal CTAP  Lower abdo pain    Past Medical History:   Diagnosis Date    Anemia     Body piercing     both ears    Bronchitis     Colitis     Disease of thyroid gland     Dissection of coronary artery 2017    pt denies this 10/2/19.  states she had testing at , but was told that they didn't \"find anything\"    Essential hypertension 2018    Gallstone     H/O echocardiogram     History of nuclear stress test     Hyperlipidemia     Kidney stones     Migraine     Obesity     Ovarian cyst     Restless leg syndrome     Urinary tract infection     Vitamin B12 deficiency     Wears glasses     to read       Past Surgical History:   Procedure Laterality Date    BREAST SURGERY Bilateral 1987    breast reduction    CARDIAC CATHETERIZATION N/A 3/15/2017    Procedure: Coronary angiography;  Surgeon: Moustapha Peñalzoa MD;  Location: [facility] CATH INVASIVE LOCATION;  Service:     CARDIAC CATHETERIZATION N/A 3/15/2017    Procedure: Left ventriculography;  Surgeon: Moustapha Peñaloza MD;  Location: [facility] CATH INVASIVE LOCATION;  Service:      SECTION      1991 & 1995    CHOLECYSTECTOMY  2013    COLONOSCOPY N/A 10/3/2019    Procedure: COLONOSCOPY WITH BIOPSIES; ANOSCOPY;  Surgeon: Jose Paez MD;  Location: [facility] ENDOSCOPY;  Service: Gastroenterology    ENDOSCOPY      GASTRIC BYPASS  2011    REDUCTION MAMMAPLASTY         Social History     Socioeconomic History    Marital status:    Tobacco Use    Smoking status: Former     Packs/day: 1.00     Years: 2.00     Pack years: 2.00     Types: Cigarettes     Quit date: 10/1/1995     Years since quittin.8    Smokeless tobacco: Never   Vaping Use    Vaping Use: Never used   Substance and Sexual Activity    Alcohol use: Yes "     Alcohol/week: 12.0 standard drinks     Types: 12 Cans of beer per week     Comment: 16 beers per week    Drug use: No    Sexual activity: Yes     Partners: Male     Birth control/protection: None, Post-menopausal       Family History   Problem Relation Age of Onset    Cancer Father     Heart attack Father     Cancer Other     Other Other     Breast cancer Mother     Asthma Brother     Ulcerative colitis Brother     Arthritis Paternal Grandmother     Colon cancer Neg Hx        Prior to Admission Medications:  Medications Prior to Admission   Medication Sig Dispense Refill Last Dose    ASPIRIN 81 PO Take 1 tablet by mouth.   Past Week    atorvastatin (LIPITOR) 40 MG tablet Take 1 tablet by mouth Every Night. 90 tablet 4 7/31/2023    carbidopa-levodopa CR (SINEMET CR)  MG per CR tablet 1 tab tid and 1 extra if needed prn breakthrough rls symptoms 360 tablet 3 Past Month    Ferrous Fumarate (Ferrocite) 324 (106 Fe) MG tablet Take 1 tablet by mouth 2 (Two) Times a Day. 180 tablet 3 7/31/2023    levothyroxine (SYNTHROID, LEVOTHROID) 88 MCG tablet Take 1 tablet by mouth Daily. 90 tablet 1 7/31/2023    Lyrica 75 MG capsule Take 1 capsule by mouth Every Night. 90 capsule 1 7/31/2023    meloxicam (MOBIC) 15 MG tablet Take 1 tablet by mouth Daily.   8/1/2023    metoprolol tartrate (LOPRESSOR) 25 MG tablet TAKE 1 TABLET BY MOUTH EVERY 12 HOURS (Patient taking differently: 0.5 tablets 2 (Two) Times a Day.) 180 tablet 3 8/1/2023 at 0650    pramipexole (MIRAPEX) 0.5 MG tablet Take 0.5 tablets by mouth 3 (Three) Times a Day. 135 tablet 3 7/31/2023    sodium-potassium-magnesium sulfates (Suprep Bowel Prep Kit) 17.5-3.13-1.6 GM/177ML solution oral solution Use as directed for colonoscopy prep. Patient has instructions. 344 mL 0 8/1/2023    ubrogepant (UBRELVY) 100 MG tablet Take 1 tablet at onset of migraine, may repeat once in 2 hours if needed 10 tablet 5 Past Month    albuterol sulfate  (90 Base) MCG/ACT inhaler  Inhale 2 puffs Every 6 (Six) Hours As Needed for Wheezing or Shortness of Air. 8 g 1 Unknown    saccharomyces boulardii (Florastor) 250 MG capsule Take 1 capsule by mouth 2 (Two) Times a Day. 30 capsule 0 More than a month       Allergies:  Allergies   Allergen Reactions    Sulfa Antibiotics Rash and Other (See Comments)     blind        Vitals: Temp:  [97.1 øF (36.2 øC)] 97.1 øF (36.2 øC)  Heart Rate:  [61] 61  Resp:  [14] 14  BP: (148)/(108) 148/108    Review Of Systems:  Constitutional:  Negative for chills, fever, and unexpected weight change.  Respiratory:  Negative for cough, chest tightness, shortness of breath, and wheezing.  Cardiovascular:  Negative for chest pain, palpitations, and leg swelling.  Gastrointestinal:  Negative for abdominal distention, abdominal pain, nausea, vomiting.  Neurological:  Negative for weakness, numbness, and headaches.     Physical Exam:    General Appearance:  Alert, cooperative, in no acute distress.   Lungs:   Clear to auscultation, respirations regular, even and                 unlabored.   Heart:  Regular rhythm and normal rate.   Abdomen:   Normal bowel sounds, no masses, no organomegaly. Soft, nontender, nondistended   Neurologic: Alert and oriented x 3. Moves all four limbs equally       Assessment & Plan     Assessment:  Principal Problem:    Abnormal CT scan, colon      Plan: COLONOSCOPY (N/A)     Sami Elizondo MD  8/1/2023

## 2023-08-02 LAB — REF LAB TEST METHOD: NORMAL

## 2023-08-10 ENCOUNTER — TELEPHONE (OUTPATIENT)
Dept: GASTROENTEROLOGY | Facility: CLINIC | Age: 53
End: 2023-08-10
Payer: COMMERCIAL

## 2023-08-10 NOTE — TELEPHONE ENCOUNTER
Patient has been scheduled for 8/15/23. Instructions given over the phone as well as sent through [employer]

## 2023-08-10 NOTE — TELEPHONE ENCOUNTER
----- Message from Johanne Olmos sent at 2023  2:54 PM EDT -----  Regarding: PILL CAM  Pill Cam - Approved - no  date

## 2023-08-15 ENCOUNTER — PROCEDURE VISIT (OUTPATIENT)
Dept: GASTROENTEROLOGY | Facility: CLINIC | Age: 53
End: 2023-08-15
Payer: COMMERCIAL

## 2023-08-15 VITALS
BODY MASS INDEX: 42.16 KG/M2 | OXYGEN SATURATION: 98 % | WEIGHT: 245.6 LBS | SYSTOLIC BLOOD PRESSURE: 110 MMHG | DIASTOLIC BLOOD PRESSURE: 78 MMHG | HEART RATE: 56 BPM

## 2023-08-15 DIAGNOSIS — K50.00 TERMINAL ILEITIS WITHOUT COMPLICATION: Primary | ICD-10-CM

## 2023-08-15 NOTE — PROGRESS NOTES
Patient presented to office for Pill Cam Endoscopy. Received consent. Discussed risks and benefits. Patient hooked to sensor belt monitor that was paired to capsule. Patient swallowed capsule without difficulty at 0820.

## 2023-08-22 ENCOUNTER — TELEPHONE (OUTPATIENT)
Dept: NEUROLOGY | Facility: CLINIC | Age: 53
End: 2023-08-22
Payer: COMMERCIAL

## 2023-08-29 ENCOUNTER — LAB (OUTPATIENT)
Dept: LAB | Facility: HOSPITAL | Age: 53
End: 2023-08-29
Payer: COMMERCIAL

## 2023-08-29 ENCOUNTER — OFFICE VISIT (OUTPATIENT)
Dept: GASTROENTEROLOGY | Facility: CLINIC | Age: 53
End: 2023-08-29
Payer: COMMERCIAL

## 2023-08-29 VITALS
DIASTOLIC BLOOD PRESSURE: 90 MMHG | HEART RATE: 66 BPM | HEIGHT: 65 IN | SYSTOLIC BLOOD PRESSURE: 147 MMHG | BODY MASS INDEX: 41.65 KG/M2 | WEIGHT: 250 LBS | TEMPERATURE: 98 F

## 2023-08-29 DIAGNOSIS — M12.9 ARTHROPATHY ASSOCIATED WITH INFLAMMATORY BOWEL DISEASE: ICD-10-CM

## 2023-08-29 DIAGNOSIS — K52.9 ARTHROPATHY ASSOCIATED WITH INFLAMMATORY BOWEL DISEASE: ICD-10-CM

## 2023-08-29 DIAGNOSIS — K50.00 CROHN'S DISEASE OF SMALL INTESTINE WITHOUT COMPLICATION: ICD-10-CM

## 2023-08-29 DIAGNOSIS — K50.00 CROHN'S DISEASE OF SMALL INTESTINE WITHOUT COMPLICATION: Primary | ICD-10-CM

## 2023-08-29 LAB
HBV SURFACE AB SER RIA-ACNC: REACTIVE
HCV AB SER DONR QL: NORMAL

## 2023-08-29 PROCEDURE — 86704 HEP B CORE ANTIBODY TOTAL: CPT

## 2023-08-29 PROCEDURE — 36415 COLL VENOUS BLD VENIPUNCTURE: CPT

## 2023-08-29 PROCEDURE — 87340 HEPATITIS B SURFACE AG IA: CPT

## 2023-08-29 PROCEDURE — 86708 HEPATITIS A ANTIBODY: CPT

## 2023-08-29 PROCEDURE — 86706 HEP B SURFACE ANTIBODY: CPT

## 2023-08-29 PROCEDURE — 86803 HEPATITIS C AB TEST: CPT

## 2023-08-29 PROCEDURE — 86480 TB TEST CELL IMMUN MEASURE: CPT

## 2023-08-29 RX ORDER — ADALIMUMAB 40MG/0.8ML
KIT SUBCUTANEOUS SEE ADMIN INSTRUCTIONS
Qty: 1 EACH | Refills: 12 | Status: SHIPPED | OUTPATIENT
Start: 2023-08-29

## 2023-08-29 RX ORDER — ADALIMUMAB 80MG/0.8ML
160 KIT SUBCUTANEOUS ONCE
Qty: 2.4 ML | Refills: 0 | Status: SHIPPED | OUTPATIENT
Start: 2023-08-29 | End: 2023-08-29

## 2023-08-29 RX ORDER — BUDESONIDE 3 MG/1
9 CAPSULE, COATED PELLETS ORAL EVERY MORNING
Qty: 90 CAPSULE | Refills: 1 | Status: SHIPPED | OUTPATIENT
Start: 2023-08-29

## 2023-08-29 NOTE — PROGRESS NOTES
"     Follow Up Note     Date: 2023   Patient Name: Mary Lunsford  MRN: [id number]  : 1970     Referring Physician: Floridalma Jimenez MD    Chief Complaint:    Chief Complaint   Patient presents with    Diarrhea    Abdominal Pain    Rectal Bleeding    Abnormal Imaging     CT-Colon         Interval History:   2023  Mary Lunsford is a 52 y.o. female who is here today for follow up after recent colonoscopy.  She states that her diarrhea has resolved now she has been having regular bowel movement without any significant abdominal pain.  She is here for follow-up after recent PillCam study.    2023  Mary Lunsford is a 52 y.o. female who is here today to establish care with gastroenterology for diarrhea.   She has a history of severe diarrhea that started a couple of weeks ago. She had lower abdominal cramping associated with bowel movements. She went to the emergency room 2 days ago and had a CT scan that showed mild colitis. She was given Cipro, Flagyl and Bentyl. She did a stool study yesterday that came back positive for enteropathogenic e.coli. Her diarrhea has been improving. Since her ER visit 2 days ago, she had 1 loose stool and 1 semi solid stool. She has not had any bowel movements today. She had some rectal bleeding during the episodes of diarrhea.      Her last colonoscopy was in 2019 by Dr. Paez. Her brother has ulcerative colitis. Her last EGD was in 2011 prior to weight loss surgery that was \"normal\" per patient.  No family history of GI malignancy.    Subjective      Past Medical History:   Past Medical History:   Diagnosis Date    Anemia     Body piercing     both ears    Bronchitis     Colitis     Disease of thyroid gland     Dissection of coronary artery 2017    pt denies this 10/2/19.  states she had testing at [facility], but was told that they didn't \"find anything\"    Essential hypertension 2018    Gallstone     H/O echocardiogram     History " of nuclear stress test     Hyperlipidemia     Kidney stones     Migraine     Obesity     Ovarian cyst     Restless leg syndrome     Urinary tract infection     Vitamin B12 deficiency     Wears glasses     to read     Past Surgical History:   Past Surgical History:   Procedure Laterality Date    BREAST SURGERY Bilateral 1987    breast reduction    CARDIAC CATHETERIZATION N/A 3/15/2017    Procedure: Coronary angiography;  Surgeon: Moustapha Peñaloza MD;  Location: [facility] CATH INVASIVE LOCATION;  Service:     CARDIAC CATHETERIZATION N/A 3/15/2017    Procedure: Left ventriculography;  Surgeon: Moustapha Peñaloza MD;  Location: [facility] CATH INVASIVE LOCATION;  Service:      SECTION      1991 & 1995    CHOLECYSTECTOMY  2013    COLONOSCOPY N/A 10/3/2019    Procedure: COLONOSCOPY WITH BIOPSIES; ANOSCOPY;  Surgeon: Jose Paez MD;  Location: [facility] ENDOSCOPY;  Service: Gastroenterology    COLONOSCOPY N/A 2023    Procedure: COLONOSCOPY with biopsy;  Surgeon: Sami Elizondo MD;  Location: [facility] ENDOSCOPY;  Service: Gastroenterology;  Laterality: N/A;    ENDOSCOPY      GASTRIC BYPASS  2011    REDUCTION MAMMAPLASTY         Family History:   Family History   Problem Relation Age of Onset    Cancer Father     Heart attack Father     Cancer Other     Other Other     Breast cancer Mother     Asthma Brother     Ulcerative colitis Brother     Arthritis Paternal Grandmother     Colon cancer Neg Hx        Social History:   Social History     Socioeconomic History    Marital status:    Tobacco Use    Smoking status: Former     Packs/day: 1.00     Years: 2.00     Pack years: 2.00     Types: Cigarettes     Quit date: 10/1/1995     Years since quittin.9    Smokeless tobacco: Never   Vaping Use    Vaping Use: Never used   Substance and Sexual Activity    Alcohol use: Yes     Alcohol/week: 12.0 standard drinks     Types: 12 Cans of beer per week     Comment: 16 beers per week    Drug  use: No    Sexual activity: Defer       Medications:     Current Outpatient Medications:     albuterol sulfate  (90 Base) MCG/ACT inhaler, Inhale 2 puffs Every 6 (Six) Hours As Needed for Wheezing or Shortness of Air., Disp: 8 g, Rfl: 1    ASPIRIN 81 PO, Take 1 tablet by mouth., Disp: , Rfl:     atorvastatin (LIPITOR) 40 MG tablet, Take 1 tablet by mouth Every Night., Disp: 90 tablet, Rfl: 4    carbidopa-levodopa CR (SINEMET CR)  MG per CR tablet, 1 tab tid and 1 extra if needed prn breakthrough rls symptoms, Disp: 360 tablet, Rfl: 3    Ferrous Fumarate (Ferrocite) 324 (106 Fe) MG tablet, Take 1 tablet by mouth 2 (Two) Times a Day., Disp: 180 tablet, Rfl: 3    levothyroxine (SYNTHROID, LEVOTHROID) 88 MCG tablet, Take 1 tablet by mouth Daily., Disp: 90 tablet, Rfl: 1    Lyrica 75 MG capsule, Take 1 capsule by mouth Every Night., Disp: 90 capsule, Rfl: 1    metoprolol tartrate (LOPRESSOR) 25 MG tablet, TAKE 1 TABLET BY MOUTH EVERY 12 HOURS (Patient taking differently: 0.5 tablets 2 (Two) Times a Day.), Disp: 180 tablet, Rfl: 3    pramipexole (MIRAPEX) 0.5 MG tablet, Take 0.5 tablets by mouth 3 (Three) Times a Day., Disp: 135 tablet, Rfl: 3    ubrogepant (UBRELVY) 100 MG tablet, Take 1 tablet at onset of migraine, may repeat once in 2 hours if needed (Patient not taking: Reported on 8/29/2023), Disp: 10 tablet, Rfl: 5    Allergies:   Allergies   Allergen Reactions    Sulfa Antibiotics Rash and Other (See Comments)     blind       Review of Systems:   Review of Systems   Constitutional:  Negative for appetite change, fatigue, fever and unexpected weight loss.   HENT:  Negative for trouble swallowing.    Gastrointestinal:  Negative for abdominal distention, abdominal pain, anal bleeding, blood in stool, constipation, diarrhea, nausea, rectal pain, vomiting, GERD and indigestion.     The following portions of the patient's history were reviewed and updated as appropriate: allergies, current medications, past  "family history, past medical history, past social history, past surgical history and problem list.    Objective     Physical Exam:  Vital Signs:   Vitals:    23 1601   BP: 147/90   Pulse: 66   Temp: 98 øF (36.7 øC)   TempSrc: Infrared   Weight: 113 kg (250 lb)   Height: 163.8 cm (64.5\")  Comment: per patient       Physical Exam  Constitutional:       Appearance: She is obese.   HENT:      Head: Normocephalic and atraumatic.   Eyes:      Conjunctiva/sclera: Conjunctivae normal.   Abdominal:      General: Abdomen is flat. There is no distension.      Palpations: There is no mass.      Tenderness: There is no abdominal tenderness. There is no guarding or rebound.      Hernia: No hernia is present.   Musculoskeletal:      Cervical back: Normal range of motion and neck supple.   Neurological:      Mental Status: She is alert.       Results Review:   I reviewed the patient's new clinical results.    Admission on 2023, Discharged on 2023   Component Date Value Ref Range Status    Reference Lab Report 2023    Final                    Value:[facility]  [address] [place] [address]  Phone: [phone] or [phone]  Fax: [phone]  Alfred Gunter M.D., Medical Director    PATIENT NAME                           LABORATORY NO.  MICHAELA GRANGER.                  [id number]  [id number]                         AGE              SEX  SSN           CLIENT REF #  [facility]            52      1970  F    [id number]   [id number]    [address] [place] [address] BY-PASS                REQUESTING M.D.     ATTENDING MRADHA     COPY TO.   [address]                        [place] [address]                 [facility]  DATE COLLECTED      DATE RECEIVED      DATE REPORTED  2023    DIAGNOSIS:  A.   CECUM BIOPSY, AND ASCENDING:  Colonic mucosa with reactive changes  B.   TERMINAL " "ILEUM BIOPSY:  Small bowel mucosa with no significant pathologic change  C.   TRANSVERSE COLON BIOPSY:  Colonic mucosa with no                           significant pathologic change  D.   DESCENDING COLON BIOPSY:  Colonic mucosa with no significant pathologic change  E.   SIGMOID COLON BIOPSY:  Colonic mucosa with no significant pathologic change  F.   RECTAL BIOPSY:  Colonic mucosa with reactive changes    GJK    CLINICAL HISTORY:  Abnormal CT scan, colon    SPECIMENS RECEIVED:  A.  CECUM BIOPSY, AND ASCENDING  B.  TERMINAL ILEUM BIOPSY  C.  TRANSVERSE COLON BIOPSY  D.  DESCENDING COLON BIOPSY  E.  SIGMOID COLON BIOPSY  F.  RECTAL BIOPSY    MICROSCOPIC DESCRIPTION:  Tissue blocks are prepared and slides are examined microscopically on all  specimens. See diagnosis for details.    Professional interpretation rendered by Yusef Arteaga M.D., FCARLO at [facility], [place], [address] [place] [address], [place] [address].    GROSS DESCRIPTION:  A.  Labeled \"cecum and ascending\".  Consists of 2 pieces of tan soft tissue  measuring 0.3 x 0.2 x 0.2 cm in aggregate and submitted entirely in 1  block.  YESENIA  B.  Labeled \"terminal ileum biopsy\".  Consists of                           1 piece of tan soft tissue  measuring 0.4 x 0.2 x 0.2 cm and is submitted entirely in 1 block.  C.  Labeled \"transverse colon biopsy\".  Consists of a 3 pieces of tan soft tissue  measuring 0.4 x 0.3 x 0.2 cm in aggregate and is submitted entirely in 1  block.  D.  Labeled \"descending colon biopsy\".  Consists of multiple pieces of tan soft  tissue measuring 0.3 x 0.3 x 0.2 cm in aggregate and is submitted entirely  in 1 block.  E.  Labeled \"sigmoid biopsy\".  Consists of multiple pieces of tan soft tissue  measuring 0.4 x 0.3 x 0.2 cm in aggregate and is submitted entirely in 1  block.  F.  Labeled \"rectal biopsy\".  Consists of 3 pieces of tan soft tissue measuring  0.4 x 0.3 x 0.1 cm in aggregate and is submitted entirely in 1 block.    REVIEWED, DIAGNOSED AND " ELECTRONICALLY  SIGNED BY:    Yusef Arteaga M.D., WANDER  CPT CODES:  88305x6        No radiology results for the last 90 days.    CT Abdomen Pelvis Without Contrast     Result Date: 4/29/2023  Mild colitis involving the descending and sigmoid colon. No complication.      Colonoscopy dated 10/3/2019 per Dr. Paez  Scant diverticular change in the left colon.  Internal hemorrhoids.  Small superficial anal tear.   No endoscopic evidence of colitis was seen.  Random biopsies were obtained from the colon upon withdrawal of the scope.  -Terminal ileum biopsy with benign ileal mucosa with no diagnostic abnormalities.  Random colon biopsies with benign colonic mucosa with no diagnostic abnormality.      Colonoscopy 8/1/2023  - Diverticulosis in the sigmoid colon and in the descending colon.  - Non-bleeding external hemorrhoids.  - Aphtha in the terminal ileum. Biopsied.  - Biopsies were taken with a cold forceps for histology in the rectum, in the sigmoid colon, in the descending  colon, in the transverse colon, in the ascending colon and in the cecum.  - No signs of colitis  - Findings suspecious for small bwoel Crohn's, however patient is on NSAIDS, NSAID induced erosions/ ulcers  possible as well    Pathology    CECUM BIOPSY, AND ASCENDING:  Colonic mucosa with reactive changes  B. TERMINAL ILEUM BIOPSY:  Small bowel mucosa with no significant pathologic change  C. TRANSVERSE COLON BIOPSY:  Colonic mucosa with no significant pathologic change  D. DESCENDING COLON BIOPSY:  Colonic mucosa with no significant pathologic change  E. SIGMOID COLON BIOPSY:  Colonic mucosa with no significant pathologic change  F. RECTAL BIOPSY:  Colonic mucosa with reactive changes    Small bowel PillCam study; 8/29/2023  Extensive areas of healed inflammation and erosion scars throughout the small bowel.  Multiple areas of healing inflammation, superficial and deep aphthae in the jejunum and ileum more prominent in the distal ileum.   Findings consistent with small bowel Crohn's disease.    Assessment / Plan      1.  Crohn's disease of the small bowel without complications  2.  Intermittent diarrhea  3.  Recent history of enteropathogenic Escherichia coli infection  4. Family history of ulcerative colitis-brother  5.  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  Patient was seen in 2019 by Dr. Aparicio for the similar symptoms.  Recent history of E. coli infection.  Colonoscopy done on 8/1/2023 revealed diverticulosis of the left side colon otherwise no colitis identified.  Terminal ileal examination revealed multiple small healing aphthae with patchy inflammation.  Subsequent PillCam study done revealed signs very suggestive of small bowel Crohn's disease.  She had a extensive areas of healing and healed inflammation scars and superficial and deep aphthae throughout the distal small bowel.    Currently patient is doing well without any significant issues with a bowel movement.  She does have intermittent joint pains involving the wrist elbow and knee joints.  This could be extraintestinal manifestation of Crohn's disease. Her CRP is consistently elevated since last 3 to 4 years.  Last CRP was 0.83 in May 2022.,  Again indicating possible ongoing inflammation    We had a discussion regarding treatment with Biologics.  We discussed on risk and benefits involved.  We finally agreed on starting on Humira    We will get insurance approval  Hepatitis panel  TB Gold test  Short course of budesonide for 8 weeks  Follow-up in 8 weeks    5/1/2023  She has a history of severe diarrhea that started a couple of weeks ago and gradually worsened.  She had lower abdominal cramping associated with bowel movements.  She had some rectal bleeding during the episodes of diarrhea.  She was seen in the emergency on 4/29/2023 and had CT  scan that showed mild colitis involving the descending and sigmoid colon.  Stool studies with negative C. difficile.  Gastrointestinal panel with positive enteropathogenic E. Coli. She was prescribed Cipro, Flagyl and Bentyl and her symptoms have improved.  No diarrhea today.  There is a family history of ulcerative colitis in her brother.  Her last colonoscopy was in October 2019 per Dr. CACERES with no endoscopic evidence of colitis seen at that time, random biopsies unremarkable.      Follow Up:   No follow-ups on file.    Sami Elizondo MD  [facility] [place]  8/29/2023  16:04 EDT     Please note that portions of this note may have been completed with a voice recognition program.

## 2023-08-30 LAB — HBV SURFACE AG SERPL QL IA: NORMAL

## 2023-08-31 LAB
HAV AB SER QL IA: NEGATIVE
HBV CORE AB SERPL QL IA: NEGATIVE

## 2023-09-01 LAB
GAMMA INTERFERON BACKGROUND BLD IA-ACNC: 0.06 IU/ML
M TB IFN-G BLD-IMP: NEGATIVE
M TB IFN-G CD4+ T-CELLS BLD-ACNC: 0.08 IU/ML
M TBIFN-G CD4+ CD8+T-CELLS BLD-ACNC: 0.14 IU/ML
MITOGEN IGNF BLD-ACNC: >10 IU/ML
QUANTIFERON INCUBATION: NORMAL
SERVICE CMNT-IMP: NORMAL

## 2023-09-15 ENCOUNTER — SPECIALTY PHARMACY (OUTPATIENT)
Dept: PHARMACY | Facility: HOSPITAL | Age: 53
End: 2023-09-15
Payer: COMMERCIAL

## 2023-09-15 PROBLEM — K50.00 CROHN'S DISEASE OF SMALL INTESTINE WITHOUT COMPLICATION: Status: ACTIVE | Noted: 2023-09-15

## 2023-09-18 ENCOUNTER — TELEPHONE (OUTPATIENT)
Dept: NEUROLOGY | Facility: CLINIC | Age: 53
End: 2023-09-18
Payer: COMMERCIAL

## 2023-09-18 DIAGNOSIS — M47.816 LUMBAR SPONDYLOSIS: Primary | ICD-10-CM

## 2023-09-18 DIAGNOSIS — G25.81 RESTLESS LEGS SYNDROME (RLS): ICD-10-CM

## 2023-09-18 NOTE — TELEPHONE ENCOUNTER
I am happy to increase her Lyrica to 75mg twice a day, but I have not treated her for her back. If we can get some records from the spine specialist, that would be great. I have no problem increasing it. Thanks, LINNEA Lopez

## 2023-09-18 NOTE — TELEPHONE ENCOUNTER
Caller: Mary Lunsford    Relationship: Self    Best call back number: [phone]      What medications are you currently taking:   Current Outpatient Medications on File Prior to Visit   Medication Sig Dispense Refill    Adalimumab (Humira Pen) 40 MG/0.8ML Pen-injector Kit Inject under the skin into the appropriate area as directed See Admin Instructions. 40 mg SC q2 wk on day 29 of induction dose and thereafter for maintenance 1 each 12    albuterol sulfate  (90 Base) MCG/ACT inhaler Inhale 2 puffs Every 6 (Six) Hours As Needed for Wheezing or Shortness of Air. 8 g 1    ASPIRIN 81 PO Take 1 tablet by mouth.      atorvastatin (LIPITOR) 40 MG tablet Take 1 tablet by mouth Every Night. 90 tablet 4    Budesonide (Entocort EC) 3 MG 24 hr capsule Take 3 capsules by mouth Every Morning. Indications: Crohn's Disease 90 capsule 1    carbidopa-levodopa CR (SINEMET CR)  MG per CR tablet 1 tab tid and 1 extra if needed prn breakthrough rls symptoms 360 tablet 3    Ferrous Fumarate (Ferrocite) 324 (106 Fe) MG tablet Take 1 tablet by mouth 2 (Two) Times a Day. 180 tablet 3    levothyroxine (SYNTHROID, LEVOTHROID) 88 MCG tablet Take 1 tablet by mouth Daily. 90 tablet 1    Lyrica 75 MG capsule Take 1 capsule by mouth Every Night. 90 capsule 1    metoprolol tartrate (LOPRESSOR) 25 MG tablet TAKE 1 TABLET BY MOUTH EVERY 12 HOURS (Patient taking differently: 0.5 tablets 2 (Two) Times a Day.) 180 tablet 3    pramipexole (MIRAPEX) 0.5 MG tablet Take 0.5 tablets by mouth 3 (Three) Times a Day. 135 tablet 3    predniSONE (DELTASONE) 20 MG tablet Take 1 tablet by mouth 2 (Two) Times a Day. 10 tablet 0    tiZANidine (ZANAFLEX) 4 MG tablet Take 1 tablet by mouth At Night As Needed for Muscle Spasms. 7 tablet 0    ubrogepant (UBRELVY) 100 MG tablet Take 1 tablet at onset of migraine, may repeat once in 2 hours if needed (Patient not taking: Reported on 8/29/2023) 10 tablet 5     No current facility-administered  medications on file prior to visit.      When did you start taking these medications: 3 YEARS    Which medication are you concerned about: ROBEL    Who prescribed you this medication: LINNEA HOLLAND    What are your concerns: ORTHOPEDIC DR AT [facility] IN [place] RECOMMENDED THAT THE DOSAGE BE INCREASED INSTEAD OF ADDING GABAPENTIN. PLEASE INCREASE DOSAGE AND SEND TO DARLENE AT [facility]. PATIENT IS COMPLETELY OUT OF THIS MEDICATION.

## 2023-09-19 ENCOUNTER — SPECIALTY PHARMACY (OUTPATIENT)
Dept: PHARMACY | Facility: HOSPITAL | Age: 53
End: 2023-09-19
Payer: COMMERCIAL

## 2023-09-19 DIAGNOSIS — K50.00 CROHN'S DISEASE OF SMALL INTESTINE WITHOUT COMPLICATION: Primary | ICD-10-CM

## 2023-09-19 RX ORDER — ADALIMUMAB 80MG/0.8ML
KIT SUBCUTANEOUS
Qty: 2.4 ML | Refills: 0 | Status: SHIPPED | OUTPATIENT
Start: 2023-09-19

## 2023-09-20 NOTE — PROGRESS NOTES
Ambulatory Care Clinic  Specialty Medication Initiation Review       Mary Lunsford is a 52 y.o. YO female who has been diagnosed with Crohn's Disease and prescribed Humira. She has been referred to the [facility] to receive specialty pharmacy services for medication initiation/management.    Indication, effectiveness, safety and convenience of medication reviewed today. Prior authorization approved. Insurance requires use of specialty mail order pharmacy.    PharmD will conduct initial clinical assessment and provide patient education during first clinic appointment once medication received from [facility]. Discussed recommendation to be UTD on all vaccinations prior to initiation of therapy and advised to go to PCP office or pharmacy to get these. She will get these while we are waiting for Humira to be shipped.    Kirill Lozada, José Miguel  9/20/2023  08:30 EDT

## 2023-09-29 ENCOUNTER — TELEPHONE (OUTPATIENT)
Dept: GASTROENTEROLOGY | Facility: CLINIC | Age: 53
End: 2023-09-29
Payer: COMMERCIAL

## 2023-09-29 NOTE — TELEPHONE ENCOUNTER
Pt called and asked if it was okay to take a pneumonia and shingles shot while also starting Humira.

## 2023-10-02 NOTE — TELEPHONE ENCOUNTER
Spoke with pt. She advised that she had not yet taken humira and would get vaccines before starting.

## 2023-10-12 DIAGNOSIS — E07.9 THYROID DISORDER: ICD-10-CM

## 2023-10-12 RX ORDER — LEVOTHYROXINE SODIUM 88 UG/1
88 TABLET ORAL DAILY
Qty: 90 TABLET | Refills: 1 | OUTPATIENT
Start: 2023-10-12

## 2023-10-24 ENCOUNTER — OFFICE VISIT (OUTPATIENT)
Dept: GASTROENTEROLOGY | Facility: CLINIC | Age: 53
End: 2023-10-24
Payer: COMMERCIAL

## 2023-10-24 VITALS
WEIGHT: 249.8 LBS | HEART RATE: 65 BPM | BODY MASS INDEX: 41.57 KG/M2 | SYSTOLIC BLOOD PRESSURE: 134 MMHG | DIASTOLIC BLOOD PRESSURE: 88 MMHG | OXYGEN SATURATION: 99 %

## 2023-10-24 DIAGNOSIS — K50.00 CROHN'S DISEASE OF SMALL INTESTINE WITHOUT COMPLICATION: Primary | ICD-10-CM

## 2023-10-24 PROCEDURE — 99213 OFFICE O/P EST LOW 20 MIN: CPT | Performed by: INTERNAL MEDICINE

## 2023-10-24 NOTE — PROGRESS NOTES
"     Follow Up Note     Date: 10/24/2023   Patient Name: Mary Lunsford  MRN: [id number]  : 1970     Referring Physician: Floridalma Jimenez MD    Chief Complaint:    Chief Complaint   Patient presents with    Follow-up    Crohn's Disease       Interval History:   10/24/2023  Mary Lunsford is a 53 y.o. female who is here today for follow up for her crohn disease.   Has been having daily BM. No significant abdominal pain. Has glen on budesonide.  She received her Humira but not started yet.  She has some questions before starting the medication and she is here to discuss.    2023  Mary Lunsford is a 52 y.o. female who is here today to establish care with gastroenterology for diarrhea.  She has a history of severe diarrhea that started a couple of weeks ago. She had lower abdominal cramping associated with bowel movements. She went to the emergency room 2 days ago and had a CT scan that showed mild colitis. She was given Cipro, Flagyl and Bentyl. She did a stool study yesterday that came back positive for enteropathogenic e.coli. Her diarrhea has been improving. Since her ER visit 2 days ago, she had 1 loose stool and 1 semi solid stool. She has not had any bowel movements today. She had some rectal bleeding during the episodes of diarrhea.      Her last colonoscopy was in 2019 by Dr. Paez. Her brother has ulcerative colitis. Her last EGD was in 2011 prior to weight loss surgery that was \"normal\" per patient.  No family history of GI malignancy.    Subjective      Past Medical History:   Past Medical History:   Diagnosis Date    Anemia     Body piercing     both ears    Bronchitis     Colitis     Disease of thyroid gland     Dissection of coronary artery 2017    pt denies this 10/2/19.  states she had testing at [facility], but was told that they didn't \"find anything\"    Essential hypertension 2018    Gallstone     H/O echocardiogram     History of nuclear stress test     " Hyperlipidemia     Kidney stones     Migraine     Obesity     Ovarian cyst     Restless leg syndrome     Urinary tract infection     Vitamin B12 deficiency     Wears glasses     to read     Past Surgical History:   Past Surgical History:   Procedure Laterality Date    BREAST SURGERY Bilateral 1987    breast reduction    CARDIAC CATHETERIZATION N/A 3/15/2017    Procedure: Coronary angiography;  Surgeon: Moustapha Peñaloza MD;  Location: [facility] CATH INVASIVE LOCATION;  Service:     CARDIAC CATHETERIZATION N/A 3/15/2017    Procedure: Left ventriculography;  Surgeon: Moustapha Peñaloza MD;  Location: [facility] CATH INVASIVE LOCATION;  Service:      SECTION      1991 & 1995    CHOLECYSTECTOMY  2013    COLONOSCOPY N/A 10/3/2019    Procedure: COLONOSCOPY WITH BIOPSIES; ANOSCOPY;  Surgeon: Jose Paez MD;  Location: [facility] ENDOSCOPY;  Service: Gastroenterology    COLONOSCOPY N/A 2023    Procedure: COLONOSCOPY with biopsy;  Surgeon: Saim Elizondo MD;  Location: [facility] ENDOSCOPY;  Service: Gastroenterology;  Laterality: N/A;    ENDOSCOPY      GASTRIC BYPASS  2011    REDUCTION MAMMAPLASTY         Family History:   Family History   Problem Relation Age of Onset    Cancer Father     Heart attack Father     Cancer Other     Other Other     Breast cancer Mother     Asthma Brother     Ulcerative colitis Brother     Arthritis Paternal Grandmother     Colon cancer Neg Hx        Social History:   Social History     Socioeconomic History    Marital status:    Tobacco Use    Smoking status: Former     Packs/day: 1.00     Years: 2.00     Additional pack years: 0.00     Total pack years: 2.00     Types: Cigarettes     Quit date: 10/1/1995     Years since quittin.0    Smokeless tobacco: Never   Vaping Use    Vaping Use: Never used   Substance and Sexual Activity    Alcohol use: Yes     Alcohol/week: 12.0 standard drinks of alcohol     Types: 12 Cans of beer per week     Comment: 16  beers per week    Drug use: No    Sexual activity: Defer       Medications:     Current Outpatient Medications:     albuterol sulfate  (90 Base) MCG/ACT inhaler, Inhale 2 puffs Every 6 (Six) Hours As Needed for Wheezing or Shortness of Air., Disp: 8 g, Rfl: 1    ASPIRIN 81 PO, Take 1 tablet by mouth Daily., Disp: , Rfl:     atorvastatin (LIPITOR) 40 MG tablet, Take 1 tablet by mouth Every Night., Disp: 90 tablet, Rfl: 4    Budesonide (Entocort EC) 3 MG 24 hr capsule, Take 3 capsules by mouth Every Morning. Indications: Crohn's Disease, Disp: 90 capsule, Rfl: 1    carbidopa-levodopa CR (SINEMET CR)  MG per CR tablet, 1 tab tid and 1 extra if needed prn breakthrough rls symptoms, Disp: 360 tablet, Rfl: 3    Ferrous Fumarate (Ferrocite) 324 (106 Fe) MG tablet, Take 1 tablet by mouth 2 (Two) Times a Day., Disp: 180 tablet, Rfl: 3    levothyroxine (SYNTHROID, LEVOTHROID) 88 MCG tablet, Take 1 tablet by mouth Daily., Disp: 90 tablet, Rfl: 1    Lyrica 75 MG capsule, Take 1 capsule by mouth 2 (Two) Times a Day., Disp: 180 capsule, Rfl: 1    metoprolol tartrate (LOPRESSOR) 25 MG tablet, TAKE 1 TABLET BY MOUTH EVERY 12 HOURS (Patient taking differently: 0.5 tablets 2 (Two) Times a Day.), Disp: 180 tablet, Rfl: 3    pramipexole (MIRAPEX) 0.5 MG tablet, Take 0.5 tablets by mouth 3 (Three) Times a Day., Disp: 135 tablet, Rfl: 3    ubrogepant (UBRELVY) 100 MG tablet, Take 1 tablet at onset of migraine, may repeat once in 2 hours if needed, Disp: 10 tablet, Rfl: 5    Adalimumab (Humira Pen-CD/UC/HS Starter) 80 MG/0.8ML injection, Inject 1.6 mL (2 pens) under the skin into the appropriate area as directed on day 1 followed by 0.8 ml (1 pen) on day 15. (Patient not taking: Reported on 10/24/2023), Disp: 2.4 mL, Rfl: 0    Adalimumab (HUMIRA) 40 MG/0.4ML Pen-injector Kit, Inject 40 mg under the skin into the appropriate area as directed Every 14 (Fourteen) Days. Beginning on day 29. (Patient not taking: Reported on  10/24/2023), Disp: 6 each, Rfl: 3    Current Facility-Administered Medications:     Adalimumab (HUMIRA) 80 MG/0.8ML injection 160 mg, 160 mg, Subcutaneous, Once, Sami Elizondo MD    Allergies:   Allergies   Allergen Reactions    Sulfa Antibiotics Rash and Other (See Comments)     blind       Review of Systems:   Review of Systems   Constitutional:  Negative for appetite change, fatigue, fever and unexpected weight loss.   HENT:  Negative for trouble swallowing.    Gastrointestinal:  Negative for abdominal distention, abdominal pain, anal bleeding, blood in stool, constipation, diarrhea, nausea, rectal pain, vomiting, GERD and indigestion.       The following portions of the patient's history were reviewed and updated as appropriate: allergies, current medications, past family history, past medical history, past social history, past surgical history and problem list.    Objective     Physical Exam:  Vital Signs:   Vitals:    10/24/23 1656   BP: 134/88   Pulse: 65   SpO2: 99%   Weight: 113 kg (249 lb 12.8 oz)       Physical Exam  Constitutional:       Appearance: She is obese.   HENT:      Head: Normocephalic and atraumatic.   Eyes:      Conjunctiva/sclera: Conjunctivae normal.   Abdominal:      General: Abdomen is flat. There is no distension.      Palpations: There is no mass.      Tenderness: There is no abdominal tenderness. There is no guarding or rebound.      Hernia: No hernia is present.   Musculoskeletal:      Cervical back: Normal range of motion and neck supple.   Neurological:      Mental Status: She is alert.         Results Review:   I reviewed the patient's new clinical results.    Lab on 08/29/2023   Component Date Value Ref Range Status    Hep B S Ab 08/29/2023 Reactive (A)  Non-Reactive Final    Hep A Total Ab 08/29/2023 Negative  Negative Final    Comment: The HAV total antibody assay detects both IgG and IgM  but does not differentiate between them. A negative result  suggests  susceptibility to infection. A positive result could  be due to vaccination, previously resolved infection or active  infection. Testing for HAV IgM should be performed if active  HAV infection is suspected. [facility] offers profiles that will  automatically reflex positive HAV total antibody results to  IgM (e.g., panel #[id number] HAV Antibody w/ Rfx).    Hep B Core Total Ab 08/29/2023 Negative  Negative Final    Hepatitis B Surface Ag 08/29/2023 Non-Reactive  Non-Reactive Final    Hepatitis C Ab 08/29/2023 Non-Reactive  Non-Reactive Final    QuantiFERON Incubation 08/29/2023 Incubation performed.   Final    QUANTIFERON-TB GOLD PLUS 08/29/2023 Negative  Negative Final    No response to M tuberculosis antigens detected.  Infection with M tuberculosis is unlikely, but high risk  individuals should be considered for additional testing  (ATS/IDSA/CDC Clinical Practice Guidelines, 2017). The  reference range is an Antigen minus Nil result of <0.35 IU/mL.  Chemiluminescence immunoassay methodology    QuantiFERON Criteria 08/29/2023 Comment   Final    QuantiFERON-TB Gold Plus is a qualitative indirect test for  M tuberculosis infection (including disease) and is intended for use  in conjunction with risk assessment, radiography, and other medical  and diagnostic evaluations. The QuantiFERON-TB Gold Plus result is  determined by subtracting the Nil value from either TB antigen (Ag)  value. The Mitogen tube serves as a control for the test.    QUANTIFERON TB1 AG VALUE 08/29/2023 0.08  IU/mL Final    QUANTIFERON TB2 AG VALUE 08/29/2023 0.14  IU/mL Final    QuantiFERON Nil Value 08/29/2023 0.06  IU/mL Final    QuantiFERON Mitogen Value 08/29/2023 >10.00  IU/mL Final   Admission on 08/01/2023, Discharged on 08/01/2023   Component Date Value Ref Range Status    Reference Lab Report 08/01/2023    Final                    Value:[facility]  [address] [place]  [address]  Phone: [phone] or  [phone]  Fax: [phone]  Alfred Gunter M.D., Medical Director    PATIENT NAME                           LABORATORY NO.  MICHAELA GRANGER.                  [id number]  [id number]                         AGE              SEX  SSN           CLIENT REF #  [facility]            52      1970  F    [id number]   [id number]    [address] [place] [address] BY-PASS                REQUESTING M.D.     ATTENDING M.D.     COPY TO.   [address]                        AKSHAT HOANG TABITHA  [place] [id number]                 [facility]  DATE COLLECTED      DATE RECEIVED      DATE REPORTED  2023    DIAGNOSIS:  A.   CECUM BIOPSY, AND ASCENDING:  Colonic mucosa with reactive changes  B.   TERMINAL ILEUM BIOPSY:  Small bowel mucosa with no significant pathologic change  C.   TRANSVERSE COLON BIOPSY:  Colonic mucosa with no                           significant pathologic change  D.   DESCENDING COLON BIOPSY:  Colonic mucosa with no significant pathologic change  E.   SIGMOID COLON BIOPSY:  Colonic mucosa with no significant pathologic change  F.   RECTAL BIOPSY:  Colonic mucosa with reactive changes    GJK    CLINICAL HISTORY:  Abnormal CT scan, colon    SPECIMENS RECEIVED:  A.  CECUM BIOPSY, AND ASCENDING  B.  TERMINAL ILEUM BIOPSY  C.  TRANSVERSE COLON BIOPSY  D.  DESCENDING COLON BIOPSY  E.  SIGMOID COLON BIOPSY  F.  RECTAL BIOPSY    MICROSCOPIC DESCRIPTION:  Tissue blocks are prepared and slides are examined microscopically on all  specimens. See diagnosis for details.    Professional interpretation rendered by Yusef Arteaga M.D., F.C.A.P. at [facility], [address] [place] [address].    GROSS DESCRIPTION:  A.  Labeled \"cecum and ascending\".  Consists of 2 pieces of tan soft tissue  measuring 0.3 x 0.2 x 0.2 cm in aggregate and submitted entirely in 1  block.  YESENIA  B.  Labeled \"terminal ileum biopsy\".  Consists of                        " "   1 piece of tan soft tissue  measuring 0.4 x 0.2 x 0.2 cm and is submitted entirely in 1 block.  C.  Labeled \"transverse colon biopsy\".  Consists of a 3 pieces of tan soft tissue  measuring 0.4 x 0.3 x 0.2 cm in aggregate and is submitted entirely in 1  block.  D.  Labeled \"descending colon biopsy\".  Consists of multiple pieces of tan soft  tissue measuring 0.3 x 0.3 x 0.2 cm in aggregate and is submitted entirely  in 1 block.  E.  Labeled \"sigmoid biopsy\".  Consists of multiple pieces of tan soft tissue  measuring 0.4 x 0.3 x 0.2 cm in aggregate and is submitted entirely in 1  block.  F.  Labeled \"rectal biopsy\".  Consists of 3 pieces of tan soft tissue measuring  0.4 x 0.3 x 0.1 cm in aggregate and is submitted entirely in 1 block.    REVIEWED, DIAGNOSED AND ELECTRONICALLY  SIGNED BY:    Yusef Arteaga M.D., F.C.A.P.  CPT CODES:  88305x6        No radiology results for the last 90 days.      CT Abdomen Pelvis Without Contrast     Result Date: 4/29/2023  Mild colitis involving the descending and sigmoid colon. No complication.      Colonoscopy dated 10/3/2019 per Dr. Paez  Scant diverticular change in the left colon.  Internal hemorrhoids.  Small superficial anal tear.   No endoscopic evidence of colitis was seen.  Random biopsies were obtained from the colon upon withdrawal of the scope. Terminal ileum biopsy with benign ileal mucosa with no diagnostic abnormalities.  Random colon biopsies with benign colonic mucosa with no diagnostic abnormality.     Colonoscopy 8/1/2023  - Diverticulosis in the sigmoid colon and in the descending colon.  - Non-bleeding external hemorrhoids.  - Aphtha in the terminal ileum. Biopsied.  - Biopsies were taken with a cold forceps for histology in the rectum, in the sigmoid colon, in the descending colon, in the transverse colon, in the ascending colon and in the cecum.  - No signs of colitis  - Findings suspecious for small bwoel Crohn's, however patient is on NSAIDS, NSAID induced " erosions/ ulcers possible as well       Pathology  CECUM BIOPSY, AND ASCENDING:  Colonic mucosa with reactive changes  B. TERMINAL ILEUM BIOPSY:  Small bowel mucosa with no significant pathologic change  C. TRANSVERSE COLON BIOPSY:  Colonic mucosa with no significant pathologic change  D. DESCENDING COLON BIOPSY:  Colonic mucosa with no significant pathologic change  E. SIGMOID COLON BIOPSY:  Colonic mucosa with no significant pathologic change  F. RECTAL BIOPSY:  Colonic mucosa with reactive changes     Small bowel PillCam study; 8/29/2023  Extensive areas of healed inflammation and erosion scars throughout the small bowel.  Multiple areas of healing inflammation, superficial and deep aphthae in the jejunum and ileum more prominent in the distal ileum.  Findings consistent with small bowel Crohn's disease.      Assessment / Plan      1.  Crohn's disease of the small bowel without complications  2.  Intermittent diarrhea  3.  Recent history of enteropathogenic Escherichia coli infection  4.  Family history of ulcerative colitis-brother  5.  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  She has been having more or less daily bowel movement without any blood or any significant abdominal pain now.  She has not started her Humira.  She had a education and shots at home.  She is immune to hepatitis B but not immune to hepatitis A.  AB Gold test is negative.  She still has a concern on immunosuppression with this medication.  We had a detailed discussion today with her and her spouse regarding diagnosis, indication, risk and benefits involved.  We also discussed that overall response rate to even with the Biologics is around 55%.  Common risks are discussed.  we also discussed on the rare risks including severe infections and rare possibility of hematological malignancies.      Advised flu vaccine, COVID-vaccine, pneumonia vaccine, hepatitis A  vaccine with a booster and shingles vaccine before starting the Humira    Patient likely has a Crohn's disease at least for 5 years  Agreed on proceeding with the Humira when once the above vaccines given.   We will finish off with course of budesonide   To wait at least 2 to 4 weeks after his shingles vaccine before starting the Humira  Avoid NSAIDs  Tylenol 3 can be taken for any arthropathy  Patient is to inform us when she start taking Humira loading dose.  We will follow-up with the lab work in 3 months    8/29/2023  As per record patient does have a prior history of alternating episodes of constipation and diarrhea.  Patient was seen in 2019 by Dr. Aparicio for the similar symptoms.  Recent history of E. coli infection.  Colonoscopy done on 8/1/2023 revealed diverticulosis of the left side colon otherwise no colitis identified.  Terminal ileal examination revealed multiple small healing aphthae with patchy inflammation.  Subsequent PillCam study done revealed signs very suggestive of small bowel Crohn's disease.  She had a extensive areas of healing and healed inflammation scars and superficial and deep aphthae throughout the distal small bowel. Currently patient is doing well without any significant issues with a bowel movement.  She does have intermittent joint pains involving the wrist elbow and knee joints.  This could be extraintestinal manifestation of Crohn's disease. Her CRP is consistently elevated since last 3 to 4 years.  Last CRP was 0.83 in May 2022.,  Again indicating possible ongoing inflammatio    5/1/2023  She has a history of severe diarrhea that started a couple of weeks ago and gradually worsened.  She had lower abdominal cramping associated with bowel movements.  She had some rectal bleeding during the episodes of diarrhea.  She was seen in the emergency on 4/29/2023 and had CT scan that showed mild colitis involving the descending and sigmoid colon.  Stool studies with negative C. difficile.   Gastrointestinal panel with positive enteropathogenic E. Coli. She was prescribed Cipro, Flagyl and Bentyl and her symptoms have improved.  No diarrhea today.  There is a family history of ulcerative colitis in her brother.  Her last colonoscopy was in October 2019 per Dr. CACERES with no endoscopic evidence of colitis seen at that time, random biopsies unremarkable.         Follow Up:   No follow-ups on file.    Sami Elizondo MD  [facility] [place]  10/24/2023  17:01 EDT     Please note that portions of this note may have been completed with a voice recognition program.

## 2023-11-06 ENCOUNTER — TRANSCRIBE ORDERS (OUTPATIENT)
Dept: ADMINISTRATIVE | Facility: HOSPITAL | Age: 53
End: 2023-11-06
Payer: COMMERCIAL

## 2023-11-06 DIAGNOSIS — M25.551 RIGHT HIP PAIN: Primary | ICD-10-CM

## 2023-11-13 ENCOUNTER — TRANSCRIBE ORDERS (OUTPATIENT)
Dept: ADMINISTRATIVE | Facility: HOSPITAL | Age: 53
End: 2023-11-13
Payer: COMMERCIAL

## 2023-11-13 DIAGNOSIS — M25.551 RIGHT HIP PAIN: Primary | ICD-10-CM

## 2023-11-28 ENCOUNTER — TELEPHONE (OUTPATIENT)
Dept: NEUROLOGY | Facility: CLINIC | Age: 53
End: 2023-11-28
Payer: COMMERCIAL

## 2023-11-28 NOTE — TELEPHONE ENCOUNTER
"I called the patient, and left a message.  I offered the Dec 4 at 3:30, as Debbie Rodriguez suggested.  I told her to answer by \"my chart\", or to call the office.    "

## 2023-11-28 NOTE — TELEPHONE ENCOUNTER
Regarding: RESCHEDULED APPT  Contact: [phone]  ----- Message from LINNEA Mendoza sent at 11/27/2023  3:56 PM EST -----  I am seeing if she can do next Monday 12/4/2023 at 330pm. Hopefully we will hear back from her today. Don't cancel June appt.     ----- Message sent from Debbie Rodriguez APRN to Mary Lunsford at 11/27/2023  3:55 PM -----   Wayne Hernandez, Would you be able to come next Monday 12/4/2023 at 330pm? We could work you in for that time and then also keep the June appointment. Let me know if that will work and I can get Shauna to place you on the schedule. You could do in person or mychart video visit that day. Debbie Ramos      ----- Message -----       From:Mary Lunsford       Sent:11/27/2023  3:31 PM EST         To:Debbie Rodriguez    Subject:RESCHEDULED APPT    Patrick Lewis. I just wanted to let you know I had to reschedule my appointment I had for this Friday, Dec 1st. I am having  an issue with my back and had scheduled an appointment on the same day and did not realize it.  I was not able to reschedule with you  until June 28th.  I feel like that is a really long time from now. I may call after the new year and check on cancellations. I am sorry I had to cancel.  Mary

## 2023-12-04 ENCOUNTER — TELEMEDICINE (OUTPATIENT)
Dept: NEUROLOGY | Facility: CLINIC | Age: 53
End: 2023-12-04
Payer: COMMERCIAL

## 2023-12-04 DIAGNOSIS — D50.9 IRON DEFICIENCY ANEMIA, UNSPECIFIED IRON DEFICIENCY ANEMIA TYPE: ICD-10-CM

## 2023-12-04 DIAGNOSIS — R20.2 FACIAL TINGLING: Primary | ICD-10-CM

## 2023-12-04 DIAGNOSIS — G25.81 RESTLESS LEGS SYNDROME (RLS): ICD-10-CM

## 2023-12-04 DIAGNOSIS — G43.109 MIGRAINE WITH AURA AND WITHOUT STATUS MIGRAINOSUS, NOT INTRACTABLE: ICD-10-CM

## 2023-12-04 PROCEDURE — 99214 OFFICE O/P EST MOD 30 MIN: CPT | Performed by: NURSE PRACTITIONER

## 2023-12-04 NOTE — LETTER
2023     Floridalma Jimenez MD  [address] [facility]  Formerly named [facility] [id number]    Patient: Mary Lunsford   YOB: 1970   Date of Visit: 2023     Dear Floridalma Jimenez MD:       Thank you for referring Mary Lunsford to me for evaluation. Below are the relevant portions of my assessment and plan of care.    If you have questions, please do not hesitate to call me. I look forward to following Mary along with you.         Sincerely,        LINNEA Mendoza        CC: MD Isaias Cabrera MD [place], LINNEA Carolina  23 0837  Sign when Signing Visit  Subjective:     Patient ID: Mary Lunsford is a 53 y.o. female.    CC:   Chief Complaint   Patient presents with   • Restless Legs Syndrome   • Numbness       HPI:   History of Present Illness  Today 2023-    This visit was completed face to face via VIDEO by Epic/Go Dish with verbal consent to treat obtained from patient and/or family.  Provider confirmed the patient's name and  at the start of visit. Patient and/or family confirms that they are located in [place] during visit and Provider is located in Neurology Clinic in [place], [place] during visit.     This is a very pleasant 53-year-old female who presents for a 6-month neurology follow-up on chronic restless legs syndrome, known iron deficiency anemia, and migraines with aura, which have been episodic. She is completing a video visit follow-up today. She does follow closely with her primary care provider as well as gastroenterology.    Today, she reports she has been diagnosed with Crohn's disease by Dr. Elizondo, gastroenterology, since her last visit. She states she has not had any symptoms and has not started treatment yet. She notes she had E. coli, and they wanted her to undergo a colonoscopy after she recovered from this. She then underwent a PillCam study in 2023. She states she has had all of her vaccines, but it  "is going to suppress her immune system, so she has had to have \"quite a few\" vaccines. She has to wait 1 month after her last vaccine. She states she only has another 10 days left before she starts Humira, and she may wait until after 01/01/2024 to start.    She states she is having a low back problem that has been going on for a few years, but it has gotten worse since 12/2022. At the end of 07/2023, it became severe. She states her discs are pinching her nerves at L4-L5. She notes when she wakes up in the morning, she can \"hardly walk.\" She states it is in her hip, down her leg, and is \"really bad\" down her shin. She notes she is not supposed to be taking ibuprofen because of the Crohn's, but that is the only thing that makes her able to function at this time. She states the only thing that made a difference in her back was when she was taking a 2-month steroid course for her stomach. When she stopped taking the steroid, her back pain increased tremendously. She notes her back hurts her less than the pain radiating down her leg. She is following with [facility]. She has had 2 injections and found them somewhat helpful. She has experienced a couple of falls recently. The first time she fell was right after she got the second injection, and she turned her back badly when she fell. She has not had a nerve and muscle study. She was told the next step was surgery because she cannot have another injection anytime soon. She states she may try physical therapy again. She was prescribed a muscle relaxer, but it made her tired.     She reports she is experiencing numbness and tingling in her face bilaterally. This started approximately 2-4 weeks ago and is intermittent. It is not painful but is making her nervous. She states she will not see her back doctor until 12/14/2023 or 12/16/2023, but she would like to undergo another MRI. She had an MRI of the brain on 01/20/2020 when she was having right " "facial tingling. She states the tingling resolved after her MRI, but it has returned. She notes it does not go \"completely numb.\" She denies any numbness or tingling into her arms or any headaches with it. She has been to the chiropractor 4 times, and she can \"hardly walk\" when she leaves. She states her bilateral facial numbness and tingling started again after going to the chiropractor ad having cervical manipulation. She denies any trouble with swallowing or slurred speech. She does not believe her symptoms worsen when she bends over.    She states she has been having \"a little more trouble\" with her restless legs since going to the chiropractor. She states she went to urgent care on 11/24/2023 and was given prednisone, which helped tremendously, so she is \"a little better\" right now. She has taken prednisone 2 different times. When she first went to the chiropractor, she was in a significant amount of pain. When she went for her second visit, she could \"hardly stand up\" while waiting to see him because her leg was in so much pain. When she went back for her fourth or fifth visit, she was not in much pain. She confirms the chiropractor adjusts her spine from the top to bottom.     She has not had any testing of the blood flow in her head and neck. She is not allergic to iodine. She is not diabetic.    She reports her restless legs bother her in the mornings, but overall, they are \"okay.\" On Saturday mornings when she can sleep in, she will get up to use the bathroom and lay back down, but they start bothering her again. She states she added a morning dose of Lyrica because they thought it may be helpful for her low back, but it has not made a big difference.  She has not had trouble tolerating this.    She denies experiencing any migraines.    Previous neurology history and workup included-   Chronic restless leg syndrome present since 2009. She also has intermittent migraine headaches. She has been taking Lyrica " "brand name necessary 75 mg twice a day. Pregabalin generic did not help her symptoms. She takes carbidopa-levodopa CR 50/200 mg 1 pill 3 times a day and 1 extra if needed. She takes pramipexole 0.5 mg 0.5 tablets 3 times a day.      She also underwent an EMG and NCVS of her right upper and left upper extremities and this was completely normal on 10/23/2019 with Dr. Buck Lan of our neurologist.      Underwent MRI of the brain with and without contrast which showed 2 very small chronic white matter changes of the brain and otherwise MRI of the brain was completely unremarkable with no abnormal contrast enhancement and no acute intracranial abnormalities.     She does also experience migraines with aura for several years. She typically has used Excedrin Migraine along with ibuprofen.  She will get \"squiggly lines\" in both eyes and then about 20 minutes later will start to have the band of headache around her head with nausea, photophobia and phonophobia.  This typically last 5 to 6 hours.  Sometimes she does have to lay down and go to sleep.  She gets moderate throbbing pain.  She has not noticed a pattern. Typically this will occur 5-6 times per year.  She used to take sumatriptan/Imitrex for many years and this became ineffective.  She switched to rizatriptan but this caused her to be very tired and did not seem to work well.  She did not like the side effects of either of the triptan medications since she felt like she could not focus and had to go to sleep.  Also caused some nausea.  She may have tried a third triptan but cannot recall exactly which one. Daughter also gets migraines. Episodic migraines-a few in a few months and then resolve for several months. These are rare now. Has ubrelvy if needed.     2021 labs-ferritin level, which was 563 ng/mL and vitamin B12 level, which is 212 pg/mL, and methylmalonic acid level, which was 367, normal.     She is treated with iron supplement and has previously had iron " "infusions in the past. Her mom and grandmother also have severe restless leg syndrome. Ropinirole and neupro patch as well as generic pregabalin, gabapentin and Horizant all ineffective for RLS.    The following portions of the patient's history were reviewed and updated as appropriate: allergies, current medications, past family history, past medical history, past social history, past surgical history, and problem list.    Past Medical History:   Diagnosis Date   • Anemia    • Body piercing     both ears   • Bronchitis    • Colitis    • Disease of thyroid gland    • Dissection of coronary artery 2017    pt denies this 10/2/19.  states she had testing at , but was told that they didn't \"find anything\"   • Essential hypertension 2018   • Gallstone    • H/O echocardiogram    • History of nuclear stress test    • Hyperlipidemia    • Kidney stones    • Migraine    • Obesity    • Ovarian cyst    • Restless leg syndrome    • Urinary tract infection    • Vitamin B12 deficiency    • Wears glasses     to read       Past Surgical History:   Procedure Laterality Date   • BREAST SURGERY Bilateral 1987    breast reduction   • CARDIAC CATHETERIZATION N/A 3/15/2017    Procedure: Coronary angiography;  Surgeon: Moustapha Peñaloza MD;  Location: [facility] CATH INVASIVE LOCATION;  Service:    • CARDIAC CATHETERIZATION N/A 3/15/2017    Procedure: Left ventriculography;  Surgeon: Moustapha Peñaloza MD;  Location: [facility] CATH INVASIVE LOCATION;  Service:    •  SECTION      1991 & 1995   • CHOLECYSTECTOMY  2013   • COLONOSCOPY N/A 10/3/2019    Procedure: COLONOSCOPY WITH BIOPSIES; ANOSCOPY;  Surgeon: Jose Paez MD;  Location: [facility] ENDOSCOPY;  Service: Gastroenterology   • COLONOSCOPY N/A 2023    Procedure: COLONOSCOPY with biopsy;  Surgeon: Sami Elizondo MD;  Location: [facility] ENDOSCOPY;  Service: Gastroenterology;  Laterality: N/A;   • ENDOSCOPY     • GASTRIC BYPASS  2011   • " REDUCTION MAMMAPLASTY         Social History     Socioeconomic History   • Marital status:    Tobacco Use   • Smoking status: Former     Packs/day: 1.00     Years: 2.00     Additional pack years: 0.00     Total pack years: 2.00     Types: Cigarettes     Quit date: 10/1/1995     Years since quittin.2   • Smokeless tobacco: Never   Vaping Use   • Vaping Use: Never used   Substance and Sexual Activity   • Alcohol use: Yes     Alcohol/week: 12.0 standard drinks of alcohol     Types: 12 Cans of beer per week     Comment: 16 beers per week   • Drug use: No   • Sexual activity: Defer       Family History   Problem Relation Age of Onset   • Cancer Father    • Heart attack Father    • Cancer Other    • Other Other    • Breast cancer Mother    • Asthma Brother    • Ulcerative colitis Brother    • Arthritis Paternal Grandmother    • Colon cancer Neg Hx           Current Outpatient Medications:   •  Adalimumab (Humira Pen-CD/UC/HS Starter) 80 MG/0.8ML injection, Inject 1.6 mL (2 pens) under the skin into the appropriate area as directed on day 1 followed by 0.8 ml (1 pen) on day 15., Disp: 2.4 mL, Rfl: 0  •  Adalimumab (HUMIRA) 40 MG/0.4ML Pen-injector Kit, Inject 40 mg under the skin into the appropriate area as directed Every 14 (Fourteen) Days. Beginning on day 29., Disp: 6 each, Rfl: 3  •  albuterol sulfate  (90 Base) MCG/ACT inhaler, Inhale 2 puffs Every 6 (Six) Hours As Needed for Wheezing or Shortness of Air., Disp: 8 g, Rfl: 1  •  ASPIRIN 81 PO, Take 1 tablet by mouth Daily., Disp: , Rfl:   •  atorvastatin (LIPITOR) 40 MG tablet, Take 1 tablet by mouth Every Night., Disp: 90 tablet, Rfl: 4  •  carbidopa-levodopa CR (SINEMET CR)  MG per CR tablet, 1 tab tid and 1 extra if needed prn breakthrough rls symptoms, Disp: 360 tablet, Rfl: 3  •  Ferrous Fumarate (Ferrocite) 324 (106 Fe) MG tablet, Take 1 tablet by mouth 2 (Two) Times a Day., Disp: 180 tablet, Rfl: 3  •  levothyroxine (SYNTHROID,  LEVOTHROID) 88 MCG tablet, Take 1 tablet by mouth Daily., Disp: 90 tablet, Rfl: 1  •  Lyrica 75 MG capsule, Take 1 capsule by mouth 2 (Two) Times a Day., Disp: 180 capsule, Rfl: 1  •  metoprolol tartrate (LOPRESSOR) 25 MG tablet, TAKE 1 TABLET BY MOUTH EVERY 12 HOURS, Disp: 180 tablet, Rfl: 3  •  pramipexole (MIRAPEX) 0.5 MG tablet, Take 0.5 tablets by mouth 3 (Three) Times a Day., Disp: 135 tablet, Rfl: 3  •  predniSONE (DELTASONE) 20 MG tablet, 3 po daily for 3 days, 2 po daily for 3 days, 1 po daily for 3 days, Disp: 18 tablet, Rfl: 0  •  ubrogepant (UBRELVY) 100 MG tablet, Take 1 tablet at onset of migraine, may repeat once in 2 hours if needed, Disp: 10 tablet, Rfl: 5    Current Facility-Administered Medications:   •  Adalimumab (HUMIRA) 80 MG/0.8ML injection 160 mg, 160 mg, Subcutaneous, Once, Sami Elizondo MD     Review of Systems   Constitutional:  Positive for activity change.        Denies bowel or bladder incontinence or perineal numbness   Musculoskeletal:  Positive for back pain and gait problem.   Neurological:  Positive for numbness. Negative for weakness.   All other systems reviewed and are negative.       Objective:  LMP 12/01/2018 (LMP Unknown)   Not obtained d/t video visit    Neurologic Exam     Mental Status   Oriented to person, place, and time.   Speech: speech is normal   Level of consciousness: alert    Cranial Nerves     CN II   Visual fields full to confrontation.     CN III, IV, VI   Extraocular motions are normal.     CN VII   Facial expression full, symmetric.     CN VIII   CN VIII normal.     CN IX, X   CN IX normal.   CN X normal.     CN XI   CN XI normal.     CN XII   CN XII normal.       Physical Exam  Constitutional:       Appearance: Normal appearance.   Eyes:      Extraocular Movements: EOM normal.   Neurological:      Mental Status: She is alert and oriented to person, place, and time.   Psychiatric:         Mood and Affect: Mood is anxious (mild related to recent  health concerns). Affect is tearful (due to pain).         Speech: Speech normal.         Behavior: Behavior normal.         Thought Content: Thought content normal.         Cognition and Memory: Cognition and memory normal.         Judgment: Judgment normal.     TRACEY fully d/t video visit    Assessment/Plan:       Diagnoses and all orders for this visit:    1. Facial tingling (Primary)  -     MRI Brain With & Without Contrast; Future  -     CT Angiogram Head; Future  -     CT Angiogram Neck; Future  -     TSH; Future  -     T4, free; Future  -     Vitamin B12; Future  -     Methylmalonic Acid, Serum; Future  -     Vitamin B6; Future    2. Restless legs syndrome (RLS)  -     Ferritin; Future  -     Iron Profile; Future    3. Migraine with aura and without status migrainosus, not intractable  Comments:  stable, rare    4. Iron deficiency anemia, unspecified iron deficiency anemia type  -     Ferritin; Future  -     Iron Profile; Future         She is having tingling in her face bilaterally. This has been going on for 2 to 4 weeks following chiropractic manipulation of her spine. I would recommend an MRI of the brain with and without contrast to rule out demyelinating lesion. She previously had an MRI of the brain without contrast on 01/20/2020 when she was having right facial tingling, and there was a tiny small vessel ischemic change in the left vertex, 1 to 2 mm in size. I would like to reevaluate for any changes to that area, which was suspected to be a chronic age-related change.     I would also recommend a CTA of the head and neck to evaluate for cerebral artery stenosis, carotid artery stenosis, or vertebral artery dissection with the beginning of numbness in the face following chiropractic manipulation. I have also ordered additional labs to evaluate for other etiology of the tingling. She does have iron deficiency, so we will be rechecking this.     I have asked her to avoid any chiropractic manipulation of  her cervical spine. She does not plan to return for their services.    She is not having any migraines at this time. We will continue to monitor.     She is having a lot of low back issues. Recommend she follow up with the orthopedic surgeon for discussion of surgical intervention. She should continue with all of her specialists. She will contact us for any additional concerns prior to follow up in clinic in 06/2024. She verbalizes understanding and agrees with plan moving forward today.    Reviewed medications, potential side effects and signs and symptoms to report. Discussed risk versus benefits of treatment plan with patient and/or family-including medications, labs and radiology that may be ordered. Addressed questions and concerns during visit. Patient and/or family verbalized understanding and agree with plan.    As part of this patient's treatment plan I am prescribing controlled substances. The patient has been made aware of appropriate use of such medications, including potential risk of somnolence, limited ability to drive and/or work safely, and potential for dependence or overdose. It has also been made clear that these medications are for use by the patient only, without concomitant use of alcohol or other substances unless prescribed. Keep out of reach of children.  [place] report has been reviewed. If this is going to be prescribed long term, [facility] Controlled Substance Agreement Contract has also been read and signed by patient and myself.    During this visit the following were done:  Labs Reviewed []    Labs Ordered [x]    Radiology Reports Reviewed [x]    Radiology Ordered [x]    PCP Records Reviewed [x]    Referring Provider Records Reviewed []    ER Records Reviewed []    Hospital Records Reviewed []    History Obtained From Family []    Radiology Images Reviewed [x]    Other Reviewed [x]    Records Requested []      Transcribed from ambient dictation for LINNEA Mendoza by Isabel  Guablerto.  12/04/23   18:08 EST    Patient or patient representative verbalized consent to the visit recording.  I have personally performed the services described in this document as transcribed by the above individual, and it is both accurate and complete.  Debbie Low Jennifer, APRN  12/8/2023  08:36 EST       Note to patient: The 21st Century Cures Act makes medical notes like these available to patients in the interest of transparency. However, be advised this is a medical document. It is intended as peer to peer communication. It is written in medical language and may contain abbreviations or verbiage that are unfamiliar. It may appear blunt or direct. Medical documents are intended to carry relevant information, facts as evident, and the clinical opinion of the provider.

## 2023-12-19 ENCOUNTER — OFFICE VISIT (OUTPATIENT)
Dept: INTERNAL MEDICINE | Facility: CLINIC | Age: 53
End: 2023-12-19
Payer: COMMERCIAL

## 2023-12-19 VITALS
OXYGEN SATURATION: 97 % | WEIGHT: 253 LBS | TEMPERATURE: 97.1 F | HEART RATE: 72 BPM | BODY MASS INDEX: 42.15 KG/M2 | RESPIRATION RATE: 16 BRPM | HEIGHT: 65 IN | SYSTOLIC BLOOD PRESSURE: 126 MMHG | DIASTOLIC BLOOD PRESSURE: 80 MMHG

## 2023-12-19 DIAGNOSIS — E07.9 THYROID DISORDER: ICD-10-CM

## 2023-12-19 DIAGNOSIS — M51.36 DEGENERATIVE LUMBAR DISC: ICD-10-CM

## 2023-12-19 DIAGNOSIS — M48.061 STENOSIS OF LATERAL RECESS OF LUMBAR SPINE: ICD-10-CM

## 2023-12-19 DIAGNOSIS — M43.16 SPONDYLOLISTHESIS AT L4-L5 LEVEL: Primary | ICD-10-CM

## 2023-12-19 DIAGNOSIS — K50.00 CROHN'S DISEASE OF SMALL INTESTINE WITHOUT COMPLICATION: ICD-10-CM

## 2023-12-19 PROBLEM — Z12.11 COLON CANCER SCREENING: Status: RESOLVED | Noted: 2019-09-19 | Resolved: 2023-12-19

## 2023-12-19 PROBLEM — A04.0 ENTEROPATHOGENIC ESCHERICHIA COLI INFECTION: Status: RESOLVED | Noted: 2023-05-01 | Resolved: 2023-12-19

## 2023-12-19 PROBLEM — J06.9 VIRAL UPPER RESPIRATORY TRACT INFECTION: Status: RESOLVED | Noted: 2021-10-11 | Resolved: 2023-12-19

## 2023-12-19 PROBLEM — M51.369 DEGENERATIVE LUMBAR DISC: Status: ACTIVE | Noted: 2023-12-19

## 2023-12-19 PROBLEM — I40.1: Status: RESOLVED | Noted: 2017-03-20 | Resolved: 2023-12-19

## 2023-12-19 PROBLEM — J40 BRONCHITIS: Status: RESOLVED | Noted: 2023-02-13 | Resolved: 2023-12-19

## 2023-12-19 PROCEDURE — 99214 OFFICE O/P EST MOD 30 MIN: CPT | Performed by: FAMILY MEDICINE

## 2023-12-19 RX ORDER — LEVOTHYROXINE SODIUM 88 UG/1
88 TABLET ORAL DAILY
Qty: 90 TABLET | Refills: 1 | Status: SHIPPED | OUTPATIENT
Start: 2023-12-19

## 2023-12-19 NOTE — Clinical Note
Saw our mutual patient today. Due to her spinal symptoms I suggested a higher dose of Lyrica (up from 75 mg bid to 100 mg bid) and I sent a month of it to try. I let her know I'd let you know. She's been referred to neurosurgery for second opinion on back.

## 2023-12-19 NOTE — PROGRESS NOTES
"Chief Complaint  Back Pain (Follow up, requesting a new referral to a new Ortho office if possible ) and Crohn's Disease (Pt would like to discuss, has recently been diagnosed )    Subjective        Mary Lunsford presents to [facility]  History of Present Illness  Taking ibuprofen to help with back, tylenol doesn't help. Had pillcam that showed Crohn's. Has Humira but hasn't started yet. Followed by Mikhail.     Aware she's likely due for labs for thyroid but hasn't been on med for weeks. Needs refilled.    Facial paresthesias--scheduled for imaging through neurology.      Objective   Vital Signs:  /80 (BP Location: Left arm, Patient Position: Sitting, Cuff Size: Adult)   Pulse 72   Temp 97.1 °F (36.2 °C) (Temporal)   Resp 16   Ht 165.1 cm (65\")   Wt 115 kg (253 lb)   SpO2 97%   BMI 42.10 kg/m²   Estimated body mass index is 42.1 kg/m² as calculated from the following:    Height as of this encounter: 165.1 cm (65\").    Weight as of this encounter: 115 kg (253 lb).               Physical Exam  Vitals and nursing note reviewed.   Constitutional:       General: She is not in acute distress.     Appearance: Normal appearance. She is well-developed and well-groomed. She is morbidly obese. She is not ill-appearing, toxic-appearing or diaphoretic.   HENT:      Head: Normocephalic and atraumatic.      Right Ear: Hearing normal.      Left Ear: Hearing normal.   Eyes:      General: Lids are normal. No scleral icterus.     Extraocular Movements: Extraocular movements intact.   Neck:      Trachea: Phonation normal.   Pulmonary:      Effort: Pulmonary effort is normal.   Musculoskeletal:      Cervical back: Neck supple.   Skin:     Coloration: Skin is not jaundiced or pale.   Neurological:      General: No focal deficit present.      Mental Status: She is alert and oriented to person, place, and time.      Motor: Motor function is intact.   Psychiatric:         Attention and " Perception: Attention and perception normal.         Mood and Affect: Mood and affect normal.         Speech: Speech normal.         Behavior: Behavior normal. Behavior is cooperative.         Thought Content: Thought content normal.         Cognition and Memory: Cognition and memory normal.         Judgment: Judgment normal.        Result Review :  The following data was reviewed by: Floridalma Jimenez MD on 12/19/2023:  Urgent Care Provider Note by Miroslava Ruiz APRN (12/17/2023 10:08)     ORTHOPEDIC - SCAN - [facility]/NOTES/09/15/2023 (09/15/2023)     MRI Lumbar Spine Without Contrast (12/29/2022 08:09)     Endoscopy, GI With Capsule (09/22/2023 15:26)                Assessment and Plan   Diagnoses and all orders for this visit:    1. Spondylolisthesis at L4-L5 level (Primary)  -     Ambulatory Referral to Neurosurgery  -     Lyrica 100 MG capsule; Take 1 capsule by mouth 2 (Two) Times a Day.  Dispense: 60 capsule; Refill: 1    2. Degenerative lumbar disc  -     Ambulatory Referral to Neurosurgery  -     Lyrica 100 MG capsule; Take 1 capsule by mouth 2 (Two) Times a Day.  Dispense: 60 capsule; Refill: 1    3. Stenosis of lateral recess of lumbar spine  -     Ambulatory Referral to Neurosurgery  -     Lyrica 100 MG capsule; Take 1 capsule by mouth 2 (Two) Times a Day.  Dispense: 60 capsule; Refill: 1    4. Crohn's disease of small intestine without complication  Comments:  follow up with GI, Humira may help with msk issues? may need HLA-B27 checked    5. Thyroid disorder  -     levothyroxine (SYNTHROID, LEVOTHROID) 88 MCG tablet; Take 1 tablet by mouth Daily.  Dispense: 90 tablet; Refill: 1      Continue to run questions regarding inflammatory bowel disease through GI.   Trial of Lyrica higher dose due to likely lumbar radicular symptoms, will let neurology know (as they prescribe Lyrica normally for restless leg syndrome).  Agree with imaging for facial paresthesias ordered by  neurology.  Defer decision on updating MRI to neurosurgery, as it has not been a year yet since last one.  Consider keeping follow up with ortho as they were also evaluating hip.  Needs to be on thyroid med at least 6 weeks consistently prior to labs. Discussed labs at next visit.       Follow Up   Return in about 2 months (around 2/19/2024) for Annual physical.  Patient was given instructions and counseling regarding her condition or for health maintenance advice. Please see specific information pulled into the AVS if appropriate.

## 2024-01-07 ENCOUNTER — APPOINTMENT (OUTPATIENT)
Dept: CT IMAGING | Facility: HOSPITAL | Age: 54
End: 2024-01-07
Payer: COMMERCIAL

## 2024-01-07 ENCOUNTER — HOSPITAL ENCOUNTER (EMERGENCY)
Facility: HOSPITAL | Age: 54
Discharge: HOME | End: 2024-01-07
Attending: EMERGENCY MEDICINE | Admitting: EMERGENCY MEDICINE
Payer: COMMERCIAL

## 2024-01-07 VITALS
HEART RATE: 84 BPM | TEMPERATURE: 98 F | BODY MASS INDEX: 42.15 KG/M2 | DIASTOLIC BLOOD PRESSURE: 98 MMHG | RESPIRATION RATE: 18 BRPM | SYSTOLIC BLOOD PRESSURE: 147 MMHG | OXYGEN SATURATION: 98 % | WEIGHT: 253 LBS | HEIGHT: 65 IN

## 2024-01-07 DIAGNOSIS — M54.50 CHRONIC LOW BACK PAIN WITHOUT SCIATICA, UNSPECIFIED BACK PAIN LATERALITY: Primary | ICD-10-CM

## 2024-01-07 DIAGNOSIS — G89.29 CHRONIC LOW BACK PAIN WITHOUT SCIATICA, UNSPECIFIED BACK PAIN LATERALITY: Primary | ICD-10-CM

## 2024-01-07 PROCEDURE — 99284 EMERGENCY DEPT VISIT MOD MDM: CPT

## 2024-01-07 PROCEDURE — 72131 CT LUMBAR SPINE W/O DYE: CPT

## 2024-01-07 PROCEDURE — 96372 THER/PROPH/DIAG INJ SC/IM: CPT

## 2024-01-07 PROCEDURE — 25010000002 DEXAMETHASONE SODIUM PHOSPHATE 10 MG/ML SOLUTION: Performed by: EMERGENCY MEDICINE

## 2024-01-07 RX ORDER — METHYLPREDNISOLONE 4 MG/1
TABLET ORAL
Qty: 21 TABLET | Refills: 0 | Status: SHIPPED | OUTPATIENT
Start: 2024-01-07

## 2024-01-07 RX ORDER — CYCLOBENZAPRINE HCL 10 MG
10 TABLET ORAL 2 TIMES DAILY PRN
Qty: 20 TABLET | Refills: 0 | Status: SHIPPED | OUTPATIENT
Start: 2024-01-07

## 2024-01-07 RX ORDER — DEXAMETHASONE SODIUM PHOSPHATE 10 MG/ML
10 INJECTION, SOLUTION INTRAMUSCULAR; INTRAVENOUS ONCE
Status: COMPLETED | OUTPATIENT
Start: 2024-01-07 | End: 2024-01-07

## 2024-01-07 RX ORDER — HYDROCODONE BITARTRATE AND ACETAMINOPHEN 5; 325 MG/1; MG/1
1 TABLET ORAL ONCE
Status: COMPLETED | OUTPATIENT
Start: 2024-01-07 | End: 2024-01-07

## 2024-01-07 RX ORDER — HYDROCODONE BITARTRATE AND ACETAMINOPHEN 5; 325 MG/1; MG/1
1 TABLET ORAL 4 TIMES DAILY PRN
Qty: 8 TABLET | Refills: 0 | Status: SHIPPED | OUTPATIENT
Start: 2024-01-07

## 2024-01-07 RX ORDER — LIDOCAINE 50 MG/G
1 PATCH TOPICAL EVERY 24 HOURS
Qty: 15 PATCH | Refills: 0 | Status: SHIPPED | OUTPATIENT
Start: 2024-01-07

## 2024-01-07 RX ADMIN — HYDROCODONE BITARTRATE AND ACETAMINOPHEN 1 TABLET: 5; 325 TABLET ORAL at 09:47

## 2024-01-07 RX ADMIN — DEXAMETHASONE SODIUM PHOSPHATE 10 MG: 10 INJECTION INTRAMUSCULAR; INTRAVENOUS at 09:48

## 2024-01-07 NOTE — ED PROVIDER NOTES
"Subjective   History of Present Illness  Chief Complaint: Low back pain  History of Present Illness: 53-year-old female history of chronic low back pain who had an MRI done within the past 2 years and has multiple degenerative disc disease and bulges presents today with increase in her low back pain, has fallen 2 times over the past few weeks due to the pain, states the ibuprofen she has at home is not helping, has been referred to neurosurgery by Dr. Dailey but has been unable to get to get in with that appointment yet.  Says the pain does radiate down the right leg but denies any saddle anesthesia, no bowel or bladder incontinence or retention.  Denies any fevers.      Nurses Notes reviewed and agree, including vitals, allergies, social history and prior medical history.    REVIEW OF SYSTEMS: All systems reviewed and not pertinent unless noted.    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  states she had testing at [facility], but was told that they didn't \"find anything\"    Enteropathogenic Escherichia coli infection     Essential hypertension 03/30/2018    Gallstone     H/O echocardiogram     History of nuclear stress test     Hyperlipidemia     Kidney stones     Migraine     Obesity     Ovarian cyst     Restless leg syndrome     Subacute idiopathic myocarditis     Urinary tract infection     Vitamin B12 deficiency     Wears glasses     to read       Allergies   Allergen Reactions    Sulfa Antibiotics Rash and Other (See Comments)     blind       Past Surgical History:   Procedure Laterality Date    BREAST SURGERY Bilateral 07/1987    breast reduction    CARDIAC CATHETERIZATION N/A 3/15/2017    Procedure: Coronary angiography;  Surgeon: " Moustapha Peñaloza MD;  Location: [facility] CATH INVASIVE LOCATION;  Service:     CARDIAC CATHETERIZATION N/A 3/15/2017    Procedure: Left ventriculography;  Surgeon: Moustapha Peñaloza MD;  Location: [facility] CATH INVASIVE LOCATION;  Service:      SECTION      1991 & 1995    CHOLECYSTECTOMY  2013    COLONOSCOPY N/A 10/3/2019    Procedure: COLONOSCOPY WITH BIOPSIES; ANOSCOPY;  Surgeon: Jose Paez MD;  Location: [facility] ENDOSCOPY;  Service: Gastroenterology    COLONOSCOPY N/A 2023    Procedure: COLONOSCOPY with biopsy;  Surgeon: Sami Elizondo MD;  Location: [facility] ENDOSCOPY;  Service: Gastroenterology;  Laterality: N/A;    ENDOSCOPY      GASTRIC BYPASS  2011    REDUCTION MAMMAPLASTY         Family History   Problem Relation Age of Onset    Cancer Father     Heart attack Father     Cancer Other     Other Other     Breast cancer Mother     Asthma Brother     Ulcerative colitis Brother     Arthritis Paternal Grandmother     Colon cancer Neg Hx        Social History     Socioeconomic History    Marital status:    Tobacco Use    Smoking status: Former     Packs/day: 1.00     Years: 2.00     Additional pack years: 0.00     Total pack years: 2.00     Types: Cigarettes     Quit date: 10/1/1995     Years since quittin.2    Smokeless tobacco: Never   Vaping Use    Vaping Use: Never used   Substance and Sexual Activity    Alcohol use: Yes     Alcohol/week: 12.0 standard drinks of alcohol     Types: 12 Cans of beer per week     Comment: 16 beers per week    Drug use: No    Sexual activity: Defer           Objective   Physical Exam  Constitutional:       Appearance: Normal appearance. She is normal weight.   HENT:      Head: Normocephalic and atraumatic.      Mouth/Throat:      Mouth: Mucous membranes are moist.      Pharynx: Oropharynx is clear.   Eyes:      Extraocular Movements: Extraocular movements intact.      Conjunctiva/sclera: Conjunctivae normal.      Pupils: Pupils are  equal, round, and reactive to light.   Cardiovascular:      Rate and Rhythm: Normal rate and regular rhythm.      Pulses: Normal pulses.      Heart sounds: Normal heart sounds.   Pulmonary:      Effort: Pulmonary effort is normal.      Breath sounds: Normal breath sounds.   Abdominal:      General: Abdomen is flat. Bowel sounds are normal.      Palpations: Abdomen is soft.   Musculoskeletal:         General: Normal range of motion.      Cervical back: Neck supple.   Skin:     General: Skin is warm and dry.      Capillary Refill: Capillary refill takes less than 2 seconds.   Neurological:      General: No focal deficit present.      Mental Status: She is alert and oriented to person, place, and time.      Comments: Patient has some mild tenderness to the lumbar spine but no palpable deformities, patient has +2 patellar reflexes bilaterally with adequate sensation of both lower extremities.   Psychiatric:         Mood and Affect: Mood normal.         Behavior: Behavior normal.         Thought Content: Thought content normal.         Judgment: Judgment normal.         Procedures           ED Course                                             Medical Decision Making  Initial impression of presenting illness: 53-year-old female presents with acute exacerbation of her chronic low back pain without any known trauma to the area, no concerning cauda equina signs or symptoms.    DDX includes but is not limited to congestive Eschen of chronic pain, herniated disc, sciatica    Patient arrives hemodynamically stable, afebrile without respiratory distress, with vitals interpreted by me. Pertinent features from physical exam: Grossly unremarkable.    Initial diagnostic plan: CT scan lumbar spine she denied any imaging in the past 2 years, pain control    Results from initial plan were reviewed and interpreted by me revealing CT scan shows no acute abnormalities only chronic findings.    Diagnostic information from other sources:  None    Interventions / Re-evaluation: Pain medicine, steroids, symptoms have slightly improved    Results/clinical rationale were discussed with patient    Consultations/Discussion of results with other physicians: Dr. Torres who will prescribe controlled substance for pain control    Disposition plan: Patient stable for discharge, likely just an acute exacerbation of her chronic pain, will advise continued follow-up with neurosurgery for likely intervention and more pain control.    Amount and/or Complexity of Data Reviewed  Radiology: ordered.    Risk  Prescription drug management.        Final diagnoses:   Chronic low back pain without sciatica, unspecified back pain laterality       ED Disposition  ED Disposition       ED Disposition   Discharge    Condition   Stable    Comment   --               Floridalma Jimenez MD  [address]  [facility] [address] [phone]    Go to   As needed, If symptoms worsen    [facility] EMERGENCY DEPARTMENT  [address] [place] [address] [phone]  Go to   As needed, If symptoms worsen         Medication List        New Prescriptions      cyclobenzaprine 10 MG tablet  Commonly known as: FLEXERIL  Take 1 tablet by mouth 2 (Two) Times a Day As Needed for Muscle Spasms.     lidocaine 5 %  Commonly known as: LIDODERM  Place 1 patch on the skin as directed by provider Daily. Remove & Discard patch within 12 hours or as directed by MD     methylPREDNISolone 4 MG dose pack  Commonly known as: MEDROL  Take as directed on package instructions.               Where to Get Your Medications        These medications were sent to [id number] - [place], [place] - [address] AT [facility] [address] - [phone]  - [phone] FX  [address], [facility] [address]      Phone: [phone]   cyclobenzaprine 10 MG tablet  lidocaine 5 %  methylPREDNISolone 4 MG dose pack            Emerson Nicholas,  PA  01/07/24 1042

## 2024-01-09 ENCOUNTER — TELEPHONE (OUTPATIENT)
Dept: CARDIOLOGY | Facility: CLINIC | Age: 54
End: 2024-01-09
Payer: COMMERCIAL

## 2024-01-09 NOTE — TELEPHONE ENCOUNTER
Caller: Mary Lunsford     Relationship to patient: Self     Best call back number: [phone]     Chief complaint: NEEDS ROUTINE F/U, BUT HAS WORK ON SCHD DATE.     Type of visit: F/U     If rescheduling, when is the original appointment: 2/6/24     Additional notes: SCHD FOR THIS PROVIDER ISN'T ON MY SIDE, IN AUGUST 2024 WITH NO APPTS.     PT IS WILLING TO SEE MD KOBI.

## 2024-01-11 ENCOUNTER — OFFICE VISIT (OUTPATIENT)
Dept: NEUROSURGERY | Facility: CLINIC | Age: 54
End: 2024-01-11
Payer: COMMERCIAL

## 2024-01-11 VITALS — WEIGHT: 249.4 LBS | BODY MASS INDEX: 41.55 KG/M2 | HEIGHT: 65 IN | TEMPERATURE: 98.6 F

## 2024-01-11 DIAGNOSIS — M48.061 STENOSIS OF LATERAL RECESS OF LUMBAR SPINE: Primary | ICD-10-CM

## 2024-01-11 DIAGNOSIS — E66.01 MORBID OBESITY: ICD-10-CM

## 2024-01-11 DIAGNOSIS — M43.16 SPONDYLOLISTHESIS AT L4-L5 LEVEL: ICD-10-CM

## 2024-01-11 NOTE — LETTER
"2024     Floridalma Jimenez MD  [address]  [facility] [address]    Patient: Mary Lunsford   YOB: 1970   Date of Visit: 2024     Dear Floridalma Jimenez MD:       Thank you for referring Mary Lunsford to me for evaluation. Below are the relevant portions of my assessment and plan of care.    If you have questions, please do not hesitate to call me. I look forward to following Mary along with you.         Sincerely,        Casie Bertrand PA-C        CC: No Recipients    Casie Bertrand PA-C  24 [id number]  Signed    Patient: Mary Lunsford  : 1970  Chart #: [id number]    Date of Service: 2024    Chief Complaint   Patient presents with   • Back Pain       Back Pain  Pertinent negatives include no abdominal pain, chest pain, dysuria, fever, headaches, numbness or weakness.       HPI:  This is a 53-year-old female who works as a , she presents with back pain and right leg pain.  She has an area in the right lower back that goes from the lateral hip down the side of the leg to the ankle  Laying and sitting causes her pain to be worse.  With her job she is up and down but does not do a lot of sitting.  Her symptoms have been going on for approximately 13 months.  Her initial back pain occurred when she was rolling over getting out of bed.    Chronic Illnesses:  Past Medical History:   Diagnosis Date   • Anemia    • Body piercing     both ears   • Bronchitis    • Colitis    • Disease of thyroid gland    • Dissection of coronary artery 2017    pt denies this 10/2/19.  states she had testing at [facility], but was told that they didn't \"find anything\"   • Enteropathogenic Escherichia coli infection    • Essential hypertension 2018   • Gallstone    • H/O echocardiogram    • History of nuclear stress test    • Hyperlipidemia    • Kidney stones    • Low back pain    • Migraine    • Obesity    • Ovarian cyst    • Restless leg " syndrome    • Subacute idiopathic myocarditis    • Urinary tract infection    • Vitamin B12 deficiency    • Wears glasses     to read         Past Surgical History:   Procedure Laterality Date   • BREAST SURGERY Bilateral 1987    breast reduction   • CARDIAC CATHETERIZATION N/A 3/15/2017    Procedure: Coronary angiography;  Surgeon: Moustapha Peñaloza MD;  Location: [facility] CATH INVASIVE LOCATION;  Service:    • CARDIAC CATHETERIZATION N/A 3/15/2017    Procedure: Left ventriculography;  Surgeon: Moustapha Peñaloza MD;  Location: [facility] CATH INVASIVE LOCATION;  Service:    •  SECTION      1991 & 1995   • CHOLECYSTECTOMY  2013   • COLONOSCOPY N/A 10/3/2019    Procedure: COLONOSCOPY WITH BIOPSIES; ANOSCOPY;  Surgeon: Jose Paez MD;  Location: [facility] ENDOSCOPY;  Service: Gastroenterology   • COLONOSCOPY N/A 2023    Procedure: COLONOSCOPY with biopsy;  Surgeon: Sami Elizondo MD;  Location: [facility] ENDOSCOPY;  Service: Gastroenterology;  Laterality: N/A;   • ENDOSCOPY     • GASTRIC BYPASS  2011   • REDUCTION MAMMAPLASTY         Allergies   Allergen Reactions   • Sulfa Antibiotics Rash and Other (See Comments)     blind         Current Outpatient Medications:   •  Adalimumab (Humira Pen-CD/UC/HS Starter) 80 MG/0.8ML injection, Inject 1.6 mL (2 pens) under the skin into the appropriate area as directed on day 1 followed by 0.8 ml (1 pen) on day 15., Disp: 2.4 mL, Rfl: 0  •  Adalimumab (HUMIRA) 40 MG/0.4ML Pen-injector Kit, Inject 40 mg under the skin into the appropriate area as directed Every 14 (Fourteen) Days. Beginning on day 29., Disp: 6 each, Rfl: 3  •  ASPIRIN 81 PO, Take 1 tablet by mouth Daily., Disp: , Rfl:   •  atorvastatin (LIPITOR) 40 MG tablet, Take 1 tablet by mouth Every Night., Disp: 90 tablet, Rfl: 4  •  carbidopa-levodopa CR (SINEMET CR)  MG per CR tablet, 1 tab tid and 1 extra if needed prn breakthrough rls symptoms, Disp: 360 tablet, Rfl: 3  •  Ferrous  Fumarate (Ferrocite) 324 (106 Fe) MG tablet, Take 1 tablet by mouth 2 (Two) Times a Day., Disp: 180 tablet, Rfl: 3  •  levothyroxine (SYNTHROID, LEVOTHROID) 88 MCG tablet, Take 1 tablet by mouth Daily., Disp: 90 tablet, Rfl: 1  •  lidocaine (LIDODERM) 5 %, Place 1 patch on the skin as directed by provider Daily. Remove & Discard patch within 12 hours or as directed by MD, Disp: 15 patch, Rfl: 0  •  Lyrica 100 MG capsule, Take 1 capsule by mouth 2 (Two) Times a Day., Disp: 60 capsule, Rfl: 1  •  methylPREDNISolone (MEDROL) 4 MG dose pack, Take as directed on package instructions., Disp: 21 tablet, Rfl: 0  •  metoprolol tartrate (LOPRESSOR) 25 MG tablet, TAKE 1 TABLET BY MOUTH EVERY 12 HOURS, Disp: 180 tablet, Rfl: 3  •  pramipexole (MIRAPEX) 0.5 MG tablet, Take 0.5 tablets by mouth 3 (Three) Times a Day., Disp: 135 tablet, Rfl: 3  •  cyclobenzaprine (FLEXERIL) 10 MG tablet, Take 1 tablet by mouth 2 (Two) Times a Day As Needed for Muscle Spasms. (Patient not taking: Reported on 1/11/2024), Disp: 20 tablet, Rfl: 0  •  HYDROcodone-acetaminophen (NORCO) 5-325 MG per tablet, Take 1 tablet by mouth 4 (Four) Times a Day As Needed for Moderate Pain. (Patient not taking: Reported on 1/11/2024), Disp: 8 tablet, Rfl: 0  •  Lyrica 75 MG capsule, Take 1 capsule by mouth 2 (Two) Times a Day. (Patient not taking: Reported on 1/11/2024), Disp: 180 capsule, Rfl: 1  •  ubrogepant (UBRELVY) 100 MG tablet, Take 1 tablet at onset of migraine, may repeat once in 2 hours if needed (Patient not taking: Reported on 1/11/2024), Disp: 10 tablet, Rfl: 5    Current Facility-Administered Medications:   •  Adalimumab (HUMIRA) 80 MG/0.8ML injection 160 mg, 160 mg, Subcutaneous, Once, Sami Elizondo MD    Social History     Socioeconomic History   • Marital status:    Tobacco Use   • Smoking status: Former     Packs/day: 1.00     Years: 2.00     Additional pack years: 0.00     Total pack years: 2.00     Types: Cigarettes     Quit  date: 10/1/1995     Years since quittin.3   • Smokeless tobacco: Never   Vaping Use   • Vaping Use: Never used   Substance and Sexual Activity   • Alcohol use: Yes     Alcohol/week: 12.0 standard drinks of alcohol     Types: 12 Cans of beer per week     Comment: 16 beers per week   • Drug use: No   • Sexual activity: Defer       Family History   Problem Relation Age of Onset   • Cancer Father    • Heart attack Father    • Cancer Other    • Other Other    • Breast cancer Mother    • Asthma Brother    • Ulcerative colitis Brother    • Arthritis Paternal Grandmother    • Colon cancer Neg Hx                Social History    Tobacco Use      Smoking status: Former        Packs/day: 1.00        Years: 2.00        Additional pack years: 0.00        Total pack years: 2.00        Types: Cigarettes        Quit date: 10/1/1995        Years since quittin.3      Smokeless tobacco: Never       Review of Systems   Constitutional:  Negative for activity change, appetite change, chills, diaphoresis, fatigue, fever and unexpected weight change.   HENT:  Negative for congestion, dental problem, drooling, ear discharge, ear pain, facial swelling, hearing loss, mouth sores, nosebleeds, postnasal drip, rhinorrhea, sinus pressure, sinus pain, sneezing, sore throat, tinnitus, trouble swallowing and voice change.    Eyes:  Negative for photophobia, pain, discharge, redness, itching and visual disturbance.   Respiratory:  Negative for apnea, cough, choking, chest tightness, shortness of breath, wheezing and stridor.    Cardiovascular:  Negative for chest pain, palpitations and leg swelling.   Gastrointestinal:  Negative for abdominal distention, abdominal pain, anal bleeding, blood in stool, constipation, diarrhea, nausea, rectal pain and vomiting.   Endocrine: Negative for cold intolerance, heat intolerance, polydipsia, polyphagia and polyuria.   Genitourinary:  Negative for decreased urine volume, difficulty urinating, dysuria,  "enuresis, flank pain, frequency, genital sores, hematuria and urgency.   Musculoskeletal:  Positive for back pain. Negative for arthralgias, gait problem, joint swelling, myalgias, neck pain and neck stiffness.   Skin:  Negative for color change, pallor, rash and wound.   Allergic/Immunologic: Negative for environmental allergies, food allergies and immunocompromised state.   Neurological:  Negative for dizziness, tremors, seizures, syncope, facial asymmetry, speech difficulty, weakness, light-headedness, numbness and headaches.   Hematological:  Negative for adenopathy. Does not bruise/bleed easily.   Psychiatric/Behavioral:  Negative for agitation, behavioral problems, confusion, decreased concentration, dysphoric mood, hallucinations, self-injury, sleep disturbance and suicidal ideas. The patient is not nervous/anxious and is not hyperactive.         Physical examination:  Temperature 98.6 °F (37 °C), temperature source Temporal, height 163.8 cm (64.5\"), weight 113 kg (249 lb 6.4 oz), last menstrual period 12/01/2018, not currently breastfeeding.  HEENT- normocephalic, atraumatic, sclera clear  Lungs-normal expansion, no wheezing  Heart-regular rate and rhythm  Extremities-positive pulses, no edema    Neurologic Exam  WDWN well-developed  A/A/C, speech clear, attention normal, conversant, answers questions appropriately, good historian.  Cranial nerves II through XII are intact.  Motor examination does not reveal weakness lower extremities.   Sensation is intact.  Gait is normal, balance is normal.   No tremors are noted.  Reflexes are intact.   Palpation of the right paraspinal musculature and SI joint area is mildly tender.    Radiographic Imaging:  For my review is a lumbar CT scan which shows significant degenerative disc disease at L2-3 with narrowing of the disc space and broad-based disc bulging, there is associated anterior osteophytes noted.  At L3-4 there is mild degenerative disc and slight bulging " noted.  At L4-5 there appears to be an offset with degenerative bulging disc and bilateral foraminal narrowing and at L5-S1 there is degenerative disc with mild bulging with mild foraminal narrowing.    Medical Decision Making  Diagnoses and all orders for this visit:    1. Stenosis of lateral recess of lumbar spine (Primary)  -     MRI Lumbar Spine Without Contrast; Future  -     XR Spine Lumbar AP & Lateral With Flex & Ext    2. Spondylolisthesis at L4-L5 level  -     MRI Lumbar Spine Without Contrast; Future  -     XR Spine Lumbar AP & Lateral With Flex & Ext    3. Morbid obesity    Mr. Lunsford requires an MRI of the lumbar spine as well as flexion-extension x-rays to further assess nerve root compression.  She and her  are in agreement with this plan.  Their questions were answered.  She will return to see me in follow-up with new diagnostic studies.    Any copied data from previous notes included in the (1) HPI, (2) PE, (3) MDM and/or assessment and plan has been reviewed and is accurate as of this day.    Anca Bertrand, JOCELYN    Patient Care Team:  Floridalma Jimenez MD as PCP - General (Family Medicine)  Floridalma Jimenez MD as Referring Physician (Family Medicine)  Casie Bertrand PA-C as Consulting Physician (Physician Assistant)

## 2024-01-11 NOTE — PROGRESS NOTES
"  Patient: Mary Lunsford  : 1970  Chart #: [id number]    Date of Service: 2024    Chief Complaint   Patient presents with    Back Pain       Back Pain  Pertinent negatives include no abdominal pain, chest pain, dysuria, fever, headaches, numbness or weakness.       HPI:  This is a 53-year-old female who works as a , she presents with back pain and right leg pain.  She has an area in the right lower back that goes from the lateral hip down the side of the leg to the ankle  Laying and sitting causes her pain to be worse.  With her job she is up and down but does not do a lot of sitting.  Her symptoms have been going on for approximately 13 months.  Her initial back pain occurred when she was rolling over getting out of bed.    Chronic Illnesses:  Past Medical History:   Diagnosis Date    Anemia     Body piercing     both ears    Bronchitis     Colitis     Disease of thyroid gland     Dissection of coronary artery 2017    pt denies this 10/2/19.  states she had testing at , but was told that they didn't \"find anything\"    Enteropathogenic Escherichia coli infection     Essential hypertension 2018    Gallstone     H/O echocardiogram     History of nuclear stress test     Hyperlipidemia     Kidney stones     Low back pain     Migraine     Obesity     Ovarian cyst     Restless leg syndrome     Subacute idiopathic myocarditis     Urinary tract infection     Vitamin B12 deficiency     Wears glasses     to read         Past Surgical History:   Procedure Laterality Date    BREAST SURGERY Bilateral 1987    breast reduction    CARDIAC CATHETERIZATION N/A 3/15/2017    Procedure: Coronary angiography;  Surgeon: Moustapha Peñaloza MD;  Location: [facility] CATH INVASIVE LOCATION;  Service:     CARDIAC CATHETERIZATION N/A 3/15/2017    Procedure: Left ventriculography;  Surgeon: Moustapha Peñaloza MD;  Location: [facility] CATH INVASIVE LOCATION;  Service:      SECTION      " 6- & 7-    CHOLECYSTECTOMY  06/01/2013    COLONOSCOPY N/A 10/3/2019    Procedure: COLONOSCOPY WITH BIOPSIES; ANOSCOPY;  Surgeon: Jose Paez MD;  Location: [facility] ENDOSCOPY;  Service: Gastroenterology    COLONOSCOPY N/A 8/1/2023    Procedure: COLONOSCOPY with biopsy;  Surgeon: Sami Elizondo MD;  Location: [facility] ENDOSCOPY;  Service: Gastroenterology;  Laterality: N/A;    ENDOSCOPY      GASTRIC BYPASS  08/2011    REDUCTION MAMMAPLASTY         Allergies   Allergen Reactions    Sulfa Antibiotics Rash and Other (See Comments)     blind         Current Outpatient Medications:     Adalimumab (Humira Pen-CD/UC/HS Starter) 80 MG/0.8ML injection, Inject 1.6 mL (2 pens) under the skin into the appropriate area as directed on day 1 followed by 0.8 ml (1 pen) on day 15., Disp: 2.4 mL, Rfl: 0    Adalimumab (HUMIRA) 40 MG/0.4ML Pen-injector Kit, Inject 40 mg under the skin into the appropriate area as directed Every 14 (Fourteen) Days. Beginning on day 29., Disp: 6 each, Rfl: 3    ASPIRIN 81 PO, Take 1 tablet by mouth Daily., Disp: , Rfl:     atorvastatin (LIPITOR) 40 MG tablet, Take 1 tablet by mouth Every Night., Disp: 90 tablet, Rfl: 4    carbidopa-levodopa CR (SINEMET CR)  MG per CR tablet, 1 tab tid and 1 extra if needed prn breakthrough rls symptoms, Disp: 360 tablet, Rfl: 3    Ferrous Fumarate (Ferrocite) 324 (106 Fe) MG tablet, Take 1 tablet by mouth 2 (Two) Times a Day., Disp: 180 tablet, Rfl: 3    levothyroxine (SYNTHROID, LEVOTHROID) 88 MCG tablet, Take 1 tablet by mouth Daily., Disp: 90 tablet, Rfl: 1    lidocaine (LIDODERM) 5 %, Place 1 patch on the skin as directed by provider Daily. Remove & Discard patch within 12 hours or as directed by MD, Disp: 15 patch, Rfl: 0    Lyrica 100 MG capsule, Take 1 capsule by mouth 2 (Two) Times a Day., Disp: 60 capsule, Rfl: 1    methylPREDNISolone (MEDROL) 4 MG dose pack, Take as directed on package instructions., Disp: 21 tablet, Rfl: 0     metoprolol tartrate (LOPRESSOR) 25 MG tablet, TAKE 1 TABLET BY MOUTH EVERY 12 HOURS, Disp: 180 tablet, Rfl: 3    pramipexole (MIRAPEX) 0.5 MG tablet, Take 0.5 tablets by mouth 3 (Three) Times a Day., Disp: 135 tablet, Rfl: 3    cyclobenzaprine (FLEXERIL) 10 MG tablet, Take 1 tablet by mouth 2 (Two) Times a Day As Needed for Muscle Spasms. (Patient not taking: Reported on 2024), Disp: 20 tablet, Rfl: 0    HYDROcodone-acetaminophen (NORCO) 5-325 MG per tablet, Take 1 tablet by mouth 4 (Four) Times a Day As Needed for Moderate Pain. (Patient not taking: Reported on 2024), Disp: 8 tablet, Rfl: 0    Lyrica 75 MG capsule, Take 1 capsule by mouth 2 (Two) Times a Day. (Patient not taking: Reported on 2024), Disp: 180 capsule, Rfl: 1    ubrogepant (UBRELVY) 100 MG tablet, Take 1 tablet at onset of migraine, may repeat once in 2 hours if needed (Patient not taking: Reported on 2024), Disp: 10 tablet, Rfl: 5    Current Facility-Administered Medications:     Adalimumab (HUMIRA) 80 MG/0.8ML injection 160 mg, 160 mg, Subcutaneous, Once, Sami Elizondo MD    Social History     Socioeconomic History    Marital status:    Tobacco Use    Smoking status: Former     Packs/day: 1.00     Years: 2.00     Additional pack years: 0.00     Total pack years: 2.00     Types: Cigarettes     Quit date: 10/1/1995     Years since quittin.3    Smokeless tobacco: Never   Vaping Use    Vaping Use: Never used   Substance and Sexual Activity    Alcohol use: Yes     Alcohol/week: 12.0 standard drinks of alcohol     Types: 12 Cans of beer per week     Comment: 16 beers per week    Drug use: No    Sexual activity: Defer       Family History   Problem Relation Age of Onset    Cancer Father     Heart attack Father     Cancer Other     Other Other     Breast cancer Mother     Asthma Brother     Ulcerative colitis Brother     Arthritis Paternal Grandmother     Colon cancer Neg Hx                Social History    Tobacco  Use      Smoking status: Former        Packs/day: 1.00        Years: 2.00        Additional pack years: 0.00        Total pack years: 2.00        Types: Cigarettes        Quit date: 10/1/1995        Years since quittin.3      Smokeless tobacco: Never       Review of Systems   Constitutional:  Negative for activity change, appetite change, chills, diaphoresis, fatigue, fever and unexpected weight change.   HENT:  Negative for congestion, dental problem, drooling, ear discharge, ear pain, facial swelling, hearing loss, mouth sores, nosebleeds, postnasal drip, rhinorrhea, sinus pressure, sinus pain, sneezing, sore throat, tinnitus, trouble swallowing and voice change.    Eyes:  Negative for photophobia, pain, discharge, redness, itching and visual disturbance.   Respiratory:  Negative for apnea, cough, choking, chest tightness, shortness of breath, wheezing and stridor.    Cardiovascular:  Negative for chest pain, palpitations and leg swelling.   Gastrointestinal:  Negative for abdominal distention, abdominal pain, anal bleeding, blood in stool, constipation, diarrhea, nausea, rectal pain and vomiting.   Endocrine: Negative for cold intolerance, heat intolerance, polydipsia, polyphagia and polyuria.   Genitourinary:  Negative for decreased urine volume, difficulty urinating, dysuria, enuresis, flank pain, frequency, genital sores, hematuria and urgency.   Musculoskeletal:  Positive for back pain. Negative for arthralgias, gait problem, joint swelling, myalgias, neck pain and neck stiffness.   Skin:  Negative for color change, pallor, rash and wound.   Allergic/Immunologic: Negative for environmental allergies, food allergies and immunocompromised state.   Neurological:  Negative for dizziness, tremors, seizures, syncope, facial asymmetry, speech difficulty, weakness, light-headedness, numbness and headaches.   Hematological:  Negative for adenopathy. Does not bruise/bleed easily.   Psychiatric/Behavioral:   "Negative for agitation, behavioral problems, confusion, decreased concentration, dysphoric mood, hallucinations, self-injury, sleep disturbance and suicidal ideas. The patient is not nervous/anxious and is not hyperactive.         Physical examination:  Temperature 98.6 °F (37 °C), temperature source Temporal, height 163.8 cm (64.5\"), weight 113 kg (249 lb 6.4 oz), last menstrual period 12/01/2018, not currently breastfeeding.  HEENT- normocephalic, atraumatic, sclera clear  Lungs-normal expansion, no wheezing  Heart-regular rate and rhythm  Extremities-positive pulses, no edema    Neurologic Exam  WDWN well-developed  A/A/C, speech clear, attention normal, conversant, answers questions appropriately, good historian.  Cranial nerves II through XII are intact.  Motor examination does not reveal weakness lower extremities.   Sensation is intact.  Gait is normal, balance is normal.   No tremors are noted.  Reflexes are intact.   Palpation of the right paraspinal musculature and SI joint area is mildly tender.    Radiographic Imaging:  For my review is a lumbar CT scan which shows significant degenerative disc disease at L2-3 with narrowing of the disc space and broad-based disc bulging, there is associated anterior osteophytes noted.  At L3-4 there is mild degenerative disc and slight bulging noted.  At L4-5 there appears to be an offset with degenerative bulging disc and bilateral foraminal narrowing and at L5-S1 there is degenerative disc with mild bulging with mild foraminal narrowing.    Medical Decision Making  Diagnoses and all orders for this visit:    1. Stenosis of lateral recess of lumbar spine (Primary)  -     MRI Lumbar Spine Without Contrast; Future  -     XR Spine Lumbar AP & Lateral With Flex & Ext    2. Spondylolisthesis at L4-L5 level  -     MRI Lumbar Spine Without Contrast; Future  -     XR Spine Lumbar AP & Lateral With Flex & Ext    3. Morbid obesity    Mr. Lunsford requires an MRI of the lumbar " spine as well as flexion-extension x-rays to further assess nerve root compression.  She and her  are in agreement with this plan.  Their questions were answered.  She will return to see me in follow-up with new diagnostic studies.    Any copied data from previous notes included in the (1) HPI, (2) PE, (3) MDM and/or assessment and plan has been reviewed and is accurate as of this day.    JOCELYN Smith    Patient Care Team:  Floridalma Jimenez MD as PCP - General (Family Medicine)  Floridalma Jimenez MD as Referring Physician (Family Medicine)  Casie Bertrand PA-C as Consulting Physician (Physician Assistant)

## 2024-01-25 ENCOUNTER — HOSPITAL ENCOUNTER (OUTPATIENT)
Dept: CT IMAGING | Facility: HOSPITAL | Age: 54
Discharge: HOME | End: 2024-01-25
Payer: COMMERCIAL

## 2024-01-25 ENCOUNTER — APPOINTMENT (OUTPATIENT)
Dept: GENERAL RADIOLOGY | Facility: HOSPITAL | Age: 54
End: 2024-01-25
Payer: COMMERCIAL

## 2024-01-25 ENCOUNTER — HOSPITAL ENCOUNTER (OUTPATIENT)
Dept: GENERAL RADIOLOGY | Facility: HOSPITAL | Age: 54
Discharge: HOME | End: 2024-01-25
Payer: COMMERCIAL

## 2024-01-25 ENCOUNTER — HOSPITAL ENCOUNTER (OUTPATIENT)
Dept: MRI IMAGING | Facility: HOSPITAL | Age: 54
Discharge: HOME | End: 2024-01-25
Payer: COMMERCIAL

## 2024-01-25 DIAGNOSIS — M43.16 SPONDYLOLISTHESIS AT L4-L5 LEVEL: ICD-10-CM

## 2024-01-25 DIAGNOSIS — R20.2 FACIAL TINGLING: ICD-10-CM

## 2024-01-25 DIAGNOSIS — M48.061 STENOSIS OF LATERAL RECESS OF LUMBAR SPINE: ICD-10-CM

## 2024-01-25 PROCEDURE — 70496 CT ANGIOGRAPHY HEAD: CPT

## 2024-01-25 PROCEDURE — 25510000001 IOPAMIDOL 61 % SOLUTION: Performed by: NURSE PRACTITIONER

## 2024-01-25 PROCEDURE — 70498 CT ANGIOGRAPHY NECK: CPT

## 2024-01-25 PROCEDURE — 72148 MRI LUMBAR SPINE W/O DYE: CPT

## 2024-01-25 PROCEDURE — 72110 X-RAY EXAM L-2 SPINE 4/>VWS: CPT

## 2024-01-25 RX ADMIN — IOPAMIDOL 100 ML: 612 INJECTION, SOLUTION INTRAVENOUS at 14:01

## 2024-01-26 ENCOUNTER — TELEPHONE (OUTPATIENT)
Dept: CARDIOLOGY | Facility: CLINIC | Age: 54
End: 2024-01-26
Payer: COMMERCIAL

## 2024-01-26 ENCOUNTER — TELEPHONE (OUTPATIENT)
Dept: GASTROENTEROLOGY | Facility: CLINIC | Age: 54
End: 2024-01-26
Payer: COMMERCIAL

## 2024-01-26 ENCOUNTER — TELEPHONE (OUTPATIENT)
Dept: NEUROLOGY | Facility: CLINIC | Age: 54
End: 2024-01-26
Payer: COMMERCIAL

## 2024-01-26 ENCOUNTER — OFFICE VISIT (OUTPATIENT)
Dept: NEUROSURGERY | Facility: CLINIC | Age: 54
End: 2024-01-26
Payer: COMMERCIAL

## 2024-01-26 ENCOUNTER — DOCUMENTATION (OUTPATIENT)
Dept: NEUROSURGERY | Facility: CLINIC | Age: 54
End: 2024-01-26
Payer: COMMERCIAL

## 2024-01-26 VITALS
SYSTOLIC BLOOD PRESSURE: 119 MMHG | WEIGHT: 255.2 LBS | BODY MASS INDEX: 42.52 KG/M2 | TEMPERATURE: 97.3 F | DIASTOLIC BLOOD PRESSURE: 72 MMHG | RESPIRATION RATE: 17 BRPM | HEIGHT: 65 IN

## 2024-01-26 DIAGNOSIS — M71.30 SYNOVIAL CYST: ICD-10-CM

## 2024-01-26 DIAGNOSIS — J01.40 ACUTE NON-RECURRENT PANSINUSITIS: Primary | ICD-10-CM

## 2024-01-26 DIAGNOSIS — M43.10 ACQUIRED SPONDYLOLISTHESIS: ICD-10-CM

## 2024-01-26 DIAGNOSIS — M43.16 SPONDYLOLISTHESIS AT L4-L5 LEVEL: ICD-10-CM

## 2024-01-26 DIAGNOSIS — M48.061 STENOSIS OF LATERAL RECESS OF LUMBAR SPINE: Primary | ICD-10-CM

## 2024-01-26 DIAGNOSIS — M51.36 DEGENERATIVE LUMBAR DISC: ICD-10-CM

## 2024-01-26 RX ORDER — FAMOTIDINE 10 MG
20 TABLET ORAL
OUTPATIENT
Start: 2024-01-26

## 2024-01-26 NOTE — TELEPHONE ENCOUNTER
----- Message from LINNEA Mendoza sent at 1/26/2024 11:49 AM EST -----  Please let Mary know that the CTA of her head and neck look normal. An incidental note of an acute sinus infection and chronic sinus infection is present. Does she currently have sinus infection symptoms? Would she like treatment? Usually sinus drainage, pressure across face and forehead? Increased pressure in face when bending down? If she would like to be treated, I am happy to do so. She may consider seeing ENT if this is something she has often. I know she has a lot of other health concerns and is seeing multiple specialists right now. Just wanted to offer to treat the acute sinus infection and then she can discuss ENT in the future. Let me know. Thanks, LINNEA Lopez

## 2024-01-26 NOTE — PROGRESS NOTES
I evaluated this patient today with our PA, Anca Bertrand.  The patient has a spondylolisthesis and synovial cyst at L4-5.  She is having medically refractory right leg pain due to this.  She has failed medicines and physical therapy.  As such, I recommended an L4-5 TLIF.I reviewed the risks, benefits and alternatives with the patient including but not limited to bleeding, infection, CSF leak, nerve injury, rarely paralysis, pseudoarthrosis, hardware failure and the need for further surgery.  The patient understood these and would like to proceed with surgery.  We will get her scheduled in the near future.

## 2024-01-26 NOTE — TELEPHONE ENCOUNTER
Serene with [facility] procedure scheduling said that this patient will be having back surgery on February 14th. She would like to know if the patient can continue to hold her Humira until after her back surgery. Patient has not yet started. She will be seeing Dr. Patrica Smith. Please advise.

## 2024-01-26 NOTE — PROGRESS NOTES
"  Patient: Mary Lunsford  : 1970  Chart #: [id number]    Date of Service: 2024    Chief Complaint   Patient presents with    Low back & right leg pain       Back Pain  Pertinent negatives include no abdominal pain, chest pain, dysuria, fever, headaches, numbness or weakness.       HPI:  This is a 53-year-old female who works as a , she presents with back pain and right leg pain.  She has an area in the right lower back that goes from the lateral hip down the side of the leg to the ankle  Laying and sitting causes her pain to be worse.  With her job she is up and down but does not do a lot of sitting.  Her symptoms have been going on for approximately 13 months.  Her initial back pain occurred when she was rolling over getting out of bed.  She presents with MRI and x-rays to review.    Chronic Illnesses:  Past Medical History:   Diagnosis Date    Anemia     Body piercing     both ears    Bronchitis     Colitis     Disease of thyroid gland     Dissection of coronary artery 2017    pt denies this 10/2/19.  states she had testing at , but was told that they didn't \"find anything\"    Enteropathogenic Escherichia coli infection     Essential hypertension 2018    Gallstone     H/O echocardiogram     History of nuclear stress test     Hyperlipidemia     Kidney stones     Low back pain     Migraine     Obesity     Ovarian cyst     Restless leg syndrome     Subacute idiopathic myocarditis     Urinary tract infection     Vitamin B12 deficiency     Wears glasses     to read         Past Surgical History:   Procedure Laterality Date    BREAST SURGERY Bilateral 1987    breast reduction    CARDIAC CATHETERIZATION N/A 3/15/2017    Procedure: Coronary angiography;  Surgeon: Moustapha Peñaloza MD;  Location: [facility] CATH INVASIVE LOCATION;  Service:     CARDIAC CATHETERIZATION N/A 3/15/2017    Procedure: Left ventriculography;  Surgeon: Moustapha Peñaloza MD;  Location: [facility] " CATH INVASIVE LOCATION;  Service:      SECTION      1991 & 1995    CHOLECYSTECTOMY  2013    COLONOSCOPY N/A 10/3/2019    Procedure: COLONOSCOPY WITH BIOPSIES; ANOSCOPY;  Surgeon: Jose Paez MD;  Location: [facility] ENDOSCOPY;  Service: Gastroenterology    COLONOSCOPY N/A 2023    Procedure: COLONOSCOPY with biopsy;  Surgeon: Sami Elizondo MD;  Location: [facility] ENDOSCOPY;  Service: Gastroenterology;  Laterality: N/A;    ENDOSCOPY      GASTRIC BYPASS  2011    REDUCTION MAMMAPLASTY         Allergies   Allergen Reactions    Sulfa Antibiotics Rash and Other (See Comments)     blind         Current Outpatient Medications:     Adalimumab (Humira Pen-CD/UC/HS Starter) 80 MG/0.8ML injection, Inject 1.6 mL (2 pens) under the skin into the appropriate area as directed on day 1 followed by 0.8 ml (1 pen) on day 15., Disp: 2.4 mL, Rfl: 0    Adalimumab (HUMIRA) 40 MG/0.4ML Pen-injector Kit, Inject 40 mg under the skin into the appropriate area as directed Every 14 (Fourteen) Days. Beginning on day 29., Disp: 6 each, Rfl: 3    ASPIRIN 81 PO, Take 1 tablet by mouth Daily., Disp: , Rfl:     atorvastatin (LIPITOR) 40 MG tablet, Take 1 tablet by mouth Every Night., Disp: 90 tablet, Rfl: 4    carbidopa-levodopa CR (SINEMET CR)  MG per CR tablet, 1 tab tid and 1 extra if needed prn breakthrough rls symptoms, Disp: 360 tablet, Rfl: 3    Ferrous Fumarate (Ferrocite) 324 (106 Fe) MG tablet, Take 1 tablet by mouth 2 (Two) Times a Day., Disp: 180 tablet, Rfl: 3    levothyroxine (SYNTHROID, LEVOTHROID) 88 MCG tablet, Take 1 tablet by mouth Daily., Disp: 90 tablet, Rfl: 1    Lyrica 100 MG capsule, Take 1 capsule by mouth 2 (Two) Times a Day., Disp: 60 capsule, Rfl: 1    metoprolol tartrate (LOPRESSOR) 25 MG tablet, TAKE 1 TABLET BY MOUTH EVERY 12 HOURS, Disp: 180 tablet, Rfl: 3    pramipexole (MIRAPEX) 0.5 MG tablet, Take 0.5 tablets by mouth 3 (Three) Times a Day., Disp: 135 tablet, Rfl: 3     ubrogepant (UBRELVY) 100 MG tablet, Take 1 tablet at onset of migraine, may repeat once in 2 hours if needed (Patient not taking: Reported on 2024), Disp: 10 tablet, Rfl: 5    Current Facility-Administered Medications:     Adalimumab (HUMIRA) 80 MG/0.8ML injection 160 mg, 160 mg, Subcutaneous, Once, Sami Elizondo MD    Social History     Socioeconomic History    Marital status:    Tobacco Use    Smoking status: Former     Packs/day: 1.00     Years: 2.00     Additional pack years: 0.00     Total pack years: 2.00     Types: Cigarettes     Quit date: 10/1/1995     Years since quittin.3    Smokeless tobacco: Never   Vaping Use    Vaping Use: Never used   Substance and Sexual Activity    Alcohol use: Yes     Alcohol/week: 12.0 standard drinks of alcohol     Types: 12 Cans of beer per week     Comment: 16 beers per week    Drug use: No    Sexual activity: Defer       Family History   Problem Relation Age of Onset    Cancer Father     Heart attack Father     Cancer Other     Other Other     Breast cancer Mother     Asthma Brother     Ulcerative colitis Brother     Arthritis Paternal Grandmother     Colon cancer Neg Hx                Social History    Tobacco Use      Smoking status: Former        Packs/day: 1.00        Years: 2.00        Additional pack years: 0.00        Total pack years: 2.00        Types: Cigarettes        Quit date: 10/1/1995        Years since quittin.3      Smokeless tobacco: Never       Review of Systems   Constitutional:  Negative for activity change, appetite change, chills, diaphoresis, fatigue, fever and unexpected weight change.   HENT:  Negative for congestion, dental problem, drooling, ear discharge, ear pain, facial swelling, hearing loss, mouth sores, nosebleeds, postnasal drip, rhinorrhea, sinus pressure, sinus pain, sneezing, sore throat, tinnitus, trouble swallowing and voice change.    Eyes:  Negative for photophobia, pain, discharge, redness, itching and  "visual disturbance.   Respiratory:  Negative for apnea, cough, choking, chest tightness, shortness of breath, wheezing and stridor.    Cardiovascular:  Negative for chest pain, palpitations and leg swelling.   Gastrointestinal:  Negative for abdominal distention, abdominal pain, anal bleeding, blood in stool, constipation, diarrhea, nausea, rectal pain and vomiting.   Endocrine: Negative for cold intolerance, heat intolerance, polydipsia, polyphagia and polyuria.   Genitourinary:  Negative for decreased urine volume, difficulty urinating, dysuria, enuresis, flank pain, frequency, genital sores, hematuria and urgency.   Musculoskeletal:  Positive for back pain. Negative for arthralgias, gait problem, joint swelling, myalgias, neck pain and neck stiffness.   Skin:  Negative for color change, pallor, rash and wound.   Allergic/Immunologic: Negative for environmental allergies, food allergies and immunocompromised state.   Neurological:  Negative for dizziness, tremors, seizures, syncope, facial asymmetry, speech difficulty, weakness, light-headedness, numbness and headaches.   Hematological:  Negative for adenopathy. Does not bruise/bleed easily.   Psychiatric/Behavioral:  Negative for agitation, behavioral problems, confusion, decreased concentration, dysphoric mood, hallucinations, self-injury, sleep disturbance and suicidal ideas. The patient is not nervous/anxious and is not hyperactive.         Physical examination:  Blood pressure 119/72, temperature 97.3 °F (36.3 °C), temperature source Infrared, resp. rate 17, height 163.8 cm (64.5\"), weight 116 kg (255 lb 3.2 oz), last menstrual period 12/01/2018, not currently breastfeeding.  HEENT- normocephalic, atraumatic, sclera clear  Lungs-normal expansion, no wheezing  Heart-regular rate and rhythm  Extremities-positive pulses, no edema    Neurologic Exam  WDWN well-developed  A/A/C, speech clear, attention normal, conversant, answers questions appropriately, good " historian.  Cranial nerves II through XII are intact.  Motor examination does not reveal weakness lower extremities.   Sensation is intact.  Gait is normal, balance is normal.   No tremors are noted.  Reflexes are intact.   Palpation of the right paraspinal musculature and SI joint area is mildly tender.    Radiographic Imaging:  For my review is a MRI of the lumbar spine from 1/25/2024 which reveals degenerative disc at L4-5, bilateral facet arthropathy and a new right synovial cyst.  X-rays of the lumbar spine show a fixed grade 1 listhesis at L4-5.    Medical Decision Making  Diagnoses and all orders for this visit:    1. Degenerative lumbar disc (Primary)    2. Spondylolisthesis at L4-L5 level    3. Stenosis of lateral recess of lumbar spine    4. Acquired spondylolisthesis    5. Synovial cyst      Dr. Smith has reviewed the patient's studies and met with with patient and spouse. He has reviewed the findings, symptoms and has discussed options for treatment. Meds, PT has been completed and home program ongoing. Her new studies have been discussed and explained. We have discussed surgical intervention, risks of infection, csf leak, nerve damage.  We have discussed the surgery and anticipated time in the hospital as well as her return to work would be in about 4 weeks after surgery.  They are excepting the risk, her questions were answered and we will proceed with lumbar fusion at L4-5.  Including assessment, review of prior documentation, review of new diagnostic studies and discussing these findings with the patient and spouse as well as consultation with Dr. Smith, documentation and orders for surgery more than 45 minutes was spent total time on this appointment.    Any copied data from previous notes included in the (1) HPI, (2) PE, (3) MDM and/or assessment and plan has been reviewed and is accurate as of this day.    Anca Bertrand, PAC    Patient Care Team:  Floridalma Jimenez MD as PCP - General  (Family Medicine)  Floridalma Jimenez MD as Referring Physician (Family Medicine)  Casie Bertrand PA-C as Consulting Physician (Physician Assistant)

## 2024-01-26 NOTE — PROGRESS NOTES
Please let Mary know that the CTA of her head and neck look normal. An incidental note of an acute sinus infection and chronic sinus infection is present. Does she currently have sinus infection symptoms? Would she like treatment? Usually sinus drainage, pressure across face and forehead? Increased pressure in face when bending down? If she would like to be treated, I am happy to do so. She may consider seeing ENT if this is something she has often. I know she has a lot of other health concerns and is seeing multiple specialists right now. Just wanted to offer to treat the acute sinus infection and then she can discuss ENT in the future. Let me know. Thanks, LINNEA Lopez

## 2024-01-26 NOTE — H&P
"   Patient: Mary Lunsford  : 1970  Chart #: [id number]     Date of Service: 2024         Chief Complaint   Patient presents with    Low back & right leg pain         Back Pain  Pertinent negatives include no abdominal pain, chest pain, dysuria, fever, headaches, numbness or weakness.         HPI:  This is a 53-year-old female who works as a , she presents with back pain and right leg pain.  She has an area in the right lower back that goes from the lateral hip down the side of the leg to the ankle  Laying and sitting causes her pain to be worse.  With her job she is up and down but does not do a lot of sitting.  Her symptoms have been going on for approximately 13 months.  Her initial back pain occurred when she was rolling over getting out of bed.  She presents with MRI and x-rays to review.     Chronic Illnesses:  Medical History        Past Medical History:   Diagnosis Date    Anemia      Body piercing       both ears    Bronchitis      Colitis      Disease of thyroid gland      Dissection of coronary artery 2017     pt denies this 10/2/19.  states she had testing at , but was told that they didn't \"find anything\"    Enteropathogenic Escherichia coli infection      Essential hypertension 2018    Gallstone      H/O echocardiogram      History of nuclear stress test      Hyperlipidemia      Kidney stones      Low back pain      Migraine      Obesity      Ovarian cyst      Restless leg syndrome      Subacute idiopathic myocarditis      Urinary tract infection      Vitamin B12 deficiency      Wears glasses       to read               Surgical History         Past Surgical History:   Procedure Laterality Date    BREAST SURGERY Bilateral 1987     breast reduction    CARDIAC CATHETERIZATION N/A 3/15/2017     Procedure: Coronary angiography;  Surgeon: Moustapha Peñaloza MD;  Location: [facility] CATH INVASIVE LOCATION;  Service:     CARDIAC CATHETERIZATION N/A 3/15/2017 "     Procedure: Left ventriculography;  Surgeon: Moustapha Peñaloza MD;  Location: [facility] CATH INVASIVE LOCATION;  Service:      SECTION         1991 & 1995    CHOLECYSTECTOMY   2013    COLONOSCOPY N/A 10/3/2019     Procedure: COLONOSCOPY WITH BIOPSIES; ANOSCOPY;  Surgeon: Jose Paez MD;  Location: [facility] ENDOSCOPY;  Service: Gastroenterology    COLONOSCOPY N/A 2023     Procedure: COLONOSCOPY with biopsy;  Surgeon: Sami Elizondo MD;  Location: [facility] ENDOSCOPY;  Service: Gastroenterology;  Laterality: N/A;    ENDOSCOPY        GASTRIC BYPASS   2011    REDUCTION MAMMAPLASTY                      Allergies   Allergen Reactions    Sulfa Antibiotics Rash and Other (See Comments)       blind            Current Outpatient Medications:     Adalimumab (Humira Pen-CD/UC/HS Starter) 80 MG/0.8ML injection, Inject 1.6 mL (2 pens) under the skin into the appropriate area as directed on day 1 followed by 0.8 ml (1 pen) on day 15., Disp: 2.4 mL, Rfl: 0    Adalimumab (HUMIRA) 40 MG/0.4ML Pen-injector Kit, Inject 40 mg under the skin into the appropriate area as directed Every 14 (Fourteen) Days. Beginning on day 29., Disp: 6 each, Rfl: 3    ASPIRIN 81 PO, Take 1 tablet by mouth Daily., Disp: , Rfl:     atorvastatin (LIPITOR) 40 MG tablet, Take 1 tablet by mouth Every Night., Disp: 90 tablet, Rfl: 4    carbidopa-levodopa CR (SINEMET CR)  MG per CR tablet, 1 tab tid and 1 extra if needed prn breakthrough rls symptoms, Disp: 360 tablet, Rfl: 3    Ferrous Fumarate (Ferrocite) 324 (106 Fe) MG tablet, Take 1 tablet by mouth 2 (Two) Times a Day., Disp: 180 tablet, Rfl: 3    levothyroxine (SYNTHROID, LEVOTHROID) 88 MCG tablet, Take 1 tablet by mouth Daily., Disp: 90 tablet, Rfl: 1    Lyrica 100 MG capsule, Take 1 capsule by mouth 2 (Two) Times a Day., Disp: 60 capsule, Rfl: 1    metoprolol tartrate (LOPRESSOR) 25 MG tablet, TAKE 1 TABLET BY MOUTH EVERY 12 HOURS, Disp: 180 tablet, Rfl: 3     pramipexole (MIRAPEX) 0.5 MG tablet, Take 0.5 tablets by mouth 3 (Three) Times a Day., Disp: 135 tablet, Rfl: 3    ubrogepant (UBRELVY) 100 MG tablet, Take 1 tablet at onset of migraine, may repeat once in 2 hours if needed (Patient not taking: Reported on 2024), Disp: 10 tablet, Rfl: 5     Current Facility-Administered Medications:     Adalimumab (HUMIRA) 80 MG/0.8ML injection 160 mg, 160 mg, Subcutaneous, Once, Sami Elizondo MD     Social History   Social History            Socioeconomic History    Marital status:    Tobacco Use    Smoking status: Former       Packs/day: 1.00       Years: 2.00       Additional pack years: 0.00       Total pack years: 2.00       Types: Cigarettes       Quit date: 10/1/1995       Years since quittin.3    Smokeless tobacco: Never   Vaping Use    Vaping Use: Never used   Substance and Sexual Activity    Alcohol use: Yes       Alcohol/week: 12.0 standard drinks of alcohol       Types: 12 Cans of beer per week       Comment: 16 beers per week    Drug use: No    Sexual activity: Defer                  Family History   Problem Relation Age of Onset    Cancer Father      Heart attack Father      Cancer Other      Other Other      Breast cancer Mother      Asthma Brother      Ulcerative colitis Brother      Arthritis Paternal Grandmother      Colon cancer Neg Hx                    Social History    Tobacco Use      Smoking status: Former        Packs/day: 1.00        Years: 2.00        Additional pack years: 0.00        Total pack years: 2.00        Types: Cigarettes        Quit date: 10/1/1995        Years since quittin.3      Smokeless tobacco: Never        Review of Systems   Constitutional:  Negative for activity change, appetite change, chills, diaphoresis, fatigue, fever and unexpected weight change.   HENT:  Negative for congestion, dental problem, drooling, ear discharge, ear pain, facial swelling, hearing loss, mouth sores, nosebleeds, postnasal drip,  "rhinorrhea, sinus pressure, sinus pain, sneezing, sore throat, tinnitus, trouble swallowing and voice change.    Eyes:  Negative for photophobia, pain, discharge, redness, itching and visual disturbance.   Respiratory:  Negative for apnea, cough, choking, chest tightness, shortness of breath, wheezing and stridor.    Cardiovascular:  Negative for chest pain, palpitations and leg swelling.   Gastrointestinal:  Negative for abdominal distention, abdominal pain, anal bleeding, blood in stool, constipation, diarrhea, nausea, rectal pain and vomiting.   Endocrine: Negative for cold intolerance, heat intolerance, polydipsia, polyphagia and polyuria.   Genitourinary:  Negative for decreased urine volume, difficulty urinating, dysuria, enuresis, flank pain, frequency, genital sores, hematuria and urgency.   Musculoskeletal:  Positive for back pain. Negative for arthralgias, gait problem, joint swelling, myalgias, neck pain and neck stiffness.   Skin:  Negative for color change, pallor, rash and wound.   Allergic/Immunologic: Negative for environmental allergies, food allergies and immunocompromised state.   Neurological:  Negative for dizziness, tremors, seizures, syncope, facial asymmetry, speech difficulty, weakness, light-headedness, numbness and headaches.   Hematological:  Negative for adenopathy. Does not bruise/bleed easily.   Psychiatric/Behavioral:  Negative for agitation, behavioral problems, confusion, decreased concentration, dysphoric mood, hallucinations, self-injury, sleep disturbance and suicidal ideas. The patient is not nervous/anxious and is not hyperactive.          Physical examination:  Blood pressure 119/72, temperature 97.3 °F (36.3 °C), temperature source Infrared, resp. rate 17, height 163.8 cm (64.5\"), weight 116 kg (255 lb 3.2 oz), last menstrual period 12/01/2018, not currently breastfeeding.  HEENT- normocephalic, atraumatic, sclera clear  Lungs-normal expansion, no wheezing  Heart-regular rate " and rhythm  Extremities-positive pulses, no edema     Neurologic Exam  WDWN well-developed  A/A/C, speech clear, attention normal, conversant, answers questions appropriately, good historian.  Cranial nerves II through XII are intact.  Motor examination does not reveal weakness lower extremities.   Sensation is intact.  Gait is normal, balance is normal.   No tremors are noted.  Reflexes are intact.   Palpation of the right paraspinal musculature and SI joint area is mildly tender.     Radiographic Imaging:  For my review is a MRI of the lumbar spine from 1/25/2024 which reveals degenerative disc at L4-5, bilateral facet arthropathy and a new right synovial cyst.  X-rays of the lumbar spine show a fixed grade 1 listhesis at L4-5.     Medical Decision Making  Diagnoses and all orders for this visit:     1. Degenerative lumbar disc (Primary)     2. Spondylolisthesis at L4-L5 level     3. Stenosis of lateral recess of lumbar spine     4. Acquired spondylolisthesis     5. Synovial cyst        Dr. Smith has reviewed the patient's studies and met with with patient and spouse. He has reviewed the findings, symptoms and has discussed options for treatment. Meds, PT has been completed and home program ongoing. Her new studies have been discussed and explained. We have discussed surgical intervention, risks of infection, csf leak, nerve damage.  We have discussed the surgery and anticipated time in the hospital as well as her return to work would be in about 4 weeks after surgery.  They are excepting the risk, her questions were answered and we will proceed with lumbar fusion at L4-5.     Any copied data from previous notes included in the (1) HPI, (2) PE, (3) MDM and/or assessment and plan has been reviewed and is accurate as of this day.     Anca Bertrand, PAC     Patient Care Team:  Floridalma Jimenez MD as PCP - General (Family Medicine)  Floridalma Jimenez MD as Referring Physician (Family Medicine)  Elza  Casie ERAZO PA-C as Consulting Physician (Physician Assistant)

## 2024-01-26 NOTE — TELEPHONE ENCOUNTER
[facility] caled asking if Pt can hold her ASA 81 for a back surgery. Pt will need to hold her ASA 7 days prior and 7 days post surgery

## 2024-01-29 RX ORDER — CEFDINIR 300 MG/1
300 CAPSULE ORAL 2 TIMES DAILY
Qty: 20 CAPSULE | Refills: 0 | Status: SHIPPED | OUTPATIENT
Start: 2024-01-29

## 2024-01-29 NOTE — TELEPHONE ENCOUNTER
I spoke with Mary by phone and she does have acute sinus infection symptoms. She has not been evaluated for this due to other pressing health issues. I am sending in Omnicef for her to take. Encouraged her to see ENT after her back surgery scheduled 2/14/2024 and have evaluation for chronic sinusitis as well as tinnitus. She verbalizes understanding and agrees with plan. LINNEA Lopez

## 2024-02-06 ENCOUNTER — HOSPITAL ENCOUNTER (OUTPATIENT)
Dept: MRI IMAGING | Facility: HOSPITAL | Age: 54
Discharge: HOME | End: 2024-02-06
Admitting: NURSE PRACTITIONER
Payer: COMMERCIAL

## 2024-02-06 DIAGNOSIS — R20.2 FACIAL TINGLING: ICD-10-CM

## 2024-02-06 PROCEDURE — 0 GADOBENATE DIMEGLUMINE 529 MG/ML SOLUTION: Performed by: NURSE PRACTITIONER

## 2024-02-06 PROCEDURE — A9577 INJ MULTIHANCE: HCPCS | Performed by: NURSE PRACTITIONER

## 2024-02-06 PROCEDURE — 70553 MRI BRAIN STEM W/O & W/DYE: CPT

## 2024-02-06 RX ADMIN — GADOBENATE DIMEGLUMINE 22 ML: 529 INJECTION, SOLUTION INTRAVENOUS at 18:59

## 2024-02-07 ENCOUNTER — TELEPHONE (OUTPATIENT)
Dept: NEUROLOGY | Facility: CLINIC | Age: 54
End: 2024-02-07
Payer: COMMERCIAL

## 2024-02-07 DIAGNOSIS — G25.81 RESTLESS LEGS SYNDROME (RLS): ICD-10-CM

## 2024-02-07 DIAGNOSIS — D50.9 IRON DEFICIENCY ANEMIA, UNSPECIFIED IRON DEFICIENCY ANEMIA TYPE: ICD-10-CM

## 2024-02-07 RX ORDER — FERROUS FUMARATE 324(106)MG
1 TABLET ORAL 2 TIMES DAILY
Qty: 180 TABLET | Refills: 3 | Status: SHIPPED | OUTPATIENT
Start: 2024-02-07

## 2024-02-07 NOTE — TELEPHONE ENCOUNTER
----- Message from LINNEA Mendoza sent at 2/7/2024 11:10 AM EST -----  Please let Mary know the MRI of her brain looks great. Stable findings. 1-2 tiny white matter changes consistent with headaches. These are considered normal for her age. No concerning findings. It does show that sinus infection still. Is she feeling relief with the antibiotics I called in previously? Let me know. Thanks, Debbie

## 2024-02-07 NOTE — PROGRESS NOTES
Please let Mary know the MRI of her brain looks great. Stable findings. 1-2 tiny white matter changes consistent with headaches. These are considered normal for her age. No concerning findings. It does show that sinus infection still. Is she feeling relief with the antibiotics I called in previously? Let me know. Thanks, Debbie

## 2024-02-08 RX ORDER — CHLORHEXIDINE GLUCONATE 4 G/100ML
SOLUTION TOPICAL DAILY PRN
Qty: 118 ML | Refills: 0 | Status: SHIPPED | OUTPATIENT
Start: 2024-02-08

## 2024-02-09 ENCOUNTER — PRE-ADMISSION TESTING (OUTPATIENT)
Dept: PREADMISSION TESTING | Facility: HOSPITAL | Age: 54
End: 2024-02-09
Payer: COMMERCIAL

## 2024-02-09 VITALS — HEIGHT: 65 IN | BODY MASS INDEX: 42.46 KG/M2 | WEIGHT: 254.85 LBS

## 2024-02-09 DIAGNOSIS — M48.061 STENOSIS OF LATERAL RECESS OF LUMBAR SPINE: ICD-10-CM

## 2024-02-09 DIAGNOSIS — M51.36 DEGENERATIVE LUMBAR DISC: ICD-10-CM

## 2024-02-09 DIAGNOSIS — M43.10 ACQUIRED SPONDYLOLISTHESIS: ICD-10-CM

## 2024-02-09 DIAGNOSIS — M43.16 SPONDYLOLISTHESIS AT L4-L5 LEVEL: ICD-10-CM

## 2024-02-09 LAB
ANION GAP SERPL CALCULATED.3IONS-SCNC: 7 MMOL/L (ref 5–15)
BUN SERPL-MCNC: 16 MG/DL (ref 6–20)
BUN/CREAT SERPL: 16.7 (ref 7–25)
CALCIUM SPEC-SCNC: 9.6 MG/DL (ref 8.6–10.5)
CHLORIDE SERPL-SCNC: 103 MMOL/L (ref 98–107)
CO2 SERPL-SCNC: 30 MMOL/L (ref 22–29)
CREAT SERPL-MCNC: 0.96 MG/DL (ref 0.57–1)
DEPRECATED RDW RBC AUTO: 46.5 FL (ref 37–54)
EGFRCR SERPLBLD CKD-EPI 2021: 70 ML/MIN/1.73
ERYTHROCYTE [DISTWIDTH] IN BLOOD BY AUTOMATED COUNT: 12.1 % (ref 12.3–15.4)
GLUCOSE SERPL-MCNC: 74 MG/DL (ref 65–99)
HBA1C MFR BLD: 5.4 % (ref 4.8–5.6)
HCT VFR BLD AUTO: 44.7 % (ref 34–46.6)
HGB BLD-MCNC: 14.5 G/DL (ref 12–15.9)
MCH RBC QN AUTO: 33.7 PG (ref 26.6–33)
MCHC RBC AUTO-ENTMCNC: 32.4 G/DL (ref 31.5–35.7)
MCV RBC AUTO: 104 FL (ref 79–97)
PLATELET # BLD AUTO: 222 10*3/MM3 (ref 140–450)
PMV BLD AUTO: 10.2 FL (ref 6–12)
POTASSIUM SERPL-SCNC: 4.3 MMOL/L (ref 3.5–5.2)
QT INTERVAL: 434 MS
QTC INTERVAL: 440 MS
RBC # BLD AUTO: 4.3 10*6/MM3 (ref 3.77–5.28)
SODIUM SERPL-SCNC: 140 MMOL/L (ref 136–145)
WBC NRBC COR # BLD AUTO: 4.5 10*3/MM3 (ref 3.4–10.8)

## 2024-02-09 PROCEDURE — 36415 COLL VENOUS BLD VENIPUNCTURE: CPT

## 2024-02-09 PROCEDURE — 85027 COMPLETE CBC AUTOMATED: CPT

## 2024-02-09 PROCEDURE — 80048 BASIC METABOLIC PNL TOTAL CA: CPT

## 2024-02-09 PROCEDURE — 93005 ELECTROCARDIOGRAM TRACING: CPT

## 2024-02-09 PROCEDURE — 87081 CULTURE SCREEN ONLY: CPT

## 2024-02-09 PROCEDURE — 83036 HEMOGLOBIN GLYCOSYLATED A1C: CPT

## 2024-02-09 PROCEDURE — 93010 ELECTROCARDIOGRAM REPORT: CPT | Performed by: INTERNAL MEDICINE

## 2024-02-09 NOTE — PAT
An arrival time for procedure was not provided during PAT visit. If patient had any questions or concerns about their arrival time, they were instructed to contact their surgeon/physician.  Additionally, if the patient referred to an arrival time that was acquired from their my chart account, patient was encouraged to verify that time with their surgeon/physician. Arrival times are NOT provided in Pre Admission Testing Department.    Bactroban (if prescribed) and Chlorhexidine Prescription prescribed by physician before PAT visit.  Verified with patient that medication(s) were picked up from their pharmacy.  Written instructions given to patient during PAT visit.  Patient/family also instructed to complete skin prep checklist and return the checklist on the day of surgery to preoperative staff.  Patient/family verbalized understanding.    Patient instructed to drink 20 ounces of Gatorade or Gatorlyte (if diabetic) and it needs to be completed 1 hour (for Main OR patients) or 2 hours (scheduled  section & [facility] patients) before given arrival time for procedure (NO RED Gatorade and NO Gatorade Zero).    Patient verbalized understanding.    Patient to apply Chlorhexadine wipes  to surgical area (as instructed) the night before procedure and the AM of procedure. Wipes provided.    Patient viewed general PAT education video as instructed in their preoperative information received from their surgeon.  Patient stated the general PAT education video was viewed in its entirety and survey completed.  Copies of PAT general education handouts (Incentive Spirometry, Meds to Beds Program, Patient Belongings, Pre-op skin preparation instructions, Blood Glucose testing, Visitor policy, Surgery FAQ, Code H) distributed to patient if not printed. Education related to the PAT pass and skin preparation for surgery (if applicable) completed in PAT as a reinforcement to PAT education video. Patient instructed to return PAT  pass provided today as well as completed skin preparation sheet (if applicable) on the day of procedure.     Additionally if patient had not viewed video yet but intended to view it at home or in our waiting area, then referred them to the handout with QR code/link provided during PAT visit.  Instructed patient to complete survey after viewing the video in its entirety.  Encouraged patient/family to read PAT general education handouts thoroughly and notify PAT staff with any questions or concerns. Patient verbalized understanding of all information and priority content.

## 2024-02-10 LAB — MRSA SPEC QL CULT: NORMAL

## 2024-02-13 ENCOUNTER — TELEPHONE (OUTPATIENT)
Dept: NEUROSURGERY | Facility: CLINIC | Age: 54
End: 2024-02-13
Payer: COMMERCIAL

## 2024-02-14 ENCOUNTER — HOSPITAL ENCOUNTER (OUTPATIENT)
Facility: HOSPITAL | Age: 54
Discharge: HOME | End: 2024-02-16
Attending: STUDENT IN AN ORGANIZED HEALTH CARE EDUCATION/TRAINING PROGRAM | Admitting: STUDENT IN AN ORGANIZED HEALTH CARE EDUCATION/TRAINING PROGRAM
Payer: COMMERCIAL

## 2024-02-14 ENCOUNTER — ANESTHESIA (OUTPATIENT)
Dept: PERIOP | Facility: HOSPITAL | Age: 54
End: 2024-02-14
Payer: COMMERCIAL

## 2024-02-14 ENCOUNTER — ANESTHESIA EVENT (OUTPATIENT)
Dept: PERIOP | Facility: HOSPITAL | Age: 54
End: 2024-02-14
Payer: COMMERCIAL

## 2024-02-14 ENCOUNTER — APPOINTMENT (OUTPATIENT)
Dept: GENERAL RADIOLOGY | Facility: HOSPITAL | Age: 54
End: 2024-02-14
Payer: COMMERCIAL

## 2024-02-14 ENCOUNTER — ANESTHESIA EVENT CONVERTED (OUTPATIENT)
Dept: ANESTHESIOLOGY | Facility: HOSPITAL | Age: 54
End: 2024-02-14
Payer: COMMERCIAL

## 2024-02-14 DIAGNOSIS — E55.9 VITAMIN D DEFICIENCY: ICD-10-CM

## 2024-02-14 DIAGNOSIS — E78.5 DYSLIPIDEMIA: ICD-10-CM

## 2024-02-14 DIAGNOSIS — M51.36 DEGENERATIVE LUMBAR DISC: ICD-10-CM

## 2024-02-14 DIAGNOSIS — M43.10 ACQUIRED SPONDYLOLISTHESIS: ICD-10-CM

## 2024-02-14 DIAGNOSIS — M79.10 PAIN IN THE MUSCLES: ICD-10-CM

## 2024-02-14 DIAGNOSIS — R10.30 LOWER ABDOMINAL PAIN: ICD-10-CM

## 2024-02-14 DIAGNOSIS — M43.00 ACQUIRED SPONDYLOLYSIS: Primary | ICD-10-CM

## 2024-02-14 DIAGNOSIS — M43.16 SPONDYLOLISTHESIS AT L4-L5 LEVEL: ICD-10-CM

## 2024-02-14 DIAGNOSIS — R19.7 DIARRHEA, UNSPECIFIED TYPE: Chronic | ICD-10-CM

## 2024-02-14 DIAGNOSIS — M48.061 STENOSIS OF LATERAL RECESS OF LUMBAR SPINE: ICD-10-CM

## 2024-02-14 DIAGNOSIS — R20.2 FACIAL TINGLING: ICD-10-CM

## 2024-02-14 DIAGNOSIS — N92.6 IRREGULAR PERIODS: ICD-10-CM

## 2024-02-14 DIAGNOSIS — R74.8 ABNORMAL CARDIAC ENZYME LEVEL: ICD-10-CM

## 2024-02-14 DIAGNOSIS — E07.9 THYROID DISORDER: ICD-10-CM

## 2024-02-14 DIAGNOSIS — M71.30 SYNOVIAL CYST: ICD-10-CM

## 2024-02-14 DIAGNOSIS — G43.109 MIGRAINE WITH AURA AND WITHOUT STATUS MIGRAINOSUS, NOT INTRACTABLE: ICD-10-CM

## 2024-02-14 DIAGNOSIS — K50.00 CROHN'S DISEASE OF SMALL INTESTINE WITHOUT COMPLICATION: ICD-10-CM

## 2024-02-14 DIAGNOSIS — K62.5 RECTAL BLEEDING: ICD-10-CM

## 2024-02-14 DIAGNOSIS — R07.89 CHEST HEAVINESS: ICD-10-CM

## 2024-02-14 DIAGNOSIS — R93.3 ABNORMAL CT SCAN, COLON: Chronic | ICD-10-CM

## 2024-02-14 DIAGNOSIS — I10 ESSENTIAL HYPERTENSION: ICD-10-CM

## 2024-02-14 DIAGNOSIS — E53.8 VITAMIN B12 DEFICIENCY: ICD-10-CM

## 2024-02-14 DIAGNOSIS — Z28.21 INFLUENZA VACCINATION DECLINED: ICD-10-CM

## 2024-02-14 DIAGNOSIS — G25.81 RESTLESS LEGS SYNDROME (RLS): ICD-10-CM

## 2024-02-14 DIAGNOSIS — E66.01 MORBID OBESITY: ICD-10-CM

## 2024-02-14 DIAGNOSIS — Z83.79 FAMILY HISTORY OF ULCERATIVE COLITIS: ICD-10-CM

## 2024-02-14 DIAGNOSIS — R21 RASH: ICD-10-CM

## 2024-02-14 DIAGNOSIS — R06.83 SNORING: ICD-10-CM

## 2024-02-14 DIAGNOSIS — D50.9 IRON DEFICIENCY ANEMIA, UNSPECIFIED IRON DEFICIENCY ANEMIA TYPE: ICD-10-CM

## 2024-02-14 PROCEDURE — C1713 ANCHOR/SCREW BN/BN,TIS/BN: HCPCS | Performed by: STUDENT IN AN ORGANIZED HEALTH CARE EDUCATION/TRAINING PROGRAM

## 2024-02-14 PROCEDURE — 25010000002 CEFAZOLIN PER 500 MG: Performed by: STUDENT IN AN ORGANIZED HEALTH CARE EDUCATION/TRAINING PROGRAM

## 2024-02-14 PROCEDURE — G0378 HOSPITAL OBSERVATION PER HR: HCPCS

## 2024-02-14 PROCEDURE — 25010000002 MIDAZOLAM PER 1 MG: Performed by: NURSE ANESTHETIST, CERTIFIED REGISTERED

## 2024-02-14 PROCEDURE — 25010000002 ONDANSETRON PER 1 MG: Performed by: NURSE ANESTHETIST, CERTIFIED REGISTERED

## 2024-02-14 PROCEDURE — 63052 LAM FACETC/FRMT ARTHRD LUM 1: CPT | Performed by: STUDENT IN AN ORGANIZED HEALTH CARE EDUCATION/TRAINING PROGRAM

## 2024-02-14 PROCEDURE — 22853 INSJ BIOMECHANICAL DEVICE: CPT

## 2024-02-14 PROCEDURE — 25810000003 LACTATED RINGERS PER 1000 ML: Performed by: ANESTHESIOLOGY

## 2024-02-14 PROCEDURE — 25810000003 SODIUM CHLORIDE PER 500 ML: Performed by: STUDENT IN AN ORGANIZED HEALTH CARE EDUCATION/TRAINING PROGRAM

## 2024-02-14 PROCEDURE — 25010000002 HYDROMORPHONE 1 MG/ML SOLUTION

## 2024-02-14 PROCEDURE — 63052 LAM FACETC/FRMT ARTHRD LUM 1: CPT

## 2024-02-14 PROCEDURE — 25010000002 DEXAMETHASONE PER 1 MG: Performed by: NURSE ANESTHETIST, CERTIFIED REGISTERED

## 2024-02-14 PROCEDURE — 76000 FLUOROSCOPY <1 HR PHYS/QHP: CPT

## 2024-02-14 PROCEDURE — 22853 INSJ BIOMECHANICAL DEVICE: CPT | Performed by: STUDENT IN AN ORGANIZED HEALTH CARE EDUCATION/TRAINING PROGRAM

## 2024-02-14 PROCEDURE — 25010000002 PHENYLEPHRINE 10 MG/ML SOLUTION: Performed by: NURSE ANESTHETIST, CERTIFIED REGISTERED

## 2024-02-14 PROCEDURE — 22840 INSERT SPINE FIXATION DEVICE: CPT | Performed by: STUDENT IN AN ORGANIZED HEALTH CARE EDUCATION/TRAINING PROGRAM

## 2024-02-14 PROCEDURE — 61783 SCAN PROC SPINAL: CPT

## 2024-02-14 PROCEDURE — 22840 INSERT SPINE FIXATION DEVICE: CPT

## 2024-02-14 PROCEDURE — 25010000002 FENTANYL CITRATE (PF) 50 MCG/ML SOLUTION

## 2024-02-14 PROCEDURE — 25810000003 LACTATED RINGERS PER 1000 ML: Performed by: NURSE ANESTHETIST, CERTIFIED REGISTERED

## 2024-02-14 PROCEDURE — 25010000002 FENTANYL CITRATE (PF) 100 MCG/2ML SOLUTION: Performed by: NURSE ANESTHETIST, CERTIFIED REGISTERED

## 2024-02-14 PROCEDURE — 22633 ARTHRD CMBN 1NTRSPC LUMBAR: CPT | Performed by: STUDENT IN AN ORGANIZED HEALTH CARE EDUCATION/TRAINING PROGRAM

## 2024-02-14 PROCEDURE — 22633 ARTHRD CMBN 1NTRSPC LUMBAR: CPT

## 2024-02-14 PROCEDURE — 25010000002 PROPOFOL 10 MG/ML EMULSION: Performed by: NURSE ANESTHETIST, CERTIFIED REGISTERED

## 2024-02-14 PROCEDURE — 25010000002 SUGAMMADEX 200 MG/2ML SOLUTION: Performed by: NURSE ANESTHETIST, CERTIFIED REGISTERED

## 2024-02-14 DEVICE — IMPLANTABLE DEVICE: Type: IMPLANTABLE DEVICE | Site: SPINE LUMBAR | Status: FUNCTIONAL

## 2024-02-14 DEVICE — KT SEAL HEMOS ABS FLOSEAL MATRX FAST/PREP 10ML: Type: IMPLANTABLE DEVICE | Site: SPINE LUMBAR | Status: FUNCTIONAL

## 2024-02-14 DEVICE — ALLOGRFT DBM GRAFTON DS INJ FIBR 9CC: Type: IMPLANTABLE DEVICE | Site: SPINE LUMBAR | Status: FUNCTIONAL

## 2024-02-14 DEVICE — WAX BONE HEMO AESCULAP 2.5GM: Type: IMPLANTABLE DEVICE | Site: SPINE LUMBAR | Status: FUNCTIONAL

## 2024-02-14 RX ORDER — FAMOTIDINE 10 MG/ML
20 INJECTION, SOLUTION INTRAVENOUS ONCE
Status: CANCELLED | OUTPATIENT
Start: 2024-02-14 | End: 2024-02-14

## 2024-02-14 RX ORDER — DIAZEPAM 5 MG/1
TABLET ORAL
Status: COMPLETED
Start: 2024-02-14 | End: 2024-02-14

## 2024-02-14 RX ORDER — LEVOTHYROXINE SODIUM 88 UG/1
88 TABLET ORAL
Status: DISCONTINUED | OUTPATIENT
Start: 2024-02-15 | End: 2024-02-16 | Stop reason: HOSPADM

## 2024-02-14 RX ORDER — FAMOTIDINE 20 MG/1
20 TABLET, FILM COATED ORAL ONCE
Status: DISCONTINUED | OUTPATIENT
Start: 2024-02-14 | End: 2024-02-14 | Stop reason: SDUPTHER

## 2024-02-14 RX ORDER — NALOXONE HCL 0.4 MG/ML
0.4 VIAL (ML) INJECTION
Status: DISCONTINUED | OUTPATIENT
Start: 2024-02-14 | End: 2024-02-16 | Stop reason: HOSPADM

## 2024-02-14 RX ORDER — SODIUM CHLORIDE 0.9 % (FLUSH) 0.9 %
10 SYRINGE (ML) INJECTION AS NEEDED
Status: DISCONTINUED | OUTPATIENT
Start: 2024-02-14 | End: 2024-02-16 | Stop reason: HOSPADM

## 2024-02-14 RX ORDER — FENTANYL CITRATE 50 UG/ML
50 INJECTION, SOLUTION INTRAMUSCULAR; INTRAVENOUS
Status: DISCONTINUED | OUTPATIENT
Start: 2024-02-14 | End: 2024-02-14 | Stop reason: HOSPADM

## 2024-02-14 RX ORDER — FENTANYL CITRATE 50 UG/ML
INJECTION, SOLUTION INTRAMUSCULAR; INTRAVENOUS AS NEEDED
Status: DISCONTINUED | OUTPATIENT
Start: 2024-02-14 | End: 2024-02-14 | Stop reason: SURG

## 2024-02-14 RX ORDER — ATORVASTATIN CALCIUM 40 MG/1
40 TABLET, FILM COATED ORAL NIGHTLY
Status: DISCONTINUED | OUTPATIENT
Start: 2024-02-14 | End: 2024-02-16 | Stop reason: HOSPADM

## 2024-02-14 RX ORDER — SODIUM CHLORIDE 9 MG/ML
INJECTION, SOLUTION INTRAVENOUS AS NEEDED
Status: DISCONTINUED | OUTPATIENT
Start: 2024-02-14 | End: 2024-02-14 | Stop reason: HOSPADM

## 2024-02-14 RX ORDER — LIDOCAINE HYDROCHLORIDE AND EPINEPHRINE 5; 5 MG/ML; UG/ML
INJECTION, SOLUTION INFILTRATION; PERINEURAL AS NEEDED
Status: DISCONTINUED | OUTPATIENT
Start: 2024-02-14 | End: 2024-02-14 | Stop reason: HOSPADM

## 2024-02-14 RX ORDER — MIDAZOLAM HYDROCHLORIDE 1 MG/ML
INJECTION INTRAMUSCULAR; INTRAVENOUS AS NEEDED
Status: DISCONTINUED | OUTPATIENT
Start: 2024-02-14 | End: 2024-02-14 | Stop reason: SURG

## 2024-02-14 RX ORDER — HYDROCODONE BITARTRATE AND ACETAMINOPHEN 5; 325 MG/1; MG/1
TABLET ORAL
Status: COMPLETED
Start: 2024-02-14 | End: 2024-02-16

## 2024-02-14 RX ORDER — CARBIDOPA AND LEVODOPA 50; 200 MG/1; MG/1
1 TABLET, EXTENDED RELEASE ORAL 3 TIMES DAILY
Status: DISCONTINUED | OUTPATIENT
Start: 2024-02-14 | End: 2024-02-16 | Stop reason: HOSPADM

## 2024-02-14 RX ORDER — AMOXICILLIN 250 MG
1 CAPSULE ORAL NIGHTLY PRN
Status: DISCONTINUED | OUTPATIENT
Start: 2024-02-14 | End: 2024-02-16 | Stop reason: HOSPADM

## 2024-02-14 RX ORDER — HYDROCODONE BITARTRATE AND ACETAMINOPHEN 5; 325 MG/1; MG/1
2 TABLET ORAL EVERY 4 HOURS PRN
Status: DISCONTINUED | OUTPATIENT
Start: 2024-02-14 | End: 2024-02-16 | Stop reason: HOSPADM

## 2024-02-14 RX ORDER — ONDANSETRON 2 MG/ML
INJECTION INTRAMUSCULAR; INTRAVENOUS AS NEEDED
Status: DISCONTINUED | OUTPATIENT
Start: 2024-02-14 | End: 2024-02-14 | Stop reason: SURG

## 2024-02-14 RX ORDER — ROCURONIUM BROMIDE 10 MG/ML
INJECTION, SOLUTION INTRAVENOUS AS NEEDED
Status: DISCONTINUED | OUTPATIENT
Start: 2024-02-14 | End: 2024-02-14 | Stop reason: SURG

## 2024-02-14 RX ORDER — ONDANSETRON 2 MG/ML
4 INJECTION INTRAMUSCULAR; INTRAVENOUS EVERY 6 HOURS PRN
Status: DISCONTINUED | OUTPATIENT
Start: 2024-02-14 | End: 2024-02-16 | Stop reason: HOSPADM

## 2024-02-14 RX ORDER — PRAMIPEXOLE DIHYDROCHLORIDE 0.25 MG/1
0.25 TABLET ORAL 3 TIMES DAILY
Status: DISCONTINUED | OUTPATIENT
Start: 2024-02-14 | End: 2024-02-16 | Stop reason: HOSPADM

## 2024-02-14 RX ORDER — SODIUM CHLORIDE 9 MG/ML
40 INJECTION, SOLUTION INTRAVENOUS AS NEEDED
Status: DISCONTINUED | OUTPATIENT
Start: 2024-02-14 | End: 2024-02-16 | Stop reason: HOSPADM

## 2024-02-14 RX ORDER — EPHEDRINE SULFATE 50 MG/ML
INJECTION INTRAVENOUS AS NEEDED
Status: DISCONTINUED | OUTPATIENT
Start: 2024-02-14 | End: 2024-02-14 | Stop reason: SURG

## 2024-02-14 RX ORDER — LIDOCAINE HYDROCHLORIDE 10 MG/ML
0.5 INJECTION, SOLUTION EPIDURAL; INFILTRATION; INTRACAUDAL; PERINEURAL ONCE AS NEEDED
Status: COMPLETED | OUTPATIENT
Start: 2024-02-14 | End: 2024-02-14

## 2024-02-14 RX ORDER — PROPOFOL 10 MG/ML
VIAL (ML) INTRAVENOUS AS NEEDED
Status: DISCONTINUED | OUTPATIENT
Start: 2024-02-14 | End: 2024-02-14 | Stop reason: SURG

## 2024-02-14 RX ORDER — SODIUM CHLORIDE 0.9 % (FLUSH) 0.9 %
3 SYRINGE (ML) INJECTION EVERY 12 HOURS SCHEDULED
Status: DISCONTINUED | OUTPATIENT
Start: 2024-02-14 | End: 2024-02-16 | Stop reason: HOSPADM

## 2024-02-14 RX ORDER — SODIUM CHLORIDE 0.9 % (FLUSH) 0.9 %
10 SYRINGE (ML) INJECTION AS NEEDED
Status: DISCONTINUED | OUTPATIENT
Start: 2024-02-14 | End: 2024-02-14 | Stop reason: HOSPADM

## 2024-02-14 RX ORDER — FENTANYL CITRATE 50 UG/ML
INJECTION, SOLUTION INTRAMUSCULAR; INTRAVENOUS
Status: COMPLETED
Start: 2024-02-14 | End: 2024-02-14

## 2024-02-14 RX ORDER — DEXAMETHASONE SODIUM PHOSPHATE 4 MG/ML
INJECTION, SOLUTION INTRA-ARTICULAR; INTRALESIONAL; INTRAMUSCULAR; INTRAVENOUS; SOFT TISSUE AS NEEDED
Status: DISCONTINUED | OUTPATIENT
Start: 2024-02-14 | End: 2024-02-14 | Stop reason: SURG

## 2024-02-14 RX ORDER — LIDOCAINE HYDROCHLORIDE 10 MG/ML
INJECTION, SOLUTION EPIDURAL; INFILTRATION; INTRACAUDAL; PERINEURAL AS NEEDED
Status: DISCONTINUED | OUTPATIENT
Start: 2024-02-14 | End: 2024-02-14 | Stop reason: SURG

## 2024-02-14 RX ORDER — FAMOTIDINE 20 MG/1
20 TABLET, FILM COATED ORAL
Status: COMPLETED | OUTPATIENT
Start: 2024-02-14 | End: 2024-02-14

## 2024-02-14 RX ORDER — DOCUSATE SODIUM 100 MG/1
100 CAPSULE, LIQUID FILLED ORAL 2 TIMES DAILY PRN
Status: DISCONTINUED | OUTPATIENT
Start: 2024-02-14 | End: 2024-02-16

## 2024-02-14 RX ORDER — HYDROMORPHONE HYDROCHLORIDE 1 MG/ML
0.5 INJECTION, SOLUTION INTRAMUSCULAR; INTRAVENOUS; SUBCUTANEOUS
Status: DISCONTINUED | OUTPATIENT
Start: 2024-02-14 | End: 2024-02-14 | Stop reason: HOSPADM

## 2024-02-14 RX ORDER — SODIUM CHLORIDE 9 MG/ML
INJECTION, SOLUTION INTRAVENOUS
Status: DISPENSED
Start: 2024-02-14 | End: 2024-02-15

## 2024-02-14 RX ORDER — SODIUM CHLORIDE, SODIUM LACTATE, POTASSIUM CHLORIDE, CALCIUM CHLORIDE 600; 310; 30; 20 MG/100ML; MG/100ML; MG/100ML; MG/100ML
INJECTION, SOLUTION INTRAVENOUS CONTINUOUS PRN
Status: DISCONTINUED | OUTPATIENT
Start: 2024-02-14 | End: 2024-02-14 | Stop reason: SURG

## 2024-02-14 RX ORDER — MAGNESIUM HYDROXIDE 1200 MG/15ML
LIQUID ORAL AS NEEDED
Status: DISCONTINUED | OUTPATIENT
Start: 2024-02-14 | End: 2024-02-14 | Stop reason: HOSPADM

## 2024-02-14 RX ORDER — DIAZEPAM 5 MG/1
5 TABLET ORAL EVERY 6 HOURS PRN
Status: DISCONTINUED | OUTPATIENT
Start: 2024-02-14 | End: 2024-02-16 | Stop reason: HOSPADM

## 2024-02-14 RX ORDER — CEFAZOLIN SODIUM 1 G/3ML
INJECTION, POWDER, FOR SOLUTION INTRAMUSCULAR; INTRAVENOUS
Status: DISPENSED
Start: 2024-02-14 | End: 2024-02-15

## 2024-02-14 RX ORDER — SODIUM CHLORIDE, SODIUM LACTATE, POTASSIUM CHLORIDE, CALCIUM CHLORIDE 600; 310; 30; 20 MG/100ML; MG/100ML; MG/100ML; MG/100ML
9 INJECTION, SOLUTION INTRAVENOUS CONTINUOUS
Status: DISCONTINUED | OUTPATIENT
Start: 2024-02-14 | End: 2024-02-16 | Stop reason: HOSPADM

## 2024-02-14 RX ORDER — ONDANSETRON 4 MG/1
4 TABLET, ORALLY DISINTEGRATING ORAL EVERY 6 HOURS PRN
Status: DISCONTINUED | OUTPATIENT
Start: 2024-02-14 | End: 2024-02-16 | Stop reason: HOSPADM

## 2024-02-14 RX ORDER — ENOXAPARIN SODIUM 100 MG/ML
40 INJECTION SUBCUTANEOUS DAILY
Status: DISCONTINUED | OUTPATIENT
Start: 2024-02-15 | End: 2024-02-16 | Stop reason: HOSPADM

## 2024-02-14 RX ORDER — PHENYLEPHRINE HYDROCHLORIDE 10 MG/ML
INJECTION INTRAVENOUS AS NEEDED
Status: DISCONTINUED | OUTPATIENT
Start: 2024-02-14 | End: 2024-02-14 | Stop reason: SURG

## 2024-02-14 RX ORDER — MIDAZOLAM HYDROCHLORIDE 1 MG/ML
1 INJECTION INTRAMUSCULAR; INTRAVENOUS
Status: DISCONTINUED | OUTPATIENT
Start: 2024-02-14 | End: 2024-02-14 | Stop reason: HOSPADM

## 2024-02-14 RX ORDER — FERROUS SULFATE 325(65) MG
325 TABLET ORAL
Status: DISCONTINUED | OUTPATIENT
Start: 2024-02-15 | End: 2024-02-16 | Stop reason: HOSPADM

## 2024-02-14 RX ORDER — SODIUM CHLORIDE 0.9 % (FLUSH) 0.9 %
10 SYRINGE (ML) INJECTION EVERY 12 HOURS SCHEDULED
Status: DISCONTINUED | OUTPATIENT
Start: 2024-02-14 | End: 2024-02-14 | Stop reason: HOSPADM

## 2024-02-14 RX ORDER — HYDROCODONE BITARTRATE AND ACETAMINOPHEN 5; 325 MG/1; MG/1
TABLET ORAL
Status: COMPLETED
Start: 2024-02-14 | End: 2024-02-14

## 2024-02-14 RX ORDER — SODIUM CHLORIDE 9 MG/ML
40 INJECTION, SOLUTION INTRAVENOUS AS NEEDED
Status: DISCONTINUED | OUTPATIENT
Start: 2024-02-14 | End: 2024-02-14 | Stop reason: HOSPADM

## 2024-02-14 RX ORDER — POLYETHYLENE GLYCOL 3350 17 G/17G
17 POWDER, FOR SOLUTION ORAL DAILY PRN
Status: DISCONTINUED | OUTPATIENT
Start: 2024-02-14 | End: 2024-02-16 | Stop reason: HOSPADM

## 2024-02-14 RX ORDER — PREGABALIN 50 MG/1
100 CAPSULE ORAL 2 TIMES DAILY
Status: DISCONTINUED | OUTPATIENT
Start: 2024-02-14 | End: 2024-02-16 | Stop reason: HOSPADM

## 2024-02-14 RX ADMIN — PROPOFOL 200 MG: 10 INJECTION, EMULSION INTRAVENOUS at 11:17

## 2024-02-14 RX ADMIN — SODIUM CHLORIDE 2000 MG: 900 INJECTION INTRAVENOUS at 11:26

## 2024-02-14 RX ADMIN — Medication 3 ML: at 20:44

## 2024-02-14 RX ADMIN — CEFAZOLIN 2000 MG: 1 INJECTION, POWDER, FOR SOLUTION INTRAMUSCULAR; INTRAVENOUS at 15:23

## 2024-02-14 RX ADMIN — PHENYLEPHRINE HYDROCHLORIDE 100 MCG: 10 INJECTION INTRAVENOUS at 12:58

## 2024-02-14 RX ADMIN — FENTANYL CITRATE 50 MCG: 50 INJECTION, SOLUTION INTRAMUSCULAR; INTRAVENOUS at 14:30

## 2024-02-14 RX ADMIN — MIDAZOLAM HYDROCHLORIDE 2 MG: 1 INJECTION, SOLUTION INTRAMUSCULAR; INTRAVENOUS at 11:13

## 2024-02-14 RX ADMIN — ROCURONIUM BROMIDE 20 MG: 10 SOLUTION INTRAVENOUS at 11:57

## 2024-02-14 RX ADMIN — PHENYLEPHRINE HYDROCHLORIDE 100 MCG: 10 INJECTION INTRAVENOUS at 12:02

## 2024-02-14 RX ADMIN — HYDROCODONE BITARTRATE AND ACETAMINOPHEN 2 TABLET: 5; 325 TABLET ORAL at 18:05

## 2024-02-14 RX ADMIN — PREGABALIN 100 MG: 50 CAPSULE ORAL at 20:42

## 2024-02-14 RX ADMIN — SODIUM CHLORIDE, POTASSIUM CHLORIDE, SODIUM LACTATE AND CALCIUM CHLORIDE 9 ML/HR: 600; 310; 30; 20 INJECTION, SOLUTION INTRAVENOUS at 09:44

## 2024-02-14 RX ADMIN — METOPROLOL TARTRATE 25 MG: 25 TABLET, FILM COATED ORAL at 20:42

## 2024-02-14 RX ADMIN — FENTANYL CITRATE 50 MCG: 50 INJECTION, SOLUTION INTRAMUSCULAR; INTRAVENOUS at 14:15

## 2024-02-14 RX ADMIN — EPHEDRINE SULFATE 5 MG: 50 INJECTION, SOLUTION INTRAVENOUS at 12:29

## 2024-02-14 RX ADMIN — CARBIDOPA AND LEVODOPA 1 TABLET: 50; 200 TABLET, EXTENDED RELEASE ORAL at 20:42

## 2024-02-14 RX ADMIN — EPHEDRINE SULFATE 10 MG: 50 INJECTION, SOLUTION INTRAVENOUS at 13:15

## 2024-02-14 RX ADMIN — DIAZEPAM 5 MG: 5 TABLET ORAL at 14:32

## 2024-02-14 RX ADMIN — FENTANYL CITRATE 100 MCG: 50 INJECTION, SOLUTION INTRAMUSCULAR; INTRAVENOUS at 11:17

## 2024-02-14 RX ADMIN — FAMOTIDINE 20 MG: 20 TABLET, FILM COATED ORAL at 09:44

## 2024-02-14 RX ADMIN — ROCURONIUM BROMIDE 50 MG: 10 SOLUTION INTRAVENOUS at 11:17

## 2024-02-14 RX ADMIN — PRAMIPEXOLE DIHYDROCHLORIDE 0.25 MG: 0.25 TABLET ORAL at 22:12

## 2024-02-14 RX ADMIN — ROCURONIUM BROMIDE 10 MG: 10 SOLUTION INTRAVENOUS at 12:11

## 2024-02-14 RX ADMIN — DEXAMETHASONE SODIUM PHOSPHATE 10 MG: 4 INJECTION INTRA-ARTICULAR; INTRALESIONAL; INTRAMUSCULAR; INTRAVENOUS; SOFT TISSUE at 11:22

## 2024-02-14 RX ADMIN — HYDROMORPHONE HYDROCHLORIDE 0.5 MG: 1 INJECTION, SOLUTION INTRAMUSCULAR; INTRAVENOUS; SUBCUTANEOUS at 14:24

## 2024-02-14 RX ADMIN — HYDROCODONE BITARTRATE AND ACETAMINOPHEN 2 TABLET: 5; 325 TABLET ORAL at 14:32

## 2024-02-14 RX ADMIN — ONDANSETRON 4 MG: 2 INJECTION INTRAMUSCULAR; INTRAVENOUS at 13:28

## 2024-02-14 RX ADMIN — HYDROMORPHONE HYDROCHLORIDE 0.5 MG: 1 INJECTION, SOLUTION INTRAMUSCULAR; INTRAVENOUS; SUBCUTANEOUS at 15:37

## 2024-02-14 RX ADMIN — PHENYLEPHRINE HYDROCHLORIDE 100 MCG: 10 INJECTION INTRAVENOUS at 12:26

## 2024-02-14 RX ADMIN — HYDROCODONE BITARTRATE AND ACETAMINOPHEN 2 TABLET: 5; 325 TABLET ORAL at 22:14

## 2024-02-14 RX ADMIN — LIDOCAINE HYDROCHLORIDE 0.5 ML: 10 INJECTION, SOLUTION EPIDURAL; INFILTRATION; INTRACAUDAL; PERINEURAL at 09:44

## 2024-02-14 RX ADMIN — SODIUM CHLORIDE, POTASSIUM CHLORIDE, SODIUM LACTATE AND CALCIUM CHLORIDE: 600; 310; 30; 20 INJECTION, SOLUTION INTRAVENOUS at 11:22

## 2024-02-14 RX ADMIN — LIDOCAINE HYDROCHLORIDE 50 MG: 10 INJECTION, SOLUTION EPIDURAL; INFILTRATION; INTRACAUDAL; PERINEURAL at 11:17

## 2024-02-14 RX ADMIN — DIAZEPAM 5 MG: 5 TABLET ORAL at 20:42

## 2024-02-14 RX ADMIN — SUGAMMADEX 200 MG: 100 INJECTION, SOLUTION INTRAVENOUS at 13:34

## 2024-02-14 NOTE — OP NOTE
Date of operation: February 14, 2024    Attending surgeon: Dr. Jeovanny Smith MD  Surgical Assistance: CHIKA Elliott    Preoperative diagnosis: Lumbar spondylolisthesis at L4-5    Postoperative diagnosis: The same    Operations performed:  Thornton laminectomy with complete bilateral facet removal at L4-5  Pedicle screw fixation at L4-5  Transforaminal Interbody fusion at L4-5  Expandable cage placement at L4-5  Posterolateral fusion at L4-5  Use of BMP and Matthew DBM  Use of Stereotactic spinal navigation.    Findings: Successful decompression and fusion at L4-5    Spinal Surgery Levels Completed:1 Level    Specimens: None    Indications: Medically refractory right leg pain    Procedure in detail: The patient was brought back to the operative room and placed under general anesthesia.  They were then intubated.  The patient was positioned prone all pressure points were padded well.  At this time, we brought x-ray to the field to localize an incision that correlated to the L4-5 level.  The incision marking was in the midline.  At this time, this area was then prepped and draped in a sterile fashion, a timeout was performed and local anesthetic was injected into the incision marking.  At this time, we incised the incision with a knife and using Bovie cautery reflected the muscle off the posterior aspect of the spine.  We exposed the lamina at L4-5, as well as the bilateral facet joints.  We then placed our stereotactic frame on the spinous process.  We then brought the O-arm into the field to perform our first navigational spin.  At this time, we turned our attention to pedicle screw placement.  Using both Stealth navigation as well as anatomical landmarks, we placed pedicle screws bilaterally at L4-5.  We used a [employer] 5.5 system.  The pedicle screws had the following measurements: 6.5 x 45 bilaterally at L4 and 6.5 x 40 bilaterally at L5.  With good placement of our screws, we turned our attention to  our decompression.  Using drills and Kerrisons, the bilateral descending facet joints were completely removed at the L4-5 level.  Once this was complete, we used rongeurs to remove the entire lamina and bilateral facet joints en bloc, thus completing our Thornton laminectomy.  Once this was removed, we then used a combination of curettes and Kerrisons to begin removing the bone off the ascending facet along the thecal sac.  We were able to do this bilaterally so that we could trace out the exiting nerve roots bilaterally.  We confirmed with a nerve hook that they were well decompressed.  We also continued to remove the ligament inferiorly and superiorly so we had a wide central decompression of the thecal sac and bilateral exiting nerve roots.  We also encountered the synovial cyst to the right at L4-5. Using dissection, we gently remove this off of the dura to complete the decompression.  Once this was complete, we turned our attention to our interbody placement.  We retracted the exiting nerve root medially and were able to gain access to the L4-5 disc space where we incised it with a knife.  We then used a combination of olinda and pituitaries as well as curettes to remove the entire disc at the L4-5 level.  Once our discectomy was complete, we turned our attention to our interbody placement where we used a 9 x 28 mm expandable cage.  The cage was packed with [place] on DBM as well as the patient's own bone.  Using Stealth navigation, we inserted the interbody in the L4-5 disc space.  We confirmed with live fluoroscopy that it was in good position.  We then expanded the cage while watching with x-ray.  At this time, final AP and lateral x-ray confirmed good placement of her hardware..  We then looked to place our rods where we used 35 mm on the right and 35 mm on the left.  The rods were secured with a total of 4 set screws. At this time, we then final tightened our set screws.  We achieved complete hemostasis and  copiously irrigated our field.  At this time, we looked to place our bone material.  We first decorticated the transverse processes in the lateral gutters of our exposure.  In the lateral gutters of our construct, we placed a combination of infuse, mark and the patient's own bone.  At this time, we tunneled a YESENIA drain.  We then placed vancomycin powder into our wound.  We then turned our attention to the closure where the muscle and fascia were closed with interrupted 0 Vicryl stitches.  The fat and dermis were closed with interrupted inverted 2-0 Vicryl stitches, and the skin with a Monocryl stitch.  The drain was secured with nylon stitch.    Cherie Magana was present for all portions of the surgery and assisted with patient positioning, opening, retraction, suturing, and closing.  At the end of the case, all counts were correct x2 and there were no complications noted.\

## 2024-02-14 NOTE — H&P
Pre-Op H&P  Mary Lunsford  [id number]  1970      Chief complaint: Right Leg Pain      Subjective:  Patient is a 53 y.o.female presents for scheduled surgery by Dr. Smith. Sh anticipates a LUMBAR FUSION DECOMPRESSON WITH PEDICLE SCREWS, L4-5 today.  The patient has had pain that radiates down her right leg for the past year.  The symptoms began to progressively worsen since July, 2023.  The pain is present all the time, regardless of movement, if she is not taking her pain medications.  She has been using ibuprofen, which has helped to alleviate the pain.  She denies the use of a walker or assistive device to ambulate.    Review of Systems:  Constitutional-- No fever, chills or sweats. No fatigue.  CV-- No chest pain, palpitation or syncope. +HLD.  Resp-- No SOB, cough, hemoptysis  Skin--No rashes or lesions      Allergies:   Allergies   Allergen Reactions    Sulfa Antibiotics Rash and Other (See Comments)     Blurry vision         Home Meds:  Facility-Administered Medications Prior to Admission   Medication Dose Route Frequency Provider Last Rate Last Admin    Adalimumab (HUMIRA) 80 MG/0.8ML injection 160 mg  160 mg Subcutaneous Once Sami Elizondo MD         Medications Prior to Admission   Medication Sig Dispense Refill Last Dose    ASPIRIN 81 PO Take 1 tablet by mouth Daily.   2/4/2024    atorvastatin (LIPITOR) 40 MG tablet Take 1 tablet by mouth Every Night. 90 tablet 4 Past Month    carbidopa-levodopa CR (SINEMET CR)  MG per CR tablet 1 tab tid and 1 extra if needed prn breakthrough rls symptoms (Patient taking differently: Take 1 tablet by mouth 3 (Three) Times a Day. 1 tab tid and 1 extra if needed prn breakthrough rls symptoms) 360 tablet 3 2/14/2024 at 0700    chlorhexidine (HIBICLENS) 4 % external liquid Apply  topically to the appropriate area as directed Daily As Needed for Wound Care. Use wash in shower daily, beginning 5 days prior to surgery 118 mL 0 2/13/2024    Ferrous  "Fumarate (Ferrocite) 324 (106 Fe) MG tablet Take 1 tablet by mouth 2 (Two) Times a Day. 180 tablet 3 2/13/2024    levothyroxine (SYNTHROID, LEVOTHROID) 88 MCG tablet Take 1 tablet by mouth Daily. 90 tablet 1 2/14/2024 at 0700    Lyrica 100 MG capsule Take 1 capsule by mouth 2 (Two) Times a Day. 60 capsule 1 2/14/2024 at 0700    metoprolol tartrate (LOPRESSOR) 25 MG tablet TAKE 1 TABLET BY MOUTH EVERY 12 HOURS (Patient taking differently: Take 1 tablet by mouth 2 (Two) Times a Day.) 180 tablet 3 2/14/2024 at 0700    pramipexole (MIRAPEX) 0.5 MG tablet Take 0.5 tablets by mouth 3 (Three) Times a Day. 135 tablet 3 2/13/2024    Adalimumab (Humira Pen-CD/UC/HS Starter) 80 MG/0.8ML injection Inject 1.6 mL (2 pens) under the skin into the appropriate area as directed on day 1 followed by 0.8 ml (1 pen) on day 15. (Patient not taking: Reported on 2/14/2024) 2.4 mL 0 Not Taking    Adalimumab (HUMIRA) 40 MG/0.4ML Pen-injector Kit Inject 40 mg under the skin into the appropriate area as directed Every 14 (Fourteen) Days. Beginning on day 29. (Patient not taking: Reported on 2/14/2024) 6 each 3 Not Taking    ubrogepant (UBRELVY) 100 MG tablet Take 1 tablet at onset of migraine, may repeat once in 2 hours if needed (Patient not taking: Reported on 1/11/2024) 10 tablet 5          PMH:   Past Medical History:   Diagnosis Date    Anemia     Body piercing     both ears    Bronchitis     Colitis     Crohn's disease 08/2023    incidental finding, asymptomatic    Disease of thyroid gland     Dissection of coronary artery 03/20/2017    pt denies this 10/2/19.  states she had testing at [facility], but was told that they didn't \"find anything\"    Enteropathogenic Escherichia coli infection     Essential hypertension 03/30/2018    Gallstone     H/O echocardiogram     History of nuclear stress test     Hyperlipidemia     Kidney stones     Low back pain     Migraine     Obesity     Ovarian cyst     Prolonged Q-T interval on ECG     history    " "Restless leg syndrome     Subacute idiopathic myocarditis     Urinary tract infection     Vitamin B12 deficiency     Wears glasses     to read     PSH:    Past Surgical History:   Procedure Laterality Date    BREAST SURGERY Bilateral 1987    breast reduction    CARDIAC CATHETERIZATION N/A 03/15/2017    Procedure: Coronary angiography;  Surgeon: Moustapha Peñaloza MD;  Location: [facility] CATH INVASIVE LOCATION;  Service:     CARDIAC CATHETERIZATION N/A 03/15/2017    Procedure: Left ventriculography;  Surgeon: Moustapha Peñaloza MD;  Location: [facility] CATH INVASIVE LOCATION;  Service:      SECTION      1991 & 1995    CHOLECYSTECTOMY  2013    COLONOSCOPY N/A 10/03/2019    Procedure: COLONOSCOPY WITH BIOPSIES; ANOSCOPY;  Surgeon: Jose Paez MD;  Location: [facility] ENDOSCOPY;  Service: Gastroenterology    COLONOSCOPY N/A 2023    Procedure: COLONOSCOPY with biopsy;  Surgeon: Sami Elizondo MD;  Location: [facility] ENDOSCOPY;  Service: Gastroenterology;  Laterality: N/A;    ENDOSCOPY      GASTRIC BYPASS  2011    STEROID INJECTION      x2 in back       Immunization History:  Influenza:   Pneumococcal: Yes  Tetanus: Yes  Covid : x3    Social History:   Tobacco:   Social History     Tobacco Use   Smoking Status Former    Packs/day: 1.00    Years: 2.00    Additional pack years: 0.00    Total pack years: 2.00    Types: Cigarettes    Quit date: 10/1/1995    Years since quittin.3   Smokeless Tobacco Never      Alcohol:     Social History     Substance and Sexual Activity   Alcohol Use Yes    Alcohol/week: 12.0 standard drinks of alcohol    Types: 12 Cans of beer per week    Comment: 16 beers per week         Physical Exam:/93 (BP Location: Right arm, Patient Position: Lying)   Pulse 58   Temp 97.3 °F (36.3 °C) (Temporal)   Resp 18   Ht 164.5 cm (64.75\")   Wt 116 kg (254 lb 13.6 oz)   LMP 2018 (LMP Unknown)   SpO2 99%   BMI 42.74 kg/m²       General Appearance:    " Alert, cooperative, no distress, appears stated age   Head:    Normocephalic, without obvious abnormality, atraumatic   Lungs:     Clear to auscultation bilaterally, respirations unlabored    Heart:   Regular rhythm, S1 and S2 normal. Bradycardic.    Abdomen:    Soft without tenderness   Extremities:   Extremities normal, atraumatic, no cyanosis or edema   Skin:   Skin color, texture, turgor normal, no rashes or lesions   Neurologic:   Grossly intact     Results Review:     LABS:  Lab Results   Component Value Date    WBC 4.50 02/09/2024    HGB 14.5 02/09/2024    HCT 44.7 02/09/2024    .0 (H) 02/09/2024     02/09/2024    NEUTROABS 3.56 05/26/2022    GLUCOSE 74 02/09/2024    BUN 16 02/09/2024    CREATININE 0.96 02/09/2024    EGFRIFNONA 64 11/29/2021    EGFRIFAFRI 63 08/12/2019     02/09/2024    K 4.3 02/09/2024     02/09/2024    CO2 30.0 (H) 02/09/2024    MG 2.1 11/29/2021    CALCIUM 9.6 02/09/2024    ALBUMIN 4.30 05/26/2022    AST 17 05/26/2022    ALT 7 05/26/2022    BILITOT 1.1 05/26/2022       RADIOLOGY:  Imaging Results (Last 72 Hours)       ** No results found for the last 72 hours. **            I reviewed the patient's new clinical results.    Cancer Staging (if applicable)  Cancer Patient: __ yes _x_no __unknown; If yes, clinical stage T:__ N:__M:__, stage group or __N/A      Impression: Spondylolisthesis at L4-L5 level       Plan: LUMBAR FUSION DECOMPRESSON WITH PEDICLE SCREWS, L4-5       Arvin Grissom, APRN   2/14/2024   10:17 EST

## 2024-02-14 NOTE — BRIEF OP NOTE
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]  Acquired spondylolisthesis [M43.10]       Post-Op Diagnosis Codes:     * Degenerative lumbar disc [M51.36]     * Spondylolisthesis at L4-L5 level [M43.16]     * Stenosis of lateral recess of lumbar spine [M48.061]     * Acquired spondylolisthesis [M43.10]    Procedure/CPT® Codes:        Procedure(s):  LUMBAR FUSION DECOMPRESSON WITH PEDICLE SCREWS, L4-5              Surgeon(s):  Jeovanny Smith MD    Anesthesia: General    Staff:   Circulator: Radha Bills RN; Trina Hung RN  Physician Assistant: Cherie Magana PA-C  Radiology Technologist: Liu Wilson RT  Scrub Person: Leena Bangura; Darek Ireland  Vendor Representative: Sherry Bhardawj Joseph  Nursing Assistant: Emilie Bright         Estimated Blood Loss: 300 mL    Urine Voided: * No values recorded between 2/14/2024 11:12 AM and 2/14/2024  1:34 PM *    Specimens:                None          Drains:   Closed/Suction Drain 1 Inferior Back Accordion (Active)       Findings: Successful decompression fusion at L4-5        Complications: None apparent          Jeovanny Smith MD     Date: 2/14/2024  Time: 13:34 EST

## 2024-02-15 ENCOUNTER — APPOINTMENT (OUTPATIENT)
Dept: GENERAL RADIOLOGY | Facility: HOSPITAL | Age: 54
End: 2024-02-15
Payer: COMMERCIAL

## 2024-02-15 LAB
ANION GAP SERPL CALCULATED.3IONS-SCNC: 9 MMOL/L (ref 5–15)
BASOPHILS # BLD AUTO: 0.01 10*3/MM3 (ref 0–0.2)
BASOPHILS NFR BLD AUTO: 0.1 % (ref 0–1.5)
BUN SERPL-MCNC: 18 MG/DL (ref 6–20)
BUN/CREAT SERPL: 24.3 (ref 7–25)
CALCIUM SPEC-SCNC: 8.6 MG/DL (ref 8.6–10.5)
CHLORIDE SERPL-SCNC: 103 MMOL/L (ref 98–107)
CO2 SERPL-SCNC: 26 MMOL/L (ref 22–29)
CREAT SERPL-MCNC: 0.74 MG/DL (ref 0.57–1)
DEPRECATED RDW RBC AUTO: 44.9 FL (ref 37–54)
EGFRCR SERPLBLD CKD-EPI 2021: 95.7 ML/MIN/1.73
EOSINOPHIL # BLD AUTO: 0 10*3/MM3 (ref 0–0.4)
EOSINOPHIL NFR BLD AUTO: 0 % (ref 0.3–6.2)
ERYTHROCYTE [DISTWIDTH] IN BLOOD BY AUTOMATED COUNT: 11.9 % (ref 12.3–15.4)
GLUCOSE SERPL-MCNC: 150 MG/DL (ref 65–99)
HCT VFR BLD AUTO: 34 % (ref 34–46.6)
HGB BLD-MCNC: 11.3 G/DL (ref 12–15.9)
IMM GRANULOCYTES # BLD AUTO: 0.07 10*3/MM3 (ref 0–0.05)
IMM GRANULOCYTES NFR BLD AUTO: 0.7 % (ref 0–0.5)
LYMPHOCYTES # BLD AUTO: 0.72 10*3/MM3 (ref 0.7–3.1)
LYMPHOCYTES NFR BLD AUTO: 6.7 % (ref 19.6–45.3)
MCH RBC QN AUTO: 34 PG (ref 26.6–33)
MCHC RBC AUTO-ENTMCNC: 33.2 G/DL (ref 31.5–35.7)
MCV RBC AUTO: 102.4 FL (ref 79–97)
MONOCYTES # BLD AUTO: 0.51 10*3/MM3 (ref 0.1–0.9)
MONOCYTES NFR BLD AUTO: 4.8 % (ref 5–12)
NEUTROPHILS NFR BLD AUTO: 87.7 % (ref 42.7–76)
NEUTROPHILS NFR BLD AUTO: 9.38 10*3/MM3 (ref 1.7–7)
NRBC BLD AUTO-RTO: 0 /100 WBC (ref 0–0.2)
PLATELET # BLD AUTO: 199 10*3/MM3 (ref 140–450)
PMV BLD AUTO: 10 FL (ref 6–12)
POTASSIUM SERPL-SCNC: 5.2 MMOL/L (ref 3.5–5.2)
RBC # BLD AUTO: 3.32 10*6/MM3 (ref 3.77–5.28)
SODIUM SERPL-SCNC: 138 MMOL/L (ref 136–145)
WBC NRBC COR # BLD AUTO: 10.69 10*3/MM3 (ref 3.4–10.8)

## 2024-02-15 PROCEDURE — 99024 POSTOP FOLLOW-UP VISIT: CPT

## 2024-02-15 PROCEDURE — 97161 PT EVAL LOW COMPLEX 20 MIN: CPT

## 2024-02-15 PROCEDURE — 25010000002 CEFAZOLIN PER 500 MG: Performed by: STUDENT IN AN ORGANIZED HEALTH CARE EDUCATION/TRAINING PROGRAM

## 2024-02-15 PROCEDURE — 97535 SELF CARE MNGMENT TRAINING: CPT

## 2024-02-15 PROCEDURE — 80048 BASIC METABOLIC PNL TOTAL CA: CPT | Performed by: STUDENT IN AN ORGANIZED HEALTH CARE EDUCATION/TRAINING PROGRAM

## 2024-02-15 PROCEDURE — 25010000002 ENOXAPARIN PER 10 MG: Performed by: STUDENT IN AN ORGANIZED HEALTH CARE EDUCATION/TRAINING PROGRAM

## 2024-02-15 PROCEDURE — 85025 COMPLETE CBC W/AUTO DIFF WBC: CPT | Performed by: STUDENT IN AN ORGANIZED HEALTH CARE EDUCATION/TRAINING PROGRAM

## 2024-02-15 PROCEDURE — 72100 X-RAY EXAM L-S SPINE 2/3 VWS: CPT

## 2024-02-15 PROCEDURE — 97110 THERAPEUTIC EXERCISES: CPT

## 2024-02-15 PROCEDURE — 97166 OT EVAL MOD COMPLEX 45 MIN: CPT

## 2024-02-15 RX ADMIN — LEVOTHYROXINE SODIUM 88 MCG: 0.09 TABLET ORAL at 04:25

## 2024-02-15 RX ADMIN — HYDROCODONE BITARTRATE AND ACETAMINOPHEN 2 TABLET: 5; 325 TABLET ORAL at 12:34

## 2024-02-15 RX ADMIN — POLYETHYLENE GLYCOL 3350 17 G: 17 POWDER, FOR SOLUTION ORAL at 08:28

## 2024-02-15 RX ADMIN — DIAZEPAM 5 MG: 5 TABLET ORAL at 05:30

## 2024-02-15 RX ADMIN — PRAMIPEXOLE DIHYDROCHLORIDE 0.25 MG: 0.25 TABLET ORAL at 08:28

## 2024-02-15 RX ADMIN — DIAZEPAM 5 MG: 5 TABLET ORAL at 12:35

## 2024-02-15 RX ADMIN — CARBIDOPA AND LEVODOPA 1 TABLET: 50; 200 TABLET, EXTENDED RELEASE ORAL at 15:43

## 2024-02-15 RX ADMIN — PREGABALIN 100 MG: 50 CAPSULE ORAL at 20:53

## 2024-02-15 RX ADMIN — CEFAZOLIN 2000 MG: 1 INJECTION, POWDER, FOR SOLUTION INTRAMUSCULAR; INTRAVENOUS at 00:05

## 2024-02-15 RX ADMIN — Medication 3 ML: at 10:00

## 2024-02-15 RX ADMIN — FERROUS SULFATE TAB 325 MG (65 MG ELEMENTAL FE) 325 MG: 325 (65 FE) TAB at 08:26

## 2024-02-15 RX ADMIN — ENOXAPARIN SODIUM 40 MG: 100 INJECTION SUBCUTANEOUS at 20:48

## 2024-02-15 RX ADMIN — HYDROCODONE BITARTRATE AND ACETAMINOPHEN 2 TABLET: 5; 325 TABLET ORAL at 16:26

## 2024-02-15 RX ADMIN — HYDROCODONE BITARTRATE AND ACETAMINOPHEN 2 TABLET: 5; 325 TABLET ORAL at 08:27

## 2024-02-15 RX ADMIN — PREGABALIN 100 MG: 50 CAPSULE ORAL at 08:26

## 2024-02-15 RX ADMIN — DOCUSATE SODIUM 100 MG: 100 CAPSULE, LIQUID FILLED ORAL at 08:28

## 2024-02-15 RX ADMIN — Medication 3 ML: at 20:56

## 2024-02-15 RX ADMIN — METOPROLOL TARTRATE 25 MG: 25 TABLET, FILM COATED ORAL at 20:52

## 2024-02-15 RX ADMIN — PRAMIPEXOLE DIHYDROCHLORIDE 0.25 MG: 0.25 TABLET ORAL at 23:31

## 2024-02-15 RX ADMIN — METOPROLOL TARTRATE 25 MG: 25 TABLET, FILM COATED ORAL at 08:28

## 2024-02-15 RX ADMIN — CARBIDOPA AND LEVODOPA 1 TABLET: 50; 200 TABLET, EXTENDED RELEASE ORAL at 20:53

## 2024-02-15 RX ADMIN — HYDROCODONE BITARTRATE AND ACETAMINOPHEN 2 TABLET: 5; 325 TABLET ORAL at 20:58

## 2024-02-15 RX ADMIN — HYDROCODONE BITARTRATE AND ACETAMINOPHEN 2 TABLET: 5; 325 TABLET ORAL at 04:25

## 2024-02-15 RX ADMIN — PRAMIPEXOLE DIHYDROCHLORIDE 0.25 MG: 0.25 TABLET ORAL at 12:49

## 2024-02-15 RX ADMIN — CARBIDOPA AND LEVODOPA 1 TABLET: 50; 200 TABLET, EXTENDED RELEASE ORAL at 08:28

## 2024-02-15 NOTE — THERAPY EVALUATION
"Patient Name: Mary Lunsford  : 1970    MRN: [id number]                              Today's Date: 2/15/2024       Admit Date: 2024    Visit Dx:     ICD-10-CM ICD-9-CM   1. Degenerative lumbar disc  M51.36 722.52   2. Spondylolisthesis at L4-L5 level  M43.16 756.12   3. Stenosis of lateral recess of lumbar spine  M48.061 724.02   4. Acquired spondylolisthesis  M43.10 738.4     Patient Active Problem List   Diagnosis    Anemia    Elevated blood pressure    Fatigue    Insomnia    Iron deficiency anemia    Irregular periods    Pain in the muscles    Rash    Restless legs syndrome (RLS)    Thyroid disorder    Vitamin B12 deficiency    Snoring    Chest heaviness    Vitamin D deficiency    Abnormal cardiac enzyme level    Essential hypertension    Breast lump    Diarrhea    Rectal bleeding    Lower abdominal pain    Facial tingling    Migraine with aura and without status migrainosus, not intractable    Influenza vaccination declined    Morbid obesity    Dyslipidemia    Abnormal CT scan, colon    Family history of ulcerative colitis    Crohn's disease of small intestine without complication    Spondylolisthesis at L4-L5 level    Stenosis of lateral recess of lumbar spine    Degenerative lumbar disc    Acquired spondylolisthesis    Synovial cyst    Acquired spondylolysis     Past Medical History:   Diagnosis Date    Anemia     Body piercing     both ears    Bronchitis     Colitis     Crohn's disease 2023    incidental finding, asymptomatic    Disease of thyroid gland     Dissection of coronary artery 2017    pt denies this 10/2/19.  states she had testing at , but was told that they didn't \"find anything\"    Enteropathogenic Escherichia coli infection     Essential hypertension 2018    Gallstone     H/O echocardiogram     History of nuclear stress test     Hyperlipidemia     Kidney stones     Low back pain     Migraine     Obesity     Ovarian cyst     Prolonged Q-T interval on ECG     " history    Restless leg syndrome     Subacute idiopathic myocarditis     Urinary tract infection     Vitamin B12 deficiency     Wears glasses     to read     Past Surgical History:   Procedure Laterality Date    BREAST SURGERY Bilateral 1987    breast reduction    CARDIAC CATHETERIZATION N/A 03/15/2017    Procedure: Coronary angiography;  Surgeon: Moustapha Peñaloza MD;  Location: [facility] CATH INVASIVE LOCATION;  Service:     CARDIAC CATHETERIZATION N/A 03/15/2017    Procedure: Left ventriculography;  Surgeon: Moustapha Peñaloza MD;  Location: [facility] CATH INVASIVE LOCATION;  Service:      SECTION      1991 & 1995    CHOLECYSTECTOMY  2013    COLONOSCOPY N/A 10/03/2019    Procedure: COLONOSCOPY WITH BIOPSIES; ANOSCOPY;  Surgeon: Jose Paez MD;  Location: [facility] ENDOSCOPY;  Service: Gastroenterology    COLONOSCOPY N/A 2023    Procedure: COLONOSCOPY with biopsy;  Surgeon: Sami Elizondo MD;  Location: [facility] ENDOSCOPY;  Service: Gastroenterology;  Laterality: N/A;    ENDOSCOPY      GASTRIC BYPASS  2011    STEROID INJECTION      x2 in back      General Information       Row Name 02/15/24 1054          Physical Therapy Time and Intention    Document Type evaluation  -CK     Mode of Treatment individual therapy;physical therapy  -CK       Row Name 02/15/24 1054          General Information    Patient Profile Reviewed yes  -CK     Prior Level of Function independent:;all household mobility;min assist:;ADL's  ind mobility without AD,  assisting with some dressing due to back pain  -CK     Existing Precautions/Restrictions fall;spinal;other (see comments)  YESENIA  -CK     Barriers to Rehab previous functional deficit  -CK       Row Name 02/15/24 1054          Living Environment    People in Home spouse  -CK       Row Name 02/15/24 1054          Home Main Entrance    Number of Stairs, Main Entrance three  -CK     Stair Railings, Main Entrance none  -CK       Row Name 02/15/24  1054          Stairs Within Home, Primary    Number of Stairs, Within Home, Primary none  -CK       Row Name 02/15/24 1054          Cognition    Orientation Status (Cognition) oriented x 4  -CK       Row Name 02/15/24 1054          Safety Issues, Functional Mobility    Safety Issues Affecting Function (Mobility) awareness of need for assistance;insight into deficits/self-awareness;safety precaution awareness  -CK     Impairments Affecting Function (Mobility) endurance/activity tolerance;pain;strength;range of motion (ROM);balance  -CK               User Key  (r) = Recorded By, (t) = Taken By, (c) = Cosigned By      Initials Name Provider Type    CK Brigida Banegas, PT Physical Therapist                   Mobility       Row Name 02/15/24 1056          Bed Mobility    Comment, (Bed Mobility) [facility] pre/post eval, verbally reviewed logroll technique  -CK       Row Name 02/15/24 1056          Sit-Stand Transfer    Sit-Stand [place] (Transfers) contact guard;1 person assist;verbal cues  -CK     Assistive Device (Sit-Stand Transfers) walker, front-wheeled  -CK     Comment, (Sit-Stand Transfer) cues to push up from armrests of chair and maintain spinal precautions, patient required increased time and effort in transition but no physical assist  -CK       Row Name 02/15/24 1056          Gait/Stairs (Locomotion)    [place] Level (Gait) contact guard;1 person assist;verbal cues  -CK     Assistive Device (Gait) walker, front-wheeled  -CK     Distance in Feet (Gait) 350  -CK     Deviations/Abnormal Patterns (Gait) bilateral deviations;salvador decreased;gait speed decreased;stride length decreased  -CK     [place] Level (Stairs) contact guard;1 person assist;verbal cues  -CK     Assistive Device (Stairs) other (see comments)  unilateral HHA  -CK     Number of Steps (Stairs) 3  -CK     Ascending Technique (Stairs) step-to-step  -CK     Descending Technique (Stairs) step-to-step  -CK     Comment, (Gait/Stairs)  Patient ambulated in [place] with a step through gait pattern, cues for relaxed shoulders and proximity of AD. Patient trialed brief ambulation without walker, no LOB but guarded posture and decreased speed, educated patient on using FWW at D/C. She also navigated 3 steps with unilateral handrail and cues for sequencing and step to step pattern. No LOB on steps,  present and educated on providing HHA at D/C.  -CK               User Key  (r) = Recorded By, (t) = Taken By, (c) = Cosigned By      Initials Name Provider Type    CK Brigida Banegas, PT Physical Therapist                   Obj/Interventions       Row Name 02/15/24 1104          Range of Motion Comprehensive    General Range of Motion bilateral lower extremity ROM WFL  -CK       Row Name 02/15/24 1104          Strength Comprehensive (MMT)    General Manual Muscle Testing (MMT) Assessment lower extremity strength deficits identified  -CK     Comment, General Manual Muscle Testing (MMT) Assessment BLE grossly 4+/5  -CK       Row Name 02/15/24 1104          Motor Skills    Therapeutic Exercise hip;knee;ankle;other (see comments)  printed HEP provided and reviewed; abdominal set x10  -CK       Row Name 02/15/24 1104          Hip (Therapeutic Exercise)    Hip (Therapeutic Exercise) isometric exercises  -CK     Hip Isometrics (Therapeutic Exercise) bilateral;gluteal sets;10 repetitions;3 second hold  -CK       Row Name 02/15/24 1104          Knee (Therapeutic Exercise)    Knee (Therapeutic Exercise) isometric exercises  -CK     Knee Isometrics (Therapeutic Exercise) bilateral;quad sets;10 repetitions;3 second hold  -CK       Row Name 02/15/24 1104          Ankle (Therapeutic Exercise)    Ankle (Therapeutic Exercise) AROM (active range of motion)  -CK     Ankle AROM (Therapeutic Exercise) bilateral;dorsiflexion;plantarflexion;10 repetitions  -CK       Row Name 02/15/24 1104          Balance    Balance Assessment sitting static balance;sitting dynamic  balance;standing static balance;standing dynamic balance  -CK     Static Sitting Balance independent  -CK     Dynamic Sitting Balance independent  -CK     Position, Sitting Balance unsupported;sitting in chair  -CK     Static Standing Balance standby assist  -CK     Dynamic Standing Balance contact guard  -CK     Position/Device Used, Standing Balance supported;walker, front-wheeled  -CK     Comment, Balance no LOB  -CK       Row Name 02/15/24 1104          Sensory Assessment (Somatosensory)    Sensory Assessment (Somatosensory) LE sensation intact  -CK               User Key  (r) = Recorded By, (t) = Taken By, (c) = Cosigned By      Initials Name Provider Type    CK Brigida Banegas, PT Physical Therapist                   Goals/Plan       Row Name 02/15/24 1108          Bed Mobility Goal 1 (PT)    Activity/Assistive Device (Bed Mobility Goal 1, PT) sit to supine/supine to sit  -CK     [place] Level/Cues Needed (Bed Mobility Goal 1, PT) modified independence  -CK     Time Frame (Bed Mobility Goal 1, PT) long term goal (LTG);10 days  -CK     Progress/Outcomes (Bed Mobility Goal 1, PT) new goal  -CK       Row Name 02/15/24 1108          Transfer Goal 1 (PT)    Activity/Assistive Device (Transfer Goal 1, PT) sit-to-stand/stand-to-sit;bed-to-chair/chair-to-bed  -CK     [place] Level/Cues Needed (Transfer Goal 1, PT) standby assist  -CK     Time Frame (Transfer Goal 1, PT) long term goal (LTG);10 days  -CK     Progress/Outcome (Transfer Goal 1, PT) new goal  -CK       Row Name 02/15/24 1108          Gait Training Goal 1 (PT)    Activity/Assistive Device (Gait Training Goal 1, PT) gait (walking locomotion);assistive device use  -CK     [place] Level (Gait Training Goal 1, PT) standby assist  -CK     Distance (Gait Training Goal 1, PT) 500'  -CK     Time Frame (Gait Training Goal 1, PT) long term goal (LTG);10 days  -CK     Progress/Outcome (Gait Training Goal 1, PT) new goal  -CK       Row Name 02/15/24  1108          Stairs Goal 1 (PT)    Activity/Assistive Device (Stairs Goal 1, PT) ascending stairs;descending stairs  -CK     [place] Level/Cues Needed (Stairs Goal 1, PT) standby assist  -CK     Number of Stairs (Stairs Goal 1, PT) 3  -CK     Time Frame (Stairs Goal 1, PT) long term goal (LTG);10 days  -CK     Progress/Outcome (Stairs Goal 1, PT) new goal  -CK       Row Name 02/15/24 1104          Therapy Assessment/Plan (PT)    Planned Therapy Interventions (PT) balance training;bed mobility training;gait training;home exercise program;stretching;strengthening;stair training;ROM (range of motion);postural re-education;patient/family education;transfer training  -CK               User Key  (r) = Recorded By, (t) = Taken By, (c) = Cosigned By      Initials Name Provider Type    CK Brigida Banegas, PT Physical Therapist                   Clinical Impression       Row Name 02/15/24 1106          Pain    Pretreatment Pain Rating 0/10 - no pain  -CK     Posttreatment Pain Rating 0/10 - no pain  -CK     Pre/Posttreatment Pain Comment patient denied pain at rest pre/post mobility, mild pain with mobility but patient tolerated well  -CK     Pain Intervention(s) Ambulation/increased activity;Repositioned;Cold applied  -CK       Row Name 02/15/24 1101          Plan of Care Review    Plan of Care Reviewed With patient;spouse;friend  -CK     Outcome Evaluation Patient presents with mild deficits in strength, endurance, and balance. She was able to ambulate 350' with FWW and CGA and navigate 3 steps per her home setup with CGA. IPPT is indicated as patient is currently mobilizing below her baseline. Recommend D/C home with assist with use of FWW.  -CK       Row Name 02/15/24 1101          Therapy Assessment/Plan (PT)    Rehab Potential (PT) good, to achieve stated therapy goals  -CK     Criteria for Skilled Interventions Met (PT) yes;meets criteria  -CK     Therapy Frequency (PT) daily  -CK       Row Name 02/15/24 4365           Vital Signs    Pre Systolic BP Rehab 129  -CK     Pre Treatment Diastolic BP 83  -CK     Pretreatment Heart Rate (beats/min) 65  -CK     Posttreatment Heart Rate (beats/min) 63  -CK     Pre SpO2 (%) 99  -CK     O2 Delivery Pre Treatment room air  -CK     O2 Delivery Intra Treatment room air  -CK     Post SpO2 (%) 99  -CK     O2 Delivery Post Treatment room air  -CK     Pre Patient Position Sitting  -CK     Intra Patient Position Standing  -CK     Post Patient Position Sitting  -CK       Row Name 02/15/24 1105          Positioning and Restraints    Pre-Treatment Position sitting in chair/recliner  -CK     Post Treatment Position chair  -CK     In Chair reclined;call light within reach;encouraged to call for assist;with family/caregiver;waffle cushion;compression device;notified nsg  no alarm upon entry, RN aware  -CK               User Key  (r) = Recorded By, (t) = Taken By, (c) = Cosigned By      Initials Name Provider Type    CK Brigida Banegas, PT Physical Therapist                   Outcome Measures       Row Name 02/15/24 1108          How much help from another person do you currently need...    Turning from your back to your side while in flat bed without using bedrails? 3  -CK     Moving from lying on back to sitting on the side of a flat bed without bedrails? 3  -CK     Moving to and from a bed to a chair (including a wheelchair)? 3  -CK     Standing up from a chair using your arms (e.g., wheelchair, bedside chair)? 3  -CK     Climbing 3-5 steps with a railing? 3  -CK     To walk in hospital room? 3  -CK     AM-PAC 6 Clicks Score (PT) 18  -CK     Highest Level of Mobility Goal 6 --> Walk 10 steps or more  -CK       Row Name 02/15/24 1108 02/15/24 0959       Functional Assessment    Outcome Measure Options AM-PAC 6 Clicks Basic Mobility (PT)  -CK AM-PAC 6 Clicks Daily Activity (OT)  -TB              User Key  (r) = Recorded By, (t) = Taken By, (c) = Cosigned By      Initials Name Provider Type     Shona Medina, OT Occupational Therapist    Brigida Feldman, PT Physical Therapist                                 Physical Therapy Education       Title: PT OT SLP Therapies (In Progress)       Topic: Physical Therapy (Done)       Point: Mobility training (Done)       Learning Progress Summary             Patient Acceptance, E, VU by CK at 2/15/2024 1108    Comment: Reviewed spinal precautions and HEP, educated patient on use of FWW at D/C   Significant Other Acceptance, E, VU by CK at 2/15/2024 1108    Comment: Reviewed spinal precautions and HEP, educated patient on use of FWW at D/C                         Point: Home exercise program (Done)       Learning Progress Summary             Patient Acceptance, E, VU by CK at 2/15/2024 1108    Comment: Reviewed spinal precautions and HEP, educated patient on use of FWW at D/C   Significant Other Acceptance, E, VU by CK at 2/15/2024 1108    Comment: Reviewed spinal precautions and HEP, educated patient on use of FWW at D/C                         Point: Body mechanics (Done)       Learning Progress Summary             Patient Acceptance, E, VU by CK at 2/15/2024 1108    Comment: Reviewed spinal precautions and HEP, educated patient on use of FWW at D/C   Significant Other Acceptance, E, VU by CK at 2/15/2024 1108    Comment: Reviewed spinal precautions and HEP, educated patient on use of FWW at D/C                         Point: Precautions (Done)       Learning Progress Summary             Patient Acceptance, E, VU by  at 2/15/2024 1108    Comment: Reviewed spinal precautions and HEP, educated patient on use of FWW at D/C   Significant Other Acceptance, E, VU by  at 2/15/2024 1108    Comment: Reviewed spinal precautions and HEP, educated patient on use of FWW at D/C                                         User Key       Initials Effective Dates Name Provider Type Discipline    ELLA 02/06/24 -  Brigida Banegas, PT Physical Therapist PT                   PT Recommendation and Plan  Planned Therapy Interventions (PT): balance training, bed mobility training, gait training, home exercise program, stretching, strengthening, stair training, ROM (range of motion), postural re-education, patient/family education, transfer training  Plan of Care Reviewed With: patient, spouse, friend  Outcome Evaluation: Patient presents with mild deficits in strength, endurance, and balance. She was able to ambulate 350' with FWW and CGA and navigate 3 steps per her home setup with CGA. IPPT is indicated as patient is currently mobilizing below her baseline. Recommend D/C home with assist with use of FWW.     Time Calculation:   PT Evaluation Complexity  History, PT Evaluation Complexity: 3 or more personal factors and/or comorbidities  Examination of Body Systems (PT Eval Complexity): total of 3 or more elements  Clinical Presentation (PT Evaluation Complexity): stable  Clinical Decision Making (PT Evaluation Complexity): low complexity  Overall Complexity (PT Evaluation Complexity): low complexity     PT Charges       Row Name 02/15/24 1110             Time Calculation    Start Time 0947  -CK      PT Received On 02/15/24  -CK      PT Goal Re-Cert Due Date 02/25/24  -CK         Timed Charges    48329 - PT Therapeutic Exercise Minutes 10  -CK         Untimed Charges    PT Eval/Re-eval Minutes 50  -CK         Total Minutes    Timed Charges Total Minutes 10  -CK      Untimed Charges Total Minutes 50  -CK       Total Minutes 60  -CK                User Key  (r) = Recorded By, (t) = Taken By, (c) = Cosigned By      Initials Name Provider Type    CK Brigida Banegas, PT Physical Therapist                  Therapy Charges for Today       Code Description Service Date Service Provider Modifiers Qty    29512158912 HC PT THER PROC EA 15 MIN 2/15/2024 Brigida Banegas, PT GP 1    98550664716 HC PT EVAL LOW COMPLEXITY 4 2/15/2024 Brigida Banegas, PT GP 1            PT  G-Codes  Outcome Measure Options: AM-PAC 6 Clicks Basic Mobility (PT)  AM-PAC 6 Clicks Score (PT): 18  AM-PAC 6 Clicks Score (OT): 19  PT Discharge Summary  Anticipated Discharge Disposition (PT): home with assist    Brigida Banegas, PT  2/15/2024

## 2024-02-15 NOTE — CASE MANAGEMENT/SOCIAL WORK
Continued Stay Note  [facility]     Patient Name: Mary Lunsford  MRN: [id number]  Today's Date: 2/15/2024    Admit Date: 2/14/2024    Plan: Home   Discharge Plan       Row Name 02/15/24 1131       Plan    Plan Home    Plan Comments Patient off floor. I spoke with her spouse in regards to discharge planning. They reside in [place]. She uses no DME in the home. A rolling walker will be delivered to her provided by [employer]. Patient has [employer].   She has no advanced directives.    Final Discharge Disposition Code 01 - home or self-care                   Discharge Codes    No documentation.                 Expected Discharge Date and Time       Expected Discharge Date Expected Discharge Time    Feb 16, 2024               Galilea Alvarado RN

## 2024-02-15 NOTE — PROGRESS NOTES
"  [facility]    NEUROSURGERY PROGRESS NOTE    02/15/24    Chief Complaint: Lumbar spondylolisthesis    Subjective: Stable overnight.  Patient notes significant improvement in her right leg symptoms.  Drain output 230cc over the last 24 hours.  There is some mild leakage around the drain insertion site.  Patient reports stiffness in her back around her incision site and states she is ready to ambulate more.    1 Day Post-Op    Objective:     /83 (BP Location: Right arm, Patient Position: Lying)   Pulse 62   Temp 97.7 °F (36.5 °C) (Oral)   Resp 16   Ht 164.5 cm (64.75\")   Wt 116 kg (254 lb 13.6 oz)   LMP 12/01/2018 (LMP Unknown)   SpO2 99%   BMI 42.74 kg/m²        Physical Exam  Awake, alert and oriented x 3  Speech f/c  Opens eyes spontaneously  EOM intact bilaterally  Pupils 3mm reactive bilaterally  Face symmetric  Tongue midline  Standing at bedside with walker  Moves bilateral lower extremities with good strength.  Drain output: 230ml over last 24 hours.        Intake/Output Summary (Last 24 hours) at 2/15/2024 0959  Last data filed at 2/15/2024 0700  Gross per 24 hour   Intake 1850 ml   Output 1380 ml   Net 470 ml         Current Facility-Administered Medications:     atorvastatin (LIPITOR) tablet 40 mg, 40 mg, Oral, Nightly, Jeovanny Smith MD    carbidopa-levodopa CR (SINEMET CR)  MG per CR tablet 1 tablet, 1 tablet, Oral, TID, Jeovanny Smith MD, 1 tablet at 02/15/24 0828    diazePAM (VALIUM) tablet 5 mg, 5 mg, Oral, Q6H PRN, Jeovanny Smith MD, 5 mg at 02/15/24 0530    docusate sodium (COLACE) capsule 100 mg, 100 mg, Oral, BID PRN, Jeovanny Smith MD, 100 mg at 02/15/24 0828    Enoxaparin Sodium (LOVENOX) syringe 40 mg, 40 mg, Subcutaneous, Daily, Jeovanny Smith MD    ferrous sulfate tablet 325 mg, 325 mg, Oral, Daily With Breakfast, Jeovanny Smith MD, 325 mg at 02/15/24 0826    HYDROcodone-acetaminophen (NORCO) " Topical Retinoid counseling:  Patient advised to apply a pea-sized amount only at bedtime and wait 30 minutes after washing their face before applying.  If too drying, patient may add a non-comedogenic moisturizer. The patient verbalized understanding of the proper use and possible adverse effects of retinoids.  All of the patient's questions and concerns were addressed. 5-325 MG per tablet 2 tablet, 2 tablet, Oral, Q4H PRN, Jeovanny Smith MD, 2 tablet at 02/15/24 0827    HYDROmorphone (DILAUDID) injection 0.5 mg, 0.5 mg, Intravenous, Q2H PRN **AND** naloxone (NARCAN) injection 0.4 mg, 0.4 mg, Intravenous, Q5 Min PRN, Jeovanny Smith MD    lactated ringers infusion, 9 mL/hr, Intravenous, Continuous, Jeovanny Smith MD, Last Rate: 9 mL/hr at 02/14/24 1113, Restarted at 02/14/24 1122    levothyroxine (SYNTHROID, LEVOTHROID) tablet 88 mcg, 88 mcg, Oral, Q AM, Jeovanny Smith MD, 88 mcg at 02/15/24 0425    magnesium hydroxide (MILK OF MAGNESIA) suspension 10 mL, 10 mL, Oral, Daily PRN, Jeovanny Smith MD    metoprolol tartrate (LOPRESSOR) tablet 25 mg, 25 mg, Oral, Q12H, Jeovanny Smith MD, 25 mg at 02/15/24 0828    ondansetron ODT (ZOFRAN-ODT) disintegrating tablet 4 mg, 4 mg, Oral, Q6H PRN **OR** ondansetron (ZOFRAN) injection 4 mg, 4 mg, Intravenous, Q6H PRN, Jeovanny Smith MD    polyethylene glycol (MIRALAX) packet 17 g, 17 g, Oral, Daily PRN, Jeovanny Smith MD, 17 g at 02/15/24 0828    pramipexole (MIRAPEX) tablet 0.25 mg, 0.25 mg, Oral, TID, Jeovanny Smith MD, 0.25 mg at 02/15/24 0828    pregabalin (LYRICA) capsule 100 mg, 100 mg, Oral, BID, Jeovanny Smith MD, 100 mg at 02/15/24 0826    sennosides-docusate (PERICOLACE) 8.6-50 MG per tablet 1 tablet, 1 tablet, Oral, Nightly PRN, Jeovanny Smith MD    sodium chloride 0.9 % flush 10 mL, 10 mL, Intravenous, PRN, Jeovanny Smith MD    sodium chloride 0.9 % flush 3 mL, 3 mL, Intravenous, Q12H, Jeovanny Smith MD, 3 mL at 02/14/24 2044    sodium chloride 0.9 % infusion 40 mL, 40 mL, Intravenous, PRN, Jeovanny Smtih MD    Laboratory Results:        Lab 02/15/24  0403 02/09/24  0838   WBC 10.69 4.50   HEMOGLOBIN 11.3* 14.5   HEMATOCRIT 34.0 44.7   PLATELETS 199 222   NEUTROS ABS 9.38*  --    IMMATURE GRANS (ABS) 0.07*  --    LYMPHS ABS 0.72  --    MONOS ABS 0.51  --    EOS ABS 0.00  --    MCV  102.4* 104.0*         Lab 02/15/24  0403 02/09/24  0838   SODIUM 138 140   POTASSIUM 5.2 4.3   CHLORIDE 103 103   CO2 26.0 30.0*   ANION GAP 9.0 7.0   BUN 18 16   CREATININE 0.74 0.96   EGFR 95.7 70.0   GLUCOSE 150* 74   CALCIUM 8.6 9.6   HEMOGLOBIN A1C  --  5.40                         Brief Urine Lab Results       None          Microbiology Results (last 10 days)       Procedure Component Value - Date/Time    MRSA Screen Culture (Outpatient) - Swab, Nares [[id number]]  (Normal) Collected: 02/09/24 0838    Lab Status: Final result Specimen: Swab from Nares Updated: 02/10/24 1510     MRSA Screen Cx No Methicillin Resistant Staphylococcus aureus isolated    Narrative:      The negative predictive value of this diagnostic test is high and should only be used to consider de-escalating anti-MRSA therapy. A positive result may indicate colonization with MRSA and must be correlated clinically.  The negative predictive value of this diagnostic test is high and should only be used to consider de-escalating anti-MRSA therapy. A positive result may indicate colonization with MRSA and must be correlated clinically.                     Diagnostic Imaging: I personally reviewed and interpreted the new imaging    Assessment and plan:  This is a 53-year-old female who is status post L4-5 fusion with Dr. Smith yesterday.  Surgery went without complications.  Postoperatively, patient note significant improvement in her right leg pain.  She is dealing with some moderate amount of back stiffness and incisional pain however states that it feels better when she is up and moving.  Her drain put out 230 cc over last 24 hours so we will continue her drain.  Mobilize with PT/OT.  Tentatively plan to discharge home tomorrow.  Please call with questions or concerns.      Any copied data from previous notes included in the (1) HPI, (2) PE, (3) MDM and/or Assessment and Plan has been reviewed and accurate as of 02/15/24.    Cherie Magana,  High Dose Vitamin A Counseling: Side effects reviewed, pt to contact office should one occur. SULEMAN  02/15/24  09:59 EST       Tazorac Counseling:  Patient advised that medication is irritating and drying.  Patient may need to apply sparingly and wash off after an hour before eventually leaving it on overnight.  The patient verbalized understanding of the proper use and possible adverse effects of tazorac.  All of the patient's questions and concerns were addressed. Include Pregnancy/Lactation Warning?: No Minocycline Counseling: Patient advised regarding possible photosensitivity and discoloration of the teeth, skin, lips, tongue and gums.  Patient instructed to avoid sunlight, if possible.  When exposed to sunlight, patients should wear protective clothing, sunglasses, and sunscreen.  The patient was instructed to call the office immediately if the following severe adverse effects occur:  hearing changes, easy bruising/bleeding, severe headache, or vision changes.  The patient verbalized understanding of the proper use and possible adverse effects of minocycline.  All of the patient's questions and concerns were addressed. Tetracycline Pregnancy And Lactation Text: This medication is Pregnancy Category D and not consider safe during pregnancy. It is also excreted in breast milk. Dapsone Pregnancy And Lactation Text: This medication is Pregnancy Category C and is not considered safe during pregnancy or breast feeding. Aklief Pregnancy And Lactation Text: It is unknown if this medication is safe to use during pregnancy.  It is unknown if this medication is excreted in breast milk.  Breastfeeding women should use the topical cream on the smallest area of the skin for the shortest time needed while breastfeeding.  Do not apply to nipple and areola. Doxycycline Pregnancy And Lactation Text: This medication is Pregnancy Category D and not consider safe during pregnancy. It is also excreted in breast milk but is considered safe for shorter treatment courses. Doxycycline Counseling:  Patient counseled regarding possible photosensitivity and increased risk for sunburn.  Patient instructed to avoid sunlight, if possible.  When exposed to sunlight, patients should wear protective clothing, sunglasses, and sunscreen.  The patient was instructed to call the office immediately if the following severe adverse effects occur:  hearing changes, easy bruising/bleeding, severe headache, or vision changes.  The patient verbalized understanding of the proper use and possible adverse effects of doxycycline.  All of the patient's questions and concerns were addressed. Azelaic Acid Counseling: Patient counseled that medicine may cause skin irritation and to avoid applying near the eyes.  In the event of skin irritation, the patient was advised to reduce the amount of the drug applied or use it less frequently.   The patient verbalized understanding of the proper use and possible adverse effects of azelaic acid.  All of the patient's questions and concerns were addressed. Detail Level: Zone Erythromycin Counseling:  I discussed with the patient the risks of erythromycin including but not limited to GI upset, allergic reaction, drug rash, diarrhea, increase in liver enzymes, and yeast infections. Benzoyl Peroxide Counseling: Patient counseled that medicine may cause skin irritation and bleach clothing.  In the event of skin irritation, the patient was advised to reduce the amount of the drug applied or use it less frequently.   The patient verbalized understanding of the proper use and possible adverse effects of benzoyl peroxide.  All of the patient's questions and concerns were addressed. Topical Clindamycin Pregnancy And Lactation Text: This medication is Pregnancy Category B and is considered safe during pregnancy. It is unknown if it is excreted in breast milk. Winlevi Pregnancy And Lactation Text: This medication is considered safe during pregnancy and breastfeeding. Bactrim Pregnancy And Lactation Text: This medication is Pregnancy Category D and is known to cause fetal risk.  It is also excreted in breast milk. Topical Sulfur Applications Pregnancy And Lactation Text: This medication is Pregnancy Category C and has an unknown safety profile during pregnancy. It is unknown if this topical medication is excreted in breast milk. Spironolactone Pregnancy And Lactation Text: This medication can cause feminization of the male fetus and should be avoided during pregnancy. The active metabolite is also found in breast milk. Birth Control Pills Pregnancy And Lactation Text: This medication should be avoided if pregnant and for the first 30 days post-partum. Isotretinoin Counseling: Patient should get monthly blood tests, not donate blood, not drive at night if vision affected, not share medication, and not undergo elective surgery for 6 months after tx completed. Side effects reviewed, pt to contact office should one occur. Birth Control Pills Counseling: Birth Control Pill Counseling: I discussed with the patient the potential side effects of OCPs including but not limited to increased risk of stroke, heart attack, thrombophlebitis, deep venous thrombosis, hepatic adenomas, breast changes, GI upset, headaches, and depression.  The patient verbalized understanding of the proper use and possible adverse effects of OCPs. All of the patient's questions and concerns were addressed. Tetracycline Counseling: Patient counseled regarding possible photosensitivity and increased risk for sunburn.  Patient instructed to avoid sunlight, if possible.  When exposed to sunlight, patients should wear protective clothing, sunglasses, and sunscreen.  The patient was instructed to call the office immediately if the following severe adverse effects occur:  hearing changes, easy bruising/bleeding, severe headache, or vision changes.  The patient verbalized understanding of the proper use and possible adverse effects of tetracycline.  All of the patient's questions and concerns were addressed. Patient understands to avoid pregnancy while on therapy due to potential birth defects. Dapsone Counseling: I discussed with the patient the risks of dapsone including but not limited to hemolytic anemia, agranulocytosis, rashes, methemoglobinemia, kidney failure, peripheral neuropathy, headaches, GI upset, and liver toxicity.  Patients who start dapsone require monitoring including baseline LFTs and weekly CBCs for the first month, then every month thereafter.  The patient verbalized understanding of the proper use and possible adverse effects of dapsone.  All of the patient's questions and concerns were addressed. Topical Retinoid Pregnancy And Lactation Text: This medication is Pregnancy Category C. It is unknown if this medication is excreted in breast milk. Aklief counseling:  Patient advised to apply a pea-sized amount only at bedtime and wait 30 minutes after washing their face before applying.  If too drying, patient may add a non-comedogenic moisturizer.  The most commonly reported side effects including irritation, redness, scaling, dryness, stinging, burning, itching, and increased risk of sunburn.  The patient verbalized understanding of the proper use and possible adverse effects of retinoids.  All of the patient's questions and concerns were addressed. High Dose Vitamin A Pregnancy And Lactation Text: High dose vitamin A therapy is contraindicated during pregnancy and breast feeding. Tazorac Pregnancy And Lactation Text: This medication is not safe during pregnancy. It is unknown if this medication is excreted in breast milk. Azithromycin Pregnancy And Lactation Text: This medication is considered safe during pregnancy and is also secreted in breast milk. Azithromycin Counseling:  I discussed with the patient the risks of azithromycin including but not limited to GI upset, allergic reaction, drug rash, diarrhea, and yeast infections. Topical Clindamycin Counseling: Patient counseled that this medication may cause skin irritation or allergic reactions.  In the event of skin irritation, the patient was advised to reduce the amount of the drug applied or use it less frequently.   The patient verbalized understanding of the proper use and possible adverse effects of clindamycin.  All of the patient's questions and concerns were addressed. Sarecycline Counseling: Patient advised regarding possible photosensitivity and discoloration of the teeth, skin, lips, tongue and gums.  Patient instructed to avoid sunlight, if possible.  When exposed to sunlight, patients should wear protective clothing, sunglasses, and sunscreen.  The patient was instructed to call the office immediately if the following severe adverse effects occur:  hearing changes, easy bruising/bleeding, severe headache, or vision changes.  The patient verbalized understanding of the proper use and possible adverse effects of sarecycline.  All of the patient's questions and concerns were addressed. Bactrim Counseling:  I discussed with the patient the risks of sulfa antibiotics including but not limited to GI upset, allergic reaction, drug rash, diarrhea, dizziness, photosensitivity, and yeast infections.  Rarely, more serious reactions can occur including but not limited to aplastic anemia, agranulocytosis, methemoglobinemia, blood dyscrasias, liver or kidney failure, lung infiltrates or desquamative/blistering drug rashes. Topical Sulfur Applications Counseling: Topical Sulfur Counseling: Patient counseled that this medication may cause skin irritation or allergic reactions.  In the event of skin irritation, the patient was advised to reduce the amount of the drug applied or use it less frequently.   The patient verbalized understanding of the proper use and possible adverse effects of topical sulfur application.  All of the patient's questions and concerns were addressed. Azelaic Acid Pregnancy And Lactation Text: This medication is considered safe during pregnancy and breast feeding. Spironolactone Counseling: Patient advised regarding risks of diarrhea, abdominal pain, hyperkalemia, birth defects (for female patients), liver toxicity and renal toxicity. The patient may need blood work to monitor liver and kidney function and potassium levels while on therapy. The patient verbalized understanding of the proper use and possible adverse effects of spironolactone.  All of the patient's questions and concerns were addressed. Erythromycin Pregnancy And Lactation Text: This medication is Pregnancy Category B and is considered safe during pregnancy. It is also excreted in breast milk. Benzoyl Peroxide Pregnancy And Lactation Text: This medication is Pregnancy Category C. It is unknown if benzoyl peroxide is excreted in breast milk. Winlevi Counseling:  I discussed with the patient the risks of topical clascoterone including but not limited to erythema, scaling, itching, and stinging. Patient voiced their understanding. Isotretinoin Pregnancy And Lactation Text: This medication is Pregnancy Category X and is considered extremely dangerous during pregnancy. It is unknown if it is excreted in breast milk.

## 2024-02-15 NOTE — PLAN OF CARE
Goal Outcome Evaluation:  Plan of Care Reviewed With: patient, spouse, friend           Outcome Evaluation: Patient presents with mild deficits in strength, endurance, and balance. She was able to ambulate 350' with FWW and CGA and navigate 3 steps per her home setup with CGA. IPPT is indicated as patient is currently mobilizing below her baseline. Recommend D/C home with assist with use of FWW.      Anticipated Discharge Disposition (PT): home with assist

## 2024-02-15 NOTE — PLAN OF CARE
Goal Outcome Evaluation:  Plan of Care Reviewed With: patient        Progress: no change     Pt is alert and oriented X 4. VSS. RA/ 2L. Pain controlled with prn norco. Leak at insertion site of YESENIA drain. 160 ml of bloody drainage from the YESENIA. Ambulates with one assist, walker and gait belt. Voiding spontaneously.

## 2024-02-15 NOTE — PLAN OF CARE
Dressing changed.  Remained CDI.  YESENIA had 80ml out for this shift.  Ambulated with staff and PT in the [facility] and room.  Cleared to ambulate with spouse.  X-ray complete.  Anticipate dc Friday.

## 2024-02-15 NOTE — PLAN OF CARE
Problem: Adult Inpatient Plan of Care  Goal: Plan of Care Review  Recent Flowsheet Documentation  Taken 2/15/2024 0953 by Shona Dias OT  Progress: (IE) --  Plan of Care Reviewed With:   patient   spouse  Outcome Evaluation: OT IE completed. Pt is A/Ox4 and motivated to work with therapy. BUE AROM, strength, and sensation are intact. Education initiated for spinal precautions, logroll sequencing, and ADL retraining. Mod A LB ADLS. Up in room and hallway with RW support and SBA. Slow pace, no dizziness or LOB. Teaching completed for AE/DME recommendations (toileting aide/Bidet, BSC). OT will follow IP. Recommend home with spouse assist when pt is medically ready.

## 2024-02-15 NOTE — THERAPY EVALUATION
"Patient Name: Mary Lunsford  : 1970    MRN: [id number]                              Today's Date: 2/15/2024       Admit Date: 2024    Visit Dx:     ICD-10-CM ICD-9-CM   1. Degenerative lumbar disc  M51.36 722.52   2. Spondylolisthesis at L4-L5 level  M43.16 756.12   3. Stenosis of lateral recess of lumbar spine  M48.061 724.02   4. Acquired spondylolisthesis  M43.10 738.4     Patient Active Problem List   Diagnosis    Anemia    Elevated blood pressure    Fatigue    Insomnia    Iron deficiency anemia    Irregular periods    Pain in the muscles    Rash    Restless legs syndrome (RLS)    Thyroid disorder    Vitamin B12 deficiency    Snoring    Chest heaviness    Vitamin D deficiency    Abnormal cardiac enzyme level    Essential hypertension    Breast lump    Diarrhea    Rectal bleeding    Lower abdominal pain    Facial tingling    Migraine with aura and without status migrainosus, not intractable    Influenza vaccination declined    Morbid obesity    Dyslipidemia    Abnormal CT scan, colon    Family history of ulcerative colitis    Crohn's disease of small intestine without complication    Spondylolisthesis at L4-L5 level    Stenosis of lateral recess of lumbar spine    Degenerative lumbar disc    Acquired spondylolisthesis    Synovial cyst    Acquired spondylolysis     Past Medical History:   Diagnosis Date    Anemia     Body piercing     both ears    Bronchitis     Colitis     Crohn's disease 2023    incidental finding, asymptomatic    Disease of thyroid gland     Dissection of coronary artery 2017    pt denies this 10/2/19.  states she had testing at , but was told that they didn't \"find anything\"    Enteropathogenic Escherichia coli infection     Essential hypertension 2018    Gallstone     H/O echocardiogram     History of nuclear stress test     Hyperlipidemia     Kidney stones     Low back pain     Migraine     Obesity     Ovarian cyst     Prolonged Q-T interval on ECG     " history    Restless leg syndrome     Subacute idiopathic myocarditis     Urinary tract infection     Vitamin B12 deficiency     Wears glasses     to read     Past Surgical History:   Procedure Laterality Date    BREAST SURGERY Bilateral 1987    breast reduction    CARDIAC CATHETERIZATION N/A 03/15/2017    Procedure: Coronary angiography;  Surgeon: Moustapha Peñaloza MD;  Location: [facility] CATH INVASIVE LOCATION;  Service:     CARDIAC CATHETERIZATION N/A 03/15/2017    Procedure: Left ventriculography;  Surgeon: Moustapha Peñaloza MD;  Location: [facility] CATH INVASIVE LOCATION;  Service:      SECTION      1991 & 1995    CHOLECYSTECTOMY  2013    COLONOSCOPY N/A 10/03/2019    Procedure: COLONOSCOPY WITH BIOPSIES; ANOSCOPY;  Surgeon: Jose Paez MD;  Location: [facility] ENDOSCOPY;  Service: Gastroenterology    COLONOSCOPY N/A 2023    Procedure: COLONOSCOPY with biopsy;  Surgeon: Sami Elizondo MD;  Location: [facility] ENDOSCOPY;  Service: Gastroenterology;  Laterality: N/A;    ENDOSCOPY      GASTRIC BYPASS  2011    STEROID INJECTION      x2 in back      General Information       Row Name 02/15/24 0942          OT Time and Intention    Document Type evaluation;therapy note (daily note)  -TB     Mode of Treatment occupational therapy;individual therapy  -TB       Row Name 02/15/24 0942          General Information    Patient Profile Reviewed yes  -TB     Prior Level of Function independent:;all household mobility;community mobility;min assist:;ADL's  spouse assisting with socks and shoes as needed prior due to acute back and RLE pain  -TB     Existing Precautions/Restrictions fall;spinal;other (see comments)  YESENIA Drain  -TB     Barriers to Rehab previous functional deficit  -TB       Row Name 02/15/24 0942          Occupational Profile    Reason for Services/Referral (Occupational Profile) Occupational decline  -TB     Environmental Supports and Barriers (Occupational Profile) Pt lives  "with her spouse in a single story home with 3 steps to enter. Walk-in shower with built-in bench. Chair ht commode, but interested in BSC for ht and rails. Spouse assisted with LB dressing as needed prior.  -       Row Name 02/15/24 0942          Living Environment    People in Home spouse  -       Row Name 02/15/24 0942          Home Main Entrance    Number of Stairs, Main Entrance three  -TB     Stair Railings, Main Entrance none;other (see comments)  Spouse plans to install \"hand hold\" at door  -TB       Row Name 02/15/24 0942          Stairs Within Home, Primary    Number of Stairs, Within Home, Primary none  -TB       Row Name 02/15/24 0942          Cognition    Orientation Status (Cognition) oriented x 4  -TB       Row Name 02/15/24 0942          Safety Issues, Functional Mobility    Safety Issues Affecting Function (Mobility) insight into deficits/self-awareness;awareness of need for assistance;safety precaution awareness;safety precautions follow-through/compliance;sequencing abilities  -TB     Impairments Affecting Function (Mobility) endurance/activity tolerance;pain;strength;range of motion (ROM)  -TB     Comment, Safety Issues/Impairments (Mobility) Pt up in room and hallway with SBA using RW for support. No dizziness or LOB. Slow pace.  -TB               User Key  (r) = Recorded By, (t) = Taken By, (c) = Cosigned By      Initials Name Provider Type    Shona Medina, OT Occupational Therapist                     Mobility/ADL's       Row Name 02/15/24 0945          Bed Mobility    Comment, (Bed Mobility) Pt rec'vd standing in room. Education reviewed for spinal precautions and logroll sequencing.  -TB       Row Name 02/15/24 0945          Transfers    Transfers sit-stand transfer;stand-sit transfer;bed-chair transfer  -TB     Comment, (Transfers) Education and cues for hand placement  -TB       Row Name 02/15/24 0945          Bed-Chair Transfer    Bed-Chair [place] (Transfers) standby " assist;verbal cues  -TB     Assistive Device (Bed-Chair Transfers) walker, front-wheeled  -TB       Row Name 02/15/24 0945          Sit-Stand Transfer    Sit-Stand [place] (Transfers) standby assist;verbal cues  -TB     Assistive Device (Sit-Stand Transfers) walker, front-wheeled  -TB       Row Name 02/15/24 0945          Stand-Sit Transfer    Stand-Sit [place] (Transfers) standby assist;verbal cues  -TB     Assistive Device (Stand-Sit Transfers) walker, front-wheeled  -       Row Name 02/15/24 09          Functional Mobility    Functional Mobility- Ind. Level standby assist;verbal cues required  -TB     Functional Mobility- Device walker, front-wheeled  -     Functional Mobility-Distance (Feet) 350  -TB     Functional Mobility- Comment Pt up with good effort. No dizziness or LOB  -TB     Patient was able to Ambulate yes  -       Row Name 02/15/24 0945          Activities of Daily Living    BADL Assessment/Intervention bathing;upper body dressing;lower body dressing;feeding;toileting  -       Row Name 02/15/24 0945          Bathing Assessment/Intervention    [place] Level (Bathing) set up;distal lower extremities/feet  -TB     Position (Bathing) unsupported sitting  -TB     Comment, (Bathing) Education initiated for spinal precautions and ADL retraining to maintain.  -       Row Name 02/15/24 0945          Upper Body Dressing Assessment/Training    [place] Level (Upper Body Dressing) doff;pajama/robe;minimum assist (75% patient effort)  -TB     Position (Upper Body Dressing) unsupported sitting  -TB       Row Name 02/15/24 0945          Lower Body Dressing Assessment/Training    [place] Level (Lower Body Dressing) doff;don;socks;moderate assist (50% patient effort);verbal cues  -TB     Position (Lower Body Dressing) unsupported sitting  -TB     Comment, (Lower Body Dressing) Education initiated for spinal precautions and ADL retraining to maintain. Follow up teaching recommended.  Spouse present for session. Cross legged method reviewed.  -TB       Row Name 02/15/24 0945          Self-Feeding Assessment/Training    [place] Level (Feeding) independent;liquids to mouth  -TB     Position (Self-Feeding) supported sitting  -TB       Row Name 02/15/24 0945          Toileting Assessment/Training    [place] Level (Toileting) toileting skills  -     Comment, (Toileting) Education initiated for AE options for toileting hygiene to support pt in maintaining spinal precautions.  -               User Key  (r) = Recorded By, (t) = Taken By, (c) = Cosigned By      Initials Name Provider Type    TB Shona Dias, OT Occupational Therapist                   Obj/Interventions       Row Name 02/15/24 0952          Sensory Assessment (Somatosensory)    Sensory Assessment (Somatosensory) UE sensation intact  -       Row Name 02/15/24 0952          Vision Assessment/Intervention    Visual Impairment/Limitations WFL  -       Row Name 02/15/24 0952          Range of Motion Comprehensive    General Range of Motion bilateral upper extremity ROM WFL  -TB     Comment, General Range of Motion BUE AROM intact  -       Row Name 02/15/24 0952          Strength Comprehensive (MMT)    Comment, General Manual Muscle Testing (MMT) Assessment Generalized weakness. No focal deficits.  -       Row Name 02/15/24 0952          Balance    Balance Assessment sitting dynamic balance;sit to stand dynamic balance;standing dynamic balance  -     Dynamic Sitting Balance independent  -TB     Position, Sitting Balance unsupported;sitting in chair;sitting edge of bed  -     Sit to Stand Dynamic Balance standby assist  -     Dynamic Standing Balance standby assist;verbal cues  -     Position/Device Used, Standing Balance supported;walker, front-wheeled  -TB     Balance Interventions sitting;standing;sit to stand;supported;dynamic;dynamic reaching;occupation based/functional task;UE activity with balance  activity  -TB     Comment, Balance No LOB with OOB activity  -TB               User Key  (r) = Recorded By, (t) = Taken By, (c) = Cosigned By      Initials Name Provider Type    TB Shona Dias OT Occupational Therapist                   Goals/Plan       Row Name 02/15/24 0959          Bed Mobility Goal 1 (OT)    Activity/Assistive Device (Bed Mobility Goal 1, OT) sidelying to sit/sit to sidelying  -TB     [place] Level/Cues Needed (Bed Mobility Goal 1, OT) minimum assist (75% or more patient effort);verbal cues required  -TB     Time Frame (Bed Mobility Goal 1, OT) by discharge  -TB     Strategies/Barriers (Bed Mobility Goal 1, OT) spinal precautions  -TB       Row Name 02/15/24 0959          Transfer Goal 1 (OT)    Activity/Assistive Device (Transfer Goal 1, OT) toilet  -TB     [place] Level/Cues Needed (Transfer Goal 1, OT) supervision required  -TB     Time Frame (Transfer Goal 1, OT) by discharge  -TB     Progress/Outcome (Transfer Goal 1, OT) goal ongoing  -TB       Row Name 02/15/24 0959          Dressing Goal 1 (OT)    Activity/Device (Dressing Goal 1, OT) lower body dressing  -TB     [place]/Cues Needed (Dressing Goal 1, OT) minimum assist (75% or more patient effort);verbal cues required  -TB     Time Frame (Dressing Goal 1, OT) by discharge  -TB     Progress/Outcome (Dressing Goal 1, OT) goal ongoing  -TB               User Key  (r) = Recorded By, (t) = Taken By, (c) = Cosigned By      Initials Name Provider Type    TB Shona Dias OT Occupational Therapist                   Clinical Impression       Row Name 02/15/24 4709          Pain Assessment    Pain Intervention(s) Ambulation/increased activity;Repositioned;Elevated;Cold applied  -TB     Additional Documentation Pain Scale: FACES Pre/Post-Treatment (Group)  -TB       Row Name 02/15/24 0919          Pain Scale: FACES Pre/Post-Treatment    Pain: FACES Scale, Pretreatment 4-->hurts little more  -TB     Posttreatment  Pain Rating 4-->hurts little more  -TB     Pain Location incisional  -TB     Pain Location - back  -TB     Pre/Posttreatment Pain Comment Pt tolerates activity well. Increased pain with transitional mvmts.  -TB       Row Name 02/15/24 0953          Plan of Care Review    Plan of Care Reviewed With patient;spouse  -TB     Progress --  IE  -TB     Outcome Evaluation OT IE completed. Pt is A/Ox4 and motivated to work with therapy. BUE AROM, strength, and sensation are intact. Education initiated for spinal precautions, logroll sequencing, and ADL retraining. Mod A LB ADLS. Up in room and hallway with RW support and SBA. Slow pace, no dizziness or LOB. Teaching completed for AE/DME recommendations (toileting aide/Bidet, BSC). OT will follow IP. Recommend home with spouse assist when pt is medically ready.  -TB       Row Name 02/15/24 0953          Therapy Assessment/Plan (OT)    Rehab Potential (OT) good, to achieve stated therapy goals  -TB     Criteria for Skilled Therapeutic Interventions Met (OT) yes;meets criteria;skilled treatment is necessary  -TB     Therapy Frequency (OT) daily  -TB       Row Name 02/15/24 0953          Therapy Plan Review/Discharge Plan (OT)    Equipment Needs Upon Discharge (OT) commode chair  -TB     Anticipated Discharge Disposition (OT) home with assist  -TB       Row Name 02/15/24 0953          Vital Signs    Pre Systolic BP Rehab --  RN cleared OT  -TB     O2 Delivery Pre Treatment room air  -TB     Pre Patient Position Standing  -TB     Intra Patient Position Standing  -TB     Post Patient Position Sitting  -TB       Row Name 02/15/24 0953          Positioning and Restraints    Pre-Treatment Position standing in room  -TB     Post Treatment Position chair  -TB     In Chair notified nsg;reclined;call light within reach;encouraged to call for assist;with family/caregiver;waffle cushion;legs elevated  SCD pumps applied  -TB               User Key  (r) = Recorded By, (t) = Taken By, (c) =  Cosigned By      Initials Name Provider Type    Shona Medina OT Occupational Therapist                   Outcome Measures       Row Name 02/15/24 0959          How much help from another is currently needed...    Putting on and taking off regular lower body clothing? 2  -TB     Bathing (including washing, rinsing, and drying) 3  -TB     Toileting (which includes using toilet bed pan or urinal) 3  -TB     Putting on and taking off regular upper body clothing 3  -TB     Taking care of personal grooming (such as brushing teeth) 4  -TB     Eating meals 4  -TB     AM-PAC 6 Clicks Score (OT) 19  -TB       Row Name 02/15/24 0959          Functional Assessment    Outcome Measure Options AM-PAC 6 Clicks Daily Activity (OT)  -TB               User Key  (r) = Recorded By, (t) = Taken By, (c) = Cosigned By      Initials Name Provider Type    Shona Medina OT Occupational Therapist                    Occupational Therapy Education       Title: PT OT SLP Therapies (In Progress)       Topic: Occupational Therapy (In Progress)       Point: ADL training (Done)       Description:   Instruct learner(s) on proper safety adaptation and remediation techniques during self care or transfers.   Instruct in proper use of assistive devices.                  Learning Progress Summary             Patient Acceptance, E,D,TB, VU,DU,NR by TB at 2/15/2024 1000   Family Acceptance, E,D,TB, VU,DU,NR by TB at 2/15/2024 1000                         Point: Home exercise program (Not Started)       Description:   Instruct learner(s) on appropriate technique for monitoring, assisting and/or progressing therapeutic exercises/activities.                  Learner Progress:  Not documented in this visit.              Point: Precautions (Done)       Description:   Instruct learner(s) on prescribed precautions during self-care and functional transfers.                  Learning Progress Summary             Patient Acceptance, E,D,TB,  JOSÉ ANTONIO,DESTINY,NR by TB at 2/15/2024 1000   Family Acceptance, E,D,TB, VU,DU,NR by  at 2/15/2024 1000                         Point: Body mechanics (Not Started)       Description:   Instruct learner(s) on proper positioning and spine alignment during self-care, functional mobility activities and/or exercises.                  Learner Progress:  Not documented in this visit.                              User Key       Initials Effective Dates Name Provider Type Discipline     07/11/23 -  Shona Dias OT Occupational Therapist OT                  OT Recommendation and Plan  Therapy Frequency (OT): daily  Plan of Care Review  Plan of Care Reviewed With: patient, spouse  Progress:  (IE)  Outcome Evaluation: OT IE completed. Pt is A/Ox4 and motivated to work with therapy. BUE AROM, strength, and sensation are intact. Education initiated for spinal precautions, logroll sequencing, and ADL retraining. Mod A LB ADLS. Up in room and hallway with RW support and SBA. Slow pace, no dizziness or LOB. Teaching completed for AE/DME recommendations (toileting aide/Bidet, BSC). OT will follow IP. Recommend home with spouse assist when pt is medically ready.     Time Calculation:   Evaluation Complexity (OT)  Review Occupational Profile/Medical/Therapy History Complexity: expanded/moderate complexity  Assessment, Occupational Performance/Identification of Deficit Complexity: 3-5 performance deficits  Clinical Decision Making Complexity (OT): detailed assessment/moderate complexity  Overall Complexity of Evaluation (OT): moderate complexity     Time Calculation- OT       Row Name 02/15/24 0844             Time Calculation- OT    OT Start Time 0844  -TB      OT Received On 02/15/24  -TB      OT Goal Re-Cert Due Date 02/25/24  -TB         Timed Charges    71106 - OT Self Care/Mgmt Minutes 30  -TB         Untimed Charges    OT Eval/Re-eval Minutes 45  -TB         Total Minutes    Timed Charges Total Minutes 30  -TB      Untimed  Charges Total Minutes 45  -TB       Total Minutes 75  -TB                User Key  (r) = Recorded By, (t) = Taken By, (c) = Cosigned By      Initials Name Provider Type    TB Shona Dias, OT Occupational Therapist                  Therapy Charges for Today       Code Description Service Date Service Provider Modifiers Qty    [id number]  OT SELF CARE/MGMT/TRAIN EA 15 MIN 2/15/2024 Shona Dias OT GO 2    [id number]  OT EVAL MOD COMPLEXITY 3 2/15/2024 Shona Dias OT GO 1                 Shona Dias OT  2/15/2024

## 2024-02-16 VITALS
HEIGHT: 65 IN | OXYGEN SATURATION: 96 % | TEMPERATURE: 98.6 F | WEIGHT: 254.85 LBS | DIASTOLIC BLOOD PRESSURE: 78 MMHG | RESPIRATION RATE: 18 BRPM | HEART RATE: 75 BPM | BODY MASS INDEX: 42.46 KG/M2 | SYSTOLIC BLOOD PRESSURE: 121 MMHG

## 2024-02-16 PROCEDURE — 97116 GAIT TRAINING THERAPY: CPT

## 2024-02-16 PROCEDURE — 99024 POSTOP FOLLOW-UP VISIT: CPT

## 2024-02-16 PROCEDURE — 97110 THERAPEUTIC EXERCISES: CPT

## 2024-02-16 RX ORDER — HYDROCODONE BITARTRATE AND ACETAMINOPHEN 5; 325 MG/1; MG/1
1 TABLET ORAL EVERY 6 HOURS PRN
Qty: 20 TABLET | Refills: 0 | Status: SHIPPED | OUTPATIENT
Start: 2024-02-16 | End: 2024-02-17

## 2024-02-16 RX ORDER — DIAZEPAM 5 MG/1
5 TABLET ORAL EVERY 8 HOURS PRN
Qty: 20 TABLET | Refills: 0 | Status: SHIPPED | OUTPATIENT
Start: 2024-02-16

## 2024-02-16 RX ORDER — AMOXICILLIN 250 MG
1 CAPSULE ORAL DAILY
Qty: 30 TABLET | Refills: 0 | Status: SHIPPED | OUTPATIENT
Start: 2024-02-16

## 2024-02-16 RX ORDER — NALOXONE HYDROCHLORIDE 4 MG/.1ML
SPRAY NASAL
Qty: 2 EACH | Refills: 0 | Status: SHIPPED | OUTPATIENT
Start: 2024-02-16

## 2024-02-16 RX ADMIN — LEVOTHYROXINE SODIUM 88 MCG: 0.09 TABLET ORAL at 05:19

## 2024-02-16 RX ADMIN — PRAMIPEXOLE DIHYDROCHLORIDE 0.25 MG: 0.25 TABLET ORAL at 12:17

## 2024-02-16 RX ADMIN — HYDROCODONE BITARTRATE AND ACETAMINOPHEN 2 TABLET: 5; 325 TABLET ORAL at 00:57

## 2024-02-16 RX ADMIN — HYDROCODONE BITARTRATE AND ACETAMINOPHEN 2 TABLET: 5; 325 TABLET ORAL at 12:59

## 2024-02-16 RX ADMIN — DIAZEPAM 5 MG: 5 TABLET ORAL at 12:17

## 2024-02-16 RX ADMIN — DIAZEPAM 5 MG: 5 TABLET ORAL at 04:01

## 2024-02-16 RX ADMIN — DOCUSATE SODIUM 100 MG: 100 CAPSULE, LIQUID FILLED ORAL at 08:50

## 2024-02-16 RX ADMIN — FERROUS SULFATE TAB 325 MG (65 MG ELEMENTAL FE) 325 MG: 325 (65 FE) TAB at 08:50

## 2024-02-16 RX ADMIN — HYDROCODONE BITARTRATE AND ACETAMINOPHEN 2 TABLET: 5; 325 TABLET ORAL at 08:49

## 2024-02-16 RX ADMIN — PRAMIPEXOLE DIHYDROCHLORIDE 0.25 MG: 0.25 TABLET ORAL at 08:50

## 2024-02-16 RX ADMIN — METOPROLOL TARTRATE 25 MG: 25 TABLET, FILM COATED ORAL at 08:50

## 2024-02-16 RX ADMIN — POLYETHYLENE GLYCOL 3350 17 G: 17 POWDER, FOR SOLUTION ORAL at 08:50

## 2024-02-16 RX ADMIN — HYDROCODONE BITARTRATE AND ACETAMINOPHEN 2 TABLET: 5; 325 TABLET ORAL at 05:18

## 2024-02-16 RX ADMIN — CARBIDOPA AND LEVODOPA 1 TABLET: 50; 200 TABLET, EXTENDED RELEASE ORAL at 08:50

## 2024-02-16 RX ADMIN — PREGABALIN 100 MG: 50 CAPSULE ORAL at 08:50

## 2024-02-16 NOTE — DISCHARGE INSTRUCTIONS
Patient is to limit activity, and to avoid bending, lifting, twisting.  In general we recommend limiting physical activity until first postoperative appointment in 2 weeks.  Attempt to not lift more than 5 to 10 pounds.     Patient may shower 48 hours postoperatively.  Do not submerge the incision, this includes baths, pools, hot tubs, etc. Keep incision as dry as possible.  Your sutures will not need to be removed as they will dissolve on their own.    Bandage may removed 48 hours postoperatively.  If for comfort reasons she prefers to have the bandage on, that is welcome. If wound is seeping a small amount, we recommend to continue bandaging.    Common postoperative symptoms from a posterior lumbar fusion include: Muscle spasms in the lower back, postoperative back pain, a lesser intensity of symptoms experienced prior to surgery.    Patient is to contact the office if they have any of the following:    Incision opening/stitches coming apart.  Significant swelling around the incision site.  (May attempt to ice incision first.)  Increased redness around the incision site and it becoming warm to the touch.  Signs of infection such as: Fever, chills, etc.  It is normal to have a small amount of yellow/red discharge for the first week, contact our office if it is expensive (soaking through entire bandages) or cloudy in nature.  Excessive pain.  The above list is not exhaustive.    Please avoid taking any ibuprofen or NSAIDs (Ibuprofen, Aleeve, Motrin, Naproxen) for the next 3 to 6 months, as that can inhibit bone fusion.  May resume taking Aspirin 5 days post-op  Hold Humira for 8 weeks as it can delay wound healing

## 2024-02-16 NOTE — THERAPY TREATMENT NOTE
Patient Name: Mary Lunsford  : 1970    MRN: [id number]                              Today's Date: 2024       Admit Date: 2024    Visit Dx:     ICD-10-CM ICD-9-CM   1. Acquired spondylolysis  M43.00 738.4   2. Degenerative lumbar disc  M51.36 722.52   3. Spondylolisthesis at L4-L5 level  M43.16 756.12   4. Stenosis of lateral recess of lumbar spine  M48.061 724.02   5. Acquired spondylolisthesis  M43.10 738.4   6. Synovial cyst  M71.30 727.40   7. Crohn's disease of small intestine without complication  K50.00 555.0   8. Family history of ulcerative colitis  Z83.79 V18.59   9. Abnormal CT scan, colon  R93.3 793.4   10. Dyslipidemia  E78.5 272.4   11. Morbid obesity  E66.01 278.01   12. Influenza vaccination declined  Z28.21 V64.06   13. Migraine with aura and without status migrainosus, not intractable  G43.109 346.00   14. Facial tingling  R20.2 782.0   15. Lower abdominal pain  R10.30 789.09   16. Rectal bleeding  K62.5 569.3   17. Diarrhea, unspecified type  R19.7 787.91   18. Essential hypertension  I10 401.9   19. Abnormal cardiac enzyme level  R74.8 790.5   20. Vitamin D deficiency  E55.9 268.9   21. Chest heaviness  R07.89 786.59   22. Snoring  R06.83 786.09   23. Vitamin B12 deficiency  E53.8 266.2   24. Thyroid disorder  E07.9 246.9   25. Restless legs syndrome (RLS)  G25.81 333.94   26. Rash  R21 782.1   27. Pain in the muscles  M79.10 729.1   28. Irregular periods  N92.6 626.4   29. Iron deficiency anemia, unspecified iron deficiency anemia type  D50.9 280.9     Patient Active Problem List   Diagnosis    Anemia    Elevated blood pressure    Fatigue    Insomnia    Iron deficiency anemia    Irregular periods    Pain in the muscles    Rash    Restless legs syndrome (RLS)    Thyroid disorder    Vitamin B12 deficiency    Snoring    Chest heaviness    Vitamin D deficiency    Abnormal cardiac enzyme level    Essential hypertension    Breast lump    Diarrhea    Rectal bleeding    Lower  "abdominal pain    Facial tingling    Migraine with aura and without status migrainosus, not intractable    Influenza vaccination declined    Morbid obesity    Dyslipidemia    Abnormal CT scan, colon    Family history of ulcerative colitis    Crohn's disease of small intestine without complication    Spondylolisthesis at L4-L5 level    Stenosis of lateral recess of lumbar spine    Degenerative lumbar disc    Acquired spondylolisthesis    Synovial cyst    Acquired spondylolysis     Past Medical History:   Diagnosis Date    Anemia     Body piercing     both ears    Bronchitis     Colitis     Crohn's disease 2023    incidental finding, asymptomatic    Disease of thyroid gland     Dissection of coronary artery 2017    pt denies this 10/2/19.  states she had testing at , but was told that they didn't \"find anything\"    Enteropathogenic Escherichia coli infection     Essential hypertension 2018    Gallstone     H/O echocardiogram     History of nuclear stress test     Hyperlipidemia     Kidney stones     Low back pain     Migraine     Obesity     Ovarian cyst     Prolonged Q-T interval on ECG     history    Restless leg syndrome     Subacute idiopathic myocarditis     Urinary tract infection     Vitamin B12 deficiency     Wears glasses     to read     Past Surgical History:   Procedure Laterality Date    BREAST SURGERY Bilateral 1987    breast reduction    CARDIAC CATHETERIZATION N/A 03/15/2017    Procedure: Coronary angiography;  Surgeon: Moustapha Peñaloza MD;  Location: [facility] CATH INVASIVE LOCATION;  Service:     CARDIAC CATHETERIZATION N/A 03/15/2017    Procedure: Left ventriculography;  Surgeon: Moustapha Peñaloza MD;  Location: [facility] CATH INVASIVE LOCATION;  Service:      SECTION      1991 & 1995    CHOLECYSTECTOMY  2013    COLONOSCOPY N/A 10/03/2019    Procedure: COLONOSCOPY WITH BIOPSIES; ANOSCOPY;  Surgeon: Jose Paez MD;  Location: [facility] ENDOSCOPY;  Service: " Gastroenterology    COLONOSCOPY N/A 08/01/2023    Procedure: COLONOSCOPY with biopsy;  Surgeon: Sami Elizondo MD;  Location: [facility] ENDOSCOPY;  Service: Gastroenterology;  Laterality: N/A;    ENDOSCOPY      GASTRIC BYPASS  08/2011    LUMBAR LAMINECTOMY WITH FUSION N/A 2/14/2024    Procedure: LUMBAR FUSION DECOMPRESSON WITH PEDICLE SCREWS L4-5;  Surgeon: Jeovanny Smith MD;  [facility];  Service: Neurosurgery;  Laterality: N/A;    STEROID INJECTION      x2 in back      General Information       Row Name 02/16/24 1421          Physical Therapy Time and Intention    Document Type therapy note (daily note)  -AB     Mode of Treatment physical therapy  -AB       Row Name 02/16/24 1421          General Information    Patient Profile Reviewed yes  -AB     Existing Precautions/Restrictions fall;spinal  -AB     Barriers to Rehab previous functional deficit  -AB       Row Name 02/16/24 1421          Cognition    Orientation Status (Cognition) oriented x 4  -AB       Row Name 02/16/24 1421          Safety Issues, Functional Mobility    Safety Issues Affecting Function (Mobility) awareness of need for assistance;insight into deficits/self-awareness;safety precaution awareness;safety precautions follow-through/compliance  -AB     Impairments Affecting Function (Mobility) endurance/activity tolerance;pain;strength;range of motion (ROM);balance  -AB               User Key  (r) = Recorded By, (t) = Taken By, (c) = Cosigned By      Initials Name Provider Type    AB Debbie Fairbanks, PT Physical Therapist                   Mobility       Row Name 02/16/24 1422          Bed Mobility    Comment, (Bed Mobility) received and left UIC  -AB       Row Name 02/16/24 1422          Transfers    Comment, (Transfers) Cues for hand placement and sequencing. Pt able to verbalized spinal precautions w/o cues.  -AB       Row Name 02/16/24 1422          Sit-Stand Transfer    Sit-Stand [place] (Transfers) contact guard;1 person  assist;verbal cues  -AB     Assistive Device (Sit-Stand Transfers) walker, front-wheeled  -AB     Comment, (Sit-Stand Transfer) Pt demonstrates excellent upright posture when performing STS  -AB       Row Name 02/16/24 1422          Gait/Stairs (Locomotion)    [place] Level (Gait) contact guard;1 person assist;verbal cues  -AB     Assistive Device (Gait) walker, front-wheeled  -AB     Distance in Feet (Gait) 350  -AB     Deviations/Abnormal Patterns (Gait) bilateral deviations;salvador decreased;gait speed decreased;stride length decreased  -AB     Comment, (Gait/Stairs) Pt ambulated with step through gait pattern and slowed salvador. Cues for walker positioning. No overt LOB or knee buckling. Further activity limited by fatigue.  -AB               User Key  (r) = Recorded By, (t) = Taken By, (c) = Cosigned By      Initials Name Provider Type    AB Debbie Fairbanks, PT Physical Therapist                   Obj/Interventions       Row Name 02/16/24 1427          Motor Skills    Therapeutic Exercise shoulder;hip;knee;ankle  Bent knee fallouts x3; abdominal sets x5  -AB       Row Name 02/16/24 1427          Shoulder (Therapeutic Exercise)    Shoulder (Therapeutic Exercise) AROM (active range of motion)  -AB     Shoulder AROM (Therapeutic Exercise) bilateral;flexion;3 repetitions  -AB       Row Name 02/16/24 1427          Hip (Therapeutic Exercise)    Hip (Therapeutic Exercise) AROM (active range of motion)  -AB     Hip AROM (Therapeutic Exercise) bilateral;external rotation;internal rotation;10 repetitions  -AB     Hip Isometrics (Therapeutic Exercise) bilateral;gluteal sets;3 repetitions  -AB       Row Name 02/16/24 1427          Knee (Therapeutic Exercise)    Knee Isometrics (Therapeutic Exercise) bilateral;quad sets;3 repetitions  -AB       Row Name 02/16/24 1427          Ankle (Therapeutic Exercise)    Ankle AROM (Therapeutic Exercise) bilateral;dorsiflexion;plantarflexion;10 repetitions  -AB       Row Name  02/16/24 1427          Balance    Balance Assessment sitting static balance;sitting dynamic balance;standing static balance;standing dynamic balance  -AB     Static Sitting Balance independent  -AB     Dynamic Sitting Balance independent  -AB     Position, Sitting Balance unsupported;sitting in chair  -AB     Static Standing Balance standby assist  -AB     Dynamic Standing Balance contact guard  -AB     Position/Device Used, Standing Balance walker, front-wheeled;supported  -AB     Balance Interventions standing;sitting;sit to stand;supported;static;dynamic;occupation based/functional task  -AB     Comment, Balance No overt LOB  -AB               User Key  (r) = Recorded By, (t) = Taken By, (c) = Cosigned By      Initials Name Provider Type    AB Debbie Fairbanks, PT Physical Therapist                   Goals/Plan    No documentation.                  Clinical Impression       Row Name 02/16/24 1429          Pain    Pretreatment Pain Rating 0/10 - no pain  -AB     Posttreatment Pain Rating 0/10 - no pain  -AB       Row Name 02/16/24 1429          Pain Scale: FACES Pre/Post-Treatment    Pain: FACES Scale, Pretreatment 4-->hurts little more  -AB     Posttreatment Pain Rating 4-->hurts little more  -AB       Row Name 02/16/24 1429          Plan of Care Review    Plan of Care Reviewed With patient;spouse  -AB     Progress no change  -AB     Outcome Evaluation Pt with good effort and ambulated 350' with CGA and RW. No overt LOB. HEP completed. Pt able to state spinal precautions w/o cues. Further IPPT is warrented for addressing strength and endurance deficits. PT will progress as able per POC.  -AB       Row Name 02/16/24 1429          Vital Signs    Pre Systolic BP Rehab 121  -AB     Pre Treatment Diastolic BP 78  -AB     Post Systolic BP Rehab 124  -AB     Post Treatment Diastolic BP 81  -AB     O2 Delivery Pre Treatment room air  -AB     O2 Delivery Intra Treatment room air  -AB     O2 Delivery Post Treatment room air   -AB     Pre Patient Position Sitting  -AB     Intra Patient Position Standing  -AB     Post Patient Position Sitting  -AB       Row Name 02/16/24 1429          Positioning and Restraints    Pre-Treatment Position sitting in chair/recliner  -AB     Post Treatment Position chair  -AB     In Chair notified nsg;reclined;sitting;call light within reach;with family/caregiver;legs elevated;waffle cushion  RN deferred chair alarm  -AB               User Key  (r) = Recorded By, (t) = Taken By, (c) = Cosigned By      Initials Name Provider Type    AB Debbie Fairbanks, PT Physical Therapist                   Outcome Measures       Row Name 02/16/24 1432          How much help from another person do you currently need...    Turning from your back to your side while in flat bed without using bedrails? 4  -AB     Moving from lying on back to sitting on the side of a flat bed without bedrails? 3  -AB     Moving to and from a bed to a chair (including a wheelchair)? 3  -AB     Standing up from a chair using your arms (e.g., wheelchair, bedside chair)? 3  -AB     Climbing 3-5 steps with a railing? 3  -AB     To walk in hospital room? 3  -AB     AM-PAC 6 Clicks Score (PT) 19  -AB     Highest Level of Mobility Goal 6 --> Walk 10 steps or more  -AB       Row Name 02/16/24 1432          Functional Assessment    Outcome Measure Options AM-PAC 6 Clicks Basic Mobility (PT)  -AB               User Key  (r) = Recorded By, (t) = Taken By, (c) = Cosigned By      Initials Name Provider Type    Debbie Kang, PT Physical Therapist                                 Physical Therapy Education       Title: PT OT SLP Therapies (In Progress)       Topic: Physical Therapy (Done)       Point: Mobility training (Done)       Learning Progress Summary             Patient Acceptance, E,D, VU,DU by AB at 2/16/2024 1432    Acceptance, E, VU by CK at 2/15/2024 1108    Comment: Reviewed spinal precautions and HEP, educated patient on use of FWW at D/C    Significant Other Acceptance, E, VU by CK at 2/15/2024 1108    Comment: Reviewed spinal precautions and HEP, educated patient on use of FWW at D/C                         Point: Home exercise program (Done)       Learning Progress Summary             Patient Acceptance, E,D, VU,DU by AB at 2/16/2024 1432    Acceptance, E, VU by CK at 2/15/2024 1108    Comment: Reviewed spinal precautions and HEP, educated patient on use of FWW at D/C   Significant Other Acceptance, E, VU by CK at 2/15/2024 1108    Comment: Reviewed spinal precautions and HEP, educated patient on use of FWW at D/C                         Point: Body mechanics (Done)       Learning Progress Summary             Patient Acceptance, E,D, VU,DU by AB at 2/16/2024 1432    Acceptance, E, VU by CK at 2/15/2024 1108    Comment: Reviewed spinal precautions and HEP, educated patient on use of FWW at D/C   Significant Other Acceptance, E, VU by CK at 2/15/2024 1108    Comment: Reviewed spinal precautions and HEP, educated patient on use of FWW at D/C                         Point: Precautions (Done)       Learning Progress Summary             Patient Acceptance, E,D, VU,DU by AB at 2/16/2024 1432    Acceptance, E, VU by CK at 2/15/2024 1108    Comment: Reviewed spinal precautions and HEP, educated patient on use of FWW at D/C   Significant Other Acceptance, E, VU by CK at 2/15/2024 1108    Comment: Reviewed spinal precautions and HEP, educated patient on use of FWW at D/C                                         User Key       Initials Effective Dates Name Provider Type Discipline    AB 09/22/22 -  Debbie Fairbanks, PT Physical Therapist PT    CK 02/06/24 -  Brigida Banegas PT Physical Therapist PT                  PT Recommendation and Plan     Plan of Care Reviewed With: patient, spouse  Progress: no change  Outcome Evaluation: Pt with good effort and ambulated 350' with CGA and RW. No overt LOB. HEP completed. Pt able to state spinal precautions w/o cues.  Further IPPT is warrented for addressing strength and endurance deficits. PT will progress as able per POC.     Time Calculation:         PT Charges       Row Name 02/16/24 1433             Time Calculation    Start Time 1355  -AB      PT Received On 02/16/24  -AB         Timed Charges    31407 - PT Therapeutic Exercise Minutes 10  -AB      11675 - Gait Training Minutes  10  -AB      90907 - PT Therapeutic Activity Minutes 3  -AB         Total Minutes    Timed Charges Total Minutes 23  -AB       Total Minutes 23  -AB                User Key  (r) = Recorded By, (t) = Taken By, (c) = Cosigned By      Initials Name Provider Type    AB Debbie Fairbanks, PT Physical Therapist                  Therapy Charges for Today       Code Description Service Date Service Provider Modifiers Qty    69456539467 HC PT THER PROC EA 15 MIN 2/16/2024 Debbie Fairbanks, PT GP 1    84923242510 HC GAIT TRAINING EA 15 MIN 2/16/2024 Debbie Fairbanks, PT GP 1            PT G-Codes  Outcome Measure Options: AM-PAC 6 Clicks Basic Mobility (PT)  AM-PAC 6 Clicks Score (PT): 19  AM-PAC 6 Clicks Score (OT): 19  PT Discharge Summary  Anticipated Discharge Disposition (PT): home with assist    Debbie Fairbanks PT  2/16/2024

## 2024-02-16 NOTE — PLAN OF CARE
Goal Outcome Evaluation:  Plan of Care Reviewed With: patient, spouse        Progress: no change  Outcome Evaluation: Pt with good effort and ambulated 350' with CGA and RW. No overt LOB. HEP completed. Pt able to state spinal precautions w/o cues. Further IPPT is warrented for addressing strength and endurance deficits. PT will progress as able per POC.      Anticipated Discharge Disposition (PT): home with assist

## 2024-02-16 NOTE — DISCHARGE SUMMARY
[facility]    Date of Admission: 2/14/2024  Date of Discharge: 2/16/2024    Discharge Diagnosis:   Degenerative lumbar disc [M51.36]  Spondylolisthesis at L4-L5 level [M43.16]  Stenosis of lateral recess of lumbar spine [M48.061]  Acquired spondylolisthesis [M43.10]  Acquired spondylolysis [M43.00]     Procedures Performed:  Procedure(s):  LUMBAR FUSION DECOMPRESSON WITH PEDICLE SCREWS L4-5       Presenting Problem/History of Present Illness  Degenerative lumbar disc [M51.36]  Spondylolisthesis at L4-L5 level [M43.16]  Stenosis of lateral recess of lumbar spine [M48.061]  Acquired spondylolisthesis [M43.10]  Acquired spondylolysis [M43.00]     Hospital Course:  Patient is a 53 y.o. female presented to the neurosurgery office for back and right leg pain.  She was found to have spondylolisthesis and synovial cyst at L4-5 on MRI lumbar spine.  After failing conservative measures, Dr. Smith recommended L4-5 TLIF.  She underwent surgery on 2/14/2024 which without complications.  Postoperatively, she notes this resolution of her right leg pain.  She is neurologically intact on exam.  Postoperative x-rays show stable hardware placement.  Drain was discontinued on postop day 2 without issue.  Her pain is controlled with p.o. pain meds.  She is passing gas, ambulating and voiding on her own.  Patient discharged home today in satisfactory condition.  She will follow-up in the neurosurgery office in 2 weeks for wound check.  She can resume aspirin 5 days postoperatively.  She will hold Humira for 8 weeks postop as it can delay wound healing.    Condition on Discharge: Satisfactory    Discharge Disposition:  Home or Self Care    Discharge Medications     Discharge Medications        New Medications        Instructions Start Date   diazePAM 5 MG tablet  Commonly known as: VALIUM   5 mg, Oral, Every 8 Hours PRN      HYDROcodone-acetaminophen 5-325 MG per tablet  Commonly  known as: NORCO   1 tablet, Oral, Every 6 Hours PRN      naloxone 4 MG/0.1ML nasal spray  Commonly known as: NARCAN   Call 911. Don't prime. [place] in 1 nostril for overdose. Repeat in 2-3 minutes in other nostril if no or minimal breathing/responsiveness.      sennosides-docusate 8.6-50 MG per tablet  Commonly known as: PERICOLACE   1 tablet, Oral, Daily, Take while using hydrocodone to prevent constipation             Changes to Medications        Instructions Start Date   carbidopa-levodopa CR  MG per CR tablet  Commonly known as: SINEMET CR  What changed:   how much to take  how to take this  when to take this   1 tab tid and 1 extra if needed prn breakthrough rls symptoms      metoprolol tartrate 25 MG tablet  Commonly known as: LOPRESSOR  What changed: when to take this   TAKE 1 TABLET BY MOUTH EVERY 12 HOURS             Continue These Medications        Instructions Start Date   atorvastatin 40 MG tablet  Commonly known as: LIPITOR   40 mg, Oral, Nightly      Ferrous Fumarate 324 (106 Fe) MG tablet  Commonly known as: Ferrocite   1 tablet, Oral, 2 Times Daily      levothyroxine 88 MCG tablet  Commonly known as: SYNTHROID, LEVOTHROID   88 mcg, Oral, Daily      Lyrica 100 MG capsule  Generic drug: pregabalin   100 mg, Oral, 2 Times Daily      pramipexole 0.5 MG tablet  Commonly known as: MIRAPEX   0.25 mg, Oral, 3 Times Daily             Stop These Medications      Adalimumab 40 MG/0.4ML Pen-injector Kit  Commonly known as: HUMIRA     ASPIRIN 81 PO     Humira-CD/UC/HS Starter 80 MG/0.8ML injection  Generic drug: Adalimumab            ASK your doctor about these medications        Instructions Start Date   ubrogepant 100 MG tablet  Commonly known as: UBRELVY   Take 1 tablet at onset of migraine, may repeat once in 2 hours if needed               Patient Education:  Patient is to limit activity, and to avoid bending, lifting, twisting.  In general we recommend limiting physical activity until first  postoperative appointment in 2 weeks.  Attempt to not lift more than 5 to 10 pounds.     Patient may shower 48 hours postoperatively.  Do not submerge the incision, this includes baths, pools, hot tubs, etc. Keep incision as dry as possible.  Your sutures will not need to be removed as they will dissolve on their own.    Bandage may removed 48 hours postoperatively.  If for comfort reasons she prefers to have the bandage on, that is welcome. If wound is seeping a small amount, we recommend to continue bandaging.    Common postoperative symptoms from a posterior lumbar fusion include: Muscle spasms in the lower back, postoperative back pain, a lesser intensity of symptoms experienced prior to surgery.    Patient is to contact the office if they have any of the following:    Incision opening/stitches coming apart.  Significant swelling around the incision site.  (May attempt to ice incision first.)  Increased redness around the incision site and it becoming warm to the touch.  Signs of infection such as: Fever, chills, etc.  It is normal to have a small amount of yellow/red discharge for the first week, contact our office if it is expensive (soaking through entire bandages) or cloudy in nature.  Excessive pain.  The above list is not exhaustive.    Please avoid taking any ibuprofen or NSAIDs (Ibuprofen, Aleeve, Motrin, Naproxen) for the next 3 to 6 months, as that can inhibit bone fusion.  Hold Aspirin for 5 days post op.  Hold Humira for 8 weeks post-op      Follow-up Appointments:   Contact information for follow-up providers       Cherie Magana PA-C Follow up in 2 week(s).    Specialty: Neurosurgery  Contact information:  [address]   [address]  [facility] [id number] [phone]               Floridalma Jimenez MD .    Specialty: Family Medicine  Contact information:  [address]  [facility] [id number] [phone]                       Contact information for after-discharge care        Durable Medical Equipment       [facility]    Service: Durable Medical Equipment  Contact information:  [address] [address]  [phone]                                     Cherie Magana PA-C  02/16/24  12:16 EST

## 2024-02-16 NOTE — PROGRESS NOTES
"  [facility]    NEUROSURGERY PROGRESS NOTE    02/16/24    Chief Complaint: Lumbar spondylolisthesis    Subjective: Stable overnight    2 Days Post-Op    Objective:     /80 (BP Location: Left arm, Patient Position: Lying)   Pulse 75   Temp 98.6 °F (37 °C) (Oral)   Resp 18   Ht 164.5 cm (64.75\")   Wt 116 kg (254 lb 13.6 oz)   LMP 12/01/2018 (LMP Unknown)   SpO2 96%   BMI 42.74 kg/m²        Physical Exam  Patient appears comfortable, resting  Awake, alert and oriented x 3  Speech f/c  Opens eyes spontaneously  EOM intact bilaterally  Pupils 3mm reactive bilaterally  Face symmetric  Tongue midline  Motor exam shows 5/5 strength in all 4 extremities  Incision C/D/I        Intake/Output Summary (Last 24 hours) at 2/16/2024 0928  Last data filed at 2/16/2024 0900  Gross per 24 hour   Intake 1020 ml   Output 420 ml   Net 600 ml         Current Facility-Administered Medications:     atorvastatin (LIPITOR) tablet 40 mg, 40 mg, Oral, Nightly, Jeovanny Smith MD    carbidopa-levodopa CR (SINEMET CR)  MG per CR tablet 1 tablet, 1 tablet, Oral, TID, Jeovanny Smith MD, 1 tablet at 02/16/24 0850    diazePAM (VALIUM) tablet 5 mg, 5 mg, Oral, Q6H PRN, Jeovanny Smith MD, 5 mg at 02/16/24 0401    docusate sodium (COLACE) capsule 100 mg, 100 mg, Oral, BID PRN, Jeovanny Smith MD, 100 mg at 02/16/24 0850    Enoxaparin Sodium (LOVENOX) syringe 40 mg, 40 mg, Subcutaneous, Daily, Jeovanny Smith MD, 40 mg at 02/15/24 2048    ferrous sulfate tablet 325 mg, 325 mg, Oral, Daily With Breakfast, Jeovanny Smith MD, 325 mg at 02/16/24 0850    HYDROcodone-acetaminophen (NORCO) 5-325 MG per tablet 2 tablet, 2 tablet, Oral, Q4H PRN, Jeovanny Smith MD, 2 tablet at 02/16/24 0849    HYDROmorphone (DILAUDID) injection 0.5 mg, 0.5 mg, Intravenous, Q2H PRN **AND** naloxone (NARCAN) injection 0.4 mg, 0.4 mg, Intravenous, Q5 Min PRN, Jeovanny Smith MD    " lactated ringers infusion, 9 mL/hr, Intravenous, Continuous, Jeovanny Smith MD, Last Rate: 9 mL/hr at 02/14/24 1113, Restarted at 02/14/24 1122    levothyroxine (SYNTHROID, LEVOTHROID) tablet 88 mcg, 88 mcg, Oral, Q AM, Jeovanny Smith MD, 88 mcg at 02/16/24 0519    magnesium hydroxide (MILK OF MAGNESIA) suspension 10 mL, 10 mL, Oral, Daily PRN, Jeovanny Smith MD    metoprolol tartrate (LOPRESSOR) tablet 25 mg, 25 mg, Oral, Q12H, Jeovanny Smith MD, 25 mg at 02/16/24 0850    ondansetron ODT (ZOFRAN-ODT) disintegrating tablet 4 mg, 4 mg, Oral, Q6H PRN **OR** ondansetron (ZOFRAN) injection 4 mg, 4 mg, Intravenous, Q6H PRN, Jeovanny Smith MD    polyethylene glycol (MIRALAX) packet 17 g, 17 g, Oral, Daily PRN, Jeovanny Smith MD, 17 g at 02/16/24 0850    pramipexole (MIRAPEX) tablet 0.25 mg, 0.25 mg, Oral, TID, Jeovanny Smith MD, 0.25 mg at 02/16/24 0850    pregabalin (LYRICA) capsule 100 mg, 100 mg, Oral, BID, Jeovanny Smith MD, 100 mg at 02/16/24 0850    sennosides-docusate (PERICOLACE) 8.6-50 MG per tablet 1 tablet, 1 tablet, Oral, Nightly PRN, Jeovanny Smith MD    sodium chloride 0.9 % flush 10 mL, 10 mL, Intravenous, PRN, Jeovanny Smith MD    sodium chloride 0.9 % flush 3 mL, 3 mL, Intravenous, Q12H, Jeovanny Smith MD, 3 mL at 02/15/24 2056    sodium chloride 0.9 % infusion 40 mL, 40 mL, Intravenous, PRN, Jeovanny Smith MD    Laboratory Results:        Lab 02/15/24  0403   WBC 10.69   HEMOGLOBIN 11.3*   HEMATOCRIT 34.0   PLATELETS 199   NEUTROS ABS 9.38*   IMMATURE GRANS (ABS) 0.07*   LYMPHS ABS 0.72   MONOS ABS 0.51   EOS ABS 0.00   .4*         Lab 02/15/24  0403   SODIUM 138   POTASSIUM 5.2   CHLORIDE 103   CO2 26.0   ANION GAP 9.0   BUN 18   CREATININE 0.74   EGFR 95.7   GLUCOSE 150*   CALCIUM 8.6                         Brief Urine Lab Results       None          Microbiology Results (last 10 days)       Procedure Component Value - Date/Time    MRSA Screen  Culture (Outpatient) - Swab, Nares [[id number]]  (Normal) Collected: 02/09/24 0838    Lab Status: Final result Specimen: Swab from Nares Updated: 02/10/24 1510     MRSA Screen Cx No Methicillin Resistant Staphylococcus aureus isolated    Narrative:      The negative predictive value of this diagnostic test is high and should only be used to consider de-escalating anti-MRSA therapy. A positive result may indicate colonization with MRSA and must be correlated clinically.  The negative predictive value of this diagnostic test is high and should only be used to consider de-escalating anti-MRSA therapy. A positive result may indicate colonization with MRSA and must be correlated clinically.                     Diagnostic Imaging: I personally reviewed and interpreted the new imaging    Assessment and plan:  This is a 53-year-old female who is status post L4-5 fusion with Dr. Smith. Surgery went without complications. Postoperatively, patient notes resolution of her preoperative right leg pain. She is dealing with some moderate amount of back stiffness and incisional pain however states that it feels better when she is up and moving. Remove drain today. Plan to discharge home today. Activities, restrictions, wound care were discussed with the patient.  She will follow-up in the neurosurgery office in 2 weeks for wound check.      Any copied data from previous notes included in the (1) HPI, (2) PE, (3) MDM and/or Assessment and Plan has been reviewed and accurate as of 02/16/24.    Cherie Magana PA-C  02/16/24  09:28 EST

## 2024-02-17 ENCOUNTER — DOCUMENTATION (OUTPATIENT)
Dept: NEUROSURGERY | Facility: CLINIC | Age: 54
End: 2024-02-17
Payer: COMMERCIAL

## 2024-02-17 DIAGNOSIS — M43.16 SPONDYLOLISTHESIS AT L4-L5 LEVEL: ICD-10-CM

## 2024-02-17 DIAGNOSIS — M43.16 SPONDYLOLISTHESIS AT L4-L5 LEVEL: Primary | ICD-10-CM

## 2024-02-17 RX ORDER — HYDROCODONE BITARTRATE AND ACETAMINOPHEN 10; 325 MG/1; MG/1
1 TABLET ORAL EVERY 4 HOURS PRN
Qty: 20 TABLET | Refills: 0 | Status: SHIPPED | OUTPATIENT
Start: 2024-02-17

## 2024-02-17 RX ORDER — HYDROCODONE BITARTRATE AND ACETAMINOPHEN 10; 325 MG/1; MG/1
1 TABLET ORAL EVERY 4 HOURS PRN
Qty: 20 TABLET | Refills: 0 | Status: SHIPPED | OUTPATIENT
Start: 2024-02-17 | End: 2024-02-17 | Stop reason: SDUPTHER

## 2024-02-18 ENCOUNTER — DOCUMENTATION (OUTPATIENT)
Dept: NEUROSURGERY | Facility: CLINIC | Age: 54
End: 2024-02-18
Payer: COMMERCIAL

## 2024-02-18 ENCOUNTER — APPOINTMENT (OUTPATIENT)
Dept: GENERAL RADIOLOGY | Facility: HOSPITAL | Age: 54
End: 2024-02-18
Payer: COMMERCIAL

## 2024-02-18 ENCOUNTER — HOSPITAL ENCOUNTER (EMERGENCY)
Facility: HOSPITAL | Age: 54
Discharge: HOME | End: 2024-02-18
Attending: EMERGENCY MEDICINE | Admitting: EMERGENCY MEDICINE
Payer: COMMERCIAL

## 2024-02-18 VITALS
SYSTOLIC BLOOD PRESSURE: 114 MMHG | RESPIRATION RATE: 14 BRPM | HEIGHT: 64 IN | TEMPERATURE: 98.6 F | WEIGHT: 245 LBS | BODY MASS INDEX: 41.83 KG/M2 | DIASTOLIC BLOOD PRESSURE: 67 MMHG | OXYGEN SATURATION: 96 % | HEART RATE: 82 BPM

## 2024-02-18 DIAGNOSIS — G89.18 ACUTE POST-OPERATIVE PAIN: ICD-10-CM

## 2024-02-18 DIAGNOSIS — R50.9 FEVER, UNSPECIFIED FEVER CAUSE: Primary | ICD-10-CM

## 2024-02-18 LAB
ALBUMIN SERPL-MCNC: 3.7 G/DL (ref 3.5–5.2)
ALBUMIN/GLOB SERPL: 1.2 G/DL
ALP SERPL-CCNC: 67 U/L (ref 39–117)
ALT SERPL W P-5'-P-CCNC: <5 U/L (ref 1–33)
ANION GAP SERPL CALCULATED.3IONS-SCNC: 8.5 MMOL/L (ref 5–15)
AST SERPL-CCNC: 21 U/L (ref 1–32)
BASOPHILS # BLD AUTO: 0.02 10*3/MM3 (ref 0–0.2)
BASOPHILS NFR BLD AUTO: 0.3 % (ref 0–1.5)
BILIRUB SERPL-MCNC: 0.6 MG/DL (ref 0–1.2)
BILIRUB UR QL STRIP: NEGATIVE
BUN SERPL-MCNC: 13 MG/DL (ref 6–20)
BUN/CREAT SERPL: 15.5 (ref 7–25)
CALCIUM SPEC-SCNC: 8.9 MG/DL (ref 8.6–10.5)
CHLORIDE SERPL-SCNC: 99 MMOL/L (ref 98–107)
CLARITY UR: CLEAR
CO2 SERPL-SCNC: 29.5 MMOL/L (ref 22–29)
COLOR UR: YELLOW
CREAT SERPL-MCNC: 0.84 MG/DL (ref 0.57–1)
CRP SERPL-MCNC: 20.65 MG/DL (ref 0–0.5)
D-LACTATE SERPL-SCNC: 0.7 MMOL/L (ref 0.5–2)
DEPRECATED RDW RBC AUTO: 46.5 FL (ref 37–54)
EGFRCR SERPLBLD CKD-EPI 2021: 82.2 ML/MIN/1.73
EOSINOPHIL # BLD AUTO: 0.3 10*3/MM3 (ref 0–0.4)
EOSINOPHIL NFR BLD AUTO: 4.9 % (ref 0.3–6.2)
ERYTHROCYTE [DISTWIDTH] IN BLOOD BY AUTOMATED COUNT: 12.2 % (ref 12.3–15.4)
ERYTHROCYTE [SEDIMENTATION RATE] IN BLOOD: 28 MM/HR (ref 0–30)
FLUAV RNA RESP QL NAA+PROBE: NOT DETECTED
FLUBV RNA RESP QL NAA+PROBE: NOT DETECTED
GLOBULIN UR ELPH-MCNC: 3 GM/DL
GLUCOSE SERPL-MCNC: 90 MG/DL (ref 65–99)
GLUCOSE UR STRIP-MCNC: NEGATIVE MG/DL
HCT VFR BLD AUTO: 33.4 % (ref 34–46.6)
HGB BLD-MCNC: 10.8 G/DL (ref 12–15.9)
HGB UR QL STRIP.AUTO: NEGATIVE
IMM GRANULOCYTES # BLD AUTO: 0.03 10*3/MM3 (ref 0–0.05)
IMM GRANULOCYTES NFR BLD AUTO: 0.5 % (ref 0–0.5)
KETONES UR QL STRIP: NEGATIVE
LEUKOCYTE ESTERASE UR QL STRIP.AUTO: NEGATIVE
LYMPHOCYTES # BLD AUTO: 1.63 10*3/MM3 (ref 0.7–3.1)
LYMPHOCYTES NFR BLD AUTO: 26.5 % (ref 19.6–45.3)
MCH RBC QN AUTO: 33 PG (ref 26.6–33)
MCHC RBC AUTO-ENTMCNC: 32.3 G/DL (ref 31.5–35.7)
MCV RBC AUTO: 102.1 FL (ref 79–97)
MONOCYTES # BLD AUTO: 0.56 10*3/MM3 (ref 0.1–0.9)
MONOCYTES NFR BLD AUTO: 9.1 % (ref 5–12)
NEUTROPHILS NFR BLD AUTO: 3.61 10*3/MM3 (ref 1.7–7)
NEUTROPHILS NFR BLD AUTO: 58.7 % (ref 42.7–76)
NITRITE UR QL STRIP: NEGATIVE
NRBC BLD AUTO-RTO: 0 /100 WBC (ref 0–0.2)
PH UR STRIP.AUTO: 6 [PH] (ref 5–8)
PLATELET # BLD AUTO: 233 10*3/MM3 (ref 140–450)
PMV BLD AUTO: 9.9 FL (ref 6–12)
POTASSIUM SERPL-SCNC: 4.1 MMOL/L (ref 3.5–5.2)
PROCALCITONIN SERPL-MCNC: 0.1 NG/ML (ref 0–0.25)
PROT SERPL-MCNC: 6.7 G/DL (ref 6–8.5)
PROT UR QL STRIP: NEGATIVE
RBC # BLD AUTO: 3.27 10*6/MM3 (ref 3.77–5.28)
RSV RNA RESP QL NAA+PROBE: NOT DETECTED
S PYO AG THROAT QL: NEGATIVE
SARS-COV-2 RNA RESP QL NAA+PROBE: NOT DETECTED
SODIUM SERPL-SCNC: 137 MMOL/L (ref 136–145)
SP GR UR STRIP: 1.02 (ref 1–1.03)
UROBILINOGEN UR QL STRIP: NORMAL
WBC NRBC COR # BLD AUTO: 6.15 10*3/MM3 (ref 3.4–10.8)

## 2024-02-18 PROCEDURE — 36415 COLL VENOUS BLD VENIPUNCTURE: CPT

## 2024-02-18 PROCEDURE — 85652 RBC SED RATE AUTOMATED: CPT | Performed by: EMERGENCY MEDICINE

## 2024-02-18 PROCEDURE — 87880 STREP A ASSAY W/OPTIC: CPT | Performed by: EMERGENCY MEDICINE

## 2024-02-18 PROCEDURE — 87637 SARSCOV2&INF A&B&RSV AMP PRB: CPT | Performed by: EMERGENCY MEDICINE

## 2024-02-18 PROCEDURE — 81003 URINALYSIS AUTO W/O SCOPE: CPT | Performed by: EMERGENCY MEDICINE

## 2024-02-18 PROCEDURE — 85025 COMPLETE CBC W/AUTO DIFF WBC: CPT | Performed by: EMERGENCY MEDICINE

## 2024-02-18 PROCEDURE — 86140 C-REACTIVE PROTEIN: CPT | Performed by: EMERGENCY MEDICINE

## 2024-02-18 PROCEDURE — 87040 BLOOD CULTURE FOR BACTERIA: CPT | Performed by: EMERGENCY MEDICINE

## 2024-02-18 PROCEDURE — 71046 X-RAY EXAM CHEST 2 VIEWS: CPT

## 2024-02-18 PROCEDURE — 87081 CULTURE SCREEN ONLY: CPT | Performed by: EMERGENCY MEDICINE

## 2024-02-18 PROCEDURE — 84145 PROCALCITONIN (PCT): CPT | Performed by: EMERGENCY MEDICINE

## 2024-02-18 PROCEDURE — 83605 ASSAY OF LACTIC ACID: CPT | Performed by: EMERGENCY MEDICINE

## 2024-02-18 PROCEDURE — 80053 COMPREHEN METABOLIC PANEL: CPT | Performed by: EMERGENCY MEDICINE

## 2024-02-18 PROCEDURE — 99283 EMERGENCY DEPT VISIT LOW MDM: CPT

## 2024-02-18 RX ORDER — SODIUM CHLORIDE 0.9 % (FLUSH) 0.9 %
10 SYRINGE (ML) INJECTION AS NEEDED
Status: DISCONTINUED | OUTPATIENT
Start: 2024-02-18 | End: 2024-02-18 | Stop reason: HOSPADM

## 2024-02-18 RX ORDER — ACETAMINOPHEN 325 MG/1
975 TABLET ORAL ONCE
Status: COMPLETED | OUTPATIENT
Start: 2024-02-18 | End: 2024-02-18

## 2024-02-18 RX ORDER — OXYCODONE HYDROCHLORIDE 5 MG/1
5 TABLET ORAL ONCE
Status: COMPLETED | OUTPATIENT
Start: 2024-02-18 | End: 2024-02-18

## 2024-02-18 RX ADMIN — OXYCODONE HYDROCHLORIDE 5 MG: 5 TABLET ORAL at 14:40

## 2024-02-18 RX ADMIN — ACETAMINOPHEN 975 MG: 325 TABLET, FILM COATED ORAL at 14:40

## 2024-02-18 NOTE — ED NOTES
Called [facility] at this time requesting Neurosurgery consult per MD Mercedez request

## 2024-02-18 NOTE — ED PROVIDER NOTES
" EMERGENCY DEPARTMENT ENCOUNTER    Pt Name: Mary Lunsford  MRN: [id number]  Pt :   1970  Room Number:    Date of encounter:  2024  PCP: Floridalma Jimenez MD  ED Provider: Diogo Newsome MD    Historian: Patient      HPI:  Chief Complaint   Patient presents with    Post-op Problem          Context: Mary Lunsford is a 53 y.o. female who presents to the ED c/o fever, postop complication, the patient had lumbar fusion surgery several days ago, since then she has had fevers at home and worsening pain.  Patient upped her pain medication after discussion with the neurosurgery clinic, that has been helping some.  She denies cough, fevers, sick contacts.  She did have a sore throat after the surgery, which was felt to be related to the intubation during the surgery      PAST MEDICAL HISTORY  Past Medical History:   Diagnosis Date    Anemia     Body piercing     both ears    Bronchitis     Colitis     Crohn's disease 2023    incidental finding, asymptomatic    Disease of thyroid gland     Dissection of coronary artery 2017    pt denies this 10/2/19.  states she had testing at [facility], but was told that they didn't \"find anything\"    Enteropathogenic Escherichia coli infection     Essential hypertension 2018    Gallstone     H/O echocardiogram     History of nuclear stress test     Hyperlipidemia     Kidney stones     Low back pain     Migraine     Obesity     Ovarian cyst     Prolonged Q-T interval on ECG     history    Restless leg syndrome     Subacute idiopathic myocarditis     Urinary tract infection     Vitamin B12 deficiency     Wears glasses     to read         PAST SURGICAL HISTORY  Past Surgical History:   Procedure Laterality Date    BREAST SURGERY Bilateral 1987    breast reduction    CARDIAC CATHETERIZATION N/A 03/15/2017    Procedure: Coronary angiography;  Surgeon: Moustapha Peñaloza MD;  Location: [facility] CATH INVASIVE LOCATION;  Service:     CARDIAC " CATHETERIZATION N/A 03/15/2017    Procedure: Left ventriculography;  Surgeon: Moustapha Peñaloza MD;  Location: [facility] CATH INVASIVE LOCATION;  Service:      SECTION      1991 & 1995    CHOLECYSTECTOMY  2013    COLONOSCOPY N/A 10/03/2019    Procedure: COLONOSCOPY WITH BIOPSIES; ANOSCOPY;  Surgeon: Jose Paez MD;  Location: [facility] ENDOSCOPY;  Service: Gastroenterology    COLONOSCOPY N/A 2023    Procedure: COLONOSCOPY with biopsy;  Surgeon: Sami Elizondo MD;  Location: [facility] ENDOSCOPY;  Service: Gastroenterology;  Laterality: N/A;    ENDOSCOPY      GASTRIC BYPASS  2011    LUMBAR LAMINECTOMY WITH FUSION N/A 2024    Procedure: LUMBAR FUSION DECOMPRESSON WITH PEDICLE SCREWS L4-5;  Surgeon: Jeovanny Smith MD;  Location: [facility] OR;  Service: Neurosurgery;  Laterality: N/A;    STEROID INJECTION      x2 in back         FAMILY HISTORY  Family History   Problem Relation Age of Onset    Cancer Father     Heart attack Father     Cancer Other     Other Other     Breast cancer Mother     Asthma Brother     Ulcerative colitis Brother     Arthritis Paternal Grandmother     Colon cancer Neg Hx          SOCIAL HISTORY  Social History     Socioeconomic History    Marital status:    Tobacco Use    Smoking status: Former     Packs/day: 1.00     Years: 2.00     Additional pack years: 0.00     Total pack years: 2.00     Types: Cigarettes     Quit date: 10/1/1995     Years since quittin.4    Smokeless tobacco: Never   Vaping Use    Vaping Use: Never used   Substance and Sexual Activity    Alcohol use: Yes     Alcohol/week: 12.0 standard drinks of alcohol     Types: 12 Cans of beer per week     Comment: 16 beers per week    Drug use: No    Sexual activity: Defer         ALLERGIES  Sulfa antibiotics        REVIEW OF SYSTEMS  Review of Systems   Constitutional:  Positive for chills and fever.   HENT:  Negative for sore throat and trouble swallowing.    Eyes:  Negative for pain  and redness.   Respiratory:  Negative for cough and shortness of breath.    Cardiovascular:  Negative for chest pain and leg swelling.   Gastrointestinal:  Negative for abdominal pain, nausea and vomiting.   Genitourinary:  Negative for dysuria and urgency.   Musculoskeletal:  Positive for back pain. Negative for neck pain.   Skin:  Negative for rash and wound.   Neurological:  Negative for dizziness and weakness.        All systems reviewed and negative except for those discussed in HPI.       PHYSICAL EXAM    I have reviewed the triage vital signs and nursing notes.    ED Triage Vitals [02/18/24 1220]   Temp Heart Rate Resp BP SpO2   98.6 °F (37 °C) 82 14 128/83 95 %      Temp src Heart Rate Source Patient Position BP Location FiO2 (%)   Oral Monitor -- -- --       Physical Exam  Constitutional:       Appearance: Normal appearance. She is not ill-appearing.   HENT:      Head: Normocephalic and atraumatic.      Right Ear: External ear normal.      Left Ear: External ear normal.      Nose: Nose normal.      Mouth/Throat:      Mouth: Mucous membranes are moist.      Pharynx: Oropharynx is clear.   Eyes:      Extraocular Movements: Extraocular movements intact.      Conjunctiva/sclera: Conjunctivae normal.      Pupils: Pupils are equal, round, and reactive to light.   Cardiovascular:      Rate and Rhythm: Normal rate and regular rhythm.      Pulses:           Radial pulses are 2+ on the right side and 2+ on the left side.   Pulmonary:      Effort: Pulmonary effort is normal.      Breath sounds: Normal breath sounds.   Abdominal:      General: There is no distension.      Palpations: Abdomen is soft.      Tenderness: There is no abdominal tenderness.   Musculoskeletal:         General: No tenderness or deformity. Normal range of motion.        Arms:       Cervical back: Normal range of motion and neck supple.      Right lower leg: No edema.      Left lower leg: No edema.   Skin:     General: Skin is warm and dry.       Capillary Refill: Capillary refill takes less than 2 seconds.   Neurological:      General: No focal deficit present.      Mental Status: She is alert and oriented to person, place, and time.            LAB RESULTS  Recent Results (from the past 24 hour(s))   COVID-19, FLU A/B, RSV PCR 1 HR TAT - Swab, Nasopharynx    Collection Time: 02/18/24  2:16 PM    Specimen: Nasopharynx; Swab   Result Value Ref Range    COVID19 Not Detected Not Detected - Ref. Range    Influenza A PCR Not Detected Not Detected    Influenza B PCR Not Detected Not Detected    RSV, PCR Not Detected Not Detected   Urinalysis With Culture If Indicated - Urine, Clean Catch    Collection Time: 02/18/24  2:16 PM    Specimen: Urine, Clean Catch   Result Value Ref Range    Color, UA Yellow Yellow, Straw    Appearance, UA Clear Clear    pH, UA 6.0 5.0 - 8.0    Specific Gravity, UA 1.021 1.005 - 1.030    Glucose, UA Negative Negative    Ketones, UA Negative Negative    Bilirubin, UA Negative Negative    Blood, UA Negative Negative    Protein, UA Negative Negative    Leuk Esterase, UA Negative Negative    Nitrite, UA Negative Negative    Urobilinogen, UA 1.0 E.U./dL 0.2 - 1.0 E.U./dL   Comprehensive Metabolic Panel    Collection Time: 02/18/24  2:52 PM    Specimen: Blood   Result Value Ref Range    Glucose 90 65 - 99 mg/dL    BUN 13 6 - 20 mg/dL    Creatinine 0.84 0.57 - 1.00 mg/dL    Sodium 137 136 - 145 mmol/L    Potassium 4.1 3.5 - 5.2 mmol/L    Chloride 99 98 - 107 mmol/L    CO2 29.5 (H) 22.0 - 29.0 mmol/L    Calcium 8.9 8.6 - 10.5 mg/dL    Total Protein 6.7 6.0 - 8.5 g/dL    Albumin 3.7 3.5 - 5.2 g/dL    ALT (SGPT) <5 1 - 33 U/L    AST (SGOT) 21 1 - 32 U/L    Alkaline Phosphatase 67 39 - 117 U/L    Total Bilirubin 0.6 0.0 - 1.2 mg/dL    Globulin 3.0 gm/dL    A/G Ratio 1.2 g/dL    BUN/Creatinine Ratio 15.5 7.0 - 25.0    Anion Gap 8.5 5.0 - 15.0 mmol/L    eGFR 82.2 >60.0 mL/min/1.73   Lactic Acid, Plasma    Collection Time: 02/18/24  2:52 PM     Specimen: Blood   Result Value Ref Range    Lactate 0.7 0.5 - 2.0 mmol/L   Procalcitonin    Collection Time: 02/18/24  2:52 PM    Specimen: Blood   Result Value Ref Range    Procalcitonin 0.10 0.00 - 0.25 ng/mL   CBC Auto Differential    Collection Time: 02/18/24  2:52 PM    Specimen: Blood   Result Value Ref Range    WBC 6.15 3.40 - 10.80 10*3/mm3    RBC 3.27 (L) 3.77 - 5.28 10*6/mm3    Hemoglobin 10.8 (L) 12.0 - 15.9 g/dL    Hematocrit 33.4 (L) 34.0 - 46.6 %    .1 (H) 79.0 - 97.0 fL    MCH 33.0 26.6 - 33.0 pg    MCHC 32.3 31.5 - 35.7 g/dL    RDW 12.2 (L) 12.3 - 15.4 %    RDW-SD 46.5 37.0 - 54.0 fl    MPV 9.9 6.0 - 12.0 fL    Platelets 233 140 - 450 10*3/mm3    Neutrophil % 58.7 42.7 - 76.0 %    Lymphocyte % 26.5 19.6 - 45.3 %    Monocyte % 9.1 5.0 - 12.0 %    Eosinophil % 4.9 0.3 - 6.2 %    Basophil % 0.3 0.0 - 1.5 %    Immature Grans % 0.5 0.0 - 0.5 %    Neutrophils, Absolute 3.61 1.70 - 7.00 10*3/mm3    Lymphocytes, Absolute 1.63 0.70 - 3.10 10*3/mm3    Monocytes, Absolute 0.56 0.10 - 0.90 10*3/mm3    Eosinophils, Absolute 0.30 0.00 - 0.40 10*3/mm3    Basophils, Absolute 0.02 0.00 - 0.20 10*3/mm3    Immature Grans, Absolute 0.03 0.00 - 0.05 10*3/mm3    nRBC 0.0 0.0 - 0.2 /100 WBC   Sedimentation Rate    Collection Time: 02/18/24  2:52 PM    Specimen: Blood   Result Value Ref Range    Sed Rate 28 0 - 30 mm/hr   C-reactive Protein    Collection Time: 02/18/24  2:52 PM    Specimen: Blood   Result Value Ref Range    C-Reactive Protein 20.65 (H) 0.00 - 0.50 mg/dL       If labs were ordered, I independently reviewed the results and considered them in treating the patient.        RADIOLOGY  XR Chest 2 View    Result Date: 2/18/2024  PA AND LATERAL CHEST  INDICATION: Fever.  COMPARISON:None.  FINDINGS: PA and lateral views of the chest were obtained.  The cardiac and mediastinal silhouettes are within normal limits.    The lungs are clear.  There is no pneumothorax or pleural effusion.  No acute osseous  abnormality is identified.      No radiographic evidence of acute cardiac or pulmonary disease.      This report was signed and finalized on 2/18/2024 2:11 PM by Lizy Springer MD.           PROCEDURES    Procedures    Interpretations    O2 Sat: The patients oxygen saturation was 96% on Room Air.  This was independently interpreted by me as Normal      Cardiac Monitoring: I reviewed and independently interpreted the Rhythm Strip as Normal Sinus rhythm rate of 82    Radiology: I ordered and independently reviewed the above noted radiographic studies.  I viewed images of Chest Xray which showed no pneumonia per my independent interpretation. See radiologist's dictation for official interpretation.     MEDICATIONS GIVEN IN ER    Medications   sodium chloride 0.9 % flush 10 mL (has no administration in time range)   acetaminophen (TYLENOL) tablet 975 mg (975 mg Oral Given 2/18/24 1440)   oxyCODONE (ROXICODONE) immediate release tablet 5 mg (5 mg Oral Given 2/18/24 1440)         MEDICAL DECISION MAKING, PROGRESS, and CONSULTS    All labs, if obtained, have been independently reviewed by me.  All radiology studies, if obtained, have been reviewed by me and the radiologist dictating the report.  All EKG's, if obtained, have been independently viewed and interpreted by me/my attending physician.      Discussion below represents my analysis of pertinent findings related to patient's condition, differential diagnosis, treatment plan and final disposition.      Differential diagnosis:    53 year old female with recent lumbar fusion surgery at [facility], here with fever, pain. Possible pneumonia, covid, flu, UTI, will obtain labs, urine, covid swab, chest xray. Provide oral oxycodone and tylenol for pain    Additional Sources:  External (non-ED) record review:  Operative reports, discharge summary       Orders placed during this visit:  Orders Placed This Encounter   Procedures    COVID-19, FLU A/B, RSV PCR 1  HR TAT - Swab, Nasopharynx    Rapid Strep A Screen - Swab, Throat    Blood Culture With MARSHA - Blood,    Blood Culture With MARSHA - Blood,    XR Chest 2 View    Comprehensive Metabolic Panel    Lactic Acid, Plasma    Procalcitonin    CBC Auto Differential    Urinalysis With Culture If Indicated - Urine, Clean Catch    Sedimentation Rate    C-reactive Protein    Insert peripheral IV    CBC & Differential         Additional orders considered but not ordered:  None    ED Course:    Consultants:   Neurosurgery - Dr Suarez    ED Course as of 02/18/24 1602   Sun Feb 18, 2024   1403 Spoke to Galilea Magana physician assistant neurosurgery at [facility] request adding on CRP and ESR to the workup, these orders have been added. [CS]   1520 Lab workup is fairly benign, CRP is elevated but sed rate is normal, Pro-Ghulam normal, lactic normal.  Normal white blood cell count.  Patient without fever here.  Will reach back out to neurosurgery at [facility] to discuss disposition [CS]   1539 Spoke to CHIKA Magana, will add on blood cultures [CS]   1601 Patient will follow with neurosurgery in two days, blood cultures sent and will be followed [CS]      ED Course User Index  [CS] Diogo Newsome MD           After my consideration of clinical presentation and any laboratory/radiology studies obtained, I discussed the findings with the patient/patient representative who is in agreement with the treatment plan and the final disposition. Risks and benefits of discharge were discussed.     AS OF 16:02 EST VITALS:    BP - 114/67  HR - 82  TEMP - 98.6 °F (37 °C) (Oral)  O2 SATS - 96%    I reviewed the patients prescription monitoring report if available prior to discharge    DIAGNOSIS  Final diagnoses:   Fever, unspecified fever cause   Acute post-operative pain         DISPOSITION  ED Disposition       ED Disposition   Discharge    Condition   Stable    Comment   --                   Please note that portions of  this document were completed with voice recognition software.        Diogo Newsome MD  02/18/24 [id number]

## 2024-02-20 ENCOUNTER — OFFICE VISIT (OUTPATIENT)
Dept: NEUROSURGERY | Facility: CLINIC | Age: 54
End: 2024-02-20
Payer: COMMERCIAL

## 2024-02-20 VITALS — HEIGHT: 64 IN | TEMPERATURE: 97.1 F | BODY MASS INDEX: 43.71 KG/M2 | WEIGHT: 256 LBS

## 2024-02-20 DIAGNOSIS — Z98.1 S/P LUMBAR FUSION: Primary | ICD-10-CM

## 2024-02-20 DIAGNOSIS — M43.16 SPONDYLOLISTHESIS AT L4-L5 LEVEL: ICD-10-CM

## 2024-02-20 LAB — BACTERIA SPEC AEROBE CULT: NORMAL

## 2024-02-20 PROCEDURE — 99024 POSTOP FOLLOW-UP VISIT: CPT

## 2024-02-20 NOTE — PROGRESS NOTES
"Mary Lunsford  1970  02/20/2024  [id number]    CC: s/p L4-5 fusion    HPI: Mary Lunsford is a 53 y.o. female who is about 1 weeks s/p right L4-5 fusion with Dr. Smith. Preoperatively, patient was experiencing right leg pain.  Postoperatively, patient reports significant improvement her right leg symptoms.  She was dealing with fevers 48 hours after surgery as well as achiness in her groin.  She did describe some URI symptoms.  She was evaluated in [facility] on 12/18/2024 and workup was relatively benign.  She did have elevated CRP which is common after surgery.  She did not have a temperature at the ER nor did she have a temperature today.  Patient states she has not had a fever since Sunday.  She overall feels like she is improving.  She does have some left buttock/hip pain that is worse when going from sitting to standing.  No issues with her incision.      Past Medical History:   Diagnosis Date    Anemia     Body piercing     both ears    Bronchitis     Colitis     Crohn's disease 08/2023    incidental finding, asymptomatic    Disease of thyroid gland     Dissection of coronary artery 03/20/2017    pt denies this 10/2/19.  states she had testing at , but was told that they didn't \"find anything\"    Enteropathogenic Escherichia coli infection     Essential hypertension 03/30/2018    Gallstone     H/O echocardiogram     History of nuclear stress test     Hyperlipidemia     Kidney stones     Low back pain     Migraine     Obesity     Ovarian cyst     Prolonged Q-T interval on ECG     history    Restless leg syndrome     Subacute idiopathic myocarditis     Urinary tract infection     Vitamin B12 deficiency     Wears glasses     to read       Allergies   Allergen Reactions    Sulfa Antibiotics Rash and Other (See Comments)     Blurry vision         Current Outpatient Medications:     atorvastatin (LIPITOR) 40 MG tablet, Take 1 tablet by mouth Every Night., Disp: 90 tablet, Rfl: 4    " carbidopa-levodopa CR (SINEMET CR)  MG per CR tablet, 1 tab tid and 1 extra if needed prn breakthrough rls symptoms (Patient taking differently: Take 1 tablet by mouth 3 (Three) Times a Day. 1 tab tid and 1 extra if needed prn breakthrough rls symptoms), Disp: 360 tablet, Rfl: 3    diazePAM (VALIUM) 5 MG tablet, Take 1 tablet by mouth Every 8 (Eight) Hours As Needed for Muscle Spasms., Disp: 20 tablet, Rfl: 0    Ferrous Fumarate (Ferrocite) 324 (106 Fe) MG tablet, Take 1 tablet by mouth 2 (Two) Times a Day., Disp: 180 tablet, Rfl: 3    HYDROcodone-acetaminophen (NORCO)  MG per tablet, Take 1 tablet by mouth Every 4 (Four) Hours As Needed for Moderate Pain., Disp: 20 tablet, Rfl: 0    levothyroxine (SYNTHROID, LEVOTHROID) 88 MCG tablet, Take 1 tablet by mouth Daily., Disp: 90 tablet, Rfl: 1    Lyrica 100 MG capsule, Take 1 capsule by mouth 2 (Two) Times a Day., Disp: 60 capsule, Rfl: 1    metoprolol tartrate (LOPRESSOR) 25 MG tablet, TAKE 1 TABLET BY MOUTH EVERY 12 HOURS (Patient taking differently: Take 1 tablet by mouth 2 (Two) Times a Day.), Disp: 180 tablet, Rfl: 3    naloxone (NARCAN) 4 MG/0.1ML nasal spray, Call 911. Don't prime. Spray in 1 nostril as needed for overdose. Repeat in 2-3 minutes in other nostril if no or minimal breathing/responsiveness., Disp: 2 each, Rfl: 0    pramipexole (MIRAPEX) 0.5 MG tablet, Take 0.5 tablets by mouth 3 (Three) Times a Day., Disp: 135 tablet, Rfl: 3    sennosides-docusate (senna-docusate sodium) 8.6-50 MG per tablet, Take 1 tablet by mouth Daily. (Take while using hydrocodone to prevent constipation), Disp: 30 tablet, Rfl: 0    ubrogepant (UBRELVY) 100 MG tablet, Take 1 tablet at onset of migraine, may repeat once in 2 hours if needed (Patient not taking: Reported on 1/11/2024), Disp: 10 tablet, Rfl: 5    Review of Systems   Constitutional:  Positive for chills, fatigue and fever.   Endocrine: Positive for cold intolerance.   Musculoskeletal:  Positive for back  "pain.   Skin:  Positive for color change.   All other systems reviewed and are negative.        PE:  Temp 97.1 °F (36.2 °C) (Temporal)   Ht 162.6 cm (64\")   Wt 116 kg (256 lb)   LMP 2018 (LMP Unknown)   BMI 43.94 kg/m²     Neurologic Exam   Incision healing well      Social History    Tobacco Use      Smoking status: Former        Packs/day: 1.00        Years: 2.00        Additional pack years: 0.00        Total pack years: 2.00        Types: Cigarettes        Quit date: 10/1/1995        Years since quittin.4      Smokeless tobacco: Never       Tobacco Use: Medium Risk (2024)    Patient History     Smoking Tobacco Use: Former     Smokeless Tobacco Use: Never     Passive Exposure: Not on file       STEADI Fall Risk Assessment has not been completed.      Diagnoses and all orders for this visit:    1. S/P lumbar fusion (Primary)  -     XR Spine Lumbar AP & Lateral; Future    2. Spondylolisthesis at L4-L5 level  -     XR Spine Lumbar AP & Lateral; Future       Assessment/Plan  This is a 53 y.o. female who is 1 week s/p L4-5 fusion with Dr. Smith.  Patient notes significant improvement in her right leg symptoms.  Her fevers have resolved.  Her labs and general workup in the ER 2 days ago are relatively benign.  I do think she was dealing with some sort of virus causing the fever.   states that he is also dealing with some virus currently.  Incision is healing well.  She will try to wean down off the Norco over the next couple weeks.  No new or worsening symptoms.  Activities and restrictions were discussed. Wound care was discussed with the patient.  She will follow-up in about 2 weeks with AP lateral x-rays.  Patient encouraged to contact us if she has any changes in her condition or any concerns.    Any copied data from previous notes included in the (1) HPI, (2) PE, (3) MDM and/or Assessment and Plan has been reviewed and is accurate as of 24    Cherie Magana PA-C  24    "

## 2024-02-21 DIAGNOSIS — Z98.1 S/P LUMBAR FUSION: ICD-10-CM

## 2024-02-21 DIAGNOSIS — M43.16 SPONDYLOLISTHESIS AT L4-L5 LEVEL: ICD-10-CM

## 2024-02-21 DIAGNOSIS — M51.36 DEGENERATIVE LUMBAR DISC: ICD-10-CM

## 2024-02-21 DIAGNOSIS — M48.061 STENOSIS OF LATERAL RECESS OF LUMBAR SPINE: ICD-10-CM

## 2024-02-21 DIAGNOSIS — M43.16 SPONDYLOLISTHESIS AT L4-L5 LEVEL: Primary | ICD-10-CM

## 2024-02-21 RX ORDER — BISACODYL 10 MG
10 SUPPOSITORY, RECTAL RECTAL DAILY
Qty: 10 SUPPOSITORY | Refills: 0 | Status: SHIPPED | OUTPATIENT
Start: 2024-02-21

## 2024-02-21 RX ORDER — POLYETHYLENE GLYCOL 3350 17 G/17G
17 POWDER, FOR SOLUTION ORAL 2 TIMES DAILY
Qty: 10 EACH | Refills: 0 | Status: SHIPPED | OUTPATIENT
Start: 2024-02-21

## 2024-02-21 RX ORDER — POLYETHYLENE GLYCOL 3350 17 G/17G
17 POWDER, FOR SOLUTION ORAL 2 TIMES DAILY
Qty: 10 EACH | Refills: 0 | Status: SHIPPED | OUTPATIENT
Start: 2024-02-21 | End: 2024-02-21 | Stop reason: SDUPTHER

## 2024-02-21 RX ORDER — BISACODYL 10 MG
10 SUPPOSITORY, RECTAL RECTAL DAILY
Qty: 10 SUPPOSITORY | Refills: 0 | Status: SHIPPED | OUTPATIENT
Start: 2024-02-21 | End: 2024-02-21 | Stop reason: SDUPTHER

## 2024-02-21 RX ORDER — TRAMADOL HYDROCHLORIDE 50 MG/1
50 TABLET ORAL EVERY 6 HOURS PRN
Qty: 24 TABLET | Refills: 0 | Status: SHIPPED | OUTPATIENT
Start: 2024-02-21

## 2024-02-21 NOTE — TELEPHONE ENCOUNTER
I would hold the nacotic meds until she has a BM... I have ordered mag citrate and miralax to go along with the senna she has.     Priority one is to have a BM.     I will also place order for suppository. She should get an enema at the pharmacy and follow with the suppository. At the same time she should drink 1/2 bottle of mag citrate.     Use miralax to stay regular

## 2024-02-21 NOTE — TELEPHONE ENCOUNTER
I called the patient and told her that Tramadol was being called in she stated that she had some of this and she did not like the way it made her feel, but she would try it.

## 2024-02-21 NOTE — TELEPHONE ENCOUNTER
Provider:  Luis  Surgery/Procedure:  LUMBAR FUSION DECOMPRESSON WITH PEDICLE SCREWS L4-5   Surgery/Procedure Date: 2-  Last visit:   2-20-24  Next visit: 3-5-24     Reason for call: Patient called with questions that she did not ask yesterday at her appointment. She has not had a BM since the morning of the surgery and wants to know what to do?  What she has is not helping.       She also thinks she has a yeast infection but will call her PCP if needed to do this. She also said that she is getting low on her pain medicine and wants to know if she can have a refill or something else in case she needs it.   Yesterday in the appointment with Cherie she was trying to wean off of these.  Her incision site is itching real bad and wants to know if she can put anything on this?  I told her NOT to put anything directly on it but around it would be ok, hydrocortisone cream    Name and Age:  Mary 53  Provider: Luis    Previously seen for (Diagnosis): S/P lumbar fusion  Spondylolisthesis at L4-L5 level  Last Visit Date(with what provider?):  2-20-24 Cherie  Next Visit: 3-5-24    Surgery Date: 2-14-24  Surgery: LUMBAR FUSION DECOMPRESSON WITH PEDICLE SCREWS L4-5     When did symptoms(pain, numbness, tingling, weakness?) start :constipation after surgery    Recent injury, accident, trauma?  na    Where is it located/radiate: itching around the site    How long has this been going on:  Itching from the surgery site yesterday    Is this new or the same as before surgery:   Constipation New  Characteristics of symptom/severity: itching and constipated     POST OP? Fever, chills, purulent drainage, redness, swelling, worsening pain?:  99.3 low grade fever    Is it constant or intermittent: constant    What makes it worse: nothing    What makes it better:nothing    What therapies/medications have you tried: sennosides-docusate (senna-docusate sodium) 8.6-50 MG per tablet (02/16/2024)     HYDROcodone-acetaminophen (NORCO)   MG per tablet (02/17/2024)     Most recent imaging:XR Spine Lumbar AP & Lateral (02/15/2024 11:32)     Red flag sxs: weakness, acute foot drop, bladder issues, saddle paresthesia?: NA

## 2024-02-21 NOTE — TELEPHONE ENCOUNTER
Rx Refill Note  Requested Prescriptions     Pending Prescriptions Disp Refills    Lyrica 100 MG capsule 60 capsule 1     Sig: Take 1 capsule by mouth 2 (Two) Times a Day.      Last office visit with prescribing clinician: 12/19/2023   Next office visit with prescribing clinician: 2/27/2024       Katie Rodriguez MA  02/21/24, 15:06 EST

## 2024-02-23 LAB
BACTERIA SPEC AEROBE CULT: NORMAL
BACTERIA SPEC AEROBE CULT: NORMAL

## 2024-02-27 ENCOUNTER — OFFICE VISIT (OUTPATIENT)
Dept: INTERNAL MEDICINE | Facility: CLINIC | Age: 54
End: 2024-02-27
Payer: COMMERCIAL

## 2024-02-27 VITALS
HEART RATE: 78 BPM | DIASTOLIC BLOOD PRESSURE: 82 MMHG | OXYGEN SATURATION: 99 % | SYSTOLIC BLOOD PRESSURE: 130 MMHG | WEIGHT: 252.4 LBS | TEMPERATURE: 97.6 F | BODY MASS INDEX: 43.09 KG/M2 | RESPIRATION RATE: 16 BRPM | HEIGHT: 64 IN

## 2024-02-27 DIAGNOSIS — E07.9 THYROID DISORDER: ICD-10-CM

## 2024-02-27 DIAGNOSIS — Z83.49 FAMILY HISTORY OF B12 DEFICIENCY: ICD-10-CM

## 2024-02-27 DIAGNOSIS — G25.81 RESTLESS LEGS SYNDROME (RLS): ICD-10-CM

## 2024-02-27 DIAGNOSIS — Z12.31 ENCOUNTER FOR SCREENING MAMMOGRAM FOR BREAST CANCER: ICD-10-CM

## 2024-02-27 DIAGNOSIS — E78.5 DYSLIPIDEMIA: ICD-10-CM

## 2024-02-27 DIAGNOSIS — E55.9 VITAMIN D DEFICIENCY: ICD-10-CM

## 2024-02-27 DIAGNOSIS — R79.9 ABNORMAL BLOOD CHEMISTRY: ICD-10-CM

## 2024-02-27 DIAGNOSIS — R71.8 ELEVATED MCV: ICD-10-CM

## 2024-02-27 DIAGNOSIS — I10 ESSENTIAL HYPERTENSION: ICD-10-CM

## 2024-02-27 DIAGNOSIS — Z00.00 ANNUAL PHYSICAL EXAM: Primary | ICD-10-CM

## 2024-02-27 DIAGNOSIS — K50.00 CROHN'S DISEASE OF SMALL INTESTINE WITHOUT COMPLICATION: ICD-10-CM

## 2024-02-27 DIAGNOSIS — Z51.81 MEDICATION MONITORING ENCOUNTER: ICD-10-CM

## 2024-02-27 DIAGNOSIS — Z13.1 DIABETES MELLITUS SCREENING: ICD-10-CM

## 2024-02-27 DIAGNOSIS — E66.01 CLASS 3 SEVERE OBESITY DUE TO EXCESS CALORIES WITH SERIOUS COMORBIDITY AND BODY MASS INDEX (BMI) OF 40.0 TO 44.9 IN ADULT: ICD-10-CM

## 2024-02-27 DIAGNOSIS — D64.9 ANEMIA, UNSPECIFIED TYPE: ICD-10-CM

## 2024-02-27 DIAGNOSIS — E53.8 VITAMIN B12 DEFICIENCY: ICD-10-CM

## 2024-02-27 NOTE — PATIENT INSTRUCTIONS
Health Maintenance for Postmenopausal Women  Menopause is a normal process in which your reproductive ability comes to an end. This process happens gradually over a span of months to years, usually between the ages of 48 and 55. Menopause is complete when you have missed 12 consecutive menstrual periods.  It is important to talk with your health care provider about some of the most common conditions that affect postmenopausal women, such as heart disease, cancer, and bone loss (osteoporosis). Adopting a healthy lifestyle and getting preventive care can help to promote your health and wellness. Those actions can also lower your chances of developing some of these common conditions.  What should I know about menopause?  During menopause, you may experience a number of symptoms, such as:  Moderate-to-severe hot flashes.  Night sweats.  Decrease in sex drive.  Mood swings.  Headaches.  Tiredness.  Irritability.  Memory problems.  Insomnia.     Choosing to treat or not to treat menopausal changes is an individual decision that you make with your health care provider.  What should I know about hormone replacement therapy and supplements?  Hormone therapy products are effective for treating symptoms that are associated with menopause, such as hot flashes and night sweats. Hormone replacement carries certain risks, especially as you become older. If you are thinking about using estrogen or estrogen with progestin treatments, discuss the benefits and risks with your health care provider.  What should I know about heart disease and stroke?  Heart disease, heart attack, and stroke become more likely as you age. This may be due, in part, to the hormonal changes that your body experiences during menopause. These can affect how your body processes dietary fats, triglycerides, and cholesterol. Heart attack and stroke are both medical emergencies.    There are many things that you can do to help prevent heart disease and  stroke:  Have your blood pressure checked at least every 1-2 years. High blood pressure causes heart disease and increases the risk of stroke.  If you are 55-79 years old, ask your health care provider if you should take aspirin to prevent a heart attack or a stroke.  Do not use any tobacco products, including cigarettes, chewing tobacco, or electronic cigarettes. If you need help quitting, ask your health care provider.  It is important to eat a healthy diet and maintain a healthy weight.  Be sure to include plenty of vegetables, fruits, low-fat dairy products, and lean protein.  Avoid eating foods that are high in solid fats, added sugars, or salt (sodium).  Get regular exercise. This is one of the most important things that you can do for your health.  Try to exercise for at least 150 minutes each week. The type of exercise that you do should increase your heart rate and make you sweat. This is known as moderate-intensity exercise.  Try to do strengthening exercises at least twice each week. Do these in addition to the moderate-intensity exercise.  Know your numbers. Ask your health care provider to check your cholesterol and your blood glucose. Continue to have your blood tested as directed by your health care provider.     What should I know about cancer screening?  There are several types of cancer. Take the following steps to reduce your risk and to catch any cancer development as early as possible.  Breast Cancer  Practice breast self-awareness.  This means understanding how your breasts normally appear and feel.  It also means doing regular breast self-exams. Let your health care provider know about any changes, no matter how small.  If you are 40 or older, have a clinician do a breast exam (clinical breast exam or CBE) every year. Depending on your age, family history, and medical history, it may be recommended that you also have a yearly breast X-ray (mammogram).  If you have a family history of breast  cancer, talk with your health care provider about genetic screening.  If you are at high risk for breast cancer, talk with your health care provider about having an MRI and a mammogram every year.  Breast cancer (BRCA) gene test is recommended for women who have family members with BRCA-related cancers. Results of the assessment will determine the need for genetic counseling and BRCA1 and for BRCA2 testing. BRCA-related cancers include these types:  Breast. This occurs in males or females.  Ovarian.  Tubal. This may also be called fallopian tube cancer.  Cancer of the abdominal or pelvic lining (peritoneal cancer).  Prostate.  Pancreatic.     Cervical, Uterine, and Ovarian Cancer  Your health care provider may recommend that you be screened regularly for cancer of the pelvic organs. These include your ovaries, uterus, and vagina. This screening involves a pelvic exam, which includes checking for microscopic changes to the surface of your cervix (Pap test).  For women ages 21-65, health care providers may recommend a pelvic exam and a Pap test every three years. For women ages 30-65, they may recommend the Pap test and pelvic exam, combined with testing for human papilloma virus (HPV), every five years. Some types of HPV increase your risk of cervical cancer. Testing for HPV may also be done on women of any age who have unclear Pap test results.  Other health care providers may not recommend any screening for nonpregnant women who are considered low risk for pelvic cancer and have no symptoms. Ask your health care provider if a screening pelvic exam is right for you.  If you have had past treatment for cervical cancer or a condition that could lead to cancer, you need Pap tests and screening for cancer for at least 20 years after your treatment. If Pap tests have been discontinued for you, your risk factors (such as having a new sexual partner) need to be reassessed to determine if you should start having screenings  again. Some women have medical problems that increase the chance of getting cervical cancer. In these cases, your health care provider may recommend that you have screening and Pap tests more often.  If you have a family history of uterine cancer or ovarian cancer, talk with your health care provider about genetic screening.  If you have vaginal bleeding after reaching menopause, tell your health care provider.  There are currently no reliable tests available to screen for ovarian cancer.     Lung Cancer  Lung cancer screening is recommended for adults 55-80 years old who are at high risk for lung cancer because of a history of smoking. A yearly low-dose CT scan of the lungs is recommended if you:  Currently smoke.  Have a history of at least 30 pack-years of smoking and you currently smoke or have quit within the past 15 years. A pack-year is smoking an average of one pack of cigarettes per day for one year.     Yearly screening should:  Continue until it has been 15 years since you quit.  Stop if you develop a health problem that would prevent you from having lung cancer treatment.     Colorectal Cancer  This type of cancer can be detected and can often be prevented.  Routine colorectal cancer screening usually begins at age 50 and continues through age 75.  If you have risk factors for colon cancer, your health care provider may recommend that you be screened at an earlier age.  If you have a family history of colorectal cancer, talk with your health care provider about genetic screening.  Your health care provider may also recommend using home test kits to check for hidden blood in your stool.  A small camera at the end of a tube can be used to examine your colon directly (sigmoidoscopy or colonoscopy). This is done to check for the earliest forms of colorectal cancer.  Direct examination of the colon should be repeated every 5-10 years until age 75. However, if early forms of precancerous polyps or small  growths are found or if you have a family history or genetic risk for colorectal cancer, you may need to be screened more often.     Skin Cancer  Check your skin from head to toe regularly.  Monitor any moles. Be sure to tell your health care provider:  About any new moles or changes in moles, especially if there is a change in a mole's shape or color.  If you have a mole that is larger than the size of a pencil eraser.  If any of your family members has a history of skin cancer, especially at a young age, talk with your health care provider about genetic screening.  Always use sunscreen. Apply sunscreen liberally and repeatedly throughout the day.  Whenever you are outside, protect yourself by wearing long sleeves, pants, a wide-brimmed hat, and sunglasses.     What should I know about osteoporosis?  Osteoporosis is a condition in which bone destruction happens more quickly than new bone creation. After menopause, you may be at an increased risk for osteoporosis. To help prevent osteoporosis or the bone fractures that can happen because of osteoporosis, the following is recommended:  If you are 19-50 years old, get at least 1,000 mg of calcium and at least 600 mg of vitamin D per day.  If you are older than age 50 but younger than age 70, get at least 1,200 mg of calcium and at least 600 mg of vitamin D per day.  If you are older than age 70, get at least 1,200 mg of calcium and at least 800 mg of vitamin D per day.     Smoking and excessive alcohol intake increase the risk of osteoporosis. Eat foods that are rich in calcium and vitamin D, and do weight-bearing exercises several times each week as directed by your health care provider.  What should I know about how menopause affects my mental health?  Depression may occur at any age, but it is more common as you become older. Common symptoms of depression include:  Low or sad mood.  Changes in sleep patterns.  Changes in appetite or eating patterns.  Feeling an  overall lack of motivation or enjoyment of activities that you previously enjoyed.  Frequent crying spells.     Talk with your health care provider if you think that you are experiencing depression.  What should I know about immunizations?  It is important that you get and maintain your immunizations. These include:  Tetanus, diphtheria, and pertussis (Tdap) booster vaccine.  Influenza every year before the flu season begins.  Pneumonia vaccine.  Shingles vaccine.     Your health care provider may also recommend other immunizations.  This information is not intended to replace advice given to you by your health care provider. Make sure you discuss any questions you have with your health care provider.  Document Released: 02/09/2007 Document Revised: 07/07/2017 Document Reviewed: 09/20/2016  [employer] Interactive Patient Education © 2018 [employer]

## 2024-02-27 NOTE — PROGRESS NOTES
"02/27/2024  Chief Complaint   Patient presents with    Annual Exam       Patient Care Team:  Floridalma Jimenez MD as PCP - General (Family Medicine)  Casie Bertrand PA-C as Consulting Physician (Physician Assistant)  Jeovanny Smith MD as Consulting Physician (Neurosurgery)]       Mary Lunsford is here for her annual preventive exam. History per MA reviewed.       Health Maintenance   Topic Date Due    ANNUAL PHYSICAL  Never done    LIPID PANEL  11/29/2022    ZOSTER VACCINE (2 of 2) 02/29/2024 (Originally 1/13/2024)    MAMMOGRAM  03/21/2024    BMI FOLLOWUP  04/25/2024    PAP SMEAR  04/11/2026    TDAP/TD VACCINES (3 - Td or Tdap) 03/15/2031    COLORECTAL CANCER SCREENING  08/01/2033    HEPATITIS C SCREENING  Completed    COVID-19 Vaccine  Completed    INFLUENZA VACCINE  Completed    Pneumococcal Vaccine 0-64  Aged Out       Immunization History   Administered Date(s) Administered    COVID-19 (PFIZER) Purple Cap Monovalent 03/29/2021, 04/19/2021    COVID-19 F23 (MODERNA) 12YRS+ (SPIKEVAX) 10/31/2023    Influenza Injectable Mdck Pf Quad 11/18/2023    Pneumococcal Conjugate 20-Valent (PCV20) 11/06/2023    Shingrix 11/18/2023    Tdap 08/25/2017, 03/15/2021         The following portions of the patient's history were reviewed and updated as appropriate: allergies, current medications, past family history, past medical history, past social history, past surgical history and problem list.    Objective   Visit Vitals  /82 (BP Location: Left arm, Patient Position: Sitting, Cuff Size: Adult)   Pulse 78   Temp 97.6 °F (36.4 °C) (Temporal)   Resp 16   Ht 162.6 cm (64\")   Wt 114 kg (252 lb 6.4 oz)   LMP 12/01/2018 (LMP Unknown)   SpO2 99%   BMI 43.32 kg/m²              Physical Exam  Vitals and nursing note reviewed.   Constitutional:       General: She is not in acute distress.     Appearance: Normal appearance. She is well-developed and well-groomed. She is morbidly obese. She is not ill-appearing, " toxic-appearing or diaphoretic.   HENT:      Head: Normocephalic and atraumatic. Hair is normal.      Right Ear: Hearing, tympanic membrane, ear canal and external ear normal.      Left Ear: Hearing, tympanic membrane, ear canal and external ear normal.      Nose: Nose normal.      Mouth/Throat:      Mouth: Mucous membranes are moist.   Eyes:      General: Lids are normal. Gaze aligned appropriately. No scleral icterus.        Right eye: No discharge.         Left eye: No discharge.      Extraocular Movements: Extraocular movements intact.      Conjunctiva/sclera: Conjunctivae normal.      Pupils: Pupils are equal, round, and reactive to light.   Neck:      Thyroid: No thyromegaly.      Trachea: Trachea and phonation normal. No tracheal deviation.   Cardiovascular:      Rate and Rhythm: Normal rate and regular rhythm.      Heart sounds: Normal heart sounds. No murmur heard.     No friction rub. No gallop.   Pulmonary:      Effort: Pulmonary effort is normal.      Breath sounds: Normal breath sounds and air entry.   Abdominal:      General: Bowel sounds are normal. There is no distension.      Palpations: Abdomen is soft. Abdomen is not rigid. There is no mass.      Tenderness: There is no abdominal tenderness. There is no guarding or rebound.   Musculoskeletal:         General: No tenderness or deformity.      Cervical back: Neck supple.      Lumbar back: Deformity (stitches in place from surgical incision no evidence of infection, clean/dry/intact) present.      Right lower leg: No edema.      Left lower leg: No edema.   Skin:     General: Skin is warm.      Capillary Refill: Capillary refill takes less than 2 seconds.      Coloration: Skin is not cyanotic, jaundiced or pale.      Findings: No erythema or rash.      Nails: There is no clubbing.   Neurological:      General: No focal deficit present.      Mental Status: She is alert and oriented to person, place, and time.      Cranial Nerves: No cranial nerve  deficit.      Motor: No tremor, atrophy, abnormal muscle tone or seizure activity.      Gait: Gait is intact. Gait normal.   Psychiatric:         Attention and Perception: Attention and perception normal.         Mood and Affect: Mood and affect normal.         Speech: Speech normal.         Behavior: Behavior normal. Behavior is cooperative.         Thought Content: Thought content normal.         Cognition and Memory: Cognition and memory normal.         Judgment: Judgment normal.         Lab Results   Component Value Date    TRIG 62 11/29/2021    HDL 84 (H) 11/29/2021     (H) 11/29/2021     Lab Results   Component Value Date    TSH 2.770 11/29/2021     Lab Results   Component Value Date    FREET4 1.33 11/29/2021     Lab Results   Component Value Date    HGBA1C 5.40 02/09/2024       Assessment   Diagnoses and all orders for this visit:    1. Annual physical exam (Primary)  -     Cancel: Hemoglobin A1c  -     Cancel: Vitamin B12  -     Cancel: Folate  -     Cancel: CBC & Differential  -     Cancel: Comprehensive Metabolic Panel  -     Cancel: Lipid Panel  -     Cancel: TSH+Free T4  -     Cancel: Vitamin D,25-Hydroxy  -     Cancel: Vitamin B6  -     Cancel: Ferritin  -     Cancel: Iron Profile  -     CBC & Differential  -     Comprehensive Metabolic Panel  -     Ferritin  -     Folate  -     Hemoglobin A1c  -     Iron Profile  -     Lipid Panel  -     TSH+Free T4  -     Vitamin B12  -     Vitamin B6  -     Vitamin D,25-Hydroxy  -     Methylmalonic Acid, Serum  -     Intrinsic Factor Ab    2. Anemia, unspecified type  -     Cancel: Vitamin B12  -     Cancel: Folate  -     Cancel: CBC & Differential  -     Cancel: TSH+Free T4  -     Cancel: Vitamin B6  -     Cancel: Ferritin  -     Cancel: Iron Profile  -     CBC & Differential  -     Ferritin  -     Folate  -     Iron Profile  -     TSH+Free T4  -     Vitamin B12  -     Vitamin B6  -     Methylmalonic Acid, Serum  -     Intrinsic Factor Ab    3.  Dyslipidemia  -     Cancel: Comprehensive Metabolic Panel  -     Cancel: Lipid Panel  -     Comprehensive Metabolic Panel  -     Lipid Panel    4. Crohn's disease of small intestine without complication  -     Cancel: CBC & Differential  -     Cancel: Comprehensive Metabolic Panel  -     Cancel: Vitamin D,25-Hydroxy  -     Cancel: Ferritin  -     Cancel: Iron Profile  -     CBC & Differential  -     Comprehensive Metabolic Panel  -     Ferritin  -     Iron Profile  -     Vitamin D,25-Hydroxy    5. Essential hypertension  -     Cancel: CBC & Differential  -     Cancel: Comprehensive Metabolic Panel  -     Cancel: TSH+Free T4  -     CBC & Differential  -     Comprehensive Metabolic Panel  -     TSH+Free T4    6. Thyroid disorder  -     Cancel: TSH+Free T4  -     TSH+Free T4    7. Vitamin B12 deficiency  -     Cancel: Vitamin B12  -     Cancel: Folate  -     Cancel: CBC & Differential  -     Cancel: Vitamin B6  -     CBC & Differential  -     Folate  -     Vitamin B12  -     Vitamin B6    8. Vitamin D deficiency  -     Cancel: Vitamin D,25-Hydroxy  -     Vitamin D,25-Hydroxy    9. Elevated MCV  -     Cancel: Hemoglobin A1c  -     Cancel: Vitamin B12  -     Cancel: CBC & Differential  -     Cancel: Vitamin B6  -     CBC & Differential  -     Hemoglobin A1c  -     Vitamin B12  -     Vitamin B6    10. Class 3 severe obesity due to excess calories with serious comorbidity and body mass index (BMI) of 40.0 to 44.9 in adult  -     Cancel: Hemoglobin A1c  -     Cancel: Lipid Panel  -     Cancel: Vitamin D,25-Hydroxy  -     Hemoglobin A1c  -     Lipid Panel  -     Vitamin D,25-Hydroxy    11. Abnormal blood chemistry  -     Cancel: Hemoglobin A1c  -     Cancel: Vitamin B12  -     Cancel: Folate  -     Cancel: CBC & Differential  -     Cancel: Comprehensive Metabolic Panel  -     Cancel: Lipid Panel  -     Cancel: TSH+Free T4  -     Cancel: Vitamin D,25-Hydroxy  -     Cancel: Vitamin B6  -     Cancel: Ferritin  -     Cancel:  Iron Profile  -     CBC & Differential  -     Comprehensive Metabolic Panel  -     Ferritin  -     Folate  -     Hemoglobin A1c  -     Iron Profile  -     Lipid Panel  -     TSH+Free T4  -     Vitamin B12  -     Vitamin B6  -     Vitamin D,25-Hydroxy  -     Methylmalonic Acid, Serum  -     Intrinsic Factor Ab    12. Medication monitoring encounter  -     Cancel: Hemoglobin A1c  -     Cancel: Vitamin B12  -     Cancel: Folate  -     Cancel: CBC & Differential  -     Cancel: Comprehensive Metabolic Panel  -     Cancel: Lipid Panel  -     Cancel: TSH+Free T4  -     Cancel: Vitamin D,25-Hydroxy  -     Cancel: Vitamin B6  -     Cancel: Ferritin  -     Cancel: Iron Profile  -     CBC & Differential  -     Comprehensive Metabolic Panel  -     Ferritin  -     Folate  -     Hemoglobin A1c  -     Iron Profile  -     Lipid Panel  -     TSH+Free T4  -     Vitamin B12  -     Vitamin B6  -     Vitamin D,25-Hydroxy  -     Methylmalonic Acid, Serum  -     Intrinsic Factor Ab    13. Encounter for screening mammogram for breast cancer  -     Mammo Screening Digital Tomosynthesis Bilateral With CAD; Future    14. Diabetes mellitus screening  -     Cancel: Hemoglobin A1c  -     Cancel: CBC & Differential  -     CBC & Differential  -     Hemoglobin A1c    15. Restless legs syndrome (RLS)  -     Cancel: CBC & Differential  -     Cancel: TSH+Free T4  -     Cancel: Ferritin  -     Cancel: Iron Profile  -     CBC & Differential  -     Ferritin  -     Iron Profile  -     TSH+Free T4    16. Family history of B12 deficiency  -     CBC & Differential  -     Vitamin B12  -     Vitamin B6  -     Methylmalonic Acid, Serum  -     Intrinsic Factor Ab         Health maintenance information provided via patient plan (after visit summary).   Counseled on age appropriate health screenings and immunizations.  Encouraged exercise and healthy diet.    Class 3 Severe Obesity (BMI >=40). Obesity-related health conditions include the following: hypertension,  dyslipidemias, and osteoarthritis. Obesity is unchanged. BMI is is above average; BMI management plan is completed. We discussed Information on healthy weight added to patient's after visit summary.    Separate issues addressed today along with her preventive visit:      Patient is weaning down on pain control. Has been taking Norco 5 mg at night, will drop down to tylenol with codeine she had from previous rx. Has follow up with neurosurgery scheduled and aware she has to have xray prior to visit. Patient and  thankful for care provided by Anca Bertrand at [facility].   I can refill Lyrica at 100 mg bid if she wishes to stay on that dose, but she's planning to drop to 100 gm daily and then may go back to 75 mg daily if possible which is where she was prior to the back issue (for her chronic restless leg syndrome issues).  Labs ordered for [facility] by mistake, changed to [facility] lab and added methylmalonic acid and intrinsic factor based on family history of vitamin B12 deficiency and low vitamin B12 for this patient in past.  I spent 25 minutes on the separately reported service of [id number]. This time is not included in the time used to support the E/M service also reported today.     Return in about 6 months (around 8/27/2024) for Controlled Rx Follow Up.     Floridalma Jimenez MD

## 2024-02-29 ENCOUNTER — OFFICE VISIT (OUTPATIENT)
Dept: CARDIOLOGY | Facility: CLINIC | Age: 54
End: 2024-02-29
Payer: COMMERCIAL

## 2024-02-29 VITALS
SYSTOLIC BLOOD PRESSURE: 116 MMHG | BODY MASS INDEX: 42.51 KG/M2 | HEART RATE: 72 BPM | HEIGHT: 64 IN | OXYGEN SATURATION: 99 % | DIASTOLIC BLOOD PRESSURE: 68 MMHG | WEIGHT: 249 LBS

## 2024-02-29 DIAGNOSIS — E78.5 HYPERLIPIDEMIA LDL GOAL <100: ICD-10-CM

## 2024-02-29 DIAGNOSIS — R74.8 ABNORMAL CARDIAC ENZYME LEVEL: ICD-10-CM

## 2024-02-29 DIAGNOSIS — E78.5 DYSLIPIDEMIA: Primary | ICD-10-CM

## 2024-02-29 DIAGNOSIS — I10 ESSENTIAL HYPERTENSION: ICD-10-CM

## 2024-02-29 RX ORDER — ATORVASTATIN CALCIUM 40 MG/1
40 TABLET, FILM COATED ORAL NIGHTLY
Qty: 90 TABLET | Refills: 4 | Status: SHIPPED | OUTPATIENT
Start: 2024-02-29

## 2024-02-29 NOTE — PROGRESS NOTES
"    [facility] Cardiology Office Follow Up Note    Mary Lunsford  [id number]  2024    Primary Care Provider: Floridalma Jimenez MD    Chief Complaint   Patient presents with    Follow-up       Subjective     History of Present Illness:   Mary Lunsford is a 53 y.o. 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  Patient was previously seen by Dr. Peñaloza.  Since her last visit, patient underwent removal of a cyst in her back that was compressing a nerve and causing her right sciatic pain.  She is recovering from that surgery which occurred without complication.  Says the leg pain is gone but she has not yet been cleared to resume exercising.  Denies any exertional chest pain or dyspnea.  She did run out of her metoprolol at some point for 3 days and noticed that she immediately got arm pain and her blood pressure shot up.  She is no\w taking that regularly.  She ran out of her atorvastatin sometime ago and has been off of it for the past few weeks.    Past Cardiac Testin. Last Coronary Angio: 3/15/17  Angiographically normal coronary arteries.  Hyperdynamic left ventricular systolic function with normal regional wall motion.  No evidence of myocardial infarction.  The left-sided valvular abnormalities appreciated.  Normal left ventricular filling pressures.  Serially mild elevation in cardiac troponins with a \"plateau pattern\".  Explanation for this is unknown.  CPK, CPK-MB and CPK-MB fraction are all normal.    2. Last Echo: 2/15/22  Estimated left ventricular EF = 65% Left ventricular ejection fraction appears to be 61 - 65%. Left ventricular systolic function is normal.  Left ventricular diastolic function was normal.  Estimated right ventricular systolic pressure from tricuspid regurgitation is normal (<35 mmHg). Calculated right ventricular systolic " pressure from tricuspid regurgitation is 12 mmHg.     3. Prior Stress Testin21  The patient reported dyspnea during the stress test.  Arrhythmias were not significant during stress.    4. Prior Holter Monitor: 21  A normal monitor study.     Review of Systems:  Review of Systems   Constitutional: Negative.    Respiratory: Negative.     Cardiovascular: Negative.    Gastrointestinal: Negative.    Musculoskeletal: Negative.    Neurological: Negative.        I have reviewed and/or updated the patient's past medical, past surgical, family, social history, problem list and allergies as appropriate.       Current Outpatient Medications:     atorvastatin (LIPITOR) 40 MG tablet, Take 1 tablet by mouth Every Night., Disp: 90 tablet, Rfl: 4    carbidopa-levodopa CR (SINEMET CR)  MG per CR tablet, 1 tab tid and 1 extra if needed prn breakthrough rls symptoms (Patient taking differently: Take 1 tablet by mouth 3 (Three) Times a Day. 1 tab tid and 1 extra if needed prn breakthrough rls symptoms), Disp: 360 tablet, Rfl: 3    diazePAM (VALIUM) 5 MG tablet, Take 1 tablet by mouth Every 8 (Eight) Hours As Needed for Muscle Spasms., Disp: 20 tablet, Rfl: 0    Ferrous Fumarate (Ferrocite) 324 (106 Fe) MG tablet, Take 1 tablet by mouth 2 (Two) Times a Day., Disp: 180 tablet, Rfl: 3    levothyroxine (SYNTHROID, LEVOTHROID) 88 MCG tablet, Take 1 tablet by mouth Daily., Disp: 90 tablet, Rfl: 1    Lyrica 100 MG capsule, Take 1 capsule by mouth 2 (Two) Times a Day., Disp: 60 capsule, Rfl: 0    metoprolol tartrate (LOPRESSOR) 25 MG tablet, Take 0.5 tablets by mouth 2 (Two) Times a Day., Disp: 90 tablet, Rfl: 3    polyethylene glycol (MIRALAX) 17 g packet, Take 17 g by mouth 2 (Two) Times a Day., Disp: 10 each, Rfl: 0    pramipexole (MIRAPEX) 0.5 MG tablet, Take 0.5 tablets by mouth 3 (Three) Times a Day., Disp: 135 tablet, Rfl: 3    sennosides-docusate (senna-docusate sodium) 8.6-50 MG per tablet, Take 1 tablet by mouth  "Daily. (Take while using hydrocodone to prevent constipation), Disp: 30 tablet, Rfl: 0    naloxone (NARCAN) 4 MG/0.1ML nasal spray, Call 911. Don't prime. Spray in 1 nostril as needed for overdose. Repeat in 2-3 minutes in other nostril if no or minimal breathing/responsiveness. (Patient not taking: Reported on 2/29/2024), Disp: 2 each, Rfl: 0    traMADol (ULTRAM) 50 MG tablet, Take 1 tablet by mouth Every 6 (Six) Hours As Needed for Moderate Pain. (Patient not taking: Reported on 2/27/2024), Disp: 24 tablet, Rfl: 0       Objective     Vitals:  Vitals:    02/29/24 1021   BP: 116/68   BP Location: Left arm   Patient Position: Sitting   Pulse: 72   SpO2: 99%   Weight: 113 kg (249 lb)   Height: 162.6 cm (64.02\")       Physical Exam:  Vitals reviewed.   Constitutional:       Appearance: Healthy appearance. Not in distress.   Neck:      Vascular: No JVD.   Pulmonary:      Effort: Pulmonary effort is normal.      Breath sounds: Normal breath sounds.   Cardiovascular:      Normal rate. Regular rhythm. Normal S1. Normal S2.       Murmurs: There is no murmur.      No gallop.  No click. No rub.   Pulses:     Intact distal pulses.   Edema:     Peripheral edema absent.   Abdominal:      General: There is no distension.      Palpations: Abdomen is soft.      Tenderness: There is no abdominal tenderness.   Skin:     General: Skin is warm and dry.         Results Review:   I reviewed the patient's new clinical results.  I reviewed the patient's new imaging results and agree with the interpretation.  I reviewed the patient's other test results and agree with the interpretation  I personally viewed and interpreted the patient's EKG/Telemetry data    Procedures        Assessment & Plan   Diagnoses and all orders for this visit:    1. Dyslipidemia (Primary)  No recent LDL.  Lipid panel from 2021 showed an LDL in the 120s.  Would target LDL less than 70 in the setting of presumed microvascular disease.  -- Restart intensity statin.  " Recommend lipids in 6 to 8 weeks.    2. Abnormal cardiac enzyme level  History of troponin elevation.  Cath in 2017 showed angiographically normal coronaries.  Patient was evaluated at  with stress MRI which showed no evidence of ischemia but there was a focal area of subendocardial enhancement of the distal anterior segment.  This may represent infarct, possible embolic.   Has never had a type I MI but does not get arm pain which could be an anginal equivalent when she misses metoprolol.  Strongly suspect microvascular disease/endothelial dysfunction in a postmenopausal female.  -- Continue metoprolol and statin  -- Recommended incorporating regular aerobic exercise in her daily routine as her mobility improves after back surgery    3. Essential hypertension  Controlled.  Goal less than 130/80  -- Continue metoprolol        Plan   No orders of the defined types were placed in this encounter.    Medications:  -     atorvastatin (LIPITOR) 40 MG tablet; Take 1 tablet by mouth Every Night.  Dispense: 90 tablet; Refill: 4  -     metoprolol tartrate (LOPRESSOR) 25 MG tablet; Take 0.5 tablets by mouth 2 (Two) Times a Day.  Dispense: 90 tablet; Refill: 3    Preventative Cardiology:   Tobacco Cessation: N/A  Obstructive Sleep Apnea Screening: Deffered  AAA Screening: Deffered  Peripheral Arterial Disease Screening: Deffered       Follow Up:   Return in about 1 year (around 2/28/2025).    Thank you for allowing me to participate in the care of your patient. Please to not hesitate to contact me with additional questions or concerns.     Robbie Murphy MD  02/29/2024  08:00 EST

## 2024-03-04 ENCOUNTER — LAB (OUTPATIENT)
Dept: LAB | Facility: HOSPITAL | Age: 54
End: 2024-03-04
Payer: COMMERCIAL

## 2024-03-04 ENCOUNTER — SPECIALTY PHARMACY (OUTPATIENT)
Dept: PHARMACY | Facility: HOSPITAL | Age: 54
End: 2024-03-04
Payer: COMMERCIAL

## 2024-03-04 ENCOUNTER — HOSPITAL ENCOUNTER (OUTPATIENT)
Dept: GENERAL RADIOLOGY | Facility: HOSPITAL | Age: 54
Discharge: HOME | End: 2024-03-04
Payer: COMMERCIAL

## 2024-03-04 DIAGNOSIS — R20.2 FACIAL TINGLING: ICD-10-CM

## 2024-03-04 DIAGNOSIS — M43.16 SPONDYLOLISTHESIS AT L4-L5 LEVEL: ICD-10-CM

## 2024-03-04 DIAGNOSIS — Z98.1 S/P LUMBAR FUSION: ICD-10-CM

## 2024-03-04 LAB
25(OH)D3 SERPL-MCNC: 31.1 NG/ML (ref 30–100)
ALBUMIN SERPL-MCNC: 4.2 G/DL (ref 3.5–5.2)
ALBUMIN/GLOB SERPL: 1.6 G/DL
ALP SERPL-CCNC: 77 U/L (ref 39–117)
ALT SERPL W P-5'-P-CCNC: 10 U/L (ref 1–33)
ANION GAP SERPL CALCULATED.3IONS-SCNC: 7 MMOL/L (ref 5–15)
AST SERPL-CCNC: 15 U/L (ref 1–32)
BASOPHILS # BLD AUTO: 0.09 10*3/MM3 (ref 0–0.2)
BASOPHILS NFR BLD AUTO: 1.9 % (ref 0–1.5)
BILIRUB SERPL-MCNC: 0.4 MG/DL (ref 0–1.2)
BUN SERPL-MCNC: 21 MG/DL (ref 6–20)
BUN/CREAT SERPL: 18.9 (ref 7–25)
CALCIUM SPEC-SCNC: 9.7 MG/DL (ref 8.6–10.5)
CHLORIDE SERPL-SCNC: 104 MMOL/L (ref 98–107)
CHOLEST SERPL-MCNC: 208 MG/DL (ref 0–200)
CO2 SERPL-SCNC: 28 MMOL/L (ref 22–29)
CREAT SERPL-MCNC: 1.11 MG/DL (ref 0.57–1)
DEPRECATED RDW RBC AUTO: 41.9 FL (ref 37–54)
EGFRCR SERPLBLD CKD-EPI 2021: 58.8 ML/MIN/1.73
EOSINOPHIL # BLD AUTO: 0.24 10*3/MM3 (ref 0–0.4)
EOSINOPHIL NFR BLD AUTO: 5 % (ref 0.3–6.2)
ERYTHROCYTE [DISTWIDTH] IN BLOOD BY AUTOMATED COUNT: 11.8 % (ref 12.3–15.4)
FERRITIN SERPL-MCNC: 669 NG/ML (ref 13–150)
FOLATE SERPL-MCNC: 17.5 NG/ML (ref 4.78–24.2)
GLOBULIN UR ELPH-MCNC: 2.6 GM/DL
GLUCOSE SERPL-MCNC: 97 MG/DL (ref 65–99)
HBA1C MFR BLD: 5.5 % (ref 4.8–5.6)
HCT VFR BLD AUTO: 36.8 % (ref 34–46.6)
HDLC SERPL-MCNC: 50 MG/DL (ref 40–60)
HGB BLD-MCNC: 12.4 G/DL (ref 12–15.9)
IMM GRANULOCYTES # BLD AUTO: 0.01 10*3/MM3 (ref 0–0.05)
IMM GRANULOCYTES NFR BLD AUTO: 0.2 % (ref 0–0.5)
IRON 24H UR-MRATE: 52 MCG/DL (ref 37–145)
IRON SATN MFR SERPL: 14 % (ref 20–50)
LDLC SERPL CALC-MCNC: 138 MG/DL (ref 0–100)
LDLC/HDLC SERPL: 2.7 {RATIO}
LYMPHOCYTES # BLD AUTO: 1.86 10*3/MM3 (ref 0.7–3.1)
LYMPHOCYTES NFR BLD AUTO: 39.1 % (ref 19.6–45.3)
MCH RBC QN AUTO: 32.6 PG (ref 26.6–33)
MCHC RBC AUTO-ENTMCNC: 33.7 G/DL (ref 31.5–35.7)
MCV RBC AUTO: 96.8 FL (ref 79–97)
MONOCYTES # BLD AUTO: 0.31 10*3/MM3 (ref 0.1–0.9)
MONOCYTES NFR BLD AUTO: 6.5 % (ref 5–12)
NEUTROPHILS NFR BLD AUTO: 2.25 10*3/MM3 (ref 1.7–7)
NEUTROPHILS NFR BLD AUTO: 47.3 % (ref 42.7–76)
NRBC BLD AUTO-RTO: 0 /100 WBC (ref 0–0.2)
PLATELET # BLD AUTO: 400 10*3/MM3 (ref 140–450)
PMV BLD AUTO: 9.6 FL (ref 6–12)
POTASSIUM SERPL-SCNC: 4.7 MMOL/L (ref 3.5–5.2)
PROT SERPL-MCNC: 6.8 G/DL (ref 6–8.5)
RBC # BLD AUTO: 3.8 10*6/MM3 (ref 3.77–5.28)
SODIUM SERPL-SCNC: 139 MMOL/L (ref 136–145)
T4 FREE SERPL-MCNC: 1.21 NG/DL (ref 0.93–1.7)
TIBC SERPL-MCNC: 361 MCG/DL (ref 298–536)
TRANSFERRIN SERPL-MCNC: 242 MG/DL (ref 200–360)
TRIGL SERPL-MCNC: 114 MG/DL (ref 0–150)
TSH SERPL DL<=0.05 MIU/L-ACNC: 3.27 UIU/ML (ref 0.27–4.2)
VIT B12 BLD-MCNC: 288 PG/ML (ref 211–946)
VLDLC SERPL-MCNC: 20 MG/DL (ref 5–40)
WBC NRBC COR # BLD AUTO: 4.76 10*3/MM3 (ref 3.4–10.8)

## 2024-03-04 PROCEDURE — 84207 ASSAY OF VITAMIN B-6: CPT

## 2024-03-04 PROCEDURE — 82746 ASSAY OF FOLIC ACID SERUM: CPT | Performed by: FAMILY MEDICINE

## 2024-03-04 PROCEDURE — 72100 X-RAY EXAM L-S SPINE 2/3 VWS: CPT

## 2024-03-04 PROCEDURE — 83540 ASSAY OF IRON: CPT | Performed by: FAMILY MEDICINE

## 2024-03-04 PROCEDURE — 82607 VITAMIN B-12: CPT | Performed by: FAMILY MEDICINE

## 2024-03-04 PROCEDURE — 82306 VITAMIN D 25 HYDROXY: CPT | Performed by: FAMILY MEDICINE

## 2024-03-04 PROCEDURE — 83036 HEMOGLOBIN GLYCOSYLATED A1C: CPT | Performed by: FAMILY MEDICINE

## 2024-03-04 PROCEDURE — 83921 ORGANIC ACID SINGLE QUANT: CPT | Performed by: FAMILY MEDICINE

## 2024-03-04 PROCEDURE — 80050 GENERAL HEALTH PANEL: CPT | Performed by: FAMILY MEDICINE

## 2024-03-04 PROCEDURE — 86340 INTRINSIC FACTOR ANTIBODY: CPT | Performed by: FAMILY MEDICINE

## 2024-03-04 PROCEDURE — 80061 LIPID PANEL: CPT | Performed by: FAMILY MEDICINE

## 2024-03-04 PROCEDURE — 82728 ASSAY OF FERRITIN: CPT | Performed by: FAMILY MEDICINE

## 2024-03-04 PROCEDURE — 84466 ASSAY OF TRANSFERRIN: CPT | Performed by: FAMILY MEDICINE

## 2024-03-04 PROCEDURE — 84439 ASSAY OF FREE THYROXINE: CPT | Performed by: FAMILY MEDICINE

## 2024-03-04 NOTE — PROGRESS NOTES
"Patient cancelled initial appointment in October for education and administration guidance on Humira and did not reschedule. Cancellation notes state \"unsure if wanting to start Humira.\" Will disenroll for now and re-enroll if patient would like in the future.  "

## 2024-03-05 ENCOUNTER — TELEPHONE (OUTPATIENT)
Dept: INTERNAL MEDICINE | Facility: CLINIC | Age: 54
End: 2024-03-05
Payer: COMMERCIAL

## 2024-03-05 ENCOUNTER — OFFICE VISIT (OUTPATIENT)
Dept: NEUROSURGERY | Facility: CLINIC | Age: 54
End: 2024-03-05
Payer: COMMERCIAL

## 2024-03-05 VITALS
BODY MASS INDEX: 42.51 KG/M2 | SYSTOLIC BLOOD PRESSURE: 118 MMHG | WEIGHT: 249 LBS | DIASTOLIC BLOOD PRESSURE: 82 MMHG | HEIGHT: 64 IN

## 2024-03-05 DIAGNOSIS — R39.9 ABNORMAL RENAL FINDING: Primary | ICD-10-CM

## 2024-03-05 DIAGNOSIS — E07.9 THYROID DISORDER: ICD-10-CM

## 2024-03-05 DIAGNOSIS — Z98.1 S/P LUMBAR FUSION: Primary | ICD-10-CM

## 2024-03-05 PROCEDURE — 99024 POSTOP FOLLOW-UP VISIT: CPT

## 2024-03-05 RX ORDER — LEVOTHYROXINE SODIUM 88 UG/1
88 TABLET ORAL
Qty: 90 TABLET | Refills: 3 | Status: SHIPPED | OUTPATIENT
Start: 2024-03-05

## 2024-03-05 NOTE — TELEPHONE ENCOUNTER
"Relay     \"Two labs related to vitamin B12 are pending. Vitamin B12 is low normal further testing needed to see if truly deficient.     Ferritin remains high. Checked due to previously elevated levels, remains elevated, may be higher due to recent procedure. Needs watched over time. Iron levels okay. Blood counts are okay. Anemia previously noted has improved, normal levels now.     Creatinine higher than normal for patient. Could be lab error but need to double check to ensure patient is not having signs of kidney issue. Order in for recheck this week.     Cholesterol elevated somewhat. Stay on statin, work on diet. Less fatty food, less fast food. If levels remain higher than goal (total under 200, LDL under 100) would consider increasing statin or changing statin.     Thyroid levels normal. Refilled levothyroxine current dose.     A1C normal. Not diabetic nor prediabetic. \"        "

## 2024-03-05 NOTE — TELEPHONE ENCOUNTER
----- Message from Floridalma Jimenez MD sent at 3/5/2024  1:18 PM EST -----  Two labs related to vitamin B12 are pending. Vitamin B12 is low normal further testing needed to see if truly deficient.    Ferritin remains high. Checked due to previously elevated levels, remains elevated, may be higher due to recent procedure. Needs watched over time. Iron levels okay. Blood counts are okay. Anemia previously noted has improved, normal levels now.    Creatinine higher than normal for patient. Could be lab error but need to double check to ensure patient is not having signs of kidney issue. Order in for recheck this week.     Cholesterol elevated somewhat. Stay on statin, work on diet. Less fatty food, less fast food. If levels remain higher than goal (total under 200, LDL under 100) would consider increasing statin or changing statin.    Thyroid levels normal. Refilled levothyroxine current dose.    A1C normal. Not diabetic nor prediabetic.

## 2024-03-05 NOTE — PROGRESS NOTES
"Mary Lunsford  1970  03/05/2024  [id number]    CC: s/p L4-5 fusion    HPI: Mary Lunsford is a 53 y.o. female who is 3 weeks s/p L4-5 fusion with Dr. Smith. Preoperatively, patient was experiencing right leg pain.  Postoperatively, patient reports significant improvement her right leg symptoms.  Patient states all of her preoperative leg pain is resolved.  She continues to have some mild incisional pain but states this has not been getting worse.  Denies any incisional issues. She states her overall mobility has improved. She is overall pleased with her progress so far.    Past Medical History:   Diagnosis Date    Anemia     Body piercing     both ears    Bronchitis     Colitis     Crohn's disease 08/2023    incidental finding, asymptomatic    Disease of thyroid gland     Dissection of coronary artery 03/20/2017    pt denies this 10/2/19.  states she had testing at , but was told that they didn't \"find anything\"    Enteropathogenic Escherichia coli infection     Essential hypertension 03/30/2018    Gallstone     H/O echocardiogram     History of nuclear stress test     Hyperlipidemia     Kidney stones     Low back pain     Migraine     Obesity     Ovarian cyst     Prolonged Q-T interval on ECG     history    Restless leg syndrome     Subacute idiopathic myocarditis     Urinary tract infection     Vitamin B12 deficiency     Wears glasses     to read       Allergies   Allergen Reactions    Sulfa Antibiotics Rash and Other (See Comments)     Blurry vision         Current Outpatient Medications:     atorvastatin (LIPITOR) 40 MG tablet, Take 1 tablet by mouth Every Night., Disp: 90 tablet, Rfl: 4    carbidopa-levodopa CR (SINEMET CR)  MG per CR tablet, 1 tab tid and 1 extra if needed prn breakthrough rls symptoms (Patient taking differently: Take 1 tablet by mouth 3 (Three) Times a Day. 1 tab tid and 1 extra if needed prn breakthrough rls symptoms), Disp: 360 tablet, Rfl: 3    diazePAM " (VALIUM) 5 MG tablet, Take 1 tablet by mouth Every 8 (Eight) Hours As Needed for Muscle Spasms., Disp: 20 tablet, Rfl: 0    Ferrous Fumarate (Ferrocite) 324 (106 Fe) MG tablet, Take 1 tablet by mouth 2 (Two) Times a Day., Disp: 180 tablet, Rfl: 3    levothyroxine (SYNTHROID, LEVOTHROID) 88 MCG tablet, Take 1 tablet by mouth Every Morning. For hypothyroidism., Disp: 90 tablet, Rfl: 3    Lyrica 100 MG capsule, Take 1 capsule by mouth 2 (Two) Times a Day., Disp: 60 capsule, Rfl: 0    metoprolol tartrate (LOPRESSOR) 25 MG tablet, Take 0.5 tablets by mouth 2 (Two) Times a Day., Disp: 90 tablet, Rfl: 3    naloxone (NARCAN) 4 MG/0.1ML nasal spray, Call 911. Don't prime. Spray in 1 nostril as needed for overdose. Repeat in 2-3 minutes in other nostril if no or minimal breathing/responsiveness. (Patient not taking: Reported on 2/29/2024), Disp: 2 each, Rfl: 0    polyethylene glycol (MIRALAX) 17 g packet, Take 17 g by mouth 2 (Two) Times a Day., Disp: 10 each, Rfl: 0    pramipexole (MIRAPEX) 0.5 MG tablet, Take 0.5 tablets by mouth 3 (Three) Times a Day., Disp: 135 tablet, Rfl: 3    Review of Systems   Constitutional:  Positive for fatigue. Negative for activity change, appetite change, chills, diaphoresis, fever and unexpected weight change.   HENT:  Negative for congestion, dental problem, drooling, ear discharge, ear pain, facial swelling, hearing loss, mouth sores, nosebleeds, postnasal drip, rhinorrhea, sinus pressure, sinus pain, sneezing, sore throat, tinnitus, trouble swallowing and voice change.    Eyes:  Negative for photophobia, pain, discharge, redness, itching and visual disturbance.   Respiratory:  Negative for apnea, cough, choking, chest tightness, shortness of breath, wheezing and stridor.    Cardiovascular:  Negative for chest pain, palpitations and leg swelling.   Gastrointestinal:  Negative for abdominal distention, abdominal pain, anal bleeding, blood in stool, constipation, diarrhea, nausea, rectal pain  "and vomiting.   Endocrine: Negative for cold intolerance, heat intolerance, polydipsia, polyphagia and polyuria.   Genitourinary:  Negative for decreased urine volume, difficulty urinating, dysuria, enuresis, flank pain, frequency, genital sores, hematuria and urgency.   Musculoskeletal:  Positive for back pain. Negative for arthralgias, gait problem, joint swelling, myalgias, neck pain and neck stiffness.   Skin:  Negative for color change, pallor, rash and wound.   Allergic/Immunologic: Negative for environmental allergies, food allergies and immunocompromised state.   Neurological:  Negative for dizziness, tremors, seizures, syncope, facial asymmetry, speech difficulty, weakness, light-headedness, numbness and headaches.   Hematological:  Negative for adenopathy. Does not bruise/bleed easily.   Psychiatric/Behavioral:  Negative for agitation, behavioral problems, confusion, decreased concentration, dysphoric mood, hallucinations, self-injury, sleep disturbance and suicidal ideas. The patient is not nervous/anxious and is not hyperactive.    All other systems reviewed and are negative.      PE:  Ht 162.6 cm (64\")   Wt 113 kg (249 lb)   LMP 2018 (LMP Unknown)   BMI 42.74 kg/m²     Neurologic Exam   Incision healing well with some scabbing. No signs of infection, redness, edema or drainage    Social History    Tobacco Use      Smoking status: Former        Packs/day: 0.00        Years: 1 pack/day for 2.0 years (2.0 ttl pk-yrs)        Types: Cigarettes        Start date: 10/1/1993        Quit date: 10/1/1995        Years since quittin.4      Smokeless tobacco: Never       Tobacco Use: Medium Risk (3/5/2024)    Patient History     Smoking Tobacco Use: Former     Smokeless Tobacco Use: Never     Passive Exposure: Not on file       STEADI Fall Risk Assessment has not been completed.      There are no diagnoses linked to this encounter.   Assessment/Plan  This is a 53 y.o. female who is 3 weeks s/p L4-5 " fusion with Dr. Smith.  Patient notes significant improvement her lower extremity symptoms.  She states all of her preoperative leg pain is resolved.  She does have some incisional discomfort but denies any worsening.  Denies any issues with her incision.  Postop x-rays show stable hardware placement. Activities and restrictions were discussed. Wound care was discussed with the patient.  Work note given to patient.  We will have her follow-up in 2 to 3 weeks to see how she is doing.  She may benefit from physical therapy in the future for further strengthening of her lower extremities.  Patient encouraged to contact us if she has any changes in her condition or any concerns.    Any copied data from previous notes included in the (1) HPI, (2) PE, (3) MDM and/or Assessment and Plan has been reviewed and is accurate as of 03/05/24    Cherie Magana PA-C  03/05/24

## 2024-03-06 NOTE — TELEPHONE ENCOUNTER
Relayed to patient. She asked if she needs to decrease her ferrous sulfate. Takes it daily. Has restless leg so she doesn't want to effect that though. Also meant to ask about the weight loss injections. Supposed to take Humira for Chrohn's, but hasn't started it yet due to surgery. Didn't know if she can take together either. She said that she could make another appt. if needed.

## 2024-03-07 LAB — IF BLOCK AB SER-ACNC: 1 AU/ML (ref 0–1.1)

## 2024-03-08 LAB — PYRIDOXAL PHOS SERPL-MCNC: 2.6 UG/L (ref 3.4–65.2)

## 2024-03-11 ENCOUNTER — TELEPHONE (OUTPATIENT)
Dept: PHARMACY | Facility: HOSPITAL | Age: 54
End: 2024-03-11
Payer: COMMERCIAL

## 2024-03-11 ENCOUNTER — TELEPHONE (OUTPATIENT)
Dept: NEUROLOGY | Facility: CLINIC | Age: 54
End: 2024-03-11
Payer: COMMERCIAL

## 2024-03-11 DIAGNOSIS — E53.1 VITAMIN B6 DEFICIENCY: ICD-10-CM

## 2024-03-11 DIAGNOSIS — R20.2 FACIAL TINGLING: Primary | ICD-10-CM

## 2024-03-11 LAB — METHYLMALONATE SERPL-SCNC: 681 NMOL/L (ref 0–378)

## 2024-03-11 RX ORDER — MULTIVITAMIN WITH IRON
100 TABLET ORAL DAILY
Qty: 90 TABLET | Refills: 3 | Status: SHIPPED | OUTPATIENT
Start: 2024-03-11

## 2024-03-11 NOTE — PROGRESS NOTES
Please let Mary know that her vitamin B6 level is very low. This can cause numbness and tingling symptoms. I would recommend she start Vitamin B6 100mg daily. I will send in a prescription, but if this is not covered, she can obtain it over the counter. This should help with her tingling symptoms. Thanks, LINNEA Lopez

## 2024-03-11 NOTE — TELEPHONE ENCOUNTER
----- Message from LINNEA Mendoza sent at 3/11/2024  8:07 AM EDT -----  Please let Mary know that her vitamin B6 level is very low. This can cause numbness and tingling symptoms. I would recommend she start Vitamin B6 100mg daily. I will send in a prescription, but if this is not covered, she can obtain it over the counter. This should help with her tingling symptoms. Thanks, LINNEA Lopez

## 2024-03-14 LAB
BUN SERPL-MCNC: 16 MG/DL (ref 6–20)
BUN/CREAT SERPL: 18.2 (ref 7–25)
CALCIUM SERPL-MCNC: 8.9 MG/DL (ref 8.6–10.5)
CHLORIDE SERPL-SCNC: 106 MMOL/L (ref 98–107)
CO2 SERPL-SCNC: 27.5 MMOL/L (ref 22–29)
CREAT SERPL-MCNC: 0.88 MG/DL (ref 0.57–1)
EGFRCR SERPLBLD CKD-EPI 2021: 77.7 ML/MIN/1.73
GLUCOSE SERPL-MCNC: 79 MG/DL (ref 65–99)
POTASSIUM SERPL-SCNC: 4.7 MMOL/L (ref 3.5–5.2)
SODIUM SERPL-SCNC: 143 MMOL/L (ref 136–145)

## 2024-03-15 ENCOUNTER — TELEPHONE (OUTPATIENT)
Dept: NEUROSURGERY | Facility: CLINIC | Age: 54
End: 2024-03-15
Payer: COMMERCIAL

## 2024-03-15 ENCOUNTER — TELEPHONE (OUTPATIENT)
Dept: INTERNAL MEDICINE | Facility: CLINIC | Age: 54
End: 2024-03-15
Payer: COMMERCIAL

## 2024-03-15 NOTE — TELEPHONE ENCOUNTER
Patient left a vm stating that she has been achy in right elbow and tops of legs. Wondering if it is her thyroid. Last time her thyroid was off she did this. Stated that she did have surgery 2 weeks ago. Didn't say what type of surgery. [phone].

## 2024-03-15 NOTE — LETTER
March 15, 2024     Patient: Mary Lunsford   YOB: 1970   Date of Visit: 3/15/2024       To Whom It May Concern:    It is my medical opinion that Mary Lunsford should continue working only 1/2 days through March 20, 2024 and returning to full duty on March 21, 2024.            Sincerely,        Jeovanny Smith MD    CC: No Recipients

## 2024-03-15 NOTE — TELEPHONE ENCOUNTER
Provider:  [place]  Surgery/Procedure:  Lumbar fusion decompression with pedicle screws L4-5  Surgery/Procedure Date:  2/14/24  Last visit:   3/5/24  Next visit: 3/29/24     Reason for call:  Spoke with Ms. Lunsford, she reports having some additional soreness this week after returning to work half days. She works in a [employer] center so no heavy lifting, however is sitting and standing more than she was when off of work and thinks this could be what is causing the soreness. She feels stiff in the lower back as well, and is having difficulty getting comfortable to sleep. No radiating pain into her legs, does have some occasional aching feeling in her upper legs but doesn't describe it as a nerve type of pain. Would like to see if we could extend her half day work status through Wednesday of next week, as she is supposed to return to full time on Monday and feels that may still be too soon.

## 2024-03-21 ENCOUNTER — TELEPHONE (OUTPATIENT)
Dept: INTERNAL MEDICINE | Facility: CLINIC | Age: 54
End: 2024-03-21
Payer: COMMERCIAL

## 2024-03-21 NOTE — TELEPHONE ENCOUNTER
Patient is calling wanting to get worked in with Dr Jimenez to discuss weight loss shot pt is wanting a call back at [phone]

## 2024-03-22 ENCOUNTER — OFFICE VISIT (OUTPATIENT)
Dept: INTERNAL MEDICINE | Facility: CLINIC | Age: 54
End: 2024-03-22
Payer: COMMERCIAL

## 2024-03-22 VITALS
HEART RATE: 64 BPM | BODY MASS INDEX: 43.16 KG/M2 | OXYGEN SATURATION: 100 % | SYSTOLIC BLOOD PRESSURE: 129 MMHG | TEMPERATURE: 98.5 F | HEIGHT: 64 IN | DIASTOLIC BLOOD PRESSURE: 86 MMHG | WEIGHT: 252.8 LBS

## 2024-03-22 DIAGNOSIS — E66.01 MORBID OBESITY WITH BMI OF 40.0-44.9, ADULT: Primary | ICD-10-CM

## 2024-03-22 PROBLEM — E53.1 VITAMIN B6 DEFICIENCY: Status: ACTIVE | Noted: 2024-03-22

## 2024-03-22 NOTE — PROGRESS NOTES
Office Visit      Patient Name: Mary Lunsford  : 1970   MRN: [id number]     Chief Complaint:    Chief Complaint   Patient presents with    Weight Check     Discuss weight loss Injections       History of Present Illness: Mary Lunsford is a 53 y.o. female who is here today to discuss obesity. She is interested in trying a weight loss medication. Her insurance company told her that Wegovy and Zepbound would be covered but that prior authorization would be needed.     She has attempted to lose weight many times before with diet and exercise. She had gastric bypass surgery in  which helped her lose weight but she has gained weight back. She tried counting macros which worked well. She has also tried Matilde Emerson and Weight Watchers.     She is going to have her daughter help her with diet modifications. She was walking for exercise until she had back surgery and she will follow up with them soon to find out if she is able to resume walking.     No family history of medullary thyroid cancer or multiple endocrine neoplasia. No personal history of pancreatitis.     Subjective      I have reviewed and the following portions of the patient's history were updated as appropriate: past family history, past medical history, past social history, past surgical history and problem list.      Current Outpatient Medications:     atorvastatin (LIPITOR) 40 MG tablet, Take 1 tablet by mouth Every Night., Disp: 90 tablet, Rfl: 4    carbidopa-levodopa CR (SINEMET CR)  MG per CR tablet, 1 tab tid and 1 extra if needed prn breakthrough rls symptoms (Patient taking differently: Take 1 tablet by mouth 3 (Three) Times a Day. 1 tab tid and 1 extra if needed prn breakthrough rls symptoms), Disp: 360 tablet, Rfl: 3    Ferrous Fumarate (Ferrocite) 324 (106 Fe) MG tablet, Take 1 tablet by mouth 2 (Two) Times a Day., Disp: 180 tablet, Rfl: 3    levothyroxine (SYNTHROID, LEVOTHROID) 88 MCG tablet, Take 1 tablet by  "mouth Every Morning. For hypothyroidism., Disp: 90 tablet, Rfl: 3    Lyrica 100 MG capsule, Take 1 capsule by mouth 2 (Two) Times a Day., Disp: 60 capsule, Rfl: 0    metoprolol tartrate (LOPRESSOR) 25 MG tablet, Take 0.5 tablets by mouth 2 (Two) Times a Day., Disp: 90 tablet, Rfl: 3    pramipexole (MIRAPEX) 0.5 MG tablet, Take 0.5 tablets by mouth 3 (Three) Times a Day., Disp: 135 tablet, Rfl: 3    vitamin B-6 (PYRIDOXINE) 100 MG tablet, Take 1 tablet by mouth Daily., Disp: 90 tablet, Rfl: 3    diazePAM (VALIUM) 5 MG tablet, Take 1 tablet by mouth Every 8 (Eight) Hours As Needed for Muscle Spasms. (Patient not taking: Reported on 3/22/2024), Disp: 20 tablet, Rfl: 0    polyethylene glycol (MIRALAX) 17 g packet, Take 17 g by mouth 2 (Two) Times a Day. (Patient not taking: Reported on 3/22/2024), Disp: 10 each, Rfl: 0    Semaglutide-Weight Management 0.25 MG/0.5ML solution auto-injector, Inject 0.5 mL under the skin into the appropriate area as directed 1 (One) Time Per Week., Disp: 2 mL, Rfl: 0    Allergies   Allergen Reactions    Sulfa Antibiotics Rash and Other (See Comments)     Blurry vision       Objective     Physical Exam:  Vital Signs:   Vitals:    03/22/24 1642   BP: 129/86   Pulse: 64   Temp: 98.5 °F (36.9 °C)   SpO2: 100%   Weight: 115 kg (252 lb 12.8 oz)   Height: 162.6 cm (64\")     Body mass index is 43.39 kg/m².    Physical Exam  Vitals and nursing note reviewed.   Constitutional:       General: She is not in acute distress.     Appearance: She is well-developed. She is morbidly obese. She is not ill-appearing, toxic-appearing or diaphoretic.   HENT:      Head: Normocephalic and atraumatic.   Eyes:      General: No scleral icterus.     Extraocular Movements: Extraocular movements intact.      Conjunctiva/sclera: Conjunctivae normal.      Pupils: Pupils are equal, round, and reactive to light.   Cardiovascular:      Rate and Rhythm: Normal rate and regular rhythm.      Heart sounds: Normal heart sounds. " No murmur heard.     No friction rub. No gallop.   Pulmonary:      Effort: Pulmonary effort is normal. No respiratory distress.      Breath sounds: Normal breath sounds. No wheezing, rhonchi or rales.   Chest:      Chest wall: No tenderness.   Musculoskeletal:         General: No tenderness or deformity. Normal range of motion.      Cervical back: Normal range of motion and neck supple.      Right lower leg: No edema.      Left lower leg: No edema.   Skin:     General: Skin is warm and dry.      Capillary Refill: Capillary refill takes less than 2 seconds.      Findings: No rash.   Neurological:      Mental Status: She is alert and oriented to person, place, and time.      Cranial Nerves: No cranial nerve deficit.      Sensory: No sensory deficit.      Motor: No abnormal muscle tone.      Coordination: Coordination normal.      Deep Tendon Reflexes: Reflexes normal.   Psychiatric:         Mood and Affect: Mood normal.         Behavior: Behavior normal.         Thought Content: Thought content normal.         Judgment: Judgment normal.         Common labs          2/18/2024    14:52 3/4/2024    15:33 3/13/2024    15:18   Common Labs   Glucose 90  97  79    BUN 13  21  16    Creatinine 0.84  1.11  0.88    Sodium 137  139  143    Potassium 4.1  4.7  4.7    Chloride 99  104  106    Calcium 8.9  9.7  8.9    Albumin 3.7  4.2     Total Bilirubin 0.6  0.4     Alkaline Phosphatase 67  77     AST (SGOT) 21  15     ALT (SGPT) <5  10     WBC 6.15  4.76     Hemoglobin 10.8  12.4     Hematocrit 33.4  36.8     Platelets 233  400     Total Cholesterol  208     Triglycerides  114     HDL Cholesterol  50     LDL Cholesterol   138     Hemoglobin A1C  5.50           Assessment / Plan      Assessment/Plan:   Diagnoses and all orders for this visit:    1. Morbid obesity with BMI of 40.0-44.9, adult (Primary)  -     Semaglutide-Weight Management 0.25 MG/0.5ML solution auto-injector; Inject 0.5 mL under the skin into the appropriate area  as directed 1 (One) Time Per Week.  Dispense: 2 mL; Refill: 0    Discussed potential side effects of medication.  Discussed the need for lifestyle modification to aid in weight loss and to help keep weight off after discontinuation of the medication. Increase water intake. Work to eliminate soda intake.  Initiate dietary modifications.  Begin walking daily when cleared by neurosurgery.     Comorbidities include hypertension and dyslipidemia.      Follow Up:   Return in about 3 months (around 6/22/2024) for Next scheduled follow up.    Patient was given instructions and counseling regarding her condition or for health maintenance advice. Please see specific information pulled into the AVS if appropriate.     Margarita Esparza PA-C  [facility]     Please note that portions of this note may have been completed with a voice recognition program.

## 2024-03-22 NOTE — TELEPHONE ENCOUNTER
Name: Mary Lunsford    Relationship: Self    Best Callback Number: [phone]     HUB PROVIDED THE RELAY MESSAGE FROM THE OFFICE   PATIENT SCHEDULED AS REQUESTED    ADDITIONAL INFORMATION: PATIENT DID NOT WANT TO WAIT UNTIL APRIL- PATIENT SCHEDULED TODAY WITH TAURUS WOOD

## 2024-03-26 DIAGNOSIS — M48.061 STENOSIS OF LATERAL RECESS OF LUMBAR SPINE: ICD-10-CM

## 2024-03-26 DIAGNOSIS — M51.36 DEGENERATIVE LUMBAR DISC: ICD-10-CM

## 2024-03-26 DIAGNOSIS — M43.16 SPONDYLOLISTHESIS AT L4-L5 LEVEL: ICD-10-CM

## 2024-03-27 NOTE — TELEPHONE ENCOUNTER
Rx Refill Note  Requested Prescriptions     Pending Prescriptions Disp Refills    Lyrica 100 MG capsule 60 capsule 0     Sig: Take 1 capsule by mouth 2 (Two) Times a Day.      Last office visit with prescribing clinician: 2/27/2024   Last telemedicine visit with prescribing clinician: Visit date not found   Next office visit with prescribing clinician: 8/27/2024       Jax Dimas MA  03/27/24, 10:09 EDT

## 2024-03-29 ENCOUNTER — OFFICE VISIT (OUTPATIENT)
Dept: NEUROSURGERY | Facility: CLINIC | Age: 54
End: 2024-03-29
Payer: COMMERCIAL

## 2024-03-29 VITALS — WEIGHT: 249.2 LBS | HEIGHT: 64 IN | BODY MASS INDEX: 42.55 KG/M2

## 2024-03-29 DIAGNOSIS — Z98.1 S/P LUMBAR FUSION: Primary | ICD-10-CM

## 2024-03-29 PROCEDURE — 99024 POSTOP FOLLOW-UP VISIT: CPT

## 2024-03-29 NOTE — PROGRESS NOTES
"Mary Lunsford  1970  03/29/2024  [id number]    CC: s/p L4-5 fusion    HPI: Mary Lunsford is a 53 y.o. female who is 6 weeks s/p L4-5 fusion with Dr. Smith. Preoperatively, patient was experiencing severe right leg pain. Postoperatively, patient reports significant improvement her right leg symptoms.  Patient states all of her preoperative leg pain is resolved.  She started going back to full-time work a few days ago and feels that her back feels more tired and stiff due to that.  Denies any incisional issues.  She is overall pleased that she had the surgery.  She states she has not been getting much sleep but thinks this is related to her Crohn's.  She continues to take intermittent muscle relaxers.    Past Medical History:   Diagnosis Date    Anemia     Body piercing     both ears    Bronchitis     Colitis     Crohn's disease 08/2023    incidental finding, asymptomatic    Disease of thyroid gland     Dissection of coronary artery 03/20/2017    pt denies this 10/2/19.  states she had testing at , but was told that they didn't \"find anything\"    Enteropathogenic Escherichia coli infection     Essential hypertension 03/30/2018    Gallstone     H/O echocardiogram     History of nuclear stress test     Hyperlipidemia     Kidney stones     Low back pain     Migraine     Obesity     Ovarian cyst     Prolonged Q-T interval on ECG     history    Restless leg syndrome     Subacute idiopathic myocarditis     Urinary tract infection     Vitamin B12 deficiency     Vitamin B6 deficiency 03/22/2024    Wears glasses     to read       Allergies   Allergen Reactions    Sulfa Antibiotics Rash and Other (See Comments)     Blurry vision         Current Outpatient Medications:     atorvastatin (LIPITOR) 40 MG tablet, Take 1 tablet by mouth Every Night., Disp: 90 tablet, Rfl: 4    carbidopa-levodopa CR (SINEMET CR)  MG per CR tablet, 1 tab tid and 1 extra if needed prn breakthrough rls symptoms (Patient " taking differently: Take 1 tablet by mouth 3 (Three) Times a Day. 1 tab tid and 1 extra if needed prn breakthrough rls symptoms), Disp: 360 tablet, Rfl: 3    diazePAM (VALIUM) 5 MG tablet, Take 1 tablet by mouth Every 8 (Eight) Hours As Needed for Muscle Spasms., Disp: 20 tablet, Rfl: 0    Ferrous Fumarate (Ferrocite) 324 (106 Fe) MG tablet, Take 1 tablet by mouth 2 (Two) Times a Day., Disp: 180 tablet, Rfl: 3    levothyroxine (SYNTHROID, LEVOTHROID) 88 MCG tablet, Take 1 tablet by mouth Every Morning. For hypothyroidism., Disp: 90 tablet, Rfl: 3    Lyrica 100 MG capsule, Take 1 capsule by mouth 2 (Two) Times a Day., Disp: 60 capsule, Rfl: 1    metoprolol tartrate (LOPRESSOR) 25 MG tablet, Take 0.5 tablets by mouth 2 (Two) Times a Day., Disp: 90 tablet, Rfl: 3    polyethylene glycol (MIRALAX) 17 g packet, Take 17 g by mouth 2 (Two) Times a Day., Disp: 10 each, Rfl: 0    pramipexole (MIRAPEX) 0.5 MG tablet, Take 0.5 tablets by mouth 3 (Three) Times a Day., Disp: 135 tablet, Rfl: 3    Semaglutide-Weight Management 0.25 MG/0.5ML solution auto-injector, Inject 0.5 mL under the skin into the appropriate area as directed 1 (One) Time Per Week., Disp: 2 mL, Rfl: 0    vitamin B-6 (PYRIDOXINE) 100 MG tablet, Take 1 tablet by mouth Daily., Disp: 90 tablet, Rfl: 3    Review of Systems   Constitutional:  Positive for fatigue. Negative for activity change, appetite change, chills, diaphoresis, fever and unexpected weight change.   HENT:  Negative for congestion, dental problem, drooling, ear discharge, ear pain, facial swelling, hearing loss, mouth sores, nosebleeds, postnasal drip, rhinorrhea, sinus pressure, sinus pain, sneezing, sore throat, tinnitus, trouble swallowing and voice change.    Eyes:  Negative for photophobia, pain, discharge, redness, itching and visual disturbance.   Respiratory:  Negative for apnea, cough, choking, chest tightness, shortness of breath, wheezing and stridor.    Cardiovascular:  Negative for  "chest pain, palpitations and leg swelling.   Gastrointestinal:  Negative for abdominal distention, abdominal pain, anal bleeding, blood in stool, constipation, diarrhea, nausea, rectal pain and vomiting.   Endocrine: Negative for cold intolerance, heat intolerance, polydipsia, polyphagia and polyuria.   Genitourinary:  Negative for decreased urine volume, difficulty urinating, dysuria, enuresis, flank pain, frequency, genital sores, hematuria and urgency.   Musculoskeletal:  Positive for back pain. Negative for arthralgias, gait problem, joint swelling, myalgias, neck pain and neck stiffness.   Skin:  Negative for color change, pallor, rash and wound.   Allergic/Immunologic: Negative for environmental allergies, food allergies and immunocompromised state.   Neurological:  Negative for dizziness, tremors, seizures, syncope, facial asymmetry, speech difficulty, weakness, light-headedness, numbness and headaches.   Hematological:  Negative for adenopathy. Does not bruise/bleed easily.   Psychiatric/Behavioral:  Negative for agitation, behavioral problems, confusion, decreased concentration, dysphoric mood, hallucinations, self-injury, sleep disturbance and suicidal ideas. The patient is not nervous/anxious and is not hyperactive.    All other systems reviewed and are negative.        PE:  Ht 162.6 cm (64.02\")   Wt 113 kg (249 lb 3.2 oz)   LMP 2018 (LMP Unknown)   BMI 42.75 kg/m²     Neurologic Exam   Incision healing well with some scabbing. No signs of infection, redness, edema or drainage.     Social History    Tobacco Use      Smoking status: Former        Packs/day: 0.00        Years: 1 pack/day for 2.0 years (2.0 ttl pk-yrs)        Types: Cigarettes        Start date: 10/1/1993        Quit date: 10/1/1995        Years since quittin.5      Smokeless tobacco: Never       Tobacco Use: Medium Risk (3/29/2024)    Patient History     Smoking Tobacco Use: Former     Smokeless Tobacco Use: Never     Passive " Exposure: Not on file       STEADI Fall Risk Assessment has not been completed.      Diagnoses and all orders for this visit:    1. S/P lumbar fusion (Primary)  -     Ambulatory Referral to Physical Therapy Evaluate and treat, POST OP; Stretching, ROM       Assessment/Plan  This is a 53 y.o. female who is 6 weeks s/p L4-5 fusion with Dr. Smith.  Patient overall note significant improvement in her lower extremity symptoms.  She is overall pleased that she had the surgery and notes she was she had it sooner.  She has noticed some slight increase in her back pain since going back to full-time work.  I have given her a prescription for physical therapy for this.  She will follow-up with Dr. Smith in 4 to 6 weeks. Patient encouraged to contact us if she has any changes in her condition or any concerns.    Any copied data from previous notes included in the (1) HPI, (2) PE, (3) MDM and/or Assessment and Plan has been reviewed and is accurate as of 03/29/24    Cherie Magana PA-C  03/29/24

## 2024-04-03 ENCOUNTER — PRIOR AUTHORIZATION (OUTPATIENT)
Dept: INTERNAL MEDICINE | Facility: CLINIC | Age: 54
End: 2024-04-03
Payer: COMMERCIAL

## 2024-04-03 NOTE — TELEPHONE ENCOUNTER
Mary Lunsford (Key: [id number])  Rx #: [id number]  Wegovy 0.25MG/0.5ML auto-injectors     Form  [employer] Exception to Coverage Request Form   Plan Contact  [phone] phone  [phone] fax  Created  9 days ago  Sent to Plan  less than a minute ago  Determination

## 2024-04-05 NOTE — TELEPHONE ENCOUNTER
Your prior authorization for [employer] has been approved!  MORE INFO  Personalized support and financial assistance may be available through the 's [employer] program. For more information, and to see program requirements, click on the More Info button to the right.

## 2024-04-29 ENCOUNTER — OFFICE VISIT (OUTPATIENT)
Dept: NEUROSURGERY | Facility: CLINIC | Age: 54
End: 2024-04-29
Payer: COMMERCIAL

## 2024-04-29 VITALS — BODY MASS INDEX: 43.54 KG/M2 | WEIGHT: 255 LBS | HEIGHT: 64 IN | TEMPERATURE: 97.5 F

## 2024-04-29 DIAGNOSIS — Z98.1 S/P LUMBAR FUSION: Primary | ICD-10-CM

## 2024-04-29 PROCEDURE — 99024 POSTOP FOLLOW-UP VISIT: CPT | Performed by: STUDENT IN AN ORGANIZED HEALTH CARE EDUCATION/TRAINING PROGRAM

## 2024-05-17 DIAGNOSIS — E66.01 MORBID OBESITY WITH BMI OF 40.0-44.9, ADULT: ICD-10-CM

## 2024-06-07 ENCOUNTER — HOSPITAL ENCOUNTER (OUTPATIENT)
Dept: MAMMOGRAPHY | Facility: HOSPITAL | Age: 54
Discharge: HOME | End: 2024-06-07
Admitting: FAMILY MEDICINE
Payer: COMMERCIAL

## 2024-06-07 DIAGNOSIS — Z12.31 ENCOUNTER FOR SCREENING MAMMOGRAM FOR BREAST CANCER: ICD-10-CM

## 2024-06-07 PROCEDURE — 77067 SCR MAMMO BI INCL CAD: CPT

## 2024-06-07 PROCEDURE — 77063 BREAST TOMOSYNTHESIS BI: CPT

## 2024-06-14 DIAGNOSIS — E66.01 MORBID OBESITY WITH BMI OF 40.0-44.9, ADULT: ICD-10-CM

## 2024-06-17 RX ORDER — SEMAGLUTIDE 0.5 MG/.5ML
0.5 INJECTION, SOLUTION SUBCUTANEOUS WEEKLY
Qty: 2 ML | Refills: 1 | Status: SHIPPED | OUTPATIENT
Start: 2024-06-17

## 2024-06-19 DIAGNOSIS — G25.81 RESTLESS LEGS SYNDROME (RLS): ICD-10-CM

## 2024-06-19 RX ORDER — PRAMIPEXOLE DIHYDROCHLORIDE 0.5 MG/1
0.25 TABLET ORAL 3 TIMES DAILY
Qty: 135 TABLET | Refills: 3 | Status: SHIPPED | OUTPATIENT
Start: 2024-06-19

## 2024-06-28 ENCOUNTER — OFFICE VISIT (OUTPATIENT)
Dept: NEUROLOGY | Facility: CLINIC | Age: 54
End: 2024-06-28
Payer: COMMERCIAL

## 2024-06-28 VITALS
OXYGEN SATURATION: 98 % | HEIGHT: 64 IN | DIASTOLIC BLOOD PRESSURE: 86 MMHG | HEART RATE: 64 BPM | WEIGHT: 255 LBS | SYSTOLIC BLOOD PRESSURE: 128 MMHG | BODY MASS INDEX: 43.54 KG/M2

## 2024-06-28 DIAGNOSIS — D50.9 IRON DEFICIENCY ANEMIA, UNSPECIFIED IRON DEFICIENCY ANEMIA TYPE: ICD-10-CM

## 2024-06-28 DIAGNOSIS — E53.1 VITAMIN B6 DEFICIENCY: ICD-10-CM

## 2024-06-28 DIAGNOSIS — G25.81 RESTLESS LEGS SYNDROME (RLS): ICD-10-CM

## 2024-06-28 DIAGNOSIS — E53.8 VITAMIN B12 DEFICIENCY: Primary | ICD-10-CM

## 2024-06-28 DIAGNOSIS — M51.36 DEGENERATIVE LUMBAR DISC: ICD-10-CM

## 2024-06-28 PROCEDURE — 99214 OFFICE O/P EST MOD 30 MIN: CPT | Performed by: NURSE PRACTITIONER

## 2024-06-28 RX ORDER — CYANOCOBALAMIN 1000 UG/ML
INJECTION, SOLUTION INTRAMUSCULAR; SUBCUTANEOUS
Qty: 30 ML | Refills: 1 | Status: SHIPPED | OUTPATIENT
Start: 2024-06-28

## 2024-06-28 RX ORDER — CARBIDOPA AND LEVODOPA 50; 200 MG/1; MG/1
1 TABLET, EXTENDED RELEASE ORAL 3 TIMES DAILY
Qty: 270 TABLET | Refills: 3 | Status: SHIPPED | OUTPATIENT
Start: 2024-06-28

## 2024-06-28 RX ORDER — PRAMIPEXOLE DIHYDROCHLORIDE 0.5 MG/1
TABLET ORAL
Qty: 180 TABLET | Refills: 3 | Status: SHIPPED | OUTPATIENT
Start: 2024-06-28

## 2024-06-28 NOTE — PROGRESS NOTES
Subjective:     Patient ID: Mary Lunsford is a 53 y.o. female.    CC:   Chief Complaint   Patient presents with    Restless Legs Syndrome     HPI:   History of Present Illness  This is a very pleasant 53-year-old female who presents for 6-month neurology follow-up on longstanding restless leg syndrome, rare migraine with aura, and chronic iron deficiency anemia. I saw her via telehealth on 12/04/2023.     She was experiencing some facial tingling and new symptoms following chiropractic manipulation. I ordered additional neuroimaging, which she had completed on 02/06/2024. We did decide to treat her with oral antibiotics for sinus infection symptoms.     She has been evaluated by neurosurgery for findings of lumbar spinal stenosis and radicular symptoms. She was admitted to [facility] on 02/14/2024 to 02/16/2024 for lumbar fusion, cyst removal, decompression with pedicle screws at L4-L5 completed by Dr. Smith. She has followed up with neurosurgery post intervention.     In regards to her neuropathy and radicular pain she experienced with her low back symptoms, she has been taking Lyrica 100 mg twice per day, dispensed as written and increased by PCP due to pain from her back radiating into her right leg. She has been taking once a day long term for restless leg syndrome management. She is on pramipexole and carbidopa/levodopa. She is here for follow-up and refills on her medications today.    She underwent back surgery, which yielded positive results. She experienced pain radiating from her knee to her ankle on the right side, which was debilitating. She managed the pain with 3 to 4 ibuprofen tablets, which alleviated the pain upon awakening. An MRI of her back was conducted in 12/2023 due to back pain and was found to have a cyst compressing nerves. She is feeling much better now.    She has not experienced any more episodes of facial tingling.     She continues to take vitamin B6 and receives  "B12 injections from a nurse friend.    She struggles with restless legs syndrome, however, she manages to sleep through the night. She does not take vitamin D. She does not require an additional dose of carbidopa/levodopa. She occasionally takes an extra half tablet of Mirapex to return to sleep. She does not exceed 3 Sinemet tablets per day. She needs refills today.    She continues to take iron supplements once daily.    Previous neurology history and workup included-   Chronic restless leg syndrome present since 2009. She also has intermittent migraine headaches. She has been taking Lyrica brand name necessary 75 mg twice a day. Pregabalin generic did not help her symptoms. She takes carbidopa-levodopa CR 50/200 mg 1 pill 3 times a day and 1 extra if needed. She takes pramipexole 0.5 mg 0.5 tablets 3 times a day.      She also underwent an EMG and NCVS of her right upper and left upper extremities and this was completely normal on 10/23/2019 with Dr. Buck Lan of our neurologist.       Underwent MRI of the brain with and without contrast which showed 2 very small chronic white matter changes of the brain and otherwise MRI of the brain was completely unremarkable with no abnormal contrast enhancement and no acute intracranial abnormalities.     She does also experience migraines with aura for several years. She typically has used Excedrin Migraine along with ibuprofen.  She will get \"squiggly lines\" in both eyes and then about 20 minutes later will start to have the band of headache around her head with nausea, photophobia and phonophobia.  This typically last 5 to 6 hours.  Sometimes she does have to lay down and go to sleep.  She gets moderate throbbing pain.  She has not noticed a pattern. Typically this will occur 5-6 times per year.  She used to take sumatriptan/Imitrex for many years and this became ineffective.  She switched to rizatriptan but this caused her to be very tired and did not seem to work well.  " She did not like the side effects of either of the triptan medications since she felt like she could not focus and had to go to sleep.  Also caused some nausea.  She may have tried a third triptan but cannot recall exactly which one. Daughter also gets migraines. Episodic migraines-a few in a few months and then resolve for several months. These are rare now. Has ubrelvy if needed.     2021 labs-ferritin level, which was 563 ng/mL and vitamin B12 level, which is 212 pg/mL, and methylmalonic acid level, which was 367, normal.     She is treated with iron supplement and has previously had iron infusions in the past. Her mom and grandmother also have severe restless leg syndrome. Ropinirole and neupro patch as well as generic pregabalin, gabapentin and Horizant all ineffective for RLS.     Chronic restless legs syndrome, known iron deficiency anemia, and migraines with aura, which have been episodic.     She reported on 12/4/2023 she is experiencing numbness and tingling in her face bilaterally. This started approximately 2-4 weeks ago and is intermittent. It is not painful but is making her nervous. She states she will not see her back doctor until 12/14/2023 or 12/16/2023, but she would like to undergo another MRI. She had an MRI of the brain on 01/20/2020 when she was having right facial tingling. She states the tingling resolved after her MRI, but it has returned.      The following portions of the patient's history were reviewed and updated as appropriate: allergies, current medications, past family history, past medical history, past social history, past surgical history, and problem list.    Past Medical History:   Diagnosis Date    Anemia     Body piercing     both ears    Bronchitis     Colitis     Crohn's disease 08/2023    incidental finding, asymptomatic    Disease of thyroid gland     Dissection of coronary artery 03/20/2017    pt denies this 10/2/19.  states she had testing at [facility] in [place], but was told that they  "didn't \"find anything\"    Enteropathogenic Escherichia coli infection     Essential hypertension 2018    Gallstone     H/O echocardiogram     History of nuclear stress test     Hyperlipidemia     Kidney stones     Low back pain     Migraine     Obesity     Ovarian cyst     Prolonged Q-T interval on ECG     history    Restless leg syndrome     Subacute idiopathic myocarditis     Urinary tract infection     Vitamin B12 deficiency     Vitamin B6 deficiency 2024    Wears glasses     to read       Past Surgical History:   Procedure Laterality Date    BACK SURGERY  2024    BREAST SURGERY Bilateral 1987    breast reduction    CARDIAC CATHETERIZATION N/A 03/15/2017    Procedure: Coronary angiography;  Surgeon: Moustapha Peñaloza MD;  Location: [facility] CATH INVASIVE LOCATION;  Service:     CARDIAC CATHETERIZATION N/A 03/15/2017    Procedure: Left ventriculography;  Surgeon: Moustapha Peñaloza MD;  Location: [facility] CATH INVASIVE LOCATION;  Service:      SECTION      1991 & 1995    CHOLECYSTECTOMY  2013    COLONOSCOPY N/A 10/03/2019    Procedure: COLONOSCOPY WITH BIOPSIES; ANOSCOPY;  Surgeon: Jose Paez MD;  Location: [facility] ENDOSCOPY;  Service: Gastroenterology    COLONOSCOPY N/A 2023    Procedure: COLONOSCOPY with biopsy;  Surgeon: Sami Elizondo MD;  Location: [facility] ENDOSCOPY;  Service: Gastroenterology;  Laterality: N/A;    ENDOSCOPY      GASTRIC BYPASS  2011    LUMBAR LAMINECTOMY WITH FUSION N/A 2024    Procedure: LUMBAR FUSION DECOMPRESSON WITH PEDICLE SCREWS L4-5;  Surgeon: Jeovanny Smith MD;  Location: [facility] OR;  Service: Neurosurgery;  Laterality: N/A;    REDUCTION MAMMAPLASTY      STEROID INJECTION      x2 in back       Social History     Socioeconomic History    Marital status:    Tobacco Use    Smoking status: Former     Current packs/day: 0.00     Average packs/day: 1 pack/day for 2.0 years (2.0 ttl pk-yrs)     Types: Cigarettes     " Start date: 10/1/1993     Quit date: 10/1/1995     Years since quittin.7    Smokeless tobacco: Never   Vaping Use    Vaping status: Never Used   Substance and Sexual Activity    Alcohol use: Yes     Alcohol/week: 12.0 standard drinks of alcohol     Types: 12 Cans of beer per week     Comment: 16 beers per week    Drug use: No    Sexual activity: Defer       Family History   Problem Relation Age of Onset    Cancer Father     Heart attack Father     Cancer Other     Other Other     Breast cancer Mother     Asthma Brother     Ulcerative colitis Brother     Arthritis Paternal Grandmother     Colon cancer Neg Hx           Current Outpatient Medications:     atorvastatin (LIPITOR) 40 MG tablet, Take 1 tablet by mouth Every Night., Disp: 90 tablet, Rfl: 4    carbidopa-levodopa CR (SINEMET CR)  MG per CR tablet, Take 1 tablet by mouth 3 (Three) Times a Day., Disp: 270 tablet, Rfl: 3    Ferrous Fumarate (Ferrocite) 324 (106 Fe) MG tablet, Take 1 tablet by mouth 2 (Two) Times a Day., Disp: 180 tablet, Rfl: 3    levothyroxine (SYNTHROID, LEVOTHROID) 88 MCG tablet, Take 1 tablet by mouth Every Morning. For hypothyroidism., Disp: 90 tablet, Rfl: 3    Lyrica 100 MG capsule, Take 1 capsule by mouth 2 (Two) Times a Day., Disp: 180 capsule, Rfl: 1    metoprolol tartrate (LOPRESSOR) 25 MG tablet, Take 0.5 tablets by mouth 2 (Two) Times a Day., Disp: 90 tablet, Rfl: 3    polyethylene glycol (MIRALAX) 17 g packet, Take 17 g by mouth 2 (Two) Times a Day., Disp: 10 each, Rfl: 0    pramipexole (MIRAPEX) 0.5 MG tablet, Take 1/2 tablet at 1230pm, 1 at bedtime and 1/2 tablet if needed for breakthrough symptoms, Disp: 180 tablet, Rfl: 3    Semaglutide-Weight Management (Wegovy) 0.5 MG/0.5ML solution auto-injector, Inject 0.5 mL under the skin into the appropriate area as directed 1 (One) Time Per Week., Disp: 2 mL, Rfl: 1    vitamin B-6 (PYRIDOXINE) 100 MG tablet, Take 1 tablet by mouth Daily., Disp: 90 tablet, Rfl: 3     "cyanocobalamin 1000 MCG/ML injection, Administer 1000 mcg IM every other week for 4 doses then monthly and continue, Disp: 30 mL, Rfl: 1    Semaglutide-Weight Management 0.25 MG/0.5ML solution auto-injector, Inject 0.5 mL under the skin into the appropriate area as directed 1 (One) Time Per Week. (Patient not taking: Reported on 6/28/2024), Disp: 2 mL, Rfl: 0    Syringe/Needle, Disp, 23G X 1\" 3 ML misc, Use as directed with B12 injections, Disp: 50 each, Rfl: 0     Review of Systems     Objective:  /86   Pulse 64   Ht 162.6 cm (64\")   Wt 116 kg (255 lb)   LMP 12/01/2018 (LMP Unknown)   SpO2 98%   BMI 43.77 kg/m²     Neurologic Exam     Mental Status   Oriented to person, place, and time.   Speech: speech is normal   Level of consciousness: alert    Cranial Nerves   Cranial nerves II through XII intact.     Motor Exam   Muscle bulk: normal  Overall muscle tone: normal    Strength   Strength 5/5 throughout.     Gait, Coordination, and Reflexes     Gait  Gait: normal    Coordination   Finger to nose coordination: normal    Tremor   Resting tremor: absent  Intention tremor: absent  Action tremor: absent    Reflexes   Right brachioradialis: 2+  Left brachioradialis: 2+  Right biceps: 2+  Left biceps: 2+  Right patellar: 1+  Left patellar: 1+  Right achilles: 1+  Left achilles: 1+  Right : 2+  Left : 2+  Right ankle clonus: absent  Left ankle clonus: absent      Physical Exam  Constitutional:       Appearance: Normal appearance.      Comments: BMI 43.7   Neurological:      Mental Status: She is alert and oriented to person, place, and time.      Cranial Nerves: Cranial nerves 2-12 are intact.      Motor: Motor strength is normal.     Coordination: Finger-Nose-Finger Test normal.      Gait: Gait is intact.      Deep Tendon Reflexes:      Reflex Scores:       Bicep reflexes are 2+ on the right side and 2+ on the left side.       Brachioradialis reflexes are 2+ on the right side and 2+ on the left side.   " "    Patellar reflexes are 1+ on the right side and 1+ on the left side.       Achilles reflexes are 1+ on the right side and 1+ on the left side.  Psychiatric:         Mood and Affect: Mood and affect normal.         Speech: Speech normal.         Results:  Results  Laboratory Studies 3/4/2024 with PCP:  MMA level is elevated at 681 indicating true B12 deficiency. Iron saturation is 14%. Vitamin B6 2.6 low. Hemoglobin level has improved.    Imaging  2/6/2024-MRI of the brain with and without contrast was unremarkable with a few punctate white matter changes consistent with migraines and her medical history, but no acute intracranial abnormalities and no evidence of stroke. Reviewed imaging and agree with radiology interpretation.    1/25/2024-CTA of the head, head and neck were both completed. No evidence of large vessel occlusion. Evidence of acute left maxillary sinusitis and chronic ethmoid and maxillary sinusitis.    Assessment/Plan:     Diagnoses and all orders for this visit:    1. Vitamin B12 deficiency (Primary)  -     cyanocobalamin 1000 MCG/ML injection; Administer 1000 mcg IM every other week for 4 doses then monthly and continue  Dispense: 30 mL; Refill: 1  -     Syringe/Needle, Disp, 23G X 1\" 3 ML misc; Use as directed with B12 injections  Dispense: 50 each; Refill: 0    2. Restless legs syndrome (RLS)  -     carbidopa-levodopa CR (SINEMET CR)  MG per CR tablet; Take 1 tablet by mouth 3 (Three) Times a Day.  Dispense: 270 tablet; Refill: 3  -     pramipexole (MIRAPEX) 0.5 MG tablet; Take 1/2 tablet at 1230pm, 1 at bedtime and 1/2 tablet if needed for breakthrough symptoms  Dispense: 180 tablet; Refill: 3  -     Lyrica 100 MG capsule; Take 1 capsule by mouth 2 (Two) Times a Day.  Dispense: 180 capsule; Refill: 1    3. Degenerative lumbar disc  -     Lyrica 100 MG capsule; Take 1 capsule by mouth 2 (Two) Times a Day.  Dispense: 180 capsule; Refill: 1    4. Iron deficiency anemia, unspecified iron " deficiency anemia type  Comments:  continue iron daily    5. Vitamin B6 deficiency  Comments:  continue B6 daily           Assessment & Plan  Restless leg syndrome.  The patient's restless leg syndrome is well-managed. The current medication regimen will be maintained and I did provide refills on the Lyrica DANAY 100mg twice a day.    Vitamin B12 deficiency and B6 Deficiency.  The patient is advised to recommence B12 injections due to an elevated methylmalonic acid level of 681 with the primary care. These injections have been sent to the pharmacy for her nurse friend to administer them. She will continue with vitamin B6, which previously indicated deficiency, and her tingling has resolved.    Iron supplementation will be continued.     She demonstrated understanding and agreed with the plan today.    Continue follow up with all specialists.    Follow-up  She is scheduled for a follow-up visit at the first available clinic, or sooner if necessary.    Reviewed medications, potential side effects and signs and symptoms to report. Discussed risk versus benefits of treatment plan with patient and/or family-including medications, labs and radiology that may be ordered. Addressed questions and concerns during visit. Patient and/or family verbalized understanding and agree with plan.    As part of this patient's treatment plan I am prescribing controlled substances. The patient has been made aware of appropriate use of such medications, including potential risk of somnolence, limited ability to drive and/or work safely, and potential for dependence or overdose. It has also been made clear that these medications are for use by the patient only, without concomitant use of alcohol or other substances unless prescribed. Keep out of reach of children.  [place] report has been reviewed. If this is going to be prescribed long term, [facility] Controlled Substance Agreement Contract has also been read and signed by patient and  myself.    During this visit the following were done:  Labs Reviewed [x]    Labs Ordered []    Radiology Reports Reviewed [x]    Radiology Ordered []    PCP Records Reviewed []    Referring Provider Records Reviewed []    ER Records Reviewed []    Hospital Records Reviewed []    History Obtained From Family []    Radiology Images Reviewed [x]    Other Reviewed [x]    Records Requested []      06/28/24   15:51 EDT    Patient or patient representative verbalized consent for the use of Ambient Listening during the visit with  LINNEA Mendoza for chart documentation. 7/1/2024  15:27 EDT    Note to patient: The 21st Century Cures Act makes medical notes like these available to patients in the interest of transparency. However, be advised this is a medical document. It is intended as peer to peer communication. It is written in medical language and may contain abbreviations or verbiage that are unfamiliar. It may appear blunt or direct. Medical documents are intended to carry relevant information, facts as evident, and the clinical opinion of the provider.

## 2024-06-28 NOTE — LETTER
July 1, 2024     Floridalma Jimenez MD  [address] [facility] [address]  [facility] [place] [address]    Patient: Mary Lunsford   YOB: 1970   Date of Visit: 6/28/2024       Dear Floridalma Jimenez MD    Mary Lunsford was in my office today. Below is a copy of my note.    If you have questions, please do not hesitate to call me. I look forward to following Mary along with you.         Sincerely,        LINNEA Mendoza        CC: No Recipients    Subjective:     Patient ID: Mary Lunsford is a 53 y.o. female.    CC:   Chief Complaint   Patient presents with   • Restless Legs Syndrome     HPI:   History of Present Illness  This is a very pleasant 53-year-old female who presents for 6-month neurology follow-up on longstanding restless leg syndrome, rare migraine with aura, and chronic iron deficiency anemia. I saw her via telehealth on 12/04/2023.     She was experiencing some facial tingling and new symptoms following chiropractic manipulation. I ordered additional neuroimaging, which she had completed on 02/06/2024. We did decide to treat her with oral antibiotics for sinus infection symptoms.     She has been evaluated by neurosurgery for findings of lumbar spinal stenosis and radicular symptoms. She was admitted to [facility] on 02/14/2024 to 02/16/2024 for lumbar fusion, cyst removal, decompression with pedicle screws at L4-L5 completed by Dr. Smith. She has followed up with neurosurgery post intervention.     In regards to her neuropathy and radicular pain she experienced with her low back symptoms, she has been taking Lyrica 100 mg twice per day, dispensed as written and increased by PCP due to pain from her back radiating into her right leg. She has been taking once a day long term for restless leg syndrome management. She is on pramipexole and carbidopa/levodopa. She is here for follow-up and refills on her medications today.    She underwent back surgery, which yielded  "positive results. She experienced pain radiating from her knee to her ankle on the right side, which was debilitating. She managed the pain with 3 to 4 ibuprofen tablets, which alleviated the pain upon awakening. An MRI of her back was conducted in 12/2023 due to back pain and was found to have a cyst compressing nerves. She is feeling much better now.    She has not experienced any more episodes of facial tingling.     She continues to take vitamin B6 and receives B12 injections from a nurse friend.    She struggles with restless legs syndrome, however, she manages to sleep through the night. She does not take vitamin D. She does not require an additional dose of carbidopa/levodopa. She occasionally takes an extra half tablet of Mirapex to return to sleep. She does not exceed 3 Sinemet tablets per day. She needs refills today.    She continues to take iron supplements once daily.    Previous neurology history and workup included-   Chronic restless leg syndrome present since 2009. She also has intermittent migraine headaches. She has been taking Lyrica brand name necessary 75 mg twice a day. Pregabalin generic did not help her symptoms. She takes carbidopa-levodopa CR 50/200 mg 1 pill 3 times a day and 1 extra if needed. She takes pramipexole 0.5 mg 0.5 tablets 3 times a day.      She also underwent an EMG and NCVS of her right upper and left upper extremities and this was completely normal on 10/23/2019 with Dr. Buck Lan of our neurologist.       Underwent MRI of the brain with and without contrast which showed 2 very small chronic white matter changes of the brain and otherwise MRI of the brain was completely unremarkable with no abnormal contrast enhancement and no acute intracranial abnormalities.     She does also experience migraines with aura for several years. She typically has used Excedrin Migraine along with ibuprofen.  She will get \"squiggly lines\" in both eyes and then about 20 minutes later will " start to have the band of headache around her head with nausea, photophobia and phonophobia.  This typically last 5 to 6 hours.  Sometimes she does have to lay down and go to sleep.  She gets moderate throbbing pain.  She has not noticed a pattern. Typically this will occur 5-6 times per year.  She used to take sumatriptan/Imitrex for many years and this became ineffective.  She switched to rizatriptan but this caused her to be very tired and did not seem to work well.  She did not like the side effects of either of the triptan medications since she felt like she could not focus and had to go to sleep.  Also caused some nausea.  She may have tried a third triptan but cannot recall exactly which one. Daughter also gets migraines. Episodic migraines-a few in a few months and then resolve for several months. These are rare now. Has ubrelvy if needed.     2021 labs-ferritin level, which was 563 ng/mL and vitamin B12 level, which is 212 pg/mL, and methylmalonic acid level, which was 367, normal.     She is treated with iron supplement and has previously had iron infusions in the past. Her mom and grandmother also have severe restless leg syndrome. Ropinirole and neupro patch as well as generic pregabalin, gabapentin and Horizant all ineffective for RLS.     Chronic restless legs syndrome, known iron deficiency anemia, and migraines with aura, which have been episodic.     She reported on 12/4/2023 she is experiencing numbness and tingling in her face bilaterally. This started approximately 2-4 weeks ago and is intermittent. It is not painful but is making her nervous. She states she will not see her back doctor until 12/14/2023 or 12/16/2023, but she would like to undergo another MRI. She had an MRI of the brain on 01/20/2020 when she was having right facial tingling. She states the tingling resolved after her MRI, but it has returned.      The following portions of the patient's history were reviewed and updated as  "appropriate: allergies, current medications, past family history, past medical history, past social history, past surgical history, and problem list.    Past Medical History:   Diagnosis Date   • Anemia    • Body piercing     both ears   • Bronchitis    • Colitis    • Crohn's disease 2023    incidental finding, asymptomatic   • Disease of thyroid gland    • Dissection of coronary artery 2017    pt denies this 10/2/19.  states she had testing at , but was told that they didn't \"find anything\"   • Enteropathogenic Escherichia coli infection    • Essential hypertension 2018   • Gallstone    • H/O echocardiogram    • History of nuclear stress test    • Hyperlipidemia    • Kidney stones    • Low back pain    • Migraine    • Obesity    • Ovarian cyst    • Prolonged Q-T interval on ECG     history   • Restless leg syndrome    • Subacute idiopathic myocarditis    • Urinary tract infection    • Vitamin B12 deficiency    • Vitamin B6 deficiency 2024   • Wears glasses     to read       Past Surgical History:   Procedure Laterality Date   • BACK SURGERY  2024   • BREAST SURGERY Bilateral 1987    breast reduction   • CARDIAC CATHETERIZATION N/A 03/15/2017    Procedure: Coronary angiography;  Surgeon: Moustapha Peñaloza MD;  Location: [facility] CATH INVASIVE LOCATION;  Service:    • CARDIAC CATHETERIZATION N/A 03/15/2017    Procedure: Left ventriculography;  Surgeon: Moustapha Peñaloza MD;  Location: [facility] CATH INVASIVE LOCATION;  Service:    •  SECTION      1991 & 1995   • CHOLECYSTECTOMY  2013   • COLONOSCOPY N/A 10/03/2019    Procedure: COLONOSCOPY WITH BIOPSIES; ANOSCOPY;  Surgeon: Jose Paez MD;  Location: [facility] ENDOSCOPY;  Service: Gastroenterology   • COLONOSCOPY N/A 2023    Procedure: COLONOSCOPY with biopsy;  Surgeon: Sami Elizondo MD;  Location: [facility] ENDOSCOPY;  Service: Gastroenterology;  Laterality: N/A;   • ENDOSCOPY     • GASTRIC BYPASS  2011 "   • LUMBAR LAMINECTOMY WITH FUSION N/A 2024    Procedure: LUMBAR FUSION DECOMPRESSON WITH PEDICLE SCREWS L4-5;  Surgeon: Jeovanny Smith MD;  Location: [facility];  Service: Neurosurgery;  Laterality: N/A;   • REDUCTION MAMMAPLASTY     • STEROID INJECTION      x2 in back       Social History     Socioeconomic History   • Marital status:    Tobacco Use   • Smoking status: Former     Current packs/day: 0.00     Average packs/day: 1 pack/day for 2.0 years (2.0 ttl pk-yrs)     Types: Cigarettes     Start date: 10/1/1993     Quit date: 10/1/1995     Years since quittin.7   • Smokeless tobacco: Never   Vaping Use   • Vaping status: Never Used   Substance and Sexual Activity   • Alcohol use: Yes     Alcohol/week: 12.0 standard drinks of alcohol     Types: 12 Cans of beer per week     Comment: 16 beers per week   • Drug use: No   • Sexual activity: Defer       Family History   Problem Relation Age of Onset   • Cancer Father    • Heart attack Father    • Cancer Other    • Other Other    • Breast cancer Mother    • Asthma Brother    • Ulcerative colitis Brother    • Arthritis Paternal Grandmother    • Colon cancer Neg Hx           Current Outpatient Medications:   •  atorvastatin (LIPITOR) 40 MG tablet, Take 1 tablet by mouth Every Night., Disp: 90 tablet, Rfl: 4  •  carbidopa-levodopa CR (SINEMET CR)  MG per CR tablet, Take 1 tablet by mouth 3 (Three) Times a Day., Disp: 270 tablet, Rfl: 3  •  Ferrous Fumarate (Ferrocite) 324 (106 Fe) MG tablet, Take 1 tablet by mouth 2 (Two) Times a Day., Disp: 180 tablet, Rfl: 3  •  levothyroxine (SYNTHROID, LEVOTHROID) 88 MCG tablet, Take 1 tablet by mouth Every Morning. For hypothyroidism., Disp: 90 tablet, Rfl: 3  •  Lyrica 100 MG capsule, Take 1 capsule by mouth 2 (Two) Times a Day., Disp: 180 capsule, Rfl: 1  •  metoprolol tartrate (LOPRESSOR) 25 MG tablet, Take 0.5 tablets by mouth 2 (Two) Times a Day., Disp: 90 tablet, Rfl: 3  •  polyethylene glycol  "(MIRALAX) 17 g packet, Take 17 g by mouth 2 (Two) Times a Day., Disp: 10 each, Rfl: 0  •  pramipexole (MIRAPEX) 0.5 MG tablet, Take 1/2 tablet at 1230pm, 1 at bedtime and 1/2 tablet if needed for breakthrough symptoms, Disp: 180 tablet, Rfl: 3  •  Semaglutide-Weight Management (Wegovy) 0.5 MG/0.5ML solution auto-injector, Inject 0.5 mL under the skin into the appropriate area as directed 1 (One) Time Per Week., Disp: 2 mL, Rfl: 1  •  vitamin B-6 (PYRIDOXINE) 100 MG tablet, Take 1 tablet by mouth Daily., Disp: 90 tablet, Rfl: 3  •  cyanocobalamin 1000 MCG/ML injection, Administer 1000 mcg IM every other week for 4 doses then monthly and continue, Disp: 30 mL, Rfl: 1  •  Semaglutide-Weight Management 0.25 MG/0.5ML solution auto-injector, Inject 0.5 mL under the skin into the appropriate area as directed 1 (One) Time Per Week. (Patient not taking: Reported on 6/28/2024), Disp: 2 mL, Rfl: 0  •  Syringe/Needle, Disp, 23G X 1\" 3 ML misc, Use as directed with B12 injections, Disp: 50 each, Rfl: 0     Review of Systems     Objective:  /86   Pulse 64   Ht 162.6 cm (64\")   Wt 116 kg (255 lb)   LMP 12/01/2018 (LMP Unknown)   SpO2 98%   BMI 43.77 kg/m²     Neurologic Exam     Mental Status   Oriented to person, place, and time.   Speech: speech is normal   Level of consciousness: alert    Cranial Nerves   Cranial nerves II through XII intact.     Motor Exam   Muscle bulk: normal  Overall muscle tone: normal    Strength   Strength 5/5 throughout.     Gait, Coordination, and Reflexes     Gait  Gait: normal    Coordination   Finger to nose coordination: normal    Tremor   Resting tremor: absent  Intention tremor: absent  Action tremor: absent    Reflexes   Right brachioradialis: 2+  Left brachioradialis: 2+  Right biceps: 2+  Left biceps: 2+  Right patellar: 1+  Left patellar: 1+  Right achilles: 1+  Left achilles: 1+  Right : 2+  Left : 2+  Right ankle clonus: absent  Left ankle clonus: absent      Physical " "Exam  Constitutional:       Appearance: Normal appearance.      Comments: BMI 43.7   Neurological:      Mental Status: She is alert and oriented to person, place, and time.      Cranial Nerves: Cranial nerves 2-12 are intact.      Motor: Motor strength is normal.     Coordination: Finger-Nose-Finger Test normal.      Gait: Gait is intact.      Deep Tendon Reflexes:      Reflex Scores:       Bicep reflexes are 2+ on the right side and 2+ on the left side.       Brachioradialis reflexes are 2+ on the right side and 2+ on the left side.       Patellar reflexes are 1+ on the right side and 1+ on the left side.       Achilles reflexes are 1+ on the right side and 1+ on the left side.  Psychiatric:         Mood and Affect: Mood and affect normal.         Speech: Speech normal.         Results:  Results  Laboratory Studies 3/4/2024 with PCP:  MMA level is elevated at 681 indicating true B12 deficiency. Iron saturation is 14%. Vitamin B6 2.6 low. Hemoglobin level has improved.    Imaging  2/6/2024-MRI of the brain with and without contrast was unremarkable with a few punctate white matter changes consistent with migraines and her medical history, but no acute intracranial abnormalities and no evidence of stroke. Reviewed imaging and agree with radiology interpretation.    1/25/2024-CTA of the head, head and neck were both completed. No evidence of large vessel occlusion. Evidence of acute left maxillary sinusitis and chronic ethmoid and maxillary sinusitis.    Assessment/Plan:     Diagnoses and all orders for this visit:    1. Vitamin B12 deficiency (Primary)  -     cyanocobalamin 1000 MCG/ML injection; Administer 1000 mcg IM every other week for 4 doses then monthly and continue  Dispense: 30 mL; Refill: 1  -     Syringe/Needle, Disp, 23G X 1\" 3 ML misc; Use as directed with B12 injections  Dispense: 50 each; Refill: 0    2. Restless legs syndrome (RLS)  -     carbidopa-levodopa CR (SINEMET CR)  MG per CR tablet; " Take 1 tablet by mouth 3 (Three) Times a Day.  Dispense: 270 tablet; Refill: 3  -     pramipexole (MIRAPEX) 0.5 MG tablet; Take 1/2 tablet at 1230pm, 1 at bedtime and 1/2 tablet if needed for breakthrough symptoms  Dispense: 180 tablet; Refill: 3  -     Lyrica 100 MG capsule; Take 1 capsule by mouth 2 (Two) Times a Day.  Dispense: 180 capsule; Refill: 1    3. Degenerative lumbar disc  -     Lyrica 100 MG capsule; Take 1 capsule by mouth 2 (Two) Times a Day.  Dispense: 180 capsule; Refill: 1    4. Iron deficiency anemia, unspecified iron deficiency anemia type  Comments:  continue iron daily    5. Vitamin B6 deficiency  Comments:  continue B6 daily           Assessment & Plan  Restless leg syndrome.  The patient's restless leg syndrome is well-managed. The current medication regimen will be maintained and I did provide refills on the Lyrica DANAY 100mg twice a day.    Vitamin B12 deficiency and B6 Deficiency.  The patient is advised to recommence B12 injections due to an elevated methylmalonic acid level of 681 with the primary care. These injections have been sent to the pharmacy for her nurse friend to administer them. She will continue with vitamin B6, which previously indicated deficiency, and her tingling has resolved.    Iron supplementation will be continued.     She demonstrated understanding and agreed with the plan today.    Continue follow up with all specialists.    Follow-up  She is scheduled for a follow-up visit at the first available clinic, or sooner if necessary.    Reviewed medications, potential side effects and signs and symptoms to report. Discussed risk versus benefits of treatment plan with patient and/or family-including medications, labs and radiology that may be ordered. Addressed questions and concerns during visit. Patient and/or family verbalized understanding and agree with plan.    As part of this patient's treatment plan I am prescribing controlled substances. The patient has been made  aware of appropriate use of such medications, including potential risk of somnolence, limited ability to drive and/or work safely, and potential for dependence or overdose. It has also been made clear that these medications are for use by the patient only, without concomitant use of alcohol or other substances unless prescribed. Keep out of reach of children.  [place] report has been reviewed. If this is going to be prescribed long term, [facility] [place] Controlled Substance Agreement Contract has also been read and signed by patient and myself.    During this visit the following were done:  Labs Reviewed [x]    Labs Ordered []    Radiology Reports Reviewed [x]    Radiology Ordered []    PCP Records Reviewed []    Referring Provider Records Reviewed []    ER Records Reviewed []    Hospital Records Reviewed []    History Obtained From Family []    Radiology Images Reviewed [x]    Other Reviewed [x]    Records Requested []      06/28/24   15:51 EDT    Patient or patient representative verbalized consent for the use of Ambient Listening during the visit with  LINNEA Mendoza for chart documentation. 7/1/2024  15:27 EDT    Note to patient: The 21st Century Cures Act makes medical notes like these available to patients in the interest of transparency. However, be advised this is a medical document. It is intended as peer to peer communication. It is written in medical language and may contain abbreviations or verbiage that are unfamiliar. It may appear blunt or direct. Medical documents are intended to carry relevant information, facts as evident, and the clinical opinion of the provider.

## 2024-07-15 DIAGNOSIS — E66.01 MORBID OBESITY WITH BMI OF 40.0-44.9, ADULT: ICD-10-CM

## 2024-07-15 RX ORDER — SEMAGLUTIDE 0.5 MG/.5ML
0.5 INJECTION, SOLUTION SUBCUTANEOUS WEEKLY
Qty: 2 ML | Refills: 1 | Status: SHIPPED | OUTPATIENT
Start: 2024-07-15

## 2024-07-16 DIAGNOSIS — G25.81 RESTLESS LEGS SYNDROME (RLS): ICD-10-CM

## 2024-07-16 RX ORDER — CARBIDOPA AND LEVODOPA 50; 200 MG/1; MG/1
TABLET, EXTENDED RELEASE ORAL
Qty: 360 TABLET | OUTPATIENT
Start: 2024-07-16

## 2024-08-13 DIAGNOSIS — E66.01 MORBID OBESITY WITH BMI OF 40.0-44.9, ADULT: ICD-10-CM

## 2024-08-13 RX ORDER — SEMAGLUTIDE 1 MG/.5ML
1 INJECTION, SOLUTION SUBCUTANEOUS WEEKLY
Qty: 2 ML | Refills: 1 | Status: SHIPPED | OUTPATIENT
Start: 2024-08-13

## 2024-08-13 RX ORDER — SEMAGLUTIDE 0.5 MG/.5ML
0.5 INJECTION, SOLUTION SUBCUTANEOUS WEEKLY
Qty: 2 ML | Refills: 1 | Status: CANCELLED | OUTPATIENT
Start: 2024-08-13

## 2024-08-26 NOTE — TELEPHONE ENCOUNTER
States she was in the ER yesterday for a cat bite. States she has been running a fever since last night. Statse temperature has been 100-101. She also received tetanus shot. Instructed to kassandra area of redness, make sure to get in both doses of abx today, call pcp office when they open to notify them of what is going on. Encouraged tylenol or ibuprofen for pain/fever, ice and elevation for pain and swelling.     Reason for Disposition  • [1] Taking antibiotic < 48 hours for infected bite AND [2] fever persists    Additional Information  • Negative: [1] Major bleeding (e.g., actively dripping or spurting) AND [2] can't be stopped  • Negative: Sounds like a life-threatening emergency to the triager  • Negative: Snake bite  • Negative: Bite, wound, or sting from fish  • Negative: [1] Any break in skin from BITE (e.g., cut, puncture or scratch) AND[2] WILD animal at risk for RABIES (e.g., bat, raccoon, ivy, skunk, coyote, other carnivores)  • Negative: [1] Any break in skin from BITE (e.g., cut, puncture or scratch) AND[2] PET animial (e.g., dog, cat, or ferret) at risk for RABIES (e.g., sick, stray, unprovoked bite, developing country)  • Negative: [1] Any break in skin from BITE (e.g., cut, puncture or scratch) AND[2] monkey  • Negative: [1] EXPOSURE of non-intact skin (e.g., exposed person has dermatitis, abrasion, wound) AND[2] with animal BODY FLUID (e.g., saliva such as licking, blood, brain) AND[3] animal at high-risk for RABIES (e.g., bat, raccoon, ivy, skunk, coyote, other carnivores)  • Negative: [1] Cut (length > 1/8 inch or 3 mm) or skin tear AND [2] any animal  • Negative: [1] Bleeding AND [2] won't stop after 10 minutes of direct pressure (using correct technique)  • Negative: Description of bite sounds severe to the triager  • Negative: [1] Puncture wound (hole through the skin) AND [2] from a cat bite (or deep claw puncture wound)  • Negative: [1] Puncture wound or small cut AND [2] on face  • Negative:  "[1] Puncture wound or small cut AND [2] on hands or genitals  • Negative: [1] Puncture wound or small cut AND [2] weak immune system (e.g., HIV positive, cancer chemo, splenectomy, organ transplant, chronic steroids)  • Negative: Looks infected (red area, red streak, or pus)  • Negative: [1] No bite kassandra or scratch AND [2] suspected bat exposure (e.g., bat found in same room as sleeping adult)  • Negative: [1] Taking antibiotic > 24 hours for infected bite AND [2] bite getting worse (more swollen, more redness and increased pain)  • Negative: [1] Taking antibiotic > 48 hours (2 days) for infected bite AND [2] fever persists  • Negative: [1] Taking antibiotic > 72 hours (3 days) for infected bite AND [2] has not improved (i.e., pain, pus, redness)  • Negative: [1] No prior tetanus shots (or is not fully vaccinated) AND [2] any wound (e.g., cut, scrape)  • Negative: [1] Last tetanus shot > 5 years ago AND [2] any wound (e.g., cut, scrape)    Answer Assessment - Initial Assessment Questions  1. ANIMAL: \"What type of animal caused the bite?\" \"Is the injury from a bite or a claw?\" If the animal is a dog or a cat, ask: \"Was it a pet or a stray?\" \"Was it acting ill or behaving strangely?\"      Cat bite  2. LOCATION: \"Where is the bite located?\"       n/a  3. SIZE: \"How big is the bite?\" \"What does it look like?\"       n/a  4. ONSET: \"When did the bite happen?\" (Minutes or hours ago)       n/a  5. CIRCUMSTANCES: \"Tell me how this happened.\"       n/a  6. TETANUS: \"When was the last tetanus booster?\"      n/a  7. PREGNANCY: \"Is there any chance you are pregnant?\" \"When was your last menstrual period?\"      n/a    Protocols used: ANIMAL BITE-ADULT-AH      " NULL

## 2024-08-27 ENCOUNTER — OFFICE VISIT (OUTPATIENT)
Dept: INTERNAL MEDICINE | Facility: CLINIC | Age: 54
End: 2024-08-27
Payer: COMMERCIAL

## 2024-08-27 VITALS
DIASTOLIC BLOOD PRESSURE: 72 MMHG | BODY MASS INDEX: 41.45 KG/M2 | RESPIRATION RATE: 16 BRPM | HEART RATE: 66 BPM | WEIGHT: 242.8 LBS | HEIGHT: 64 IN | OXYGEN SATURATION: 98 % | SYSTOLIC BLOOD PRESSURE: 118 MMHG | TEMPERATURE: 97.4 F

## 2024-08-27 DIAGNOSIS — Z76.89 ENCOUNTER FOR WEIGHT MANAGEMENT: ICD-10-CM

## 2024-08-27 DIAGNOSIS — E66.01 MORBID OBESITY WITH BMI OF 40.0-44.9, ADULT: Primary | ICD-10-CM

## 2024-08-27 DIAGNOSIS — Z71.84 TRAVEL ADVICE ENCOUNTER: ICD-10-CM

## 2024-08-27 DIAGNOSIS — I10 ESSENTIAL HYPERTENSION: ICD-10-CM

## 2024-08-27 PROBLEM — R10.30 LOWER ABDOMINAL PAIN: Status: RESOLVED | Noted: 2019-08-15 | Resolved: 2024-08-27

## 2024-08-27 PROCEDURE — 99214 OFFICE O/P EST MOD 30 MIN: CPT | Performed by: FAMILY MEDICINE

## 2024-08-27 RX ORDER — SEMAGLUTIDE 2.4 MG/.75ML
2.4 INJECTION, SOLUTION SUBCUTANEOUS WEEKLY
Qty: 9 ML | Refills: 3 | Status: SHIPPED | OUTPATIENT
Start: 2024-09-17

## 2024-08-27 RX ORDER — SCOLOPAMINE TRANSDERMAL SYSTEM 1 MG/1
1 PATCH, EXTENDED RELEASE TRANSDERMAL
Qty: 4 EACH | Refills: 0 | Status: SHIPPED | OUTPATIENT
Start: 2024-08-27

## 2024-08-27 NOTE — PROGRESS NOTES
"Chief Complaint  Follow-up (medications) and Obesity    Subjective        Mary Lunsofrd presents to [facility]  History of Present Illness  Medicine helping  No bothersome constipation  No side effects  No symptoms of hypotension    Upcoming cruise first time      Objective   Vital Signs:  /72   Pulse 66   Temp 97.4 °F (36.3 °C) (Temporal)   Resp 16   Ht 162.6 cm (64\")   Wt 110 kg (242 lb 12.8 oz)   SpO2 98%   BMI 41.68 kg/m²   Estimated body mass index is 41.68 kg/m² as calculated from the following:    Height as of this encounter: 162.6 cm (64\").    Weight as of this encounter: 110 kg (242 lb 12.8 oz).            Physical Exam  Vitals and nursing note reviewed.   Constitutional:       General: She is not in acute distress.     Appearance: Normal appearance. She is well-developed and well-groomed. She is morbidly obese. She is not ill-appearing, toxic-appearing or diaphoretic.   HENT:      Head: Normocephalic and atraumatic.      Right Ear: Hearing normal.      Left Ear: Hearing normal.   Eyes:      General: Lids are normal. No scleral icterus.     Extraocular Movements: Extraocular movements intact.   Neck:      Trachea: Phonation normal.   Cardiovascular:      Rate and Rhythm: Normal rate and regular rhythm.   Pulmonary:      Effort: Pulmonary effort is normal.   Musculoskeletal:      Cervical back: Neck supple.   Skin:     Coloration: Skin is not jaundiced or pale.   Neurological:      General: No focal deficit present.      Mental Status: She is alert and oriented to person, place, and time.      Motor: Motor function is intact.   Psychiatric:         Attention and Perception: Attention and perception normal.         Mood and Affect: Mood and affect normal.         Speech: Speech normal.         Behavior: Behavior normal. Behavior is cooperative.         Thought Content: Thought content normal.         Cognition and Memory: Cognition and memory normal.         " Judgment: Judgment normal.        Result Review :  The following data was reviewed by: Floridalma Jimenez MD on 08/27/2024:  Progress Notes by Debbie Rodriguez APRN (06/28/2024 16:00)   Methylmalonic Acid, Serum (03/04/2024 15:33)   Vitamin B6 (03/04/2024 15:42)          Assessment and Plan   Diagnoses and all orders for this visit:    1. Morbid obesity with BMI of 40.0-44.9, adult (Primary)  -     Semaglutide-Weight Management 1.7 MG/0.75ML solution auto-injector; Inject 0.75 mL under the skin into the appropriate area as directed 1 (One) Time Per Week.  Dispense: 3 mL; Refill: 1  -     Semaglutide-Weight Management (Wegovy) 2.4 MG/0.75ML solution auto-injector; Inject 2.4 mg under the skin into the appropriate area as directed 1 (One) Time Per Week.  Dispense: 9 mL; Refill: 3    2. Encounter for weight management  -     Semaglutide-Weight Management 1.7 MG/0.75ML solution auto-injector; Inject 0.75 mL under the skin into the appropriate area as directed 1 (One) Time Per Week.  Dispense: 3 mL; Refill: 1  -     Semaglutide-Weight Management (Wegovy) 2.4 MG/0.75ML solution auto-injector; Inject 2.4 mg under the skin into the appropriate area as directed 1 (One) Time Per Week.  Dispense: 9 mL; Refill: 3    3. Essential hypertension  Assessment & Plan:  Hypertension is stable and controlled  Continue current treatment regimen.  Monitor blood pressure. As weight loss occurs may need to stop metoprolol 12.5 mg bid monitor for symptoms of hypotension  Blood pressure will be reassessed in 6 months.      4. Travel advice encounter  -     Scopolamine 1 MG/3DAYS patch; Place 1 patch on the skin as directed by provider Every 72 (Seventy-Two) Hours.  Dispense: 4 each; Refill: 0      Class 3 Severe Obesity (BMI >=40). Obesity-related health conditions include the following: hypertension and osteoarthritis. Obesity is improving with treatment. BMI is is above average; BMI management plan is completed. We discussed portion  control, increasing exercise, and pharmacologic options including Wegovy .        Follow Up   Return in about 6 months (around 3/3/2025) for Annual physical, sooner if needed.  Patient was given instructions and counseling regarding her condition or for health maintenance advice. Please see specific information pulled into the AVS if appropriate.

## 2024-08-28 NOTE — ASSESSMENT & PLAN NOTE
Hypertension is stable and controlled  Continue current treatment regimen.  Monitor blood pressure. As weight loss occurs may need to stop metoprolol 12.5 mg bid monitor for symptoms of hypotension  Blood pressure will be reassessed in 6 months.

## 2024-09-01 DIAGNOSIS — E78.5 HYPERLIPIDEMIA LDL GOAL <100: ICD-10-CM

## 2024-09-03 RX ORDER — ATORVASTATIN CALCIUM 40 MG/1
40 TABLET, FILM COATED ORAL NIGHTLY
Qty: 90 TABLET | Refills: 2 | Status: SHIPPED | OUTPATIENT
Start: 2024-09-03

## 2024-09-24 DIAGNOSIS — Z76.89 ENCOUNTER FOR WEIGHT MANAGEMENT: ICD-10-CM

## 2024-09-24 DIAGNOSIS — E66.01 MORBID OBESITY WITH BMI OF 40.0-44.9, ADULT: ICD-10-CM

## 2024-10-18 DIAGNOSIS — Z76.89 ENCOUNTER FOR WEIGHT MANAGEMENT: ICD-10-CM

## 2024-10-18 DIAGNOSIS — E66.01 MORBID OBESITY WITH BMI OF 40.0-44.9, ADULT: ICD-10-CM

## 2024-10-18 RX ORDER — SEMAGLUTIDE 2.4 MG/.75ML
2.4 INJECTION, SOLUTION SUBCUTANEOUS WEEKLY
Qty: 9 ML | Refills: 3 | Status: SHIPPED | OUTPATIENT
Start: 2024-10-18

## 2024-12-09 ENCOUNTER — TELEPHONE (OUTPATIENT)
Dept: URGENT CARE | Facility: CLINIC | Age: 54
End: 2024-12-09
Payer: COMMERCIAL

## 2024-12-09 DIAGNOSIS — S92.354A NONDISPLACED FRACTURE OF FIFTH METATARSAL BONE, RIGHT FOOT, INITIAL ENCOUNTER FOR CLOSED FRACTURE: Primary | ICD-10-CM

## 2024-12-10 ENCOUNTER — OFFICE VISIT (OUTPATIENT)
Dept: ORTHOPEDIC SURGERY | Facility: CLINIC | Age: 54
End: 2024-12-10
Payer: COMMERCIAL

## 2024-12-10 VITALS
HEIGHT: 64 IN | DIASTOLIC BLOOD PRESSURE: 82 MMHG | WEIGHT: 225 LBS | TEMPERATURE: 98 F | BODY MASS INDEX: 38.41 KG/M2 | SYSTOLIC BLOOD PRESSURE: 122 MMHG

## 2024-12-10 DIAGNOSIS — S92.354A CLOSED NONDISPLACED FRACTURE OF FIFTH METATARSAL BONE OF RIGHT FOOT, INITIAL ENCOUNTER: Primary | ICD-10-CM

## 2024-12-10 PROCEDURE — 99203 OFFICE O/P NEW LOW 30 MIN: CPT | Performed by: STUDENT IN AN ORGANIZED HEALTH CARE EDUCATION/TRAINING PROGRAM

## 2024-12-10 NOTE — PROGRESS NOTES
"    Office Note     Name: Mary Lunsford    : 1970     MRN: [id number]     Chief Complaint  Pain and Injury of the Right Foot (Patient fell going into [employer]  morning (24). She went to [facility] after the fall, had x-rays, placed in low tide walking boot. )    Subjective     History of Present Illness:  Mary Lunsford is a 54 y.o. female presenting today for evaluation of acute right foot injury that occurred on 2024 after stumbling and inverting her ankle.  Patient states that she also landed on her left knee which scraped the knee but denies any significant injury.  Today she complains of pain in the right foot in the area of the proximal fifth metatarsal.  She denies significant ankle pain.  She denies any other significant injuries at this time.  She denies lower extremity paresthesias.  Denies any previous injuries or surgeries to the affected area.  She presents today in a pneumatic walking boot.      Review of Systems   Constitutional:  Negative for fever.   HENT:  Negative for dental problem and voice change.    Eyes:  Negative for visual disturbance.   Respiratory:  Negative for shortness of breath.    Cardiovascular:  Negative for chest pain.   Gastrointestinal:  Negative for abdominal pain.   Genitourinary:  Negative for dysuria.   Musculoskeletal:  Positive for arthralgias and joint swelling. Negative for gait problem.   Skin:  Negative for rash.   Neurological:  Negative for speech difficulty.   Hematological:  Does not bruise/bleed easily.   Psychiatric/Behavioral:  Negative for confusion.         Past Medical History:   Diagnosis Date    Anemia     Body piercing     both ears    Bronchitis     Colitis     Crohn's disease 2023    incidental finding, asymptomatic    Disease of thyroid gland     Dissection of coronary artery 2017    pt denies this 10/2/19.  states she had testing at [facility], but was told that they didn't \"find anything\"    Enteropathogenic Escherichia " coli infection     Essential hypertension 2018    Gallstone     H/O echocardiogram     History of nuclear stress test     Hyperlipidemia     Kidney stones     Low back pain     Migraine     Obesity     Ovarian cyst     Prolonged Q-T interval on ECG     history    Restless leg syndrome     Subacute idiopathic myocarditis     Urinary tract infection     Vitamin B12 deficiency     Vitamin B6 deficiency 2024    Wears glasses     to read        Past Surgical History:   Procedure Laterality Date    BACK SURGERY  2024    BREAST SURGERY Bilateral 1987    breast reduction    CARDIAC CATHETERIZATION N/A 03/15/2017    Procedure: Coronary angiography;  Surgeon: Moustapha Peñaloza MD;  Location: [facility] CATH INVASIVE LOCATION;  Service:     CARDIAC CATHETERIZATION N/A 03/15/2017    Procedure: Left ventriculography;  Surgeon: Moustapha Peñaloza MD;  Location: [facility] CATH INVASIVE LOCATION;  Service:      SECTION      1991 & 1995    CHOLECYSTECTOMY  2013    COLONOSCOPY N/A 10/03/2019    Procedure: COLONOSCOPY WITH BIOPSIES; ANOSCOPY;  Surgeon: Jose Paez MD;  Location: [facility] ENDOSCOPY;  Service: Gastroenterology    COLONOSCOPY N/A 2023    Procedure: COLONOSCOPY with biopsy;  Surgeon: Sami Elizondo MD;  Location: [facility] ENDOSCOPY;  Service: Gastroenterology;  Laterality: N/A;    ENDOSCOPY      GASTRIC BYPASS  2011    LUMBAR LAMINECTOMY WITH FUSION N/A 2024    Procedure: LUMBAR FUSION DECOMPRESSON WITH PEDICLE SCREWS L4-5;  Surgeon: Jeovanny Smith MD;  Location: [facility] OR;  Service: Neurosurgery;  Laterality: N/A;    REDUCTION MAMMAPLASTY      STEROID INJECTION      x2 in back       Family History   Problem Relation Age of Onset    Cancer Father     Heart attack Father     Cancer Other     Other Other     Breast cancer Mother     Asthma Brother     Ulcerative colitis Brother     Arthritis Paternal Grandmother     Colon cancer Neg Hx        Social History      Socioeconomic History    Marital status:    Tobacco Use    Smoking status: Former     Current packs/day: 0.00     Average packs/day: 1 pack/day for 2.0 years (2.0 ttl pk-yrs)     Types: Cigarettes     Start date: 10/1/1993     Quit date: 10/1/1995     Years since quittin.2    Smokeless tobacco: Never   Vaping Use    Vaping status: Never Used   Substance and Sexual Activity    Alcohol use: Yes     Alcohol/week: 12.0 standard drinks of alcohol     Types: 12 Cans of beer per week     Comment: 16 beers per week    Drug use: No    Sexual activity: Defer         Current Outpatient Medications:     atorvastatin (LIPITOR) 40 MG tablet, TAKE 1 TABLET BY MOUTH EVERY NIGHT, Disp: 90 tablet, Rfl: 2    carbidopa-levodopa CR (SINEMET CR)  MG per CR tablet, Take 1 tablet by mouth 3 (Three) Times a Day., Disp: 270 tablet, Rfl: 3    cyanocobalamin 1000 MCG/ML injection, Administer 1000 mcg IM every other week for 4 doses then monthly and continue, Disp: 30 mL, Rfl: 1    Ferrous Fumarate (Ferrocite) 324 (106 Fe) MG tablet, Take 1 tablet by mouth 2 (Two) Times a Day., Disp: 180 tablet, Rfl: 3    levothyroxine (SYNTHROID, LEVOTHROID) 88 MCG tablet, Take 1 tablet by mouth Every Morning. For hypothyroidism., Disp: 90 tablet, Rfl: 3    Lyrica 100 MG capsule, Take 1 capsule by mouth 2 (Two) Times a Day., Disp: 180 capsule, Rfl: 1    metoprolol tartrate (LOPRESSOR) 25 MG tablet, Take 0.5 tablets by mouth 2 (Two) Times a Day., Disp: 90 tablet, Rfl: 3    polyethylene glycol (MIRALAX) 17 g packet, Take 17 g by mouth 2 (Two) Times a Day., Disp: 10 each, Rfl: 0    pramipexole (MIRAPEX) 0.5 MG tablet, Take 1/2 tablet at 1230pm, 1 at bedtime and 1/2 tablet if needed for breakthrough symptoms, Disp: 180 tablet, Rfl: 3    Scopolamine 1 MG/3DAYS patch, Place 1 patch on the skin as directed by provider Every 72 (Seventy-Two) Hours., Disp: 4 each, Rfl: 0    Semaglutide-Weight Management (Wegovy) 2.4 MG/0.75ML solution  "auto-injector, Inject 2.4 mg under the skin into the appropriate area as directed 1 (One) Time Per Week., Disp: 9 mL, Rfl: 3    Syringe/Needle, Disp, 23G X 1\" 3 ML misc, Use as directed with B12 injections, Disp: 50 each, Rfl: 0    vitamin B-6 (PYRIDOXINE) 100 MG tablet, Take 1 tablet by mouth Daily., Disp: 90 tablet, Rfl: 3    Allergies   Allergen Reactions    Sulfa Antibiotics Rash and Other (See Comments)     Blurry vision           Objective   /82   Temp 98 °F (36.7 °C)   Ht 162.6 cm (64\")   Wt 102 kg (225 lb)   LMP 12/01/2018 (LMP Unknown)   BMI 38.62 kg/m²            Physical Exam  Right Ankle Exam     Tenderness   Right ankle tenderness location: Tenderness of the base of the fifth metatarsal.  Swelling: none    Range of Motion   The patient has normal right ankle ROM.    Tests   Anterior drawer: negative  Varus tilt: negative    Other   Erythema: absent  Sensation: normal  Pulse: present     Comments:  Moves all toes freely without pain.  No open wounds, no gross deformity.           Extremity DVT signs are negative by clinical screen.     Independent Review of Radiographic Studies:    Reviewed images and agree with radiologist interpretation.    XR Foot 3+ View Right  Order date: 12/8/2024  Authorizing: Miroslava Ruiz APRN  Ordered by Miroslava Ruiz APRN on 12/8/2024.     Narrative & Impression    FINAL REPORT     CLINICAL HISTORY:  fell at [employer] injuring right dorsal foot     FINDINGS:  RIGHT FOOT:  Three views of the right foot were obtained.  There  are mild degenerative changes of the midfoot.  There is a  transverse, nondisplaced fracture of the proximal fifth  metatarsal.  A plantar calcaneal spur is noted.  Soft tissues  are unremarkable.     IMPRESSION:  Transverse, nondisplaced fracture of the proximal fifth  metatarsal.     Authenticated and Electronically Signed by Shawn Gutierrez III, MD on 12/09/2024 05:26:13 PM       Procedures    Assessment and Plan   Diagnoses and " all orders for this visit:    1. Closed nondisplaced fracture of fifth metatarsal bone of right foot, initial encounter (Primary)       Discussion of orthopedic goals  Orthopedic activities reviewed and patient expressed appreciation  Regular exercise as tolerated  Risk, benefits, and merits of treatment alternatives reviewed with the patient and questions answered  Patient guided on mobility and conditioning exercises  Ice, heat, and/or modalities as beneficial  Elevate foot for residual swelling  Reduced physical activity as appropriate and avoid offending activity  Weight bearing parameters reviewed  Use brace as instructed  Assistive device encouraged for safety and support    Recommendations/Plan:  Exercise, medications, injections, other patient advice, and return appointment as noted.  Brace: Fracture shoe.  Patient is encouraged to call or return for any issues or concerns.    Weightbearing as tolerated with walking boot or fracture shoe.  Advised RICE and over-the-counter analgesics as needed for pain.  Follow-up in 2 weeks for repeat x-rays and exam.    Return in about 2 weeks (around 12/24/2024) for XOA.  Patient agreeable to call or return sooner for any concerns.

## 2024-12-23 DIAGNOSIS — S92.354A CLOSED NONDISPLACED FRACTURE OF FIFTH METATARSAL BONE OF RIGHT FOOT, INITIAL ENCOUNTER: Primary | ICD-10-CM

## 2024-12-26 ENCOUNTER — OFFICE VISIT (OUTPATIENT)
Dept: ORTHOPEDIC SURGERY | Facility: CLINIC | Age: 54
End: 2024-12-26
Payer: COMMERCIAL

## 2024-12-26 VITALS
SYSTOLIC BLOOD PRESSURE: 124 MMHG | TEMPERATURE: 97.7 F | BODY MASS INDEX: 38.41 KG/M2 | WEIGHT: 225 LBS | HEIGHT: 64 IN | DIASTOLIC BLOOD PRESSURE: 80 MMHG

## 2024-12-26 DIAGNOSIS — E07.9 THYROID DISORDER: ICD-10-CM

## 2024-12-26 DIAGNOSIS — S92.354D CLOSED NONDISPLACED FRACTURE OF FIFTH METATARSAL BONE OF RIGHT FOOT WITH ROUTINE HEALING, SUBSEQUENT ENCOUNTER: Primary | ICD-10-CM

## 2024-12-26 DIAGNOSIS — M51.369 DEGENERATIVE LUMBAR DISC: ICD-10-CM

## 2024-12-26 DIAGNOSIS — G25.81 RESTLESS LEGS SYNDROME (RLS): ICD-10-CM

## 2024-12-26 RX ORDER — LEVOTHYROXINE SODIUM 88 UG/1
88 TABLET ORAL
Qty: 90 TABLET | Refills: 3 | Status: SHIPPED | OUTPATIENT
Start: 2024-12-26

## 2024-12-26 RX ORDER — PREGABALIN 100 MG/1
100 CAPSULE ORAL 2 TIMES DAILY
Qty: 180 CAPSULE | Refills: 1 | Status: SHIPPED | OUTPATIENT
Start: 2024-12-26

## 2024-12-26 NOTE — TELEPHONE ENCOUNTER
Rx Refill Note  Requested Prescriptions     Pending Prescriptions Disp Refills    Lyrica 100 MG capsule 180 capsule 1     Sig: Take 1 capsule by mouth 2 (Two) Times a Day.      Last office visit with prescribing clinician: 6/28/2024   Last telemedicine visit with prescribing clinician: Visit date not found   Next office visit with prescribing clinician: 2/7/2025                         Would you like a call back once the refill request has been completed: [] Yes [] No    If the office needs to give you a call back, can they leave a voicemail: [] Yes [] No    Kamilla Avila CMA  12/26/24, 16:14 EST

## 2024-12-26 NOTE — PROGRESS NOTES
"    Office Note     Name: Mary Lunsford    : 1970     MRN: [id number]     Chief Complaint  Follow-up and Fracture of the Right Foot (Closed nondisplaced fracture of fifth metatarsal bone of right foot, DOI: 24. Pain is decreased. She has been wearing boot more than the fracture shoe. She states the shoe rubbed into the part of foot where the fracture is. )    Subjective     History of Present Illness:  Mary Lunsford is a 54 y.o. female presenting today for right foot fifth metatarsal fracture follow-up.  Patient states that she is doing very well and her symptoms have significantly improved since her previous visit with me.  He does complain of some mild aching pain at the fracture site at the end of the day but denies any significant pain during the day.  Has been wearing her shoe and/or boot and reports good compliance.  Denies any new or worsening symptoms.    Review of Systems   Constitutional:  Negative for fever.   HENT:  Negative for dental problem and voice change.    Eyes:  Negative for visual disturbance.   Respiratory:  Negative for shortness of breath.    Cardiovascular:  Negative for chest pain.   Gastrointestinal:  Negative for abdominal pain.   Genitourinary:  Negative for dysuria.   Musculoskeletal:  Positive for arthralgias, gait problem and joint swelling.   Skin:  Negative for rash.   Neurological:  Negative for speech difficulty.   Hematological:  Does not bruise/bleed easily.   Psychiatric/Behavioral:  Negative for confusion.         Past Medical History:   Diagnosis Date    Anemia     Body piercing     both ears    Bronchitis     Colitis     Crohn's disease 2023    incidental finding, asymptomatic    Disease of thyroid gland     Dissection of coronary artery 2017    pt denies this 10/2/19.  states she had testing at [facility], but was told that they didn't \"find anything\"    Enteropathogenic Escherichia coli infection     Essential hypertension 2018    " Gallstone     H/O echocardiogram     History of nuclear stress test     Hyperlipidemia     Kidney stones     Low back pain     Migraine     Obesity     Ovarian cyst     Prolonged Q-T interval on ECG     history    Restless leg syndrome     Subacute idiopathic myocarditis     Urinary tract infection     Vitamin B12 deficiency     Vitamin B6 deficiency 2024    Wears glasses     to read        Past Surgical History:   Procedure Laterality Date    BACK SURGERY  2024    BREAST SURGERY Bilateral 1987    breast reduction    CARDIAC CATHETERIZATION N/A 03/15/2017    Procedure: Coronary angiography;  Surgeon: Moustapha Peñaloza MD;  Location: [facility] CATH INVASIVE LOCATION;  Service:     CARDIAC CATHETERIZATION N/A 03/15/2017    Procedure: Left ventriculography;  Surgeon: Moustapha Peñaloza MD;  Location: [facility] CATH INVASIVE LOCATION;  Service:      SECTION      1991 & 1995    CHOLECYSTECTOMY  2013    COLONOSCOPY N/A 10/03/2019    Procedure: COLONOSCOPY WITH BIOPSIES; ANOSCOPY;  Surgeon: Jose Paez MD;  Location: [facility] ENDOSCOPY;  Service: Gastroenterology    COLONOSCOPY N/A 2023    Procedure: COLONOSCOPY with biopsy;  Surgeon: Sami Elizondo MD;  Location: [facility] ENDOSCOPY;  Service: Gastroenterology;  Laterality: N/A;    ENDOSCOPY      GASTRIC BYPASS  2011    LUMBAR LAMINECTOMY WITH FUSION N/A 2024    Procedure: LUMBAR FUSION DECOMPRESSON WITH PEDICLE SCREWS L4-5;  Surgeon: Jeovanny Smith MD;  Location: [facility] OR;  Service: Neurosurgery;  Laterality: N/A;    REDUCTION MAMMAPLASTY      STEROID INJECTION      x2 in back       Family History   Problem Relation Age of Onset    Cancer Father     Heart attack Father     Cancer Other     Other Other     Breast cancer Mother     Asthma Brother     Ulcerative colitis Brother     Arthritis Paternal Grandmother     Colon cancer Neg Hx        Social History     Socioeconomic History    Marital status:    Tobacco  Use    Smoking status: Former     Current packs/day: 0.00     Average packs/day: 1 pack/day for 2.0 years (2.0 ttl pk-yrs)     Types: Cigarettes     Start date: 10/1/1993     Quit date: 10/1/1995     Years since quittin.2    Smokeless tobacco: Never   Vaping Use    Vaping status: Never Used   Substance and Sexual Activity    Alcohol use: Yes     Alcohol/week: 12.0 standard drinks of alcohol     Types: 12 Cans of beer per week     Comment: 16 beers per week    Drug use: No    Sexual activity: Defer         Current Outpatient Medications:     atorvastatin (LIPITOR) 40 MG tablet, TAKE 1 TABLET BY MOUTH EVERY NIGHT, Disp: 90 tablet, Rfl: 2    carbidopa-levodopa CR (SINEMET CR)  MG per CR tablet, Take 1 tablet by mouth 3 (Three) Times a Day., Disp: 270 tablet, Rfl: 3    cyanocobalamin 1000 MCG/ML injection, Administer 1000 mcg IM every other week for 4 doses then monthly and continue, Disp: 30 mL, Rfl: 1    Ferrous Fumarate (Ferrocite) 324 (106 Fe) MG tablet, Take 1 tablet by mouth 2 (Two) Times a Day., Disp: 180 tablet, Rfl: 3    levothyroxine (SYNTHROID, LEVOTHROID) 88 MCG tablet, Take 1 tablet by mouth Every Morning. For hypothyroidism., Disp: 90 tablet, Rfl: 3    Lyrica 100 MG capsule, Take 1 capsule by mouth 2 (Two) Times a Day., Disp: 180 capsule, Rfl: 1    metoprolol tartrate (LOPRESSOR) 25 MG tablet, Take 0.5 tablets by mouth 2 (Two) Times a Day., Disp: 90 tablet, Rfl: 3    polyethylene glycol (MIRALAX) 17 g packet, Take 17 g by mouth 2 (Two) Times a Day., Disp: 10 each, Rfl: 0    pramipexole (MIRAPEX) 0.5 MG tablet, Take 1/2 tablet at 1230pm, 1 at bedtime and 1/2 tablet if needed for breakthrough symptoms, Disp: 180 tablet, Rfl: 3    Scopolamine 1 MG/3DAYS patch, Place 1 patch on the skin as directed by provider Every 72 (Seventy-Two) Hours., Disp: 4 each, Rfl: 0    Semaglutide-Weight Management (Wegovy) 2.4 MG/0.75ML solution auto-injector, Inject 2.4 mg under the skin into the appropriate area as  "directed 1 (One) Time Per Week., Disp: 9 mL, Rfl: 3    Syringe/Needle, Disp, 23G X 1\" 3 ML misc, Use as directed with B12 injections, Disp: 50 each, Rfl: 0    vitamin B-6 (PYRIDOXINE) 100 MG tablet, Take 1 tablet by mouth Daily., Disp: 90 tablet, Rfl: 3    Allergies   Allergen Reactions    Sulfa Antibiotics Rash and Other (See Comments)     Blurry vision           Objective   /80   Temp 97.7 °F (36.5 °C)   Ht 162.6 cm (64\")   Wt 102 kg (225 lb)   LMP 12/01/2018 (LMP Unknown)   BMI 38.62 kg/m²            Physical Exam  Right Ankle Exam      Comments:  Mild soft tissue swelling is noted in the area of the base of the fifth metatarsal.  There are no open wounds, no erythema or bruising.  There is mild tenderness to palpation at the fracture site.  She has full range of motion of the ankle without pain.  Neurovascular checks within normal limits.           Extremity DVT signs are negative by clinical screen.     Independent Review of Radiographic Studies:    3 view plain films of the right foot without in office today for the evaluation of fracture healing, comparison views available.  There is a nondisplaced, transverse fracture of the proximal fifth metatarsal base.  There are no significant interval changes from previous exam.  Plantar calcaneal spur is noted.    Procedures    Assessment and Plan   Diagnoses and all orders for this visit:    1. Closed nondisplaced fracture of fifth metatarsal bone of right foot with routine healing, subsequent encounter (Primary)       Discussion of orthopedic goals  Orthopedic activities reviewed and patient expressed appreciation  Regular exercise as tolerated  Risk, benefits, and merits of treatment alternatives reviewed with the patient and questions answered  Patient guided on mobility and conditioning exercises  Ice, heat, and/or modalities as beneficial  Elevate foot for residual swelling  Reduced physical activity as appropriate and avoid offending activity  Weight " bearing parameters reviewed  Use brace as instructed  Assistive device encouraged for safety and support    Recommendations/Plan:  Exercise, medications, injections, other patient advice, and return appointment as noted.  Brace: No brace was given at today's visit. Low tide foot and ankle immobilizer boot. Fracture shoe.  Patient is encouraged to call or return for any issues or concerns.    Continue current treatment plan and follow-up in 4 weeks.  Advised wearing shoe or boot for 3 more weeks and then trying normal shoes for 1 week if no significant pain.    Return in about 4 weeks (around 1/23/2025) for XOA.  Patient agreeable to call or return sooner for any concerns.

## 2025-01-08 DIAGNOSIS — E53.1 VITAMIN B6 DEFICIENCY: ICD-10-CM

## 2025-01-08 DIAGNOSIS — R20.2 FACIAL TINGLING: ICD-10-CM

## 2025-01-08 RX ORDER — MULTIVITAMIN WITH IRON
100 TABLET ORAL DAILY
Qty: 90 TABLET | Refills: 3 | OUTPATIENT
Start: 2025-01-08

## 2025-01-11 DIAGNOSIS — I10 ESSENTIAL HYPERTENSION: ICD-10-CM

## 2025-01-13 RX ORDER — METOPROLOL TARTRATE 25 MG/1
25 TABLET, FILM COATED ORAL EVERY 12 HOURS
Qty: 180 TABLET | Refills: 3 | Status: SHIPPED | OUTPATIENT
Start: 2025-01-13

## 2025-01-14 ENCOUNTER — TELEPHONE (OUTPATIENT)
Dept: ORTHOPEDIC SURGERY | Facility: CLINIC | Age: 55
End: 2025-01-14

## 2025-01-14 DIAGNOSIS — Z76.89 ENCOUNTER FOR WEIGHT MANAGEMENT: ICD-10-CM

## 2025-01-14 DIAGNOSIS — S92.354D CLOSED NONDISPLACED FRACTURE OF FIFTH METATARSAL BONE OF RIGHT FOOT WITH ROUTINE HEALING, SUBSEQUENT ENCOUNTER: Primary | ICD-10-CM

## 2025-01-14 DIAGNOSIS — E66.01 MORBID OBESITY WITH BMI OF 40.0-44.9, ADULT: ICD-10-CM

## 2025-01-14 RX ORDER — SEMAGLUTIDE 2.4 MG/.75ML
2.4 INJECTION, SOLUTION SUBCUTANEOUS WEEKLY
Qty: 9 ML | Refills: 3 | OUTPATIENT
Start: 2025-01-14

## 2025-01-14 NOTE — TELEPHONE ENCOUNTER
Spoke with patient and informed her that she had refills remaining on this. She is going to call the pharmacy to request refill

## 2025-01-14 NOTE — TELEPHONE ENCOUNTER
Caller: MICHAELA   Relationship to Patient: SELF  Phone Number: [phone] (home)     Reason for Call: PATIENT STATES THAT HER FOOT IS BOTHERING HER MORE THAN T HAS BEEN, WE MOVED HER APPT UP TO THIS WEEK BUT SHE STATES SHE IS SUPPOSED TO BE WEARING A REGULAR SHOE STARTING TOMORROW AND SHE STATES THAT IT HURTS TOO MUCH TO DO THIS, WANTS TO KNOW WHAT SHE SHOULD DO

## 2025-01-15 ENCOUNTER — OFFICE VISIT (OUTPATIENT)
Dept: ORTHOPEDIC SURGERY | Facility: CLINIC | Age: 55
End: 2025-01-15
Payer: COMMERCIAL

## 2025-01-15 VITALS
SYSTOLIC BLOOD PRESSURE: 122 MMHG | BODY MASS INDEX: 39.44 KG/M2 | WEIGHT: 231 LBS | DIASTOLIC BLOOD PRESSURE: 70 MMHG | HEIGHT: 64 IN | TEMPERATURE: 97.5 F

## 2025-01-15 DIAGNOSIS — S92.354D CLOSED NONDISPLACED FRACTURE OF FIFTH METATARSAL BONE OF RIGHT FOOT WITH ROUTINE HEALING, SUBSEQUENT ENCOUNTER: Primary | ICD-10-CM

## 2025-01-15 NOTE — PROGRESS NOTES
"    Office Note     Name: Mary Lunsford    : 1970     MRN: [id number]     Chief Complaint  Follow-up and Fracture of the Right Foot (Closed nondisplaced fracture of fifth metatarsal bone of right foot, DOI: 24. Pain has increased since Saturday. She went to the grocery that morning, wore boot. Foot was hurting even in the boot, foot has been swelling more as well. Pain is more on the bottom of the foot, throbbing, even at rest. She has been wearing fracture shoe while walking. )    Subjective     History of Present Illness:  Mary Lunsford is a 54 y.o. female presenting today for right foot fifth metatarsal fracture.  Patient states that she has noted some increased pain in the lateral and plantar surface of her foot with walking.  She continues wearing her walking boot or fracture shoe but has increased pain at the fracture site.  She denies any paresthesias.  She denies any new injury.    Review of Systems   Constitutional:  Negative for fever.   HENT:  Negative for dental problem and voice change.    Eyes:  Negative for visual disturbance.   Respiratory:  Negative for shortness of breath.    Cardiovascular:  Negative for chest pain.   Gastrointestinal:  Negative for abdominal pain.   Genitourinary:  Negative for dysuria.   Musculoskeletal:  Positive for arthralgias, gait problem and joint swelling.   Skin:  Negative for rash.   Neurological:  Negative for speech difficulty.   Hematological:  Does not bruise/bleed easily.   Psychiatric/Behavioral:  Negative for confusion.         Past Medical History:   Diagnosis Date    Anemia     Body piercing     both ears    Bronchitis     Colitis     Crohn's disease 2023    incidental finding, asymptomatic    Disease of thyroid gland     Dissection of coronary artery 2017    pt denies this 10/2/19.  states she had testing at [facility], but was told that they didn't \"find anything\"    Enteropathogenic Escherichia coli infection     Essential " hypertension 2018    Gallstone     H/O echocardiogram     History of nuclear stress test     Hyperlipidemia     Kidney stones     Low back pain     Migraine     Obesity     Ovarian cyst     Prolonged Q-T interval on ECG     history    Restless leg syndrome     Subacute idiopathic myocarditis     Urinary tract infection     Vitamin B12 deficiency     Vitamin B6 deficiency 2024    Wears glasses     to read        Past Surgical History:   Procedure Laterality Date    BACK SURGERY  2024    BREAST SURGERY Bilateral 1987    breast reduction    CARDIAC CATHETERIZATION N/A 03/15/2017    Procedure: Coronary angiography;  Surgeon: Moustapha Peñaloza MD;  Location: [facility] CATH INVASIVE LOCATION;  Service:     CARDIAC CATHETERIZATION N/A 03/15/2017    Procedure: Left ventriculography;  Surgeon: Moustapha Peñaloza MD;  Location: [facility] CATH INVASIVE LOCATION;  Service:      SECTION      1991 & 1995    CHOLECYSTECTOMY  2013    COLONOSCOPY N/A 10/03/2019    Procedure: COLONOSCOPY WITH BIOPSIES; ANOSCOPY;  Surgeon: Jose Paez MD;  Location: [facility] ENDOSCOPY;  Service: Gastroenterology    COLONOSCOPY N/A 2023    Procedure: COLONOSCOPY with biopsy;  Surgeon: Sami Elizondo MD;  Location: [facility] ENDOSCOPY;  Service: Gastroenterology;  Laterality: N/A;    ENDOSCOPY      GASTRIC BYPASS  2011    LUMBAR LAMINECTOMY WITH FUSION N/A 2024    Procedure: LUMBAR FUSION DECOMPRESSON WITH PEDICLE SCREWS L4-5;  Surgeon: Jeovanny Smith MD;  Location: [facility] OR;  Service: Neurosurgery;  Laterality: N/A;    REDUCTION MAMMAPLASTY      STEROID INJECTION      x2 in back       Family History   Problem Relation Age of Onset    Cancer Father     Heart attack Father     Cancer Other     Other Other     Breast cancer Mother     Asthma Brother     Ulcerative colitis Brother     Arthritis Paternal Grandmother     Colon cancer Neg Hx        Social History     Socioeconomic History     Marital status:    Tobacco Use    Smoking status: Former     Current packs/day: 0.00     Average packs/day: 1 pack/day for 2.0 years (2.0 ttl pk-yrs)     Types: Cigarettes     Start date: 10/1/1993     Quit date: 10/1/1995     Years since quittin.3    Smokeless tobacco: Never   Vaping Use    Vaping status: Never Used   Substance and Sexual Activity    Alcohol use: Yes     Alcohol/week: 12.0 standard drinks of alcohol     Types: 12 Cans of beer per week     Comment: 16 beers per week    Drug use: No    Sexual activity: Defer         Current Outpatient Medications:     atorvastatin (LIPITOR) 40 MG tablet, TAKE 1 TABLET BY MOUTH EVERY NIGHT, Disp: 90 tablet, Rfl: 2    carbidopa-levodopa CR (SINEMET CR)  MG per CR tablet, Take 1 tablet by mouth 3 (Three) Times a Day., Disp: 270 tablet, Rfl: 3    cyanocobalamin 1000 MCG/ML injection, Administer 1000 mcg IM every other week for 4 doses then monthly and continue, Disp: 30 mL, Rfl: 1    Ferrous Fumarate (Ferrocite) 324 (106 Fe) MG tablet, Take 1 tablet by mouth 2 (Two) Times a Day., Disp: 180 tablet, Rfl: 3    levothyroxine (SYNTHROID, LEVOTHROID) 88 MCG tablet, Take 1 tablet by mouth Every Morning. For hypothyroidism., Disp: 90 tablet, Rfl: 3    Lyrica 100 MG capsule, Take 1 capsule by mouth 2 (Two) Times a Day., Disp: 180 capsule, Rfl: 1    metoprolol tartrate (LOPRESSOR) 25 MG tablet, TAKE 1 TABLET BY MOUTH EVERY 12 HOURS, Disp: 180 tablet, Rfl: 3    polyethylene glycol (MIRALAX) 17 g packet, Take 17 g by mouth 2 (Two) Times a Day., Disp: 10 each, Rfl: 0    pramipexole (MIRAPEX) 0.5 MG tablet, Take 1/2 tablet at 1230pm, 1 at bedtime and 1/2 tablet if needed for breakthrough symptoms, Disp: 180 tablet, Rfl: 3    Scopolamine 1 MG/3DAYS patch, Place 1 patch on the skin as directed by provider Every 72 (Seventy-Two) Hours., Disp: 4 each, Rfl: 0    Semaglutide-Weight Management (Wegovy) 2.4 MG/0.75ML solution auto-injector, Inject 2.4 mg under the skin into  "the appropriate area as directed 1 (One) Time Per Week., Disp: 9 mL, Rfl: 3    Syringe/Needle, Disp, 23G X 1\" 3 ML misc, Use as directed with B12 injections, Disp: 50 each, Rfl: 0    vitamin B-6 (PYRIDOXINE) 100 MG tablet, Take 1 tablet by mouth Daily., Disp: 90 tablet, Rfl: 3    Allergies   Allergen Reactions    Sulfa Antibiotics Rash and Other (See Comments)     Blurry vision           Objective   /70   Temp 97.5 °F (36.4 °C)   Ht 162.6 cm (64\")   Wt 105 kg (231 lb)   LMP 12/01/2018 (LMP Unknown)   BMI 39.65 kg/m²            Physical Exam  Right Ankle Exam      Comments:  There is no significant swelling bruising erythema.  There is tenderness to the base of the fifth metatarsal lateral surface and plantar surface.  She has normal ankle range of motion.  She has a mildly antalgic gait favoring the right leg.  Cap refill brisk, gross sensation intact to light touch.           Extremity DVT signs are negative by clinical screen.     Independent Review of Radiographic Studies:    3 view plain films of the right foot without in office today for the evaluation of fracture healing, comparison views available. There is a nondisplaced, transverse fracture of the proximal fifth metatarsal base. There are no significant interval changes from previous exam. Plantar calcaneal spur is noted.     Procedures    Assessment and Plan   Diagnoses and all orders for this visit:    1. Closed nondisplaced fracture of fifth metatarsal bone of right foot with routine healing, subsequent encounter (Primary)       Discussion of orthopedic goals  Orthopedic activities reviewed and patient expressed appreciation  Regular exercise as tolerated  Risk, benefits, and merits of treatment alternatives reviewed with the patient and questions answered  Patient guided on mobility and conditioning exercises  Ice, heat, and/or modalities as beneficial  Reduced physical activity as appropriate and avoid offending activity  Weight bearing " parameters reviewed  Use brace as instructed  Recommended over-the-counter anti-inflammatories for pain as needed and rest over the next 1 to 2 weeks.    Recommendations/Plan:  Exercise, medications, injections, other patient advice, and return appointment as noted.  Patient is encouraged to call or return for any issues or concerns.    At this time I believe patient is having increased pain at the fracture site, most likely perhaps to peritendinitis.  I advised resting the foot over the next 1 to 2 weeks with weightbearing as tolerated though reduced, continue her walking boot or fracture shoe.  If patient continues to have significant symptoms in 2 weeks consider MRI for further evaluation of the soft tissue.    Return in about 2 weeks (around 1/29/2025) for Recheck.  Patient agreeable to call or return sooner for any concerns.

## 2025-01-17 ENCOUNTER — PRIOR AUTHORIZATION (OUTPATIENT)
Dept: INTERNAL MEDICINE | Facility: CLINIC | Age: 55
End: 2025-01-17
Payer: COMMERCIAL

## 2025-01-17 NOTE — TELEPHONE ENCOUNTER
Mary Lunsford ([place]: [id number])  Rx #: [id number]  Wegovy 2.4MG/0.75ML auto-injectors  Form  [employer] Exception to Coverage Request Form   Plan Contact  [phone] phone  [phone] fax  Created  Sent to Plan  Determination

## 2025-01-22 NOTE — TELEPHONE ENCOUNTER
Denied 1/22/2025 Due to base weight & current weight 5 % loss from baseline. Information sent to insurance did not reflect this request. Refaxing the form for more information.

## 2025-01-24 NOTE — TELEPHONE ENCOUNTER
Spoke with patient. She is agreeable for the appointment Monday 1/27/2024 to see Dr. Jimenez. She understands the reason behind the visit as I explained this to document for insurance and documents to be submitted for PA.

## 2025-01-27 ENCOUNTER — OFFICE VISIT (OUTPATIENT)
Dept: INTERNAL MEDICINE | Facility: CLINIC | Age: 55
End: 2025-01-27
Payer: COMMERCIAL

## 2025-01-27 VITALS
SYSTOLIC BLOOD PRESSURE: 130 MMHG | BODY MASS INDEX: 38.89 KG/M2 | HEART RATE: 84 BPM | HEIGHT: 64 IN | TEMPERATURE: 97.3 F | DIASTOLIC BLOOD PRESSURE: 84 MMHG | WEIGHT: 227.8 LBS | OXYGEN SATURATION: 98 %

## 2025-01-27 DIAGNOSIS — Z76.89 ENCOUNTER FOR WEIGHT MANAGEMENT: ICD-10-CM

## 2025-01-27 DIAGNOSIS — Z86.39 HISTORY OF MORBID OBESITY: ICD-10-CM

## 2025-01-27 DIAGNOSIS — I10 ESSENTIAL HYPERTENSION: ICD-10-CM

## 2025-01-27 DIAGNOSIS — E66.812 CLASS 2 SEVERE OBESITY DUE TO EXCESS CALORIES WITH SERIOUS COMORBIDITY AND BODY MASS INDEX (BMI) OF 39.0 TO 39.9 IN ADULT: Primary | ICD-10-CM

## 2025-01-27 DIAGNOSIS — E66.01 CLASS 2 SEVERE OBESITY DUE TO EXCESS CALORIES WITH SERIOUS COMORBIDITY AND BODY MASS INDEX (BMI) OF 39.0 TO 39.9 IN ADULT: Primary | ICD-10-CM

## 2025-01-27 PROCEDURE — 99214 OFFICE O/P EST MOD 30 MIN: CPT | Performed by: FAMILY MEDICINE

## 2025-01-27 RX ORDER — SEMAGLUTIDE 2.4 MG/.75ML
2.4 INJECTION, SOLUTION SUBCUTANEOUS WEEKLY
Qty: 3 ML | Refills: 11 | Status: SHIPPED | OUTPATIENT
Start: 2025-01-27

## 2025-01-27 NOTE — ASSESSMENT & PLAN NOTE
Hypertension is borderline  Dietary sodium restriction.  Weight loss.  Regular aerobic exercise.  Blood pressure will be reassessedat the next regular appointment.

## 2025-01-27 NOTE — PROGRESS NOTES
"Chief Complaint  Weight Check    Subjective        Mary Lunsford presents to [facility]  History of Present Illness  Doing well with Wegovy 2.4 mg. Insurance not wanting to cover. No adverse effects. Patient hopes to get back into walking with weather improvements.      Objective   Vital Signs:  /84   Pulse 84   Temp 97.3 °F (36.3 °C)   Ht 162.6 cm (64\")   Wt 103 kg (227 lb 12.8 oz)   SpO2 98%   BMI 39.10 kg/m²   Estimated body mass index is 39.1 kg/m² as calculated from the following:    Height as of this encounter: 162.6 cm (64\").    Weight as of this encounter: 103 kg (227 lb 12.8 oz).    Class 2 Severe Obesity (BMI >=35 and <=39.9). Obesity-related health conditions include the following: hypertension and osteoarthritis. Obesity is improving with treatment. BMI is is above average; BMI management plan is completed. We discussed portion control, increasing exercise, and pharmacologic options including Wegovy .           Physical Exam  Vitals and nursing note reviewed.   Constitutional:       General: She is not in acute distress.     Appearance: Normal appearance. She is well-developed and well-groomed. She is obese. She is not ill-appearing, toxic-appearing or diaphoretic.   HENT:      Head: Normocephalic and atraumatic.      Right Ear: Hearing normal.      Left Ear: Hearing normal.   Eyes:      General: Lids are normal. No scleral icterus.     Extraocular Movements: Extraocular movements intact.   Neck:      Trachea: Phonation normal.   Pulmonary:      Effort: Pulmonary effort is normal.   Musculoskeletal:      Cervical back: Neck supple.   Skin:     Coloration: Skin is not jaundiced or pale.   Neurological:      General: No focal deficit present.      Mental Status: She is alert and oriented to person, place, and time.      Motor: Motor function is intact.   Psychiatric:         Attention and Perception: Attention and perception normal.         Mood and Affect: " Mood and affect normal.         Speech: Speech normal.         Behavior: Behavior normal. Behavior is cooperative.         Thought Content: Thought content normal.         Cognition and Memory: Cognition and memory normal.         Judgment: Judgment normal.        Result Review :                Assessment and Plan   Diagnoses and all orders for this visit:    1. Class 2 severe obesity due to excess calories with serious comorbidity and body mass index (BMI) of 39.0 to 39.9 in adult (Primary)  Comments:  associated with hypertension  Orders:  -     Semaglutide-Weight Management (Wegovy) 2.4 MG/0.75ML solution auto-injector; Inject 2.4 mg under the skin into the appropriate area as directed 1 (One) Time Per Week.  Dispense: 3 mL; Refill: 11    2. Encounter for weight management  Comments:  Patient has lost approx 11% of her baseline weight with Wegovy and continuation of medicine recommended.  Orders:  -     Semaglutide-Weight Management (Wegovy) 2.4 MG/0.75ML solution auto-injector; Inject 2.4 mg under the skin into the appropriate area as directed 1 (One) Time Per Week.  Dispense: 3 mL; Refill: 11    3. History of morbid obesity  -     Semaglutide-Weight Management (Wegovy) 2.4 MG/0.75ML solution auto-injector; Inject 2.4 mg under the skin into the appropriate area as directed 1 (One) Time Per Week.  Dispense: 3 mL; Refill: 11    4. Essential hypertension  Assessment & Plan:  Hypertension is borderline  Dietary sodium restriction.  Weight loss.  Regular aerobic exercise.  Blood pressure will be reassessedat the next regular appointment.               Follow Up   No follow-ups on file.  Patient was given instructions and counseling regarding her condition or for health maintenance advice. Please see specific information pulled into the AVS if appropriate.

## 2025-02-07 ENCOUNTER — TELEMEDICINE (OUTPATIENT)
Dept: NEUROLOGY | Facility: CLINIC | Age: 55
End: 2025-02-07
Payer: COMMERCIAL

## 2025-02-07 ENCOUNTER — PATIENT ROUNDING (BHMG ONLY) (OUTPATIENT)
Dept: NEUROLOGY | Facility: CLINIC | Age: 55
End: 2025-02-07
Payer: COMMERCIAL

## 2025-02-07 DIAGNOSIS — G25.81 RESTLESS LEGS SYNDROME (RLS): Primary | ICD-10-CM

## 2025-02-07 DIAGNOSIS — E53.1 VITAMIN B6 DEFICIENCY: ICD-10-CM

## 2025-02-07 DIAGNOSIS — G43.109 MIGRAINE WITH AURA AND WITHOUT STATUS MIGRAINOSUS, NOT INTRACTABLE: ICD-10-CM

## 2025-02-07 DIAGNOSIS — E53.8 VITAMIN B12 DEFICIENCY: ICD-10-CM

## 2025-02-07 DIAGNOSIS — D50.9 IRON DEFICIENCY ANEMIA, UNSPECIFIED IRON DEFICIENCY ANEMIA TYPE: ICD-10-CM

## 2025-02-07 PROCEDURE — 99214 OFFICE O/P EST MOD 30 MIN: CPT | Performed by: NURSE PRACTITIONER

## 2025-02-07 RX ORDER — CYANOCOBALAMIN 1000 UG/ML
INJECTION, SOLUTION INTRAMUSCULAR; SUBCUTANEOUS
Qty: 30 ML | Refills: 0 | Status: SHIPPED | OUTPATIENT
Start: 2025-02-07

## 2025-02-07 RX ORDER — MULTIVITAMIN WITH IRON
100 TABLET ORAL DAILY
Qty: 90 TABLET | Refills: 3 | Status: SHIPPED | OUTPATIENT
Start: 2025-02-07

## 2025-02-07 RX ORDER — FERROUS FUMARATE 324(106)MG
1 TABLET ORAL 2 TIMES DAILY
Qty: 180 TABLET | Refills: 3 | Status: SHIPPED | OUTPATIENT
Start: 2025-02-07

## 2025-02-07 RX ORDER — CARBIDOPA AND LEVODOPA 50; 200 MG/1; MG/1
1 TABLET, EXTENDED RELEASE ORAL 3 TIMES DAILY
Qty: 270 TABLET | Refills: 3 | Status: SHIPPED | OUTPATIENT
Start: 2025-02-07

## 2025-02-07 RX ORDER — PRAMIPEXOLE DIHYDROCHLORIDE 0.5 MG/1
TABLET ORAL
Qty: 180 TABLET | Refills: 3 | Status: SHIPPED | OUTPATIENT
Start: 2025-02-07

## 2025-02-07 NOTE — LETTER
2025     Floridalma Jimenez MD  [address] [facility] [address]  [facility] [address]    Patient: Mary Lunsford   YOB: 1970   Date of Visit: 2025     Dear Floridalma Jimenez MD:       Thank you for referring Mary Lunsford to me for evaluation. Below are the relevant portions of my assessment and plan of care.    If you have questions, please do not hesitate to call me. I look forward to following Mary along with you.         Sincerely,        LINNEA Mendoza        CC: MD Jennifer Short Ashley Moore, APRN  [phone]  Sign when Signing Visit  Subjective:     Patient ID: Mary Lunsford is a 54 y.o. female.    CC:   Chief Complaint   Patient presents with   • Restless Legs Syndrome   • Migraine     This visit was completed face to face via VIDEO by Epic/Igenica with verbal consent to treat obtained from patient and/or family.  Provider confirmed the patient's name and  at the start of visit. Patient and/or family confirms that they are located in [place] during visit and Provider is located in [facility] in [place], [place] during visit.    HPI:   History of Present Illness  This is a very pleasant 54-year-old female completing a 6-month telehealth neurology follow-up on longstanding restless legs syndrome, rare migraines with aura, and chronic iron deficiency anemia.     She has also experienced some facial tingling following chiropractic manipulation which has resolved.     She has been evaluated by neurosurgery in regard to lumbar spinal stenosis and underwent lumbar spinal fusion, cyst removal, and decompression in 2024. She developed lumbar radiculopathy and neuropathy symptoms with her low back pain. These symptoms have resolved.    She has been taking Lyrica 100 mg twice per day, dispensed as written brand name only for her symptoms, which has been helpful. This manages her Restless leg symptoms well. Her mother also has severe restless leg  symptoms. She has been taking vitamin B12 injections for maintenance. For her restless leg symptoms, she is also taking carbidopa-levodopa CR  mg, 1 pill 3 times per day long term. She takes pramipexole 0.5 mg, a half a tablet at 12:30, 1 pill at bedtime, and a half a pill if breakthrough symptoms occur. She also has prior B6 deficiency and has been taking a supplement for this.    She reports an improvement in her back condition, with no current issues related to neuropathy. She does not experience any numbness, tingling, or burning sensations. However, she has noticed an increase in the frequency of her fingers turning white, particularly in cold conditions, a symptom she had previously experienced before starting Lyrica. This symptom has recently worsened. She does not have a formal diagnosis of Raynaud's, but suspects this is the cause. She continues to take Lyrica twice daily.    Her restless legs syndrome is manageable, with symptoms primarily occurring in the evening. She has adjusted her medication schedule to take Sinemet earlier and finds relief from standing up for a while before resuming sitting. Occasionally, she experiences morning symptoms, for which she takes half a tablet of Mirapex, but this is not a frequent occurrence. She does not wish to make any changes to her medications today.    She has not experienced any migraines recently, although she has had some sinus headaches.    She has not completed her B12 injections and is supposed to be doing those every 2 weeks. She has syringes available and requests a refill today. Her friend who is a nurse is available to administer these at home.    She has not experienced any facial tingling for some time, a symptom previously attributed to chiropractic adjustments.    She reports no new health concerns or significant changes.     She is currently taking iron supplements. Her last lab work was conducted in 03/2024. She has a scheduled appointment  "with Dr. Jimenez on 03/04/2025. She is uncertain about her cholesterol levels but recalls being prescribed atorvastatin by her cardiologist approximately 3 years ago. She has a follow-up appointment with cardiology on 3/4/2025.    FAMILY HISTORY  Her mother has severe restless leg symptoms.    MEDICATIONS  Lyrica, carbidopa-levodopa CR, pramipexole, atorvastatin, iron supplements, vitamin B12 injections, vitamin B6 supplements.    Previous neurology history and workup included-   Chronic restless leg syndrome present since 2009. She also has intermittent migraine headaches. She has been taking Lyrica brand name necessary 75 mg twice a day. Pregabalin generic did not help her symptoms. She takes carbidopa-levodopa CR 50/200 mg 1 pill 3 times a day and 1 extra if needed. She takes pramipexole 0.5 mg 0.5 tablets 3 times a day.      She also underwent an EMG and NCVS of her right upper and left upper extremities and this was completely normal on 10/23/2019 with Dr. Buck Lan of our neurologist.       Underwent MRI of the brain with and without contrast which showed 2 very small chronic white matter changes of the brain and otherwise MRI of the brain was completely unremarkable with no abnormal contrast enhancement and no acute intracranial abnormalities.     She does also experience migraines with aura for several years. She typically has used Excedrin Migraine along with ibuprofen.  She will get \"squiggly lines\" in both eyes and then about 20 minutes later will start to have the band of headache around her head with nausea, photophobia and phonophobia.  This typically last 5 to 6 hours.  Sometimes she does have to lay down and go to sleep.  She gets moderate throbbing pain.  She has not noticed a pattern. Typically this will occur 5-6 times per year.  She used to take sumatriptan/Imitrex for many years and this became ineffective.  She switched to rizatriptan but this caused her to be very tired and did not seem to work " well.  She did not like the side effects of either of the triptan medications since she felt like she could not focus and had to go to sleep.  Also caused some nausea.  She may have tried a third triptan but cannot recall exactly which one. Daughter also gets migraines. Episodic migraines-a few in a few months and then resolve for several months. These are rare now. Has ubrelvy if needed.     2021 labs-ferritin level, which was 563 ng/mL and vitamin B12 level, which is 212 pg/mL, and methylmalonic acid level, which was 367, normal.     She is treated with iron supplement and has previously had iron infusions in the past. Her mom and grandmother also have severe restless leg syndrome. Ropinirole and neupro patch as well as generic pregabalin, gabapentin and Horizant all ineffective for RLS.     Chronic restless legs syndrome, known iron deficiency anemia, and migraines with aura, which have been episodic.      She reported on 12/4/2023 she is experiencing numbness and tingling in her face bilaterally. This started approximately 2-4 weeks ago and is intermittent. It is not painful but is making her nervous. She states she will not see her back doctor until 12/14/2023 or 12/16/2023, but she would like to undergo another MRI. She had an MRI of the brain on 01/20/2020 when she was having right facial tingling. She states the tingling resolved after her MRI, but it has returned.       Laboratory Studies 3/4/2024 with PCP:  MMA level is elevated at 681 indicating true B12 deficiency. Iron saturation is 14%. Vitamin B6 2.6 low. Hemoglobin level has improved.     Imaging  2/6/2024-MRI of the brain with and without contrast was unremarkable with a few punctate white matter changes consistent with migraines and her medical history, but no acute intracranial abnormalities and no evidence of stroke. Reviewed imaging and agree with radiology interpretation.     1/25/2024-CTA of the head, head and neck were both completed. No  "evidence of large vessel occlusion. Evidence of acute left maxillary sinusitis and chronic ethmoid and maxillary sinusitis.    She has been evaluated by neurosurgery for findings of lumbar spinal stenosis and radicular symptoms. She was admitted to [facility] on 02/14/2024 to 02/16/2024 for lumbar fusion, cyst removal, decompression with pedicle screws at L4-L5 completed by Dr. Smith.     The following portions of the patient's history were reviewed and updated as appropriate: allergies, current medications, past family history, past medical history, past social history, past surgical history, and problem list.    Past Medical History:   Diagnosis Date   • Anemia    • Body piercing     both ears   • Bronchitis    • Colitis    • Crohn's disease 08/2023    incidental finding, asymptomatic   • Disease of thyroid gland    • Dissection of coronary artery 03/20/2017    pt denies this 10/2/19.  states she had testing at , but was told that they didn't \"find anything\"   • Enteropathogenic Escherichia coli infection    • Essential hypertension 03/30/2018   • Gallstone    • H/O echocardiogram    • History of nuclear stress test    • Hyperlipidemia    • Kidney stones    • Low back pain    • Migraine    • Obesity    • Ovarian cyst    • Prolonged Q-T interval on ECG     history   • Restless leg syndrome    • Subacute idiopathic myocarditis    • Urinary tract infection    • Vitamin B12 deficiency    • Vitamin B6 deficiency 03/22/2024   • Wears glasses     to read       Past Surgical History:   Procedure Laterality Date   • BACK SURGERY  02/2024   • BREAST SURGERY Bilateral 07/1987    breast reduction   • CARDIAC CATHETERIZATION N/A 03/15/2017    Procedure: Coronary angiography;  Surgeon: Moustapha Peñaloza MD;  Location: [facility] CATH INVASIVE LOCATION;  Service:    • CARDIAC CATHETERIZATION N/A 03/15/2017    Procedure: Left ventriculography;  Surgeon: Moustapha Peñaloza MD;  Location: [facility] INVASIVE " LOCATION;  Service:    •  SECTION      1991 & 1995   • CHOLECYSTECTOMY  2013   • COLONOSCOPY N/A 10/03/2019    Procedure: COLONOSCOPY WITH BIOPSIES; ANOSCOPY;  Surgeon: Jose Paez MD;  Location: [facility] ENDOSCOPY;  Service: Gastroenterology   • COLONOSCOPY N/A 2023    Procedure: COLONOSCOPY with biopsy;  Surgeon: Sami Elizondo MD;  Location: [facility] ENDOSCOPY;  Service: Gastroenterology;  Laterality: N/A;   • ENDOSCOPY     • GASTRIC BYPASS  2011   • LUMBAR LAMINECTOMY WITH FUSION N/A 2024    Procedure: LUMBAR FUSION DECOMPRESSON WITH PEDICLE SCREWS L4-5;  Surgeon: Jeovanny Smith MD;  Location: [facility] OR;  Service: Neurosurgery;  Laterality: N/A;   • REDUCTION MAMMAPLASTY     • STEROID INJECTION      x2 in back       Social History     Socioeconomic History   • Marital status:    Tobacco Use   • Smoking status: Former     Current packs/day: 0.00     Average packs/day: 1 pack/day for 2.0 years (2.0 ttl pk-yrs)     Types: Cigarettes     Start date: 10/1/1993     Quit date: 10/1/1995     Years since quittin.3   • Smokeless tobacco: Never   Vaping Use   • Vaping status: Never Used   Substance and Sexual Activity   • Alcohol use: Yes     Alcohol/week: 12.0 standard drinks of alcohol     Types: 12 Cans of beer per week     Comment: 16 beers per week   • Drug use: No   • Sexual activity: Defer       Family History   Problem Relation Age of Onset   • Cancer Father    • Heart attack Father    • Cancer Other    • Other Other    • Breast cancer Mother    • Asthma Brother    • Ulcerative colitis Brother    • Arthritis Paternal Grandmother    • Colon cancer Neg Hx           Current Outpatient Medications:   •  carbidopa-levodopa CR (SINEMET CR)  MG per CR tablet, Take 1 tablet by mouth 3 (Three) Times a Day., Disp: 270 tablet, Rfl: 3  •  Ferrous Fumarate (Ferrocite) 324 (106 Fe) MG tablet, Take 1 tablet by mouth 2 (Two) Times a Day., Disp: 180 tablet, Rfl:  "3  •  pramipexole (MIRAPEX) 0.5 MG tablet, Take 1/2 tablet at 1230pm, 1 at bedtime and 1/2 tablet if needed for breakthrough symptoms, Disp: 180 tablet, Rfl: 3  •  vitamin B-6 (PYRIDOXINE) 100 MG tablet, Take 1 tablet by mouth Daily., Disp: 90 tablet, Rfl: 3  •  atorvastatin (LIPITOR) 40 MG tablet, TAKE 1 TABLET BY MOUTH EVERY NIGHT, Disp: 90 tablet, Rfl: 2  •  cyanocobalamin 1000 MCG/ML injection, 1 ml Injection SQ or IM every other week x 4 doses, then once a month for maintenance, Disp: 30 mL, Rfl: 0  •  levothyroxine (SYNTHROID, LEVOTHROID) 88 MCG tablet, Take 1 tablet by mouth Every Morning. For hypothyroidism., Disp: 90 tablet, Rfl: 3  •  Lyrica 100 MG capsule, Take 1 capsule by mouth 2 (Two) Times a Day., Disp: 180 capsule, Rfl: 1  •  metoprolol tartrate (LOPRESSOR) 25 MG tablet, TAKE 1 TABLET BY MOUTH EVERY 12 HOURS, Disp: 180 tablet, Rfl: 3  •  Semaglutide-Weight Management (Wegovy) 2.4 MG/0.75ML solution auto-injector, Inject 2.4 mg under the skin into the appropriate area as directed 1 (One) Time Per Week., Disp: 3 mL, Rfl: 11  •  Syringe/Needle, Disp, 23G X 1\" 3 ML misc, Use as directed with B12 injections, Disp: 50 each, Rfl: 0     Review of Systems   Neurological:  Positive for headaches.   Psychiatric/Behavioral:  Positive for sleep disturbance.    All other systems reviewed and are negative.       Objective:  LMP 12/01/2018 (LMP Unknown)   Not obtained d/t video visit    Neurological Exam  Mental Status  Alert. Oriented to person, place, time and situation. Speech is normal. Fund of knowledge is appropriate for level of education.    Cranial Nerves  CN II: Visual acuity is normal.  CN III, IV, VI: Extraocular movements intact bilaterally. Normal lids and orbits bilaterally.  CN VII: Full and symmetric facial movement.        Physical Exam  Constitutional:       Appearance: Normal appearance.   Eyes:      General: Lids are normal.      Extraocular Movements: Extraocular movements intact. "   Neurological:      Mental Status: She is alert.   Psychiatric:         Mood and Affect: Mood and affect normal.         Speech: Speech normal.     Limited due to video visit    Results:  Results  None    Assessment/Plan:     Diagnoses and all orders for this visit:    1. Restless legs syndrome (RLS) (Primary)  -     pramipexole (MIRAPEX) 0.5 MG tablet; Take 1/2 tablet at 1230pm, 1 at bedtime and 1/2 tablet if needed for breakthrough symptoms  Dispense: 180 tablet; Refill: 3  -     carbidopa-levodopa CR (SINEMET CR)  MG per CR tablet; Take 1 tablet by mouth 3 (Three) Times a Day.  Dispense: 270 tablet; Refill: 3  -     Ferrous Fumarate (Ferrocite) 324 (106 Fe) MG tablet; Take 1 tablet by mouth 2 (Two) Times a Day.  Dispense: 180 tablet; Refill: 3  -     Comprehensive Metabolic Panel; Future    2. Iron deficiency anemia, unspecified iron deficiency anemia type  -     Ferrous Fumarate (Ferrocite) 324 (106 Fe) MG tablet; Take 1 tablet by mouth 2 (Two) Times a Day.  Dispense: 180 tablet; Refill: 3  -     Ferritin; Future  -     CBC (No Diff); Future  -     Comprehensive Metabolic Panel; Future    3. Vitamin B6 deficiency  -     vitamin B-6 (PYRIDOXINE) 100 MG tablet; Take 1 tablet by mouth Daily.  Dispense: 90 tablet; Refill: 3  -     Vitamin B6; Future    4. Vitamin B12 deficiency  -     cyanocobalamin 1000 MCG/ML injection; 1 ml Injection SQ or IM every other week x 4 doses, then once a month for maintenance  Dispense: 30 mL; Refill: 0  -     Vitamin B12; Future  -     Methylmalonic Acid, Serum; Future    5. Migraine with aura and without status migrainosus, not intractable  Comments:  rare           Assessment & Plan  1. Restless legs syndrome.  She is doing well. She does not wish to make any changes to her medications today. She will continue her current regimen of carbidopa-levodopa CR  mg 1 pill 3 times per day and pramipexole 0.5 mg, a half a tablet at 12:30, 1 pill at bedtime, and a half a pill if  breakthrough symptoms occur. All medications have been refilled.    2. Rare migraines with aura.  She has not experienced a migraine in a while. No changes to her current management plan are necessary.    3. Chronic iron deficiency anemia.  She will continue taking iron supplements. Labs have been ordered to check ferritin, CBC, CMP, and B12 levels. She is advised to complete at least 4 B12 injections before rechecking her levels.    4. Lumbar radiculopathy and neuropathy.  She reports resolution of numbness, tingling, or burning. She will continue taking Lyrica 100 mg twice per day which also treats her RLS.    5. Raynaud's phenomenon.  She reports her fingers turning white, especially when cold, which is consistent with Raynaud's phenomenon. No immediate changes to her current management plan are necessary.    6. Vitamin B12 deficiency.  She will resume B12 injections, 1 mL intramuscularly every other week for 4 doses, then once a month for maintenance. Labs have been ordered to recheck B12 levels after completing the initial 4 injections.    7. Health maintenance.  She has a follow-up appointment with cardiology on 03/04/2025. Her controlled substance agreement is up to date. She will contact us with any questions or concerns prior to follow-up.    Reviewed medications, potential side effects and signs and symptoms to report. Discussed risk versus benefits of treatment plan with patient and/or family-including medications, labs and radiology that may be ordered. Addressed questions and concerns during visit. Patient and/or family verbalized understanding and agree with plan.    As part of this patient's treatment plan I am prescribing controlled substances. The patient has been made aware of appropriate use of such medications, including potential risk of somnolence, limited ability to drive and/or work safely, and potential for dependence or overdose. It has also been made clear that these medications are for  use by the patient only, without concomitant use of alcohol or other substances unless prescribed. Keep out of reach of children.  [place] report has been reviewed. If this is going to be prescribed long term, [facility] Controlled Substance Agreement Contract has also been read and signed by patient and myself.    During this visit the following were done:  Labs Reviewed []    Labs Ordered []    Radiology Reports Reviewed []    Radiology Ordered []    PCP Records Reviewed [x]    Referring Provider Records Reviewed []    ER Records Reviewed []    Hospital Records Reviewed []    History Obtained From Family []    Radiology Images Reviewed []    Other Reviewed []    Records Requested []      02/07/25   16:12 EST    Patient or patient representative verbalized consent for the use of Ambient Listening during the visit with  LINNEA Mendoza for chart documentation. 2/8/2025  10:41 EST    Note to patient: The 21st Century Cures Act makes medical notes like these available to patients in the interest of transparency. However, be advised this is a medical document. It is intended as peer to peer communication. It is written in medical language and may contain abbreviations or verbiage that are unfamiliar. It may appear blunt or direct. Medical documents are intended to carry relevant information, facts as evident, and the clinical opinion of the provider.

## 2025-02-07 NOTE — PROGRESS NOTES
Subjective:     Patient ID: Mary Lunsford is a 54 y.o. female.    CC:   Chief Complaint   Patient presents with    Restless Legs Syndrome    Migraine     This visit was completed face to face via VIDEO by Epic/W[facility]o with verbal consent to treat obtained from patient and/or family.  Provider confirmed the patient's name and  at the start of visit. Patient and/or family confirms that they are located in [place] during visit and Provider is located in [facility] in [place], [place] during visit.    HPI:   History of Present Illness  This is a very pleasant 54-year-old female completing a 6-month telehealth neurology follow-up on longstanding restless legs syndrome, rare migraines with aura, and chronic iron deficiency anemia.     She has also experienced some facial tingling following chiropractic manipulation which has resolved.     She has been evaluated by neurosurgery in regard to lumbar spinal stenosis and underwent lumbar spinal fusion, cyst removal, and decompression in 2024. She developed lumbar radiculopathy and neuropathy symptoms with her low back pain. These symptoms have resolved.    She has been taking Lyrica 100 mg twice per day, dispensed as written brand name only for her symptoms, which has been helpful. This manages her Restless leg symptoms well. Her mother also has severe restless leg symptoms. She has been taking vitamin B12 injections for maintenance. For her restless leg symptoms, she is also taking carbidopa-levodopa CR  mg, 1 pill 3 times per day long term. She takes pramipexole 0.5 mg, a half a tablet at 12:30, 1 pill at bedtime, and a half a pill if breakthrough symptoms occur. She also has prior B6 deficiency and has been taking a supplement for this.    She reports an improvement in her back condition, with no current issues related to neuropathy. She does not experience any numbness, tingling, or burning sensations. However, she has noticed an increase in the  frequency of her fingers turning white, particularly in cold conditions, a symptom she had previously experienced before starting Lyrica. This symptom has recently worsened. She does not have a formal diagnosis of Raynaud's, but suspects this is the cause. She continues to take Lyrica twice daily.    Her restless legs syndrome is manageable, with symptoms primarily occurring in the evening. She has adjusted her medication schedule to take Sinemet earlier and finds relief from standing up for a while before resuming sitting. Occasionally, she experiences morning symptoms, for which she takes half a tablet of Mirapex, but this is not a frequent occurrence. She does not wish to make any changes to her medications today.    She has not experienced any migraines recently, although she has had some sinus headaches.    She has not completed her B12 injections and is supposed to be doing those every 2 weeks. She has syringes available and requests a refill today. Her friend who is a nurse is available to administer these at home.    She has not experienced any facial tingling for some time, a symptom previously attributed to chiropractic adjustments.    She reports no new health concerns or significant changes.     She is currently taking iron supplements. Her last lab work was conducted in 03/2024. She has a scheduled appointment with Dr. Jimenez on 03/04/2025. She is uncertain about her cholesterol levels but recalls being prescribed atorvastatin by her cardiologist approximately 3 years ago. She has a follow-up appointment with cardiology on 3/4/2025.    FAMILY HISTORY  Her mother has severe restless leg symptoms.    MEDICATIONS  Lyrica, carbidopa-levodopa CR, pramipexole, atorvastatin, iron supplements, vitamin B12 injections, vitamin B6 supplements.    Previous neurology history and workup included-   Chronic restless leg syndrome present since 2009. She also has intermittent migraine headaches. She has been taking  "Lyrica brand name necessary 75 mg twice a day. Pregabalin generic did not help her symptoms. She takes carbidopa-levodopa CR 50/200 mg 1 pill 3 times a day and 1 extra if needed. She takes pramipexole 0.5 mg 0.5 tablets 3 times a day.      She also underwent an EMG and NCVS of her right upper and left upper extremities and this was completely normal on 10/23/2019 with Dr. Buck Lan of our neurologist.       Underwent MRI of the brain with and without contrast which showed 2 very small chronic white matter changes of the brain and otherwise MRI of the brain was completely unremarkable with no abnormal contrast enhancement and no acute intracranial abnormalities.     She does also experience migraines with aura for several years. She typically has used Excedrin Migraine along with ibuprofen.  She will get \"squiggly lines\" in both eyes and then about 20 minutes later will start to have the band of headache around her head with nausea, photophobia and phonophobia.  This typically last 5 to 6 hours.  Sometimes she does have to lay down and go to sleep.  She gets moderate throbbing pain.  She has not noticed a pattern. Typically this will occur 5-6 times per year.  She used to take sumatriptan/Imitrex for many years and this became ineffective.  She switched to rizatriptan but this caused her to be very tired and did not seem to work well.  She did not like the side effects of either of the triptan medications since she felt like she could not focus and had to go to sleep.  Also caused some nausea.  She may have tried a third triptan but cannot recall exactly which one. Daughter also gets migraines. Episodic migraines-a few in a few months and then resolve for several months. These are rare now. Has ubrelvy if needed.     2021 labs-ferritin level, which was 563 ng/mL and vitamin B12 level, which is 212 pg/mL, and methylmalonic acid level, which was 367, normal.     She is treated with iron supplement and has previously " had iron infusions in the past. Her mom and grandmother also have severe restless leg syndrome. Ropinirole and neupro patch as well as generic pregabalin, gabapentin and Horizant all ineffective for RLS.     Chronic restless legs syndrome, known iron deficiency anemia, and migraines with aura, which have been episodic.      She reported on 12/4/2023 she is experiencing numbness and tingling in her face bilaterally. This started approximately 2-4 weeks ago and is intermittent. It is not painful but is making her nervous. She states she will not see her back doctor until 12/14/2023 or 12/16/2023, but she would like to undergo another MRI. She had an MRI of the brain on 01/20/2020 when she was having right facial tingling. She states the tingling resolved after her MRI, but it has returned.       Laboratory Studies 3/4/2024 with PCP:  MMA level is elevated at 681 indicating true B12 deficiency. Iron saturation is 14%. Vitamin B6 2.6 low. Hemoglobin level has improved.     Imaging  2/6/2024-MRI of the brain with and without contrast was unremarkable with a few punctate white matter changes consistent with migraines and her medical history, but no acute intracranial abnormalities and no evidence of stroke. Reviewed imaging and agree with radiology interpretation.     1/25/2024-CTA of the head, head and neck were both completed. No evidence of large vessel occlusion. Evidence of acute left maxillary sinusitis and chronic ethmoid and maxillary sinusitis.    She has been evaluated by neurosurgery for findings of lumbar spinal stenosis and radicular symptoms. She was [facility] to [facility] to 02/16/2024 for lumbar fusion, cyst removal, decompression with pedicle screws at L4-L5 completed by Dr. Smith.     The following portions of the patient's history were reviewed and updated as appropriate: allergies, current medications, past family history, past medical history, past social history, past  "surgical history, and problem list.    Past Medical History:   Diagnosis Date    Anemia     Body piercing     both ears    Bronchitis     Colitis     Crohn's disease 2023    incidental finding, asymptomatic    Disease of thyroid gland     Dissection of coronary artery 2017    pt denies this 10/2/19.  states she had testing at , but was told that they didn't \"find anything\"    Enteropathogenic Escherichia coli infection     Essential hypertension 2018    Gallstone     H/O echocardiogram     History of nuclear stress test     Hyperlipidemia     Kidney stones     Low back pain     Migraine     Obesity     Ovarian cyst     Prolonged Q-T interval on ECG     history    Restless leg syndrome     Subacute idiopathic myocarditis     Urinary tract infection     Vitamin B12 deficiency     Vitamin B6 deficiency 2024    Wears glasses     to read       Past Surgical History:   Procedure Laterality Date    BACK SURGERY  2024    BREAST SURGERY Bilateral 1987    breast reduction    CARDIAC CATHETERIZATION N/A 03/15/2017    Procedure: Coronary angiography;  Surgeon: Moustapha Peñaloza MD;  Location: [facility] CATH INVASIVE LOCATION;  Service:     CARDIAC CATHETERIZATION N/A 03/15/2017    Procedure: Left ventriculography;  Surgeon: Moustapha Peñaloza MD;  Location: [facility] CATH INVASIVE LOCATION;  Service:      SECTION      1991 & 1995    CHOLECYSTECTOMY  2013    COLONOSCOPY N/A 10/03/2019    Procedure: COLONOSCOPY WITH BIOPSIES; ANOSCOPY;  Surgeon: Jose Paez MD;  Location: [facility] ENDOSCOPY;  Service: Gastroenterology    COLONOSCOPY N/A 2023    Procedure: COLONOSCOPY with biopsy;  Surgeon: Sami Elizondo MD;  Location: [facility] ENDOSCOPY;  Service: Gastroenterology;  Laterality: N/A;    ENDOSCOPY      GASTRIC BYPASS  2011    LUMBAR LAMINECTOMY WITH FUSION N/A 2024    Procedure: LUMBAR FUSION DECOMPRESSON WITH PEDICLE SCREWS L4-5;  Surgeon: Jeovanny Smith MD; "  Location: [facility];  Service: Neurosurgery;  Laterality: N/A;    REDUCTION MAMMAPLASTY      STEROID INJECTION      x2 in back       Social History     Socioeconomic History    Marital status:    Tobacco Use    Smoking status: Former     Current packs/day: 0.00     Average packs/day: 1 pack/day for 2.0 years (2.0 ttl pk-yrs)     Types: Cigarettes     Start date: 10/1/1993     Quit date: 10/1/1995     Years since quittin.3    Smokeless tobacco: Never   Vaping Use    Vaping status: Never Used   Substance and Sexual Activity    Alcohol use: Yes     Alcohol/week: 12.0 standard drinks of alcohol     Types: 12 Cans of beer per week     Comment: 16 beers per week    Drug use: No    Sexual activity: Defer       Family History   Problem Relation Age of Onset    Cancer Father     Heart attack Father     Cancer Other     Other Other     Breast cancer Mother     Asthma Brother     Ulcerative colitis Brother     Arthritis Paternal Grandmother     Colon cancer Neg Hx           Current Outpatient Medications:     carbidopa-levodopa CR (SINEMET CR)  MG per CR tablet, Take 1 tablet by mouth 3 (Three) Times a Day., Disp: 270 tablet, Rfl: 3    Ferrous Fumarate (Ferrocite) 324 (106 Fe) MG tablet, Take 1 tablet by mouth 2 (Two) Times a Day., Disp: 180 tablet, Rfl: 3    pramipexole (MIRAPEX) 0.5 MG tablet, Take 1/2 tablet at 1230pm, 1 at bedtime and 1/2 tablet if needed for breakthrough symptoms, Disp: 180 tablet, Rfl: 3    vitamin B-6 (PYRIDOXINE) 100 MG tablet, Take 1 tablet by mouth Daily., Disp: 90 tablet, Rfl: 3    atorvastatin (LIPITOR) 40 MG tablet, TAKE 1 TABLET BY MOUTH EVERY NIGHT, Disp: 90 tablet, Rfl: 2    cyanocobalamin 1000 MCG/ML injection, 1 ml Injection SQ or IM every other week x 4 doses, then once a month for maintenance, Disp: 30 mL, Rfl: 0    levothyroxine (SYNTHROID, LEVOTHROID) 88 MCG tablet, Take 1 tablet by mouth Every Morning. For hypothyroidism., Disp: 90 tablet, Rfl: 3    Lyrica 100 MG  "capsule, Take 1 capsule by mouth 2 (Two) Times a Day., Disp: 180 capsule, Rfl: 1    metoprolol tartrate (LOPRESSOR) 25 MG tablet, TAKE 1 TABLET BY MOUTH EVERY 12 HOURS, Disp: 180 tablet, Rfl: 3    Semaglutide-Weight Management (Wegovy) 2.4 MG/0.75ML solution auto-injector, Inject 2.4 mg under the skin into the appropriate area as directed 1 (One) Time Per Week., Disp: 3 mL, Rfl: 11    Syringe/Needle, Disp, 23G X 1\" 3 ML misc, Use as directed with B12 injections, Disp: 50 each, Rfl: 0     Review of Systems   Neurological:  Positive for headaches.   Psychiatric/Behavioral:  Positive for sleep disturbance.    All other systems reviewed and are negative.       Objective:  LMP 12/01/2018 (LMP Unknown)   Not obtained d/t video visit    Neurological Exam  Mental Status  Alert. Oriented to person, place, time and situation. Speech is normal. Fund of knowledge is appropriate for level of education.    Cranial Nerves  CN II: Visual acuity is normal.  CN III, IV, VI: Extraocular movements intact bilaterally. Normal lids and orbits bilaterally.  CN VII: Full and symmetric facial movement.        Physical Exam  Constitutional:       Appearance: Normal appearance.   Eyes:      General: Lids are normal.      Extraocular Movements: Extraocular movements intact.   Neurological:      Mental Status: She is alert.   Psychiatric:         Mood and Affect: Mood and affect normal.         Speech: Speech normal.     Limited due to video visit    Results:  Results  None    Assessment/Plan:     Diagnoses and all orders for this visit:    1. Restless legs syndrome (RLS) (Primary)  -     pramipexole (MIRAPEX) 0.5 MG tablet; Take 1/2 tablet at 1230pm, 1 at bedtime and 1/2 tablet if needed for breakthrough symptoms  Dispense: 180 tablet; Refill: 3  -     carbidopa-levodopa CR (SINEMET CR)  MG per CR tablet; Take 1 tablet by mouth 3 (Three) Times a Day.  Dispense: 270 tablet; Refill: 3  -     Ferrous Fumarate (Ferrocite) 324 (106 Fe) MG " tablet; Take 1 tablet by mouth 2 (Two) Times a Day.  Dispense: 180 tablet; Refill: 3  -     Comprehensive Metabolic Panel; Future    2. Iron deficiency anemia, unspecified iron deficiency anemia type  -     Ferrous Fumarate (Ferrocite) 324 (106 Fe) MG tablet; Take 1 tablet by mouth 2 (Two) Times a Day.  Dispense: 180 tablet; Refill: 3  -     Ferritin; Future  -     CBC (No Diff); Future  -     Comprehensive Metabolic Panel; Future    3. Vitamin B6 deficiency  -     vitamin B-6 (PYRIDOXINE) 100 MG tablet; Take 1 tablet by mouth Daily.  Dispense: 90 tablet; Refill: 3  -     Vitamin B6; Future    4. Vitamin B12 deficiency  -     cyanocobalamin 1000 MCG/ML injection; 1 ml Injection SQ or IM every other week x 4 doses, then once a month for maintenance  Dispense: 30 mL; Refill: 0  -     Vitamin B12; Future  -     Methylmalonic Acid, Serum; Future    5. Migraine with aura and without status migrainosus, not intractable  Comments:  rare           Assessment & Plan  1. Restless legs syndrome.  She is doing well. She does not wish to make any changes to her medications today. She will continue her current regimen of carbidopa-levodopa CR  mg 1 pill 3 times per day and pramipexole 0.5 mg, a half a tablet at 12:30, 1 pill at bedtime, and a half a pill if breakthrough symptoms occur. All medications have been refilled.    2. Rare migraines with aura.  She has not experienced a migraine in a while. No changes to her current management plan are necessary.    3. Chronic iron deficiency anemia.  She will continue taking iron supplements. Labs have been ordered to check ferritin, CBC, CMP, and B12 levels. She is advised to complete at least 4 B12 injections before rechecking her levels.    4. Lumbar radiculopathy and neuropathy.  She reports resolution of numbness, tingling, or burning. She will continue taking Lyrica 100 mg twice per day which also treats her RLS.    5. Raynaud's phenomenon.  She reports her fingers turning  white, especially when cold, which is consistent with Raynaud's phenomenon. No immediate changes to her current management plan are necessary.    6. Vitamin B12 deficiency.  She will resume B12 injections, 1 mL intramuscularly every other week for 4 doses, then once a month for maintenance. Labs have been ordered to recheck B12 levels after completing the initial 4 injections.    7. Health maintenance.  She has a follow-up appointment with cardiology on 03/04/2025. Her controlled substance agreement is up to date. She will contact us with any questions or concerns prior to follow-up.    Reviewed medications, potential side effects and signs and symptoms to report. Discussed risk versus benefits of treatment plan with patient and/or family-including medications, labs and radiology that may be ordered. Addressed questions and concerns during visit. Patient and/or family verbalized understanding and agree with plan.    As part of this patient's treatment plan I am prescribing controlled substances. The patient has been made aware of appropriate use of such medications, including potential risk of somnolence, limited ability to drive and/or work safely, and potential for dependence or overdose. It has also been made clear that these medications are for use by the patient only, without concomitant use of alcohol or other substances unless prescribed. Keep out of reach of children.  [place] report has been reviewed. If this is going to be prescribed long term, [facility] Controlled Substance Agreement Contract has also been read and signed by patient and myself.    During this visit the following were done:  Labs Reviewed []    Labs Ordered []    Radiology Reports Reviewed []    Radiology Ordered []    PCP Records Reviewed [x]    Referring Provider Records Reviewed []    ER Records Reviewed []    Hospital Records Reviewed []    History Obtained From Family []    Radiology Images Reviewed []    Other Reviewed []    Records  Requested []      02/07/25   16:12 EST    Patient or patient representative verbalized consent for the use of Ambient Listening during the visit with  LINNEA Mendoza for chart documentation. 2/8/2025  10:41 EST    Note to patient: The 21st Century Cures Act makes medical notes like these available to patients in the interest of transparency. However, be advised this is a medical document. It is intended as peer to peer communication. It is written in medical language and may contain abbreviations or verbiage that are unfamiliar. It may appear blunt or direct. Medical documents are intended to carry relevant information, facts as evident, and the clinical opinion of the provider.

## 2025-02-27 DIAGNOSIS — Z86.39 HISTORY OF MORBID OBESITY: ICD-10-CM

## 2025-02-27 DIAGNOSIS — E78.5 HYPERLIPIDEMIA LDL GOAL <100: ICD-10-CM

## 2025-02-27 DIAGNOSIS — E66.812 CLASS 2 SEVERE OBESITY DUE TO EXCESS CALORIES WITH SERIOUS COMORBIDITY AND BODY MASS INDEX (BMI) OF 39.0 TO 39.9 IN ADULT: ICD-10-CM

## 2025-02-27 DIAGNOSIS — Z76.89 ENCOUNTER FOR WEIGHT MANAGEMENT: ICD-10-CM

## 2025-02-27 DIAGNOSIS — E66.01 CLASS 2 SEVERE OBESITY DUE TO EXCESS CALORIES WITH SERIOUS COMORBIDITY AND BODY MASS INDEX (BMI) OF 39.0 TO 39.9 IN ADULT: ICD-10-CM

## 2025-02-27 RX ORDER — SEMAGLUTIDE 2.4 MG/.75ML
2.4 INJECTION, SOLUTION SUBCUTANEOUS WEEKLY
Qty: 3 ML | Refills: 11 | OUTPATIENT
Start: 2025-02-27

## 2025-02-27 RX ORDER — ATORVASTATIN CALCIUM 40 MG/1
40 TABLET, FILM COATED ORAL NIGHTLY
Qty: 90 TABLET | Refills: 2 | Status: SHIPPED | OUTPATIENT
Start: 2025-02-27

## 2025-03-04 ENCOUNTER — OFFICE VISIT (OUTPATIENT)
Dept: CARDIOLOGY | Facility: CLINIC | Age: 55
End: 2025-03-04
Payer: COMMERCIAL

## 2025-03-04 VITALS
WEIGHT: 229.6 LBS | HEART RATE: 71 BPM | SYSTOLIC BLOOD PRESSURE: 122 MMHG | HEIGHT: 64 IN | DIASTOLIC BLOOD PRESSURE: 80 MMHG | OXYGEN SATURATION: 97 % | BODY MASS INDEX: 39.2 KG/M2

## 2025-03-04 DIAGNOSIS — E78.5 DYSLIPIDEMIA: ICD-10-CM

## 2025-03-04 DIAGNOSIS — E66.01 MORBID OBESITY: ICD-10-CM

## 2025-03-04 DIAGNOSIS — I10 ESSENTIAL HYPERTENSION: Primary | ICD-10-CM

## 2025-03-04 DIAGNOSIS — R07.2 PRECORDIAL PAIN: ICD-10-CM

## 2025-03-04 DIAGNOSIS — R00.2 PALPITATIONS: ICD-10-CM

## 2025-03-04 DIAGNOSIS — R06.02 SOB (SHORTNESS OF BREATH): ICD-10-CM

## 2025-03-04 NOTE — PROGRESS NOTES
"    Subjective:     Encounter Date:03/04/2025      Patient ID: Mary Lunsford is a 54 y.o. female.    Chief Complaint: Chest pain  HPI  This is a 54-year-old female patient who presents to cardiology clinic for routine follow-up.  Since her last visit she has developed chest discomfort.  She describes a couple month history of chest discomfort localized to a discrete area along various locations of her anterior chest which does not radiate.  There is no associated symptoms.  The discomfort at times feels like a sharp stabbing pain.  Other times there is a tightness sensation.  The patient cannot identify any precipitating aggravating or alleviating features.  There is no pleuritic or exertional component.  They typically occur at rest and generally last less than 1 minute.  She is a non-smoker.  She has a history of hypertension and dyslipidemia both of which are treated.  She underwent invasive coronary angiography in 2017 which was normal.  She had a normal treadmill exercise stress test in November 2021.  She also reports having intermittent palpitations with a sense that her heart is \"fluttering\".  There is no dizziness or syncope.  She has no history of arrhythmia.  She had a normal cardiac monitor 3-4 years ago.  She reports compliance with her medications with no perceived side effects.  The following portions of the patient's history were reviewed and updated as appropriate: allergies, current medications, past family history, past medical history, past social history, past surgical history and problem  Review of Systems   Constitutional: Negative for chills, diaphoresis, fever, malaise/fatigue, weight gain and weight loss.   HENT:  Negative for ear discharge, hearing loss, hoarse voice and nosebleeds.    Eyes:  Negative for discharge, double vision, pain and photophobia.   Cardiovascular:  Positive for chest pain and palpitations. Negative for claudication, cyanosis, dyspnea on exertion, irregular " heartbeat, leg swelling, near-syncope, orthopnea, paroxysmal nocturnal dyspnea and syncope.   Respiratory:  Negative for cough, hemoptysis, sputum production and wheezing.    Endocrine: Negative for cold intolerance, heat intolerance, polydipsia, polyphagia and polyuria.   Hematologic/Lymphatic: Negative for adenopathy and bleeding problem. Does not bruise/bleed easily.   Skin:  Negative for color change, flushing, itching and rash.   Musculoskeletal:  Negative for muscle cramps, muscle weakness, myalgias and stiffness.   Gastrointestinal:  Negative for abdominal pain, diarrhea, hematemesis, hematochezia, nausea and vomiting.   Genitourinary:  Negative for dysuria, frequency and nocturia.   Neurological:  Positive for dizziness. Negative for focal weakness, loss of balance, numbness, paresthesias and seizures.   Psychiatric/Behavioral:  Negative for altered mental status, hallucinations and suicidal ideas.    Allergic/Immunologic: Negative for HIV exposure, hives and persistent infections.           Current Outpatient Medications:     atorvastatin (LIPITOR) 40 MG tablet, Take 1 tablet by mouth Every Night., Disp: 90 tablet, Rfl: 2    carbidopa-levodopa CR (SINEMET CR)  MG per CR tablet, Take 1 tablet by mouth 3 (Three) Times a Day., Disp: 270 tablet, Rfl: 3    cyanocobalamin 1000 MCG/ML injection, 1 ml Injection SQ or IM every other week x 4 doses, then once a month for maintenance, Disp: 30 mL, Rfl: 0    Ferrous Fumarate (Ferrocite) 324 (106 Fe) MG tablet, Take 1 tablet by mouth 2 (Two) Times a Day., Disp: 180 tablet, Rfl: 3    levothyroxine (SYNTHROID, LEVOTHROID) 88 MCG tablet, Take 1 tablet by mouth Every Morning. For hypothyroidism., Disp: 90 tablet, Rfl: 3    Lyrica 100 MG capsule, Take 1 capsule by mouth 2 (Two) Times a Day., Disp: 180 capsule, Rfl: 1    metoprolol tartrate (LOPRESSOR) 25 MG tablet, TAKE 1 TABLET BY MOUTH EVERY 12 HOURS, Disp: 180 tablet, Rfl: 3    pramipexole (MIRAPEX) 0.5 MG tablet,  "Take 1/2 tablet at 1230pm, 1 at bedtime and 1/2 tablet if needed for breakthrough symptoms, Disp: 180 tablet, Rfl: 3    Semaglutide-Weight Management (Wegovy) 2.4 MG/0.75ML solution auto-injector, Inject 2.4 mg under the skin into the appropriate area as directed 1 (One) Time Per Week., Disp: 3 mL, Rfl: 11    Syringe/Needle, Disp, 23G X 1\" 3 ML misc, Use as directed with B12 injections, Disp: 50 each, Rfl: 0    vitamin B-6 (PYRIDOXINE) 100 MG tablet, Take 1 tablet by mouth Daily., Disp: 90 tablet, Rfl: 3    Objective:   Vitals and nursing note reviewed.   Constitutional:       Appearance: Healthy appearance. Not in distress.   Neck:      Vascular: No JVR. JVD normal.   Pulmonary:      Effort: Pulmonary effort is normal.      Breath sounds: Normal breath sounds. No wheezing. No rhonchi. No rales.   Chest:      Chest wall: Not tender to palpatation.   Cardiovascular:      PMI at left midclavicular line. Normal rate. Regular rhythm. Normal S1. Normal S2.       Murmurs: There is no murmur.      No gallop.  No click. No rub.   Pulses:     Intact distal pulses.   Edema:     Peripheral edema absent.   Abdominal:      General: Bowel sounds are normal.      Palpations: Abdomen is soft.      Tenderness: There is no abdominal tenderness.   Musculoskeletal: Normal range of motion.         General: No tenderness. Skin:     General: Skin is warm and dry.   Neurological:      General: No focal deficit present.      Mental Status: Alert and oriented to person, place and time.       Blood pressure 122/80, pulse 71, height 162.6 cm (64\"), weight 104 kg (229 lb 9.6 oz), last menstrual period 12/01/2018, SpO2 97%, not currently breastfeeding.   Lab Review:     Assessment:       1. Essential hypertension  Acceptable blood pressure control.    2. Morbid obesity  Body mass index is just under 40.  The patient reports having lost approximately 15-20 pounds utilizing GLP-1 receptor agonist therapy.    3. Dyslipidemia  Tolerating statin " based cholesterol-lowering therapy without side effects.    4. Precordial pain  Atypical chest pain.  Multiple risk factors for coronary artery disease.  The patient has not had a stress test in 2-3 years.  She is unable to do treadmill exercise stress testing due to morbid obesity and limited effort tolerance.  - Stress Test With PET Myocardial Perfusion  - Adult Transthoracic Echo Complete W/ Cont if Necessary Per Protocol    5. Palpitations  Possible arrhythmia versus ectopy.  The patient has not worn a heart monitor in 2-3 years.  - Stress Test With PET Myocardial Perfusion  - Adult Transthoracic Echo Complete W/ Cont if Necessary Per Protocol  - Cardiac Event Monitor (FABIAN) or Mobile Cardiac Outpatient Telemetry (MCT)    6. SOB (shortness of breath)  Multifactorial.  - Stress Test With PET Myocardial Perfusion  - Adult Transthoracic Echo Complete W/ Cont if Necessary Per Protocol      ECG 12 Lead    Date/Time: 3/4/2025 4:20 PM  Performed by: Moustapha Peñaloza MD    Authorized by: Moustapha Peñaloza MD  Comparison: compared with previous ECG   Similar to previous ECG  Rhythm: sinus rhythm  Rate: normal  QRS axis: normal    Clinical impression: normal ECG          Plan:     I have recommended a vasodilator positron emission tomography myocardial perfusion scan as this is the only modality of noninvasive ischemic testing which offers any reasonable hope of diagnostic accuracy given her body habitus.    I have recommended an echocardiogram.    I have recommended an outpatient cardiac monitor.    No changes in medications made at today's visit.    Further recommendations will be predicated on the results of her outpatient testing.

## 2025-03-10 ENCOUNTER — LAB (OUTPATIENT)
Dept: LAB | Facility: HOSPITAL | Age: 55
End: 2025-03-10
Payer: COMMERCIAL

## 2025-03-10 ENCOUNTER — OFFICE VISIT (OUTPATIENT)
Dept: GASTROENTEROLOGY | Facility: CLINIC | Age: 55
End: 2025-03-10
Payer: COMMERCIAL

## 2025-03-10 VITALS
SYSTOLIC BLOOD PRESSURE: 142 MMHG | HEIGHT: 65 IN | BODY MASS INDEX: 38.15 KG/M2 | HEART RATE: 76 BPM | TEMPERATURE: 97.8 F | WEIGHT: 229 LBS | DIASTOLIC BLOOD PRESSURE: 90 MMHG

## 2025-03-10 DIAGNOSIS — D50.9 IRON DEFICIENCY ANEMIA, UNSPECIFIED IRON DEFICIENCY ANEMIA TYPE: ICD-10-CM

## 2025-03-10 DIAGNOSIS — K50.00 CROHN'S DISEASE OF SMALL INTESTINE WITHOUT COMPLICATION: ICD-10-CM

## 2025-03-10 DIAGNOSIS — K59.03 DRUG-INDUCED CONSTIPATION: ICD-10-CM

## 2025-03-10 DIAGNOSIS — E53.1 VITAMIN B6 DEFICIENCY: ICD-10-CM

## 2025-03-10 DIAGNOSIS — E53.8 VITAMIN B12 DEFICIENCY: ICD-10-CM

## 2025-03-10 DIAGNOSIS — K50.00 CROHN'S DISEASE OF SMALL INTESTINE WITHOUT COMPLICATION: Primary | ICD-10-CM

## 2025-03-10 DIAGNOSIS — G25.81 RESTLESS LEGS SYNDROME (RLS): ICD-10-CM

## 2025-03-10 LAB
FERRITIN SERPL-MCNC: 594 NG/ML (ref 13–150)
HBV SURFACE AG SERPL QL IA: NORMAL
IRON 24H UR-MRATE: 83 MCG/DL (ref 37–145)
IRON SATN MFR SERPL: 26 % (ref 20–50)
TIBC SERPL-MCNC: 316 MCG/DL (ref 298–536)
TRANSFERRIN SERPL-MCNC: 212 MG/DL (ref 200–360)

## 2025-03-10 PROCEDURE — 36415 COLL VENOUS BLD VENIPUNCTURE: CPT

## 2025-03-10 PROCEDURE — 80053 COMPREHEN METABOLIC PANEL: CPT

## 2025-03-10 PROCEDURE — 87340 HEPATITIS B SURFACE AG IA: CPT

## 2025-03-10 PROCEDURE — 82607 VITAMIN B-12: CPT

## 2025-03-10 PROCEDURE — 86704 HEP B CORE ANTIBODY TOTAL: CPT

## 2025-03-10 PROCEDURE — 84466 ASSAY OF TRANSFERRIN: CPT

## 2025-03-10 PROCEDURE — 86480 TB TEST CELL IMMUN MEASURE: CPT

## 2025-03-10 PROCEDURE — 84207 ASSAY OF VITAMIN B-6: CPT

## 2025-03-10 PROCEDURE — 86706 HEP B SURFACE ANTIBODY: CPT

## 2025-03-10 PROCEDURE — 86708 HEPATITIS A ANTIBODY: CPT

## 2025-03-10 PROCEDURE — 83540 ASSAY OF IRON: CPT

## 2025-03-10 PROCEDURE — 85025 COMPLETE CBC W/AUTO DIFF WBC: CPT

## 2025-03-10 PROCEDURE — 83921 ORGANIC ACID SINGLE QUANT: CPT

## 2025-03-10 PROCEDURE — 99214 OFFICE O/P EST MOD 30 MIN: CPT | Performed by: INTERNAL MEDICINE

## 2025-03-10 PROCEDURE — 86803 HEPATITIS C AB TEST: CPT

## 2025-03-10 PROCEDURE — 82728 ASSAY OF FERRITIN: CPT

## 2025-03-10 NOTE — PROGRESS NOTES
"     Follow Up Note     Date: 03/10/2025   Patient Name: Mary Lunsford  MRN: [id number]  : 1970     Referring Physician: Floridalma Jimenez MD    Chief Complaint:    Chief Complaint   Patient presents with    Crohn's Disease       Interval History:   3/10/2025  Mary Lunsford is a 54 y.o. female who is here today for follow up for her Crohn's disease.  She did not come for follow-up over 2 years now.  She was supposed to start on Humira which never happened.  Patient states that she has been having issue with the back pain and surgery and everything got delayed.  She has been on Wegovy since then her bowel movement changed and mostly constipated now and takes MiraLAX as needed.    2023  Mary Lunsford is a 52 y.o. female who is here today to establish care with gastroenterology for diarrhea.  She has a history of severe diarrhea that started a couple of weeks ago. She had lower abdominal cramping associated with bowel movements. She went to the emergency room 2 days ago and had a CT scan that showed mild colitis. She was given Cipro, Flagyl and Bentyl. She did a stool study yesterday that came back positive for enteropathogenic e.coli. Her diarrhea has been improving. Since her ER visit 2 days ago, she had 1 loose stool and 1 semi solid stool. She has not had any bowel movements today. She had some rectal bleeding during the episodes of diarrhea.      Her last colonoscopy was in 2019 by Dr. Paez. Her brother has ulcerative colitis. Her last EGD was in 2011 prior to weight loss surgery that was \"normal\" per patient.  No family history of GI malignancy.    Subjective      Past Medical History:   Past Medical History:   Diagnosis Date    Anemia     Body piercing     both ears    Bronchitis     Colitis     Crohn's disease 2023    incidental finding, asymptomatic    Disease of thyroid gland     Dissection of coronary artery 2017    pt denies this 10/2/19.  states she had " "testing at , but was told that they didn't \"find anything\"    Enteropathogenic Escherichia coli infection 2023    Essential hypertension 2018    Fracture, foot     Right    Gallstone     H/O echocardiogram     History of nuclear stress test     Hyperlipidemia     Kidney stones     Low back pain     Migraine     Obesity     Ovarian cyst     Prolonged Q-T interval on ECG     history    Restless leg syndrome     Subacute idiopathic myocarditis 2017    Urinary tract infection     Vitamin B12 deficiency     Vitamin B6 deficiency 2024    Wears glasses     to read     Past Surgical History:   Past Surgical History:   Procedure Laterality Date    BACK SURGERY  2024    BREAST SURGERY Bilateral 1987    breast reduction    CARDIAC CATHETERIZATION N/A 03/15/2017    Procedure: Coronary angiography;  Surgeon: Moustapha Peñaloza MD;  Location: [facility] CATH INVASIVE LOCATION;  Service:     CARDIAC CATHETERIZATION N/A 03/15/2017    Procedure: Left ventriculography;  Surgeon: Moustapha Peñaloza MD;  Location: [facility] CATH INVASIVE LOCATION;  Service:      SECTION      1991 & 1995    CHOLECYSTECTOMY  2013    COLONOSCOPY N/A 10/03/2019    Procedure: COLONOSCOPY WITH BIOPSIES; ANOSCOPY;  Surgeon: Jose Paez MD;  Location: [facility] ENDOSCOPY;  Service: Gastroenterology    COLONOSCOPY N/A 2023    Procedure: COLONOSCOPY with biopsy;  Surgeon: Sami Elizondo MD;  Location: [facility] ENDOSCOPY;  Service: Gastroenterology;  Laterality: N/A;    ENDOSCOPY      GASTRIC BYPASS  2011    LUMBAR LAMINECTOMY WITH FUSION N/A 2024    Procedure: LUMBAR FUSION DECOMPRESSON WITH PEDICLE SCREWS L4-5;  Surgeon: Jeovanny Smith MD;  Location: [facility] OR;  Service: Neurosurgery;  Laterality: N/A;    REDUCTION MAMMAPLASTY      STEROID INJECTION      x2 in back       Family History:   Family History   Problem Relation Age of Onset    Cancer Father     Heart attack Father     Cancer " Other     Other Other     Breast cancer Mother     Asthma Brother     Ulcerative colitis Brother     Arthritis Paternal Grandmother     Colon cancer Neg Hx        Social History:   Social History     Socioeconomic History    Marital status:    Tobacco Use    Smoking status: Former     Current packs/day: 0.00     Average packs/day: 1 pack/day for 2.0 years (2.0 ttl pk-yrs)     Types: Cigarettes     Start date: 10/1/1993     Quit date: 10/1/1995     Years since quittin.4    Smokeless tobacco: Never   Vaping Use    Vaping status: Never Used   Substance and Sexual Activity    Alcohol use: Not Currently     Comment: 16 beers per week    Drug use: No    Sexual activity: Defer       Medications:     Current Outpatient Medications:     atorvastatin (LIPITOR) 40 MG tablet, Take 1 tablet by mouth Every Night., Disp: 90 tablet, Rfl: 2    carbidopa-levodopa CR (SINEMET CR)  MG per CR tablet, Take 1 tablet by mouth 3 (Three) Times a Day., Disp: 270 tablet, Rfl: 3    cyanocobalamin 1000 MCG/ML injection, 1 ml Injection SQ or IM every other week x 4 doses, then once a month for maintenance, Disp: 30 mL, Rfl: 0    Ferrous Fumarate (Ferrocite) 324 (106 Fe) MG tablet, Take 1 tablet by mouth 2 (Two) Times a Day., Disp: 180 tablet, Rfl: 3    levothyroxine (SYNTHROID, LEVOTHROID) 88 MCG tablet, Take 1 tablet by mouth Every Morning. For hypothyroidism., Disp: 90 tablet, Rfl: 3    Lyrica 100 MG capsule, Take 1 capsule by mouth 2 (Two) Times a Day., Disp: 180 capsule, Rfl: 1    metoprolol tartrate (LOPRESSOR) 25 MG tablet, TAKE 1 TABLET BY MOUTH EVERY 12 HOURS, Disp: 180 tablet, Rfl: 3    pramipexole (MIRAPEX) 0.5 MG tablet, Take 1/2 tablet at 1230pm, 1 at bedtime and 1/2 tablet if needed for breakthrough symptoms, Disp: 180 tablet, Rfl: 3    Semaglutide-Weight Management (Wegovy) 2.4 MG/0.75ML solution auto-injector, Inject 2.4 mg under the skin into the appropriate area as directed 1 (One) Time Per Week., Disp: 3 mL,  "Rfl: 11    Syringe/Needle, Disp, 23G X 1\" 3 ML misc, Use as directed with B12 injections, Disp: 50 each, Rfl: 0    vitamin B-6 (PYRIDOXINE) 100 MG tablet, Take 1 tablet by mouth Daily., Disp: 90 tablet, Rfl: 3    Allergies:   Allergies   Allergen Reactions    Sulfa Antibiotics Rash and Other (See Comments)     Blurry vision       Review of Systems:   Review of Systems   Constitutional:  Negative for appetite change, fatigue, fever and unexpected weight loss.   HENT:  Negative for trouble swallowing.    Gastrointestinal:  Positive for anal bleeding (hemorrhoids) and constipation. Negative for abdominal distention, abdominal pain, blood in stool, diarrhea, nausea, rectal pain, vomiting, GERD and indigestion.       The following portions of the patient's history were reviewed and updated as appropriate: allergies, current medications, past family history, past medical history, past social history, past surgical history and problem list.    Objective     Physical Exam:  Vital Signs:   Vitals:    03/10/25 1510   BP: 142/90   Pulse: 76   Temp: 97.8 °F (36.6 °C)   TempSrc: Infrared   Weight: 104 kg (229 lb)  Comment: with clothing/shoes   Height: 163.8 cm (64.5\")  Comment: per patient.       Physical Exam  Constitutional:       Appearance: She is obese.   HENT:      Head: Normocephalic and atraumatic.   Eyes:      Conjunctiva/sclera: Conjunctivae normal.   Abdominal:      General: Abdomen is flat. There is no distension.      Palpations: There is no mass.      Tenderness: There is no abdominal tenderness. There is no guarding or rebound.      Hernia: No hernia is present.   Musculoskeletal:      Cervical back: Normal range of motion and neck supple.   Neurological:      Mental Status: She is alert.         Results Review:   I reviewed the patient's new clinical results.    No visits with results within 90 Day(s) from this visit.   Latest known visit with results is:   Orders Only on 03/05/2024   Component Date Value Ref " Range Status    Glucose 03/13/2024 79  65 - 99 mg/dL Final    BUN 03/13/2024 16  6 - 20 mg/dL Final    Creatinine 03/13/2024 0.88  0.57 - 1.00 mg/dL Final    EGFR Result 03/13/2024 77.7  >60.0 mL/min/1.73 Final    Comment: GFR Normal >60  Chronic Kidney Disease <60  Kidney Failure <15      BUN/Creatinine Ratio 03/13/2024 18.2  7.0 - 25.0 Final    Sodium 03/13/2024 143  136 - 145 mmol/L Final    Potassium 03/13/2024 4.7  3.5 - 5.2 mmol/L Final    Chloride 03/13/2024 106  98 - 107 mmol/L Final    Total CO2 03/13/2024 27.5  22.0 - 29.0 mmol/L Final    Calcium 03/13/2024 8.9  8.6 - 10.5 mg/dL Final      No radiology results for the last 90 days.    CT Abdomen Pelvis Without Contrast     Result Date: 4/29/2023  Mild colitis involving the descending and sigmoid colon. No complication.      Colonoscopy dated 10/3/2019 per Dr. Paez  Scant diverticular change in the left colon.  Internal hemorrhoids.  Small superficial anal tear.   No endoscopic evidence of colitis was seen.  Random biopsies were obtained from the colon upon withdrawal of the scope. Terminal ileum biopsy with benign ileal mucosa with no diagnostic abnormalities.  Random colon biopsies with benign colonic mucosa with no diagnostic abnormality.     Colonoscopy 8/1/2023  - Diverticulosis in the sigmoid colon and in the descending colon.  - Non-bleeding external hemorrhoids.  - Aphtha in the terminal ileum. Biopsied.  - Biopsies were taken with a cold forceps for histology in the rectum, in the sigmoid colon, in the descending colon, in the transverse colon, in the ascending colon and in the cecum.  - No signs of colitis  - Findings suspecious for small bwoel Crohn's, however patient is on NSAIDS, NSAID induced erosions/ ulcers possible as well        Pathology  CECUM BIOPSY, AND ASCENDING:  Colonic mucosa with reactive changes  B. TERMINAL ILEUM BIOPSY:  Small bowel mucosa with no significant pathologic change  C. TRANSVERSE COLON BIOPSY:  Colonic mucosa with  no significant pathologic change  D. DESCENDING COLON BIOPSY:  Colonic mucosa with no significant pathologic change  E. SIGMOID COLON BIOPSY:  Colonic mucosa with no significant pathologic change  F. RECTAL BIOPSY:  Colonic mucosa with reactive changes     Small bowel PillCam study; 8/29/2023  Extensive areas of healed inflammation and erosion scars throughout the small bowel.  Multiple areas of healing inflammation, superficial and deep aphthae in the jejunum and ileum more prominent in the distal ileum.  Findings consistent with small bowel Crohn's disease.     Assessment / Plan      1.  Crohn's disease of the small bowel without complications  2.  Family history of ulcerative colitis-brother  3.  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  History of iron deficiency anemia  5.  Vitamin B12 deficiency  3/10/2025  Patient was supposed to be started on Humira in October 2023 which never happened.  Patient is still not sure of starting any medications.  Her bowel pattern changed from intermittent diarrhea to mostly constipation after she was started on Wegovy.  Now mostly will have joint pain in the knees. Last lab work on 3/13/2024 reveals normal BMP.  CBC revealed hemoglobin of 12.4 improved from 10.8 in February 2024.    We had a discussion again regarding management of her Crohn's disease.  We went through the list of medications risk and benefits involved and finally agreed on starting with either Rinvoq p.o. or Humira or Stelara depending on insurance approval.  Patient at this time wants to go through all 3 medications and call me back in the next 2 to 3 days.  Based on the lab work she had iron deficiency anemia before last hemoglobin was 10.8 in February 2024.  This is likely associated with the Crohn's or vitamin B12 deficiency.  No history suggestive any GI bleeding.    She is currently on iron pills for her restless leg syndrome  Given her obesity and metabolic syndrome, would  recommend cut down on alcohol consumption to prevent the development of cirrhosis.  MiraLAX 17 g p.o. daily  Continue vitamin B 12 shots as before  Continue Wegovy as long as she tolerates to lose weight  Patient is calling back in the next 2 to 3 days regarding possible prescription for Rinvoq  Recommend zoster vaccine booster dose at PCPs office  Fecal calprotectin  Follow-up in 3 months with lab work     8/29/2023  As per record patient does have a prior history of alternating episodes of constipation and diarrhea.  Patient was seen in 2019 by Dr. Aparicio for the similar symptoms.  Recent history of E. coli infection.  Colonoscopy done on 8/1/2023 revealed diverticulosis of the left side colon otherwise no colitis identified.  Terminal ileal examination revealed multiple small healing aphthae with patchy inflammation.  Subsequent PillCam study done revealed signs very suggestive of small bowel Crohn's disease.  She had a extensive areas of healing and healed inflammation scars and superficial and deep aphthae throughout the distal small bowel. Currently patient is doing well without any significant issues with a bowel movement.  She does have intermittent joint pains involving the wrist elbow and knee joints.  This could be extraintestinal manifestation of Crohn's disease. Her CRP is consistently elevated since last 3 to 4 years.  Last CRP was 0.83 in May 2022.,       5/1/2023  She has a history of severe diarrhea that started a couple of weeks ago and gradually worsened.  She had lower abdominal cramping associated with bowel movements.  She had some rectal bleeding during the episodes of diarrhea.  She was seen in the emergency on 4/29/2023 and had CT scan that showed mild colitis involving the descending and sigmoid colon.  Stool studies with negative C. difficile.  Gastrointestinal panel with positive enteropathogenic E. Coli. She was prescribed Cipro, Flagyl and Bentyl and her symptoms have improved.  No  diarrhea today.  There is a family history of ulcerative colitis in her brother.  Her last colonoscopy was in October 2019 per Dr. CACERES with no endoscopic evidence of colitis seen at that time, random biopsies unremarkable.     Prior history  6.  Recent history of enteropathogenic Escherichia coli infection    Follow Up:   No follow-ups on file.    Sami Elizondo MD  [facility] [place]  3/10/2025  15:13 EDT     Please note that portions of this note may have been completed with a voice recognition program.

## 2025-03-11 LAB
ALBUMIN SERPL-MCNC: 4.1 G/DL (ref 3.5–5.2)
ALBUMIN/GLOB SERPL: 1.6 G/DL
ALP SERPL-CCNC: 95 U/L (ref 39–117)
ALT SERPL W P-5'-P-CCNC: 7 U/L (ref 1–33)
ANION GAP SERPL CALCULATED.3IONS-SCNC: 11.4 MMOL/L (ref 5–15)
AST SERPL-CCNC: 26 U/L (ref 1–32)
BASOPHILS # BLD AUTO: 0.03 10*3/MM3 (ref 0–0.2)
BASOPHILS NFR BLD AUTO: 0.5 % (ref 0–1.5)
BILIRUB SERPL-MCNC: 0.6 MG/DL (ref 0–1.2)
BUN SERPL-MCNC: 12 MG/DL (ref 6–20)
BUN/CREAT SERPL: 12.1 (ref 7–25)
CALCIUM SPEC-SCNC: 9.1 MG/DL (ref 8.6–10.5)
CHLORIDE SERPL-SCNC: 104 MMOL/L (ref 98–107)
CO2 SERPL-SCNC: 23.6 MMOL/L (ref 22–29)
CREAT SERPL-MCNC: 0.99 MG/DL (ref 0.57–1)
DEPRECATED RDW RBC AUTO: 43.3 FL (ref 37–54)
EGFRCR SERPLBLD CKD-EPI 2021: 67.1 ML/MIN/1.73
EOSINOPHIL # BLD AUTO: 0.1 10*3/MM3 (ref 0–0.4)
EOSINOPHIL NFR BLD AUTO: 1.7 % (ref 0.3–6.2)
ERYTHROCYTE [DISTWIDTH] IN BLOOD BY AUTOMATED COUNT: 12.2 % (ref 12.3–15.4)
GLOBULIN UR ELPH-MCNC: 2.6 GM/DL
GLUCOSE SERPL-MCNC: 87 MG/DL (ref 65–99)
HBV SURFACE AB SER RIA-ACNC: REACTIVE
HCT VFR BLD AUTO: 41.8 % (ref 34–46.6)
HCV AB SER QL: NORMAL
HGB BLD-MCNC: 14.4 G/DL (ref 12–15.9)
IMM GRANULOCYTES # BLD AUTO: 0.02 10*3/MM3 (ref 0–0.05)
IMM GRANULOCYTES NFR BLD AUTO: 0.3 % (ref 0–0.5)
LYMPHOCYTES # BLD AUTO: 1.79 10*3/MM3 (ref 0.7–3.1)
LYMPHOCYTES NFR BLD AUTO: 30.5 % (ref 19.6–45.3)
MCH RBC QN AUTO: 33.1 PG (ref 26.6–33)
MCHC RBC AUTO-ENTMCNC: 34.4 G/DL (ref 31.5–35.7)
MCV RBC AUTO: 96.1 FL (ref 79–97)
MONOCYTES # BLD AUTO: 0.33 10*3/MM3 (ref 0.1–0.9)
MONOCYTES NFR BLD AUTO: 5.6 % (ref 5–12)
NEUTROPHILS NFR BLD AUTO: 3.6 10*3/MM3 (ref 1.7–7)
NEUTROPHILS NFR BLD AUTO: 61.4 % (ref 42.7–76)
NRBC BLD AUTO-RTO: 0 /100 WBC (ref 0–0.2)
PLATELET # BLD AUTO: 215 10*3/MM3 (ref 140–450)
PMV BLD AUTO: 9.9 FL (ref 6–12)
POTASSIUM SERPL-SCNC: 4.6 MMOL/L (ref 3.5–5.2)
PROT SERPL-MCNC: 6.7 G/DL (ref 6–8.5)
RBC # BLD AUTO: 4.35 10*6/MM3 (ref 3.77–5.28)
SODIUM SERPL-SCNC: 139 MMOL/L (ref 136–145)
VIT B12 BLD-MCNC: 449 PG/ML (ref 211–946)
WBC NRBC COR # BLD AUTO: 5.87 10*3/MM3 (ref 3.4–10.8)

## 2025-03-12 LAB
HAV AB SER QL IA: NEGATIVE
HBV CORE AB SERPL QL IA: NEGATIVE

## 2025-03-13 LAB
GAMMA INTERFERON BACKGROUND BLD IA-ACNC: 0.05 IU/ML
M TB IFN-G BLD-IMP: NEGATIVE
M TB IFN-G CD4+ BCKGRND COR BLD-ACNC: 0.08 IU/ML
M TB IFN-G CD4+CD8+ BCKGRND COR BLD-ACNC: 0.06 IU/ML
MITOGEN IGNF BCKGRD COR BLD-ACNC: >10 IU/ML
QUANTIFERON INCUBATION: NORMAL
SERVICE CMNT-IMP: NORMAL

## 2025-03-14 ENCOUNTER — TELEPHONE (OUTPATIENT)
Dept: GASTROENTEROLOGY | Facility: CLINIC | Age: 55
End: 2025-03-14
Payer: COMMERCIAL

## 2025-03-14 ENCOUNTER — TELEPHONE (OUTPATIENT)
Dept: CARDIOLOGY | Facility: CLINIC | Age: 55
End: 2025-03-14
Payer: COMMERCIAL

## 2025-03-14 LAB — PYRIDOXAL PHOS SERPL-MCNC: 70.2 UG/L (ref 3.4–65.2)

## 2025-03-14 NOTE — TELEPHONE ENCOUNTER
Messaged Dr. Madsen regarding transfer of care request. Waiting for response on whether he approves or denies.

## 2025-03-14 NOTE — TELEPHONE ENCOUNTER
Hub staff attempted to follow warm transfer process and was unsuccessful     Caller: Mary Lunsford    Relationship to patient: Self    Best call back number: [phone]    Patient is needing: PATIENT CALLING JENNYFER BACK TO LET HER KNOW THAT SHE IS GONNA CALL ON MONDAY WITH HER DECISION REGARDING WHICH OF THE THREE MEDICATIONS SHE IS GOING WITH

## 2025-03-14 NOTE — TELEPHONE ENCOUNTER
Caller: Jonn Mary Wrenett    Relationship: Self    Best call back number: [phone]    Who is your current provider: DR. PEACE     Is your current provider offboarding? NO    Who would you like your new provider to be: DR. ORTEZ     What are your reasons for transferring care: SECOND OPINION     Additional notes: PATIENT CALLED IN TO SEE THE PROCESS OF SWITCHING HER CARE TO DR. ORTEZ. PATIENT STATED SHE IS NOT COMFORTABLE WITH DR. PEACE AND BELIEVES SHE NEEDS TO SEE SOMEONE WITH A DIFFERENT PERSONALITY. PATIENT STATED HER FATHER IS A LONG TIME PATIENT OF DR. ORTEZ AND SHE BELIEVES HE WOULD BE A BETTER FIT FOR HER CARE THAT'S NEEDED. PATIENT WOULD LIKE TO SEE DR. ORTEZ AT THE [facility] IF POSSIBLE.

## 2025-03-15 LAB — METHYLMALONATE SERPL-SCNC: 201 NMOL/L (ref 0–378)

## 2025-04-01 ENCOUNTER — TELEPHONE (OUTPATIENT)
Dept: NEUROLOGY | Facility: CLINIC | Age: 55
End: 2025-04-01

## 2025-04-01 DIAGNOSIS — M51.369 DEGENERATIVE LUMBAR DISC: ICD-10-CM

## 2025-04-01 DIAGNOSIS — G25.81 RESTLESS LEGS SYNDROME (RLS): ICD-10-CM

## 2025-04-01 RX ORDER — PREGABALIN 100 MG/1
100 CAPSULE ORAL 2 TIMES DAILY
Qty: 180 CAPSULE | Refills: 0 | Status: SHIPPED | OUTPATIENT
Start: 2025-04-01

## 2025-04-01 RX ORDER — PREGABALIN 100 MG/1
100 CAPSULE ORAL 2 TIMES DAILY
Qty: 180 CAPSULE | Refills: 1 | Status: CANCELLED | OUTPATIENT
Start: 2025-04-01

## 2025-04-01 NOTE — TELEPHONE ENCOUNTER
Caller: Mary Lunsford    Relationship: Self    Best call back number: [phone] (home)       Requested Prescriptions:   Requested Prescriptions     Pending Prescriptions Disp Refills    Lyrica 100 MG capsule 180 capsule 1     Sig: Take 1 capsule by mouth 2 (Two) Times a Day.        Pharmacy where request should be sent: [id number] [place] [address] AT [facility] - [phone]  - [phone]     Last office visit with prescribing clinician: 6/28/2024   Last telemedicine visit with prescribing clinician: 2/7/2025   Next office visit with prescribing clinician: 9/5/2025     Additional details provided by patient: PATIENT'S PHARMACY CLOSED AND SHE TRANSITIONED TO A NEW [facility], BUT THEY NEED A NEW RX FOR THE LYRICA. THE PATIENT IS OUT OF HER RX.     Does the patient have less than a 3 day supply:  [x] Yes  [] No    Would you like a call back once the refill request has been completed: [x] Yes [] No    If the office needs to give you a call back, can they leave a voicemail: [x] Yes [] No    Marsha Anaya Rep   04/01/25 08:21 EDT

## 2025-04-09 ENCOUNTER — OFFICE VISIT (OUTPATIENT)
Dept: INTERNAL MEDICINE | Facility: CLINIC | Age: 55
End: 2025-04-09
Payer: COMMERCIAL

## 2025-04-09 VITALS
HEART RATE: 56 BPM | OXYGEN SATURATION: 98 % | DIASTOLIC BLOOD PRESSURE: 82 MMHG | TEMPERATURE: 97.4 F | BODY MASS INDEX: 38.05 KG/M2 | HEIGHT: 65 IN | SYSTOLIC BLOOD PRESSURE: 122 MMHG | WEIGHT: 228.4 LBS

## 2025-04-09 DIAGNOSIS — Z86.39 HISTORY OF MORBID OBESITY: ICD-10-CM

## 2025-04-09 DIAGNOSIS — E78.5 DYSLIPIDEMIA: ICD-10-CM

## 2025-04-09 DIAGNOSIS — H91.90 HEARING DIFFICULTY, UNSPECIFIED LATERALITY: ICD-10-CM

## 2025-04-09 DIAGNOSIS — E66.01 CLASS 2 SEVERE OBESITY DUE TO EXCESS CALORIES WITH SERIOUS COMORBIDITY AND BODY MASS INDEX (BMI) OF 38.0 TO 38.9 IN ADULT: ICD-10-CM

## 2025-04-09 DIAGNOSIS — Z12.31 ENCOUNTER FOR SCREENING MAMMOGRAM FOR BREAST CANCER: ICD-10-CM

## 2025-04-09 DIAGNOSIS — E66.812 CLASS 2 SEVERE OBESITY DUE TO EXCESS CALORIES WITH SERIOUS COMORBIDITY AND BODY MASS INDEX (BMI) OF 38.0 TO 38.9 IN ADULT: ICD-10-CM

## 2025-04-09 DIAGNOSIS — Z00.00 ANNUAL PHYSICAL EXAM: Primary | ICD-10-CM

## 2025-04-09 DIAGNOSIS — K50.00 CROHN'S DISEASE OF SMALL INTESTINE WITHOUT COMPLICATION: ICD-10-CM

## 2025-04-09 PROBLEM — R93.3 ABNORMAL CT SCAN, COLON: Chronic | Status: RESOLVED | Noted: 2023-05-01 | Resolved: 2025-04-09

## 2025-04-09 PROBLEM — E66.09 OBESITY DUE TO EXCESS CALORIES WITH SERIOUS COMORBIDITY: Status: ACTIVE | Noted: 2023-01-10

## 2025-04-09 PROBLEM — R07.89 CHEST HEAVINESS: Status: RESOLVED | Noted: 2017-03-14 | Resolved: 2025-04-09

## 2025-04-09 NOTE — PROGRESS NOTES
04/09/2025  Chief Complaint   Patient presents with    Annual Exam       Patient Care Team:  Floridalma Jimenez MD as PCP - General (Family Medicine)  Moustapha Peñaloza MD as Cardiologist (Cardiology)  Casie Bertrand PA-C as Consulting Physician (Physician Assistant)  Jeovanny Smith MD as Consulting Physician (Neurosurgery)  Debbie Rodriguez APRN as Nurse Practitioner (Neurology)  Sami Elizondo MD as Consulting Physician (Gastroenterology)]       Mray Lunsford is here for her annual preventive exam. History per MA reviewed.     Cardiology: father goes to Dr. Madsen. Transitioning to him later this month. Just had a lot of labs through GI. Lipid panel wasn't checked. Still on statin.    GI: treatment for Crohns being recommended and patient isn't sure why. Remains asymptomatic. Hesitant to take medicine. Considering a second opinion. Not sure when next colonoscopy is due, hasn't had one since inflammatory bowel disease diagnosed on pillcam.    Continues Wegovy for weight.     Has had first shingles shot. Still needs second.  Going to get eye exam scheduled (has never had one; using readers)  Prefers yearly mammograms.           Health Maintenance   Topic Date Due    ZOSTER VACCINE (2 of 2) 01/13/2024    ANNUAL PHYSICAL  02/27/2025    LIPID PANEL  03/04/2025    MAMMOGRAM  06/07/2025    COVID-19 Vaccine (4 - 2024-25 season) 04/18/2025 (Originally 9/1/2024)    INFLUENZA VACCINE  07/01/2025    PAP SMEAR  04/11/2026    TDAP/TD VACCINES (3 - Td or Tdap) 03/15/2031    COLORECTAL CANCER SCREENING  08/01/2033    HEPATITIS C SCREENING  Completed    Pneumococcal Vaccine 50+  Completed       Immunization History   Administered Date(s) Administered    COVID-19 (MODERNA) 12YRS+ (SPIKEVAX) 10/31/2023    COVID-19 (PFIZER) Purple Cap Monovalent 03/29/2021, 04/19/2021    Influenza Injectable Mdck Pf Quad 11/18/2023    Pneumococcal Conjugate 20-Valent (PCV20) 11/06/2023    Shingrix 11/18/2023    Tdap  "08/25/2017, 03/15/2021         The following portions of the patient's history were reviewed and updated as appropriate: allergies, current medications, past family history, past medical history, past social history, past surgical history and problem list.    Objective   Visit Vitals  /82   Pulse 56   Temp 97.4 °F (36.3 °C)   Ht 163.8 cm (64.5\")   Wt 104 kg (228 lb 6.4 oz)   LMP 12/01/2018 (LMP Unknown)   SpO2 98%   BMI 38.60 kg/m²              Physical Exam  Vitals and nursing note reviewed.   Constitutional:       General: She is not in acute distress.     Appearance: Normal appearance. She is well-developed and well-groomed. She is obese. She is not ill-appearing, toxic-appearing or diaphoretic.   HENT:      Head: Normocephalic and atraumatic. Hair is normal.      Right Ear: Tympanic membrane, ear canal and external ear normal. Decreased hearing noted.      Left Ear: Tympanic membrane, ear canal and external ear normal. Decreased hearing noted.      Nose: Nose normal.      Mouth/Throat:      Mouth: Mucous membranes are moist.   Eyes:      General: Lids are normal. Gaze aligned appropriately. No scleral icterus.        Right eye: No discharge.         Left eye: No discharge.      Extraocular Movements: Extraocular movements intact.      Conjunctiva/sclera: Conjunctivae normal.      Pupils: Pupils are equal, round, and reactive to light.   Neck:      Thyroid: No thyromegaly.      Trachea: Trachea and phonation normal. No tracheal deviation.   Cardiovascular:      Rate and Rhythm: Normal rate and regular rhythm.      Heart sounds: Normal heart sounds. No murmur heard.     No friction rub. No gallop.   Pulmonary:      Effort: Pulmonary effort is normal.      Breath sounds: Normal breath sounds and air entry.   Abdominal:      General: Bowel sounds are normal. There is no distension.      Palpations: Abdomen is soft. Abdomen is not rigid. There is no mass.      Tenderness: There is no abdominal tenderness. There " is no guarding or rebound.   Musculoskeletal:         General: No tenderness or deformity.      Cervical back: Neck supple.      Right lower leg: No edema.      Left lower leg: No edema.   Skin:     General: Skin is warm.      Capillary Refill: Capillary refill takes less than 2 seconds.      Coloration: Skin is not cyanotic, jaundiced or pale.      Findings: No erythema or rash.      Nails: There is no clubbing.   Neurological:      General: No focal deficit present.      Mental Status: She is alert and oriented to person, place, and time.      Cranial Nerves: No cranial nerve deficit.      Motor: No tremor, atrophy, abnormal muscle tone or seizure activity.      Gait: Gait is intact. Gait normal.   Psychiatric:         Attention and Perception: Attention and perception normal.         Mood and Affect: Mood and affect normal.         Speech: Speech normal.         Behavior: Behavior normal. Behavior is cooperative.         Thought Content: Thought content normal.         Cognition and Memory: Cognition and memory normal.         Judgment: Judgment normal.         Lab Results   Component Value Date    CHOL 208 (H) 03/04/2024    TRIG 114 03/04/2024    HDL 50 03/04/2024     (H) 03/04/2024      Lab Results   Component Value Date    TSH 3.270 03/04/2024     Lab Results   Component Value Date    FREET4 1.21 03/04/2024     Lab Results   Component Value Date    HGBA1C 5.50 03/04/2024    HGBA1C 5.40 02/09/2024       Assessment   Diagnoses and all orders for this visit:    1. Annual physical exam (Primary)    2. Class 2 severe obesity due to excess calories with serious comorbidity and body mass index (BMI) of 38.0 to 38.9 in adult  Assessment & Plan:  Patient's (Body mass index is 38.6 kg/m².) indicates that they are morbidly/severely obese (BMI > 40 or > 35 with obesity - related health condition) with health conditions that include hypertension, dyslipidemias, and osteoarthritis . Weight is improving with lifestyle  modifications. BMI  is above average; BMI management plan is completed. We discussed portion control, increasing exercise, and pharmacologic options including glp-1 .       3. History of morbid obesity  Overview:  February 2024 weight was 256 lb, BMI 43.9      4. Crohn's disease of small intestine without complication  Assessment & Plan:  Patient considering second opinion GI. If needs referral let me know  Discussed may need more frequent colonoscopy due to her IBD but can discuss with GI      5. Dyslipidemia  Assessment & Plan:  Continue statin  Defer lipid profile as she just had a lot of labs drawn, explained cardiology may want to do that lab when she sees them later this month.      6. Hearing difficulty, unspecified laterality  Comments:  consider ENT follow up, may need hearing aids in future    7. Encounter for screening mammogram for breast cancer  -     Mammo Screening Digital Tomosynthesis Bilateral With CAD; Future          Health maintenance information provided via patient plan (after visit summary).   Counseled on age appropriate health screenings and immunizations. Encouraged last shingles vaccine at pharmacy.  Encouraged exercise and healthy diet.      Return in about 1 year (around 4/10/2026) for Annual physical, sooner if needed.     Floridalma Jimenez MD

## 2025-04-09 NOTE — ASSESSMENT & PLAN NOTE
Patient's (Body mass index is 38.6 kg/m².) indicates that they are morbidly/severely obese (BMI > 40 or > 35 with obesity - related health condition) with health conditions that include hypertension, dyslipidemias, and osteoarthritis . Weight is improving with lifestyle modifications. BMI  is above average; BMI management plan is completed. We discussed portion control, increasing exercise, and pharmacologic options including glp-1 .

## 2025-04-09 NOTE — ASSESSMENT & PLAN NOTE
Continue statin  Defer lipid profile as she just had a lot of labs drawn, explained cardiology may want to do that lab when she sees them later this month.

## 2025-04-09 NOTE — PATIENT INSTRUCTIONS
Health Maintenance for Postmenopausal Women    Menopause is a normal process in which your ability to get pregnant comes to an end. This process happens slowly over many months or years, usually between the ages of 48 and 55. Menopause is complete when you have missed your menstrual period for 12 months.    It is important to talk with your health care provider about some of the most common conditions that affect women after menopause (postmenopausal women). These include heart disease, cancer, and bone loss (osteoporosis). Adopting a healthy lifestyle and getting preventive care can help to promote your health and wellness. The actions you take can also lower your chances of developing some of these common conditions.    What are the signs and symptoms of menopause?  During menopause, you may have the following symptoms:  Hot flashes. These can be moderate or severe.  Night sweats.  Decrease in sex drive.  Mood swings.  Headaches.  Tiredness (fatigue).  Irritability.  Memory problems.  Problems falling asleep or staying asleep.    Talk with your health care provider about treatment options for your symptoms.    Do I need hormone replacement therapy?  Hormone replacement therapy is effective in treating symptoms that are caused by menopause, such as hot flashes and night sweats.  Hormone replacement carries certain risks, especially as you become older. If you are thinking about using estrogen or estrogen with progestin, discuss the benefits and risks with your health care provider.    How can I reduce my risk for heart disease and stroke?  The risk of heart disease, heart attack, and stroke increases as you age. One of the causes may be a change in the body's hormones during menopause. This can affect how your body uses dietary fats, triglycerides, and cholesterol. Heart attack and stroke are medical emergencies. There are many things that you can do to help prevent heart disease and stroke.  Watch your blood  pressure  High blood pressure causes heart disease and increases the risk of stroke. This is more likely to develop in people who have high blood pressure readings or are overweight.  Have your blood pressure checked:  Every 3-5 years if you are 18-39 years of age.  Every year if you are 40 years old or older.    Eat a healthy diet    Eat a diet that includes plenty of vegetables, fruits, low-fat dairy products, and lean protein.  Do not eat a lot of foods that are high in solid fats, added sugars, or sodium.    Get regular exercise  Get regular exercise. This is one of the most important things you can do for your health. Most adults should:  Try to exercise for at least 150 minutes each week. The exercise should increase your heart rate and make you sweat (moderate-intensity exercise).  Try to do strengthening exercises at least twice each week. Do these in addition to the moderate-intensity exercise.  Spend less time sitting. Even light physical activity can be beneficial.    Other tips  Work with your health care provider to achieve or maintain a healthy weight.  Do not use any products that contain nicotine or tobacco. These products include cigarettes, chewing tobacco, and vaping devices, such as e-cigarettes. If you need help quitting, ask your health care provider.  Know your numbers. Ask your health care provider to check your cholesterol and your blood sugar (glucose). Continue to have your blood tested as directed by your health care provider.    Do I need screening for cancer?  Depending on your health history and family history, you may need to have cancer screenings at different stages of your life. This may include screening for:  Breast cancer.  Cervical cancer.  Lung cancer.  Colorectal cancer.    What is my risk for osteoporosis?  After menopause, you may be at increased risk for osteoporosis. Osteoporosis is a condition in which bone destruction happens more quickly than new bone creation. To help  prevent osteoporosis or the bone fractures that can happen because of osteoporosis, you may take the following actions:  If you are 19-50 years old, get at least 1,000 mg of calcium and at least 600 international units (IU) of vitamin D per day.  If you are older than age 50 but younger than age 70, get at least 1,200 mg of calcium and at least 600 international units (IU) of vitamin D per day.  If you are older than age 70, get at least 1,200 mg of calcium and at least 800 international units (IU) of vitamin D per day.    Smoking and drinking excessive alcohol increase the risk of osteoporosis. Eat foods that are rich in calcium and vitamin D, and do weight-bearing exercises several times each week as directed by your health care provider.    How does menopause affect my mental health?  Depression may occur at any age, but it is more common as you become older. Common symptoms of depression include:  Feeling depressed.  Changes in sleep patterns.  Changes in appetite or eating patterns.  Feeling an overall lack of motivation or enjoyment of activities that you previously enjoyed.  Frequent crying spells.    Talk with your health care provider if you think that you are experiencing any of these symptoms.    General instructions  See your health care provider for regular wellness exams and vaccines. This may include:  Scheduling regular health, dental, and eye exams.  Getting and maintaining your vaccines. These include:  Influenza vaccine. Get this vaccine each year before the flu season begins.  Pneumonia vaccine (Prevnar 20)  Shingles vaccine (Shingrix, ages 50 and older)  Tetanus, diphtheria, and pertussis (Tdap) booster vaccine (every 10 years)  Respiratory Syncytial Virus (RSV) vaccine (ages 60 and older)    Your health care provider may also recommend other immunizations.    Tell your health care provider if you have ever been abused or do not feel safe at home.    Summary  Menopause is a normal process in  which your ability to get pregnant comes to an end.  This condition causes hot flashes, night sweats, decreased interest in sex, mood swings, headaches, or lack of sleep.  Treatment for this condition may include hormone replacement therapy.  Take actions to keep yourself healthy, including exercising regularly, eating a healthy diet, watching your weight, and checking your blood pressure and blood sugar levels.  Get screened for cancer and depression. Make sure that you are up to date with all your vaccines.    This information is not intended to replace advice given to you by your health care provider. Make sure you discuss any questions you have with your health care provider.  Document Revised: 05/09/2022 Document Reviewed: 05/09/2022  [employer] Patient Education © 2023 [employer]  Updated 2/29/24 TC

## 2025-04-09 NOTE — ASSESSMENT & PLAN NOTE
Patient considering second opinion GI. If needs referral let me know  Discussed may need more frequent colonoscopy due to her IBD but can discuss with GI

## 2025-04-30 ENCOUNTER — OFFICE VISIT (OUTPATIENT)
Dept: CARDIOLOGY | Facility: CLINIC | Age: 55
End: 2025-04-30
Payer: COMMERCIAL

## 2025-04-30 VITALS
SYSTOLIC BLOOD PRESSURE: 132 MMHG | DIASTOLIC BLOOD PRESSURE: 84 MMHG | HEART RATE: 61 BPM | WEIGHT: 228 LBS | BODY MASS INDEX: 38.93 KG/M2 | HEIGHT: 64 IN | OXYGEN SATURATION: 99 %

## 2025-04-30 DIAGNOSIS — E78.2 MIXED HYPERLIPIDEMIA: ICD-10-CM

## 2025-04-30 DIAGNOSIS — I20.89 OTHER FORMS OF ANGINA PECTORIS: Primary | ICD-10-CM

## 2025-04-30 RX ORDER — NEBIVOLOL 5 MG/1
5 TABLET ORAL DAILY
Qty: 90 TABLET | Refills: 3 | Status: SHIPPED | OUTPATIENT
Start: 2025-04-30

## 2025-04-30 NOTE — PROGRESS NOTES
[facility]iology  Office Progress Note  Mary Lunsford  1970  [address] [place] [address]       Visit Date: 25    PCP: Floridalma Jimenez MD  [address]  [place] [address]    IDENTIFICATION: A 54 y.o. female  , employed [employer]  Porter Faye's daughter-[facility] patient    PROBLEM LIST:   CAD    2017 [facility](trop 0.48)  nl cors marked distal LAD tapering(no dissection) hyperdynamic LV LVEDP 8     GXT   BRYSON     echo BHR EF>70% rvsp 12  Palpitations     53/71/127 <0.01% pac/pvc  HL    3/24 208/114/50/138  HTN  [facility]- [facility]    Noncardiac sxHx:   CCX   x 2  Gastric bypass Dr. Navarrete  preop weight 250  Breast reduction    CC:   Chief Complaint   Patient presents with    Essential hypertension       Allergies  Allergies   Allergen Reactions    Sulfa Antibiotics Rash and Other (See Comments)     Blurry vision       Current Medications    Current Outpatient Medications:     atorvastatin (LIPITOR) 40 MG tablet, Take 1 tablet by mouth Every Night., Disp: 90 tablet, Rfl: 2    carbidopa-levodopa CR (SINEMET CR)  MG per CR tablet, Take 1 tablet by mouth 3 (Three) Times a Day., Disp: 270 tablet, Rfl: 3    cyanocobalamin 1000 MCG/ML injection, 1 ml Injection SQ or IM every other week x 4 doses, then once a month for maintenance, Disp: 30 mL, Rfl: 0    Ferrous Fumarate (Ferrocite) 324 (106 Fe) MG tablet, Take 1 tablet by mouth 2 (Two) Times a Day. (Patient taking differently: Take 1 tablet by mouth 2 (Two) Times a Day. qd), Disp: 180 tablet, Rfl: 3    levothyroxine (SYNTHROID, LEVOTHROID) 88 MCG tablet, Take 1 tablet by mouth Every Morning. For hypothyroidism., Disp: 90 tablet, Rfl: 3    Lyrica 100 MG capsule, Take 1 capsule by mouth 2 (Two) Times a Day., Disp: 180 capsule, Rfl: 0    metoprolol tartrate (LOPRESSOR) 25 MG tablet, TAKE 1 TABLET BY MOUTH EVERY 12 HOURS (Patient taking differently: Take 0.5 tablets by mouth 2  "(Two) Times a Day.), Disp: 180 tablet, Rfl: 3    pramipexole (MIRAPEX) 0.5 MG tablet, Take 1/2 tablet at 1230pm, 1 at bedtime and 1/2 tablet if needed for breakthrough symptoms, Disp: 180 tablet, Rfl: 3    Semaglutide-Weight Management (Wegovy) 2.4 MG/0.75ML solution auto-injector, Inject 2.4 mg under the skin into the appropriate area as directed 1 (One) Time Per Week., Disp: 3 mL, Rfl: 11    Syringe/Needle, Disp, 23G X 1\" 3 ML misc, Use as directed with B12 injections, Disp: 50 each, Rfl: 0    vitamin B-6 (PYRIDOXINE) 100 MG tablet, Take 1 tablet by mouth Daily., Disp: 90 tablet, Rfl: 3      History of Present Illness   Mary Lunsford is a 54 y.o. year old female here for follow up.  She has had atypical chest symptoms for the last 8 years.  Initially was with bilateral arm pain presenting to [facility] was noted with troponin elevation subsequent catheterization revealed potentially mismatch of apical small LAD.  She was placed on beta-blockade and noted when she discontinued this within the past few years she had return of her bilateral arm pain.  She does not follow her blood pressure nor heart rate regularly at home.  There are no alleviating or aggravating factors that she is found with her atypical chest symptoms on the occasion now.  She states that his left submammary sharp in character and fleeting.  She has been on her current medical regimen for some period of time and has not noted any correlation to initiation of GLP.        OBJECTIVE:  Vitals:    04/30/25 1132   BP: 132/84   BP Location: Right arm   Patient Position: Sitting   Cuff Size: Adult   Pulse: 61   SpO2: 99%   Weight: 103 kg (228 lb)   Height: 162.6 cm (64\")     Body mass index is 39.14 kg/m².    Constitutional:       Appearance: Healthy appearance. Not in distress.   Neck:      Vascular: No JVR. JVD normal.   Pulmonary:      Effort: Pulmonary effort is normal.      Breath sounds: Normal breath sounds. No wheezing. No " rhonchi. No rales.   Chest:      Chest wall: Not tender to palpatation.   Cardiovascular:      PMI at left midclavicular line. Normal rate. Regular rhythm. Normal S1. Normal S2.       Murmurs: There is no murmur.      No gallop.  No click. No rub.   Pulses:     Intact distal pulses.   Edema:     Peripheral edema absent.   Abdominal:      General: Bowel sounds are normal.      Palpations: Abdomen is soft.      Tenderness: There is no abdominal tenderness.   Musculoskeletal: Normal range of motion.         General: No tenderness. Skin:     General: Skin is warm and dry.   Neurological:      General: No focal deficit present.      Mental Status: Alert and oriented to person, place and time.         Diagnostic Data:  Procedures        ASSESSMENT:   Diagnosis Plan   1. Other forms of angina pectoris        2. Mixed hyperlipidemia            PLAN:  Anginal equivalent with component of vasospastic angina.  She may have syndrome X with small mismatch apical LAD.  We will transition metoprolol to Bystolic    Mixed dyslipidemia with no evidence of coronary plaque noted on prior angiogram.  She will decrease atorvastatin to 3 times weekly    Preventive care recommended aerobic activity 120 minutes aerobic activity a week suggested recumbent bike to avoid lower extremity musculoskeletal impact          Isaias Madsen MD, FACC

## 2025-06-03 NOTE — PROGRESS NOTES
Follow Up Note     Date: 06/10/2025   Patient Name: Mary Lunsford  MRN: [id number]  : 1970     Referring Physician: Floridalma Jimenez MD    Chief Complaint:    Chief Complaint   Patient presents with    Crohn's Disease    RICHAR    Constipation    B-12 deficency        Interval History:   6/10/2025  Mary Lunsford is a 54 y.o. female who is here today for follow up for her Crohn's disease and anemia.  She states that she has been doing well with the MiraLAX with more or less daily bowel movement or every other day.  Continue to take Wegovy and lost about 20 pounds.  Denies any significant abdominal pain.  States that she was diagnosed with A-fib yesterday and seeing cardiology tomorrow.    3/10/2025  Mary Lunsford is a 54 y.o. female who is here today for follow up for her Crohn's disease.  She did not come for follow-up over 2 years now.  She was supposed to start on Humira which never happened.  Patient states that she has been having issue with the back pain and surgery and everything got delayed.  She has been on Wegovy since then her bowel movement changed and mostly constipated now and takes MiraLAX as needed.     2023  Mary Lunsford is a 52 y.o. female who is here today to establish care with gastroenterology for diarrhea.  She has a history of severe diarrhea that started a couple of weeks ago. She had lower abdominal cramping associated with bowel movements. She went to the emergency room 2 days ago and had a CT scan that showed mild colitis. She was given Cipro, Flagyl and Bentyl. She did a stool study yesterday that came back positive for enteropathogenic e.coli. Her diarrhea has been improving. Since her ER visit 2 days ago, she had 1 loose stool and 1 semi solid stool. She has not had any bowel movements today. She had some rectal bleeding during the episodes of diarrhea. Her last colonoscopy was in 2019 by Dr. Paez. Her brother has ulcerative colitis. Her last  "EGD was in 2011 prior to weight loss surgery that was \"normal\" per patient.  No family history of GI malignancy.    Subjective      Past Medical History:   Past Medical History:   Diagnosis Date    Abnormal CT scan, colon 2023    Anemia     Body piercing     both ears    Bronchitis     Colitis     Crohn's disease 2023    incidental finding, asymptomatic    Disease of thyroid gland     Dissection of coronary artery 2017    pt denies this 10/2/19.  states she had testing at , but was told that they didn't \"find anything\"    Enteropathogenic Escherichia coli infection 2023    Essential hypertension 2018    Fracture, foot     Right    Gallstone     H/O echocardiogram     Headache, tension-type ??    History of nuclear stress test     Hyperlipidemia     Kidney stones     Low back pain     Migraine 1994    Obesity     Ovarian cyst     Prolonged Q-T interval on ECG     history    Restless leg syndrome     Subacute idiopathic myocarditis 2017    Urinary tract infection     Vitamin B12 deficiency     Vitamin B6 deficiency 2024    Wears glasses     to read     Past Surgical History:   Past Surgical History:   Procedure Laterality Date    BACK SURGERY  2024    BREAST SURGERY Bilateral 1987    breast reduction    CARDIAC CATHETERIZATION N/A 03/15/2017    Procedure: Coronary angiography;  Surgeon: Moustapha Peñaloza MD;  Location: [facility] CATH INVASIVE LOCATION;  Service:     CARDIAC CATHETERIZATION N/A 03/15/2017    Procedure: Left ventriculography;  Surgeon: Moustapha Peñaloza MD;  Location: [facility] CATH INVASIVE LOCATION;  Service:      SECTION      1991 & 1995    CHOLECYSTECTOMY  2013    COLONOSCOPY N/A 10/03/2019    Procedure: COLONOSCOPY WITH BIOPSIES; ANOSCOPY;  Surgeon: Jose Paez MD;  Location: [facility] ENDOSCOPY;  Service: Gastroenterology    COLONOSCOPY N/A 2023    Procedure: COLONOSCOPY with biopsy;  Surgeon: Sami Elizondo " MD TAI;  Location: [facility] ENDOSCOPY;  Service: Gastroenterology;  Laterality: N/A;    ENDOSCOPY      GASTRIC BYPASS  2011    LUMBAR LAMINECTOMY WITH FUSION N/A 2024    Procedure: LUMBAR FUSION DECOMPRESSON WITH PEDICLE SCREWS L4-5;  Surgeon: Jeovanny Smith MD;  Location: [facility] OR;  Service: Neurosurgery;  Laterality: N/A;    REDUCTION MAMMAPLASTY      STEROID INJECTION      x2 in back       Family History:   Family History   Problem Relation Age of Onset    Cancer Father     Heart attack Father     Cancer Other     Other Other     Breast cancer Mother     Asthma Brother     Ulcerative colitis Brother     Arthritis Paternal Grandmother     Colon cancer Neg Hx        Social History:   Social History     Socioeconomic History    Marital status:    Tobacco Use    Smoking status: Former     Current packs/day: 0.00     Average packs/day: 1 pack/day for 2.0 years (2.0 ttl pk-yrs)     Types: Cigarettes     Start date: 10/1/1993     Quit date: 10/1/1995     Years since quittin.7    Smokeless tobacco: Never   Vaping Use    Vaping status: Never Used   Substance and Sexual Activity    Alcohol use: Yes     Comment: 16 beers per week    Drug use: No    Sexual activity: Defer       Medications:     Current Outpatient Medications:     Apixaban Starter Pack tablet therapy pack, Take two 5 mg tablets by mouth every 12 hours for 7 days. Followed by one 5 mg tablet every 12 hours. (Dispense starter pack if available), Disp: 74 tablet, Rfl: 0    atorvastatin (LIPITOR) 40 MG tablet, Take 1 tablet by mouth Every Night., Disp: 90 tablet, Rfl: 2    carbidopa-levodopa CR (SINEMET CR)  MG per CR tablet, Take 1 tablet by mouth 3 (Three) Times a Day., Disp: 270 tablet, Rfl: 3    cyanocobalamin 1000 MCG/ML injection, 1 ml Injection SQ or IM every other week x 4 doses, then once a month for maintenance, Disp: 30 mL, Rfl: 0    Ferrous Fumarate (Ferrocite) 324 (106 Fe) MG tablet, Take 1 tablet by mouth 2 (Two) Times a  "Day. (Patient taking differently: Take 1 tablet by mouth 2 (Two) Times a Day. qd), Disp: 180 tablet, Rfl: 3    levothyroxine (SYNTHROID, LEVOTHROID) 88 MCG tablet, Take 1 tablet by mouth Every Morning. For hypothyroidism., Disp: 90 tablet, Rfl: 3    Lyrica 100 MG capsule, Take 1 capsule by mouth 2 (Two) Times a Day., Disp: 180 capsule, Rfl: 0    nebivolol (Bystolic) 5 MG tablet, Take 1 tablet by mouth Daily., Disp: 90 tablet, Rfl: 3    pramipexole (MIRAPEX) 0.5 MG tablet, Take 1/2 tablet at 1230pm, 1 at bedtime and 1/2 tablet if needed for breakthrough symptoms, Disp: 180 tablet, Rfl: 3    Semaglutide-Weight Management (Wegovy) 2.4 MG/0.75ML solution auto-injector, Inject 2.4 mg under the skin into the appropriate area as directed 1 (One) Time Per Week., Disp: 3 mL, Rfl: 11    Syringe/Needle, Disp, 23G X 1\" 3 ML misc, Use as directed with B12 injections, Disp: 50 each, Rfl: 0    vitamin B-6 (PYRIDOXINE) 100 MG tablet, Take 1 tablet by mouth Daily., Disp: 90 tablet, Rfl: 3    Allergies:   Allergies   Allergen Reactions    Sulfa Antibiotics Rash and Other (See Comments)     Blurry vision       Review of Systems:   Review of Systems   Constitutional:  Positive for fatigue. Negative for appetite change, fever and unexpected weight loss.   HENT:  Negative for trouble swallowing.    Gastrointestinal:  Positive for constipation. Negative for abdominal distention, abdominal pain, anal bleeding, blood in stool, diarrhea, nausea, rectal pain, vomiting, GERD and indigestion.       The following portions of the patient's history were reviewed and updated as appropriate: allergies, current medications, past family history, past medical history, past social history, past surgical history and problem list.    Objective     Physical Exam:  Vital Signs:   Vitals:    06/10/25 1609   BP: 130/88   Pulse: 83   Temp: 97.1 °F (36.2 °C)   TempSrc: Infrared   SpO2: 98%   Weight: 102 kg (225 lb)   Height: 162.6 cm (64\")       Physical " Exam  Constitutional:       Appearance: She is obese.   HENT:      Head: Normocephalic and atraumatic.   Eyes:      Conjunctiva/sclera: Conjunctivae normal.   Abdominal:      General: Abdomen is flat. There is no distension.      Palpations: There is no mass.      Tenderness: There is no abdominal tenderness. There is no guarding or rebound.      Hernia: No hernia is present.   Musculoskeletal:      Cervical back: Normal range of motion and neck supple.   Neurological:      Mental Status: She is alert.         Results Review:   I reviewed the patient's new clinical results.    Admission on 06/08/2025, Discharged on 06/09/2025   Component Date Value Ref Range Status    Glucose 06/09/2025 103 (H)  65 - 99 mg/dL Final    BUN 06/09/2025 16.0  6.0 - 20.0 mg/dL Final    Creatinine 06/09/2025 1.14 (H)  0.57 - 1.00 mg/dL Final    Sodium 06/09/2025 136  136 - 145 mmol/L Final    Potassium 06/09/2025 3.6  3.5 - 5.2 mmol/L Final    Chloride 06/09/2025 101  98 - 107 mmol/L Final    CO2 06/09/2025 20.7 (L)  22.0 - 29.0 mmol/L Final    Calcium 06/09/2025 9.7  8.6 - 10.5 mg/dL Final    Total Protein 06/09/2025 6.6  6.0 - 8.5 g/dL Final    Albumin 06/09/2025 4.1  3.5 - 5.2 g/dL Final    ALT (SGPT) 06/09/2025 8  1 - 33 U/L Final    AST (SGOT) 06/09/2025 29  1 - 32 U/L Final    Alkaline Phosphatase 06/09/2025 87  39 - 117 U/L Final    Total Bilirubin 06/09/2025 0.5  0.0 - 1.2 mg/dL Final    Globulin 06/09/2025 2.5  gm/dL Final    A/G Ratio 06/09/2025 1.6  g/dL Final    BUN/Creatinine Ratio 06/09/2025 14.0  7.0 - 25.0 Final    Anion Gap 06/09/2025 14.3  5.0 - 15.0 mmol/L Final    eGFR 06/09/2025 56.6 (L)  >60.0 mL/min/1.73 Final    HS Troponin T 06/09/2025 6  <14 ng/L Final    Extra Tube 06/09/2025 Hold for add-ons.   Final    Auto resulted.    Extra Tube 06/09/2025 hold for add-on   Final    Auto resulted    Extra Tube 06/09/2025 Hold for add-ons.   Final    Auto resulted.    Extra Tube 06/09/2025 Hold for add-ons.   Final    Auto  resulted    WBC 06/09/2025 9.21  3.40 - 10.80 10*3/mm3 Final    RBC 06/09/2025 4.17  3.77 - 5.28 10*6/mm3 Final    Hemoglobin 06/09/2025 13.5  12.0 - 15.9 g/dL Final    Hematocrit 06/09/2025 40.1  34.0 - 46.6 % Final    MCV 06/09/2025 96.2  79.0 - 97.0 fL Final    MCH 06/09/2025 32.4  26.6 - 33.0 pg Final    MCHC 06/09/2025 33.7  31.5 - 35.7 g/dL Final    RDW 06/09/2025 12.6  12.3 - 15.4 % Final    RDW-SD 06/09/2025 44.3  37.0 - 54.0 fl Final    MPV 06/09/2025 9.3  6.0 - 12.0 fL Final    Platelets 06/09/2025 197  140 - 450 10*3/mm3 Final    Neutrophil % 06/09/2025 67.1  42.7 - 76.0 % Final    Lymphocyte % 06/09/2025 26.2  19.6 - 45.3 % Final    Monocyte % 06/09/2025 4.9 (L)  5.0 - 12.0 % Final    Eosinophil % 06/09/2025 1.2  0.3 - 6.2 % Final    Basophil % 06/09/2025 0.4  0.0 - 1.5 % Final    Immature Grans % 06/09/2025 0.2  0.0 - 0.5 % Final    Neutrophils, Absolute 06/09/2025 6.18  1.70 - 7.00 10*3/mm3 Final    Lymphocytes, Absolute 06/09/2025 2.41  0.70 - 3.10 10*3/mm3 Final    Monocytes, Absolute 06/09/2025 0.45  0.10 - 0.90 10*3/mm3 Final    Eosinophils, Absolute 06/09/2025 0.11  0.00 - 0.40 10*3/mm3 Final    Basophils, Absolute 06/09/2025 0.04  0.00 - 0.20 10*3/mm3 Final    Immature Grans, Absolute 06/09/2025 0.02  0.00 - 0.05 10*3/mm3 Final    nRBC 06/09/2025 0.0  0.0 - 0.2 /100 WBC Final    Magnesium 06/09/2025 2.0  1.6 - 2.6 mg/dL Final    HS Troponin T 06/09/2025 10  <14 ng/L Final    Troponin T Numeric Delta 06/09/2025 4 (C)  Abnormal if >/=3 ng/L Final      XR Chest 1 View  Result Date: 6/9/2025  No acute cardiopulmonary process.      This report was signed and finalized on 6/9/2025 7:26 AM by Mary Mariano MD.      CT Abdomen Pelvis Without Contrast     Result Date: 4/29/2023  Mild colitis involving the descending and sigmoid colon. No complication.      Colonoscopy dated 10/3/2019 per Dr. Paez  Scant diverticular change in the left colon.  Internal hemorrhoids.  Small superficial anal tear.   No  endoscopic evidence of colitis was seen.  Random biopsies were obtained from the colon upon withdrawal of the scope. Terminal ileum biopsy with benign ileal mucosa with no diagnostic abnormalities.  Random colon biopsies with benign colonic mucosa with no diagnostic abnormality.     Colonoscopy 8/1/2023  - Diverticulosis in the sigmoid colon and in the descending colon.  - Non-bleeding external hemorrhoids.  - Aphtha in the terminal ileum. Biopsied.  - Biopsies were taken with a cold forceps for histology in the rectum, in the sigmoid colon, in the descending colon, in the transverse colon, in the ascending colon and in the cecum.  - No signs of colitis  - Findings suspecious for small bwoel Crohn's, however patient is on NSAIDS, NSAID induced erosions/ ulcers possible as well        Pathology  CECUM BIOPSY, AND ASCENDING:  Colonic mucosa with reactive changes  B. TERMINAL ILEUM BIOPSY:  Small bowel mucosa with no significant pathologic change  C. TRANSVERSE COLON BIOPSY:  Colonic mucosa with no significant pathologic change  D. DESCENDING COLON BIOPSY:  Colonic mucosa with no significant pathologic change  E. SIGMOID COLON BIOPSY:  Colonic mucosa with no significant pathologic change  F. RECTAL BIOPSY:  Colonic mucosa with reactive changes     Small bowel PillCam study; 8/29/2023  Extensive areas of healed inflammation and erosion scars throughout the small bowel.  Multiple areas of healing inflammation, superficial and deep aphthae in the jejunum and ileum more prominent in the distal ileum.  Findings consistent with moderate small bowel Crohn's disease.    Assessment / Plan      1.  Moderate Crohn's disease of the small bowel without complications  2.  Family history of ulcerative colitis-brother  3.  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  History of iron deficiency anemia  5.  Vitamin B12 deficiency  6.  Medication induced constipation  6/10/2025  Patient is constipated mostly  associated with the Wegovy.  Lost about 20 pounds.  She has been having bowel movement daily or every other day while on the MiraLAX daily.  She did not come for follow-up regarding initiation of Crohn's therapy. She was diagnosed with A-fib earlier this week and awaiting appointment with Dr. Lang tomorrow.  Patient likely will be on anticoagulation with risk of GI bleeding from Crohn's ulcerations.  Lab work on 6/9/2025 revealed a borderline creatinine of 1.14 otherwise normal CMP.  CBC was normal with hemoglobin of 13.3.    Given her newly diagnosed cardiac issue we will avoid Rinvoq and anti-TNF medications.  We had a discussion regarding starting on with Tremfya, Entyvio or Stelara and agreed on Tremfya pending insurance approval.  Adverse reactions and benefits discussed with the patient.    Tremfya 400 mg subcu x 1 on week 0, 4, 8 followed by maintenance treatment with Tremfya 100 mg subcu every 8 week starting 8-week after last induction dose.  Continue vitamin B12  Zoster vaccine with a booster at PCPs office  Fecal calprotectin  Continue MiraLAX daily or every other day  Follow-up in 8 weeks with a lab work    3/10/2025  Patient was supposed to be started on Humira in October 2023 which never happened.  Patient is still not sure of starting any medications.  Her bowel pattern changed from intermittent diarrhea to mostly constipation after she was started on Wegovy.  Now mostly will have joint pain in the knees.     We had a discussion again regarding management of her Crohn's disease.  We went through the list of medications risk and benefits involved and finally agreed on starting with either Rinvoq p.o. or Humira or Stelara depending on insurance approval.  Patient at this time wants to go through all 3 medications and call me back in the next 2 to 3 days.  Based on the lab work she had iron deficiency anemia before last hemoglobin was 10.8 in February 2024.  This is likely associated with the Crohn's  or vitamin B12 deficiency.  No history suggestive any GI bleeding.    8/29/2023  As per record patient does have a prior history of alternating episodes of constipation and diarrhea.  Patient was seen in 2019 by Dr. Aparicio for the similar symptoms.  Recent history of E. coli infection.  Colonoscopy done on 8/1/2023 revealed diverticulosis of the left side colon otherwise no colitis identified.  Terminal ileal examination revealed multiple small healing aphthae with patchy inflammation.  Subsequent PillCam study done revealed signs very suggestive of small bowel Crohn's disease.  She had a extensive areas of healing and healed inflammation scars and superficial and deep aphthae throughout the distal small bowel. Currently patient is doing well without any significant issues with a bowel movement.  She does have intermittent joint pains involving the wrist elbow and knee joints.  This could be extraintestinal manifestation of Crohn's disease. Her CRP is consistently elevated since last 3 to 4 years.  Last CRP was 0.83 in May 2022.,       5/1/2023  She has a history of severe diarrhea that started a couple of weeks ago and gradually worsened.  She had lower abdominal cramping associated with bowel movements.  She had some rectal bleeding during the episodes of diarrhea.  She was seen in the emergency on 4/29/2023 and had CT scan that showed mild colitis involving the descending and sigmoid colon.  Stool studies with negative C. difficile.  Gastrointestinal panel with positive enteropathogenic E. Coli. She was prescribed Cipro, Flagyl and Bentyl and her symptoms have improved.  No diarrhea today.  There is a family history of ulcerative colitis in her brother.  Her last colonoscopy was in October 2019 per Dr. CACERES with no endoscopic evidence of colitis seen at that time, random biopsies unremarkable.     Prior history  7.  History of enteropathogenic Escherichia coli infection      Follow Up:   No follow-ups on  file.    Sami Elizondo MD  [facility]  6/10/2025  16:13 EDT     Please note that portions of this note may have been completed with a voice recognition program.

## 2025-06-08 ENCOUNTER — HOSPITAL ENCOUNTER (EMERGENCY)
Facility: HOSPITAL | Age: 55
Discharge: HOME | End: 2025-06-09
Attending: EMERGENCY MEDICINE | Admitting: EMERGENCY MEDICINE
Payer: COMMERCIAL

## 2025-06-08 DIAGNOSIS — I48.91 NEW ONSET ATRIAL FIBRILLATION: Primary | ICD-10-CM

## 2025-06-08 PROCEDURE — 93005 ELECTROCARDIOGRAM TRACING: CPT

## 2025-06-08 PROCEDURE — 99285 EMERGENCY DEPT VISIT HI MDM: CPT | Performed by: EMERGENCY MEDICINE

## 2025-06-09 ENCOUNTER — TELEPHONE (OUTPATIENT)
Dept: CARDIOLOGY | Facility: CLINIC | Age: 55
End: 2025-06-09
Payer: COMMERCIAL

## 2025-06-09 ENCOUNTER — APPOINTMENT (OUTPATIENT)
Dept: GENERAL RADIOLOGY | Facility: HOSPITAL | Age: 55
End: 2025-06-09
Payer: COMMERCIAL

## 2025-06-09 VITALS
DIASTOLIC BLOOD PRESSURE: 67 MMHG | BODY MASS INDEX: 40.97 KG/M2 | SYSTOLIC BLOOD PRESSURE: 103 MMHG | HEART RATE: 75 BPM | WEIGHT: 240 LBS | RESPIRATION RATE: 13 BRPM | HEIGHT: 64 IN | OXYGEN SATURATION: 99 % | TEMPERATURE: 97.6 F

## 2025-06-09 LAB
ALBUMIN SERPL-MCNC: 4.1 G/DL (ref 3.5–5.2)
ALBUMIN/GLOB SERPL: 1.6 G/DL
ALP SERPL-CCNC: 87 U/L (ref 39–117)
ALT SERPL W P-5'-P-CCNC: 8 U/L (ref 1–33)
ANION GAP SERPL CALCULATED.3IONS-SCNC: 14.3 MMOL/L (ref 5–15)
AST SERPL-CCNC: 29 U/L (ref 1–32)
BASOPHILS # BLD AUTO: 0.04 10*3/MM3 (ref 0–0.2)
BASOPHILS NFR BLD AUTO: 0.4 % (ref 0–1.5)
BILIRUB SERPL-MCNC: 0.5 MG/DL (ref 0–1.2)
BUN SERPL-MCNC: 16 MG/DL (ref 6–20)
BUN/CREAT SERPL: 14 (ref 7–25)
CALCIUM SPEC-SCNC: 9.7 MG/DL (ref 8.6–10.5)
CHLORIDE SERPL-SCNC: 101 MMOL/L (ref 98–107)
CO2 SERPL-SCNC: 20.7 MMOL/L (ref 22–29)
CREAT SERPL-MCNC: 1.14 MG/DL (ref 0.57–1)
DEPRECATED RDW RBC AUTO: 44.3 FL (ref 37–54)
EGFRCR SERPLBLD CKD-EPI 2021: 56.6 ML/MIN/1.73
EOSINOPHIL # BLD AUTO: 0.11 10*3/MM3 (ref 0–0.4)
EOSINOPHIL NFR BLD AUTO: 1.2 % (ref 0.3–6.2)
ERYTHROCYTE [DISTWIDTH] IN BLOOD BY AUTOMATED COUNT: 12.6 % (ref 12.3–15.4)
GEN 5 1HR TROPONIN T REFLEX: 10 NG/L
GLOBULIN UR ELPH-MCNC: 2.5 GM/DL
GLUCOSE SERPL-MCNC: 103 MG/DL (ref 65–99)
HCT VFR BLD AUTO: 40.1 % (ref 34–46.6)
HGB BLD-MCNC: 13.5 G/DL (ref 12–15.9)
HOLD SPECIMEN: NORMAL
HOLD SPECIMEN: NORMAL
IMM GRANULOCYTES # BLD AUTO: 0.02 10*3/MM3 (ref 0–0.05)
IMM GRANULOCYTES NFR BLD AUTO: 0.2 % (ref 0–0.5)
LYMPHOCYTES # BLD AUTO: 2.41 10*3/MM3 (ref 0.7–3.1)
LYMPHOCYTES NFR BLD AUTO: 26.2 % (ref 19.6–45.3)
MAGNESIUM SERPL-MCNC: 2 MG/DL (ref 1.6–2.6)
MCH RBC QN AUTO: 32.4 PG (ref 26.6–33)
MCHC RBC AUTO-ENTMCNC: 33.7 G/DL (ref 31.5–35.7)
MCV RBC AUTO: 96.2 FL (ref 79–97)
MONOCYTES # BLD AUTO: 0.45 10*3/MM3 (ref 0.1–0.9)
MONOCYTES NFR BLD AUTO: 4.9 % (ref 5–12)
NEUTROPHILS NFR BLD AUTO: 6.18 10*3/MM3 (ref 1.7–7)
NEUTROPHILS NFR BLD AUTO: 67.1 % (ref 42.7–76)
NRBC BLD AUTO-RTO: 0 /100 WBC (ref 0–0.2)
PLATELET # BLD AUTO: 197 10*3/MM3 (ref 140–450)
PMV BLD AUTO: 9.3 FL (ref 6–12)
POTASSIUM SERPL-SCNC: 3.6 MMOL/L (ref 3.5–5.2)
PROT SERPL-MCNC: 6.6 G/DL (ref 6–8.5)
RBC # BLD AUTO: 4.17 10*6/MM3 (ref 3.77–5.28)
SODIUM SERPL-SCNC: 136 MMOL/L (ref 136–145)
TROPONIN T NUMERIC DELTA: 4 NG/L
TROPONIN T SERPL HS-MCNC: 6 NG/L
WBC NRBC COR # BLD AUTO: 9.21 10*3/MM3 (ref 3.4–10.8)
WHOLE BLOOD HOLD COAG: NORMAL
WHOLE BLOOD HOLD SPECIMEN: NORMAL

## 2025-06-09 PROCEDURE — 85025 COMPLETE CBC W/AUTO DIFF WBC: CPT | Performed by: EMERGENCY MEDICINE

## 2025-06-09 PROCEDURE — 25010000002 LORAZEPAM PER 2 MG: Performed by: EMERGENCY MEDICINE

## 2025-06-09 PROCEDURE — 25810000003 SODIUM CHLORIDE 0.9 % SOLUTION: Performed by: EMERGENCY MEDICINE

## 2025-06-09 PROCEDURE — 96374 THER/PROPH/DIAG INJ IV PUSH: CPT

## 2025-06-09 PROCEDURE — 93005 ELECTROCARDIOGRAM TRACING: CPT

## 2025-06-09 PROCEDURE — 71045 X-RAY EXAM CHEST 1 VIEW: CPT

## 2025-06-09 PROCEDURE — 83735 ASSAY OF MAGNESIUM: CPT | Performed by: EMERGENCY MEDICINE

## 2025-06-09 PROCEDURE — 80053 COMPREHEN METABOLIC PANEL: CPT | Performed by: EMERGENCY MEDICINE

## 2025-06-09 PROCEDURE — 36415 COLL VENOUS BLD VENIPUNCTURE: CPT

## 2025-06-09 PROCEDURE — 96376 TX/PRO/DX INJ SAME DRUG ADON: CPT

## 2025-06-09 PROCEDURE — 96375 TX/PRO/DX INJ NEW DRUG ADDON: CPT

## 2025-06-09 PROCEDURE — 84484 ASSAY OF TROPONIN QUANT: CPT | Performed by: EMERGENCY MEDICINE

## 2025-06-09 RX ORDER — SODIUM CHLORIDE 0.9 % (FLUSH) 0.9 %
10 SYRINGE (ML) INJECTION AS NEEDED
Status: DISCONTINUED | OUTPATIENT
Start: 2025-06-09 | End: 2025-06-09 | Stop reason: HOSPADM

## 2025-06-09 RX ORDER — METOPROLOL TARTRATE 1 MG/ML
5 INJECTION, SOLUTION INTRAVENOUS
Status: COMPLETED | OUTPATIENT
Start: 2025-06-09 | End: 2025-06-09

## 2025-06-09 RX ORDER — PROPOFOL 10 MG/ML
200 VIAL (ML) INTRAVENOUS AS NEEDED
Status: DISCONTINUED | OUTPATIENT
Start: 2025-06-09 | End: 2025-06-09

## 2025-06-09 RX ORDER — LORAZEPAM 2 MG/ML
0.25 INJECTION INTRAMUSCULAR ONCE
Status: COMPLETED | OUTPATIENT
Start: 2025-06-09 | End: 2025-06-09

## 2025-06-09 RX ORDER — ONDANSETRON 2 MG/ML
8 INJECTION INTRAMUSCULAR; INTRAVENOUS ONCE
Status: DISCONTINUED | OUTPATIENT
Start: 2025-06-09 | End: 2025-06-09 | Stop reason: HOSPADM

## 2025-06-09 RX ADMIN — METOPROLOL TARTRATE 5 MG: 1 INJECTION, SOLUTION INTRAVENOUS at 01:11

## 2025-06-09 RX ADMIN — METOPROLOL TARTRATE 5 MG: 1 INJECTION, SOLUTION INTRAVENOUS at 00:35

## 2025-06-09 RX ADMIN — LORAZEPAM 0.25 MG: 2 INJECTION INTRAMUSCULAR; INTRAVENOUS at 01:01

## 2025-06-09 RX ADMIN — APIXABAN 5 MG: 2.5 TABLET, FILM COATED ORAL at 03:13

## 2025-06-09 RX ADMIN — METOPROLOL TARTRATE 5 MG: 1 INJECTION, SOLUTION INTRAVENOUS at 00:59

## 2025-06-09 RX ADMIN — SODIUM CHLORIDE 1000 ML: 9 INJECTION, SOLUTION INTRAVENOUS at 01:01

## 2025-06-09 NOTE — ED PROVIDER NOTES
" EMERGENCY DEPARTMENT ENCOUNTER    Pt Name: Mary Lunsford  MRN: [id number]  Pt :   1970  Room Number:  15/15  Date of encounter:  2025  PCP: Floridalma Jimenez MD  ED Provider: Porter Reveles MD    Historian: Patient      HPI:  Chief Complaint: Palpitations        Context: Mary Lunsford is a 54 y.o. female who presents to the ED c/o palpitations.  Patient has a past medical history significant for hyperlipidemia, heart disease, and hypertension. Patient says about 45 minutes ago she felt acutely nauseous, had an episode of vomiting and palpitations.  She says that she did have some chest pressure but that has since resolved.  She denies any known history of atrial fibrillation or other cardiac dysrhythmia.      PAST MEDICAL HISTORY  Past Medical History:   Diagnosis Date    Abnormal CT scan, colon 2023    Anemia     Body piercing     both ears    Bronchitis     Colitis     Crohn's disease 2023    incidental finding, asymptomatic    Disease of thyroid gland     Dissection of coronary artery 2017    pt denies this 10/2/19.  states she had testing at [facility], but was told that they didn't \"find anything\"    Enteropathogenic Escherichia coli infection 2023    Essential hypertension 2018    Fracture, foot     Right    Gallstone     H/O echocardiogram     History of nuclear stress test     Hyperlipidemia     Kidney stones     Low back pain     Migraine     Obesity     Ovarian cyst     Prolonged Q-T interval on ECG     history    Restless leg syndrome     Subacute idiopathic myocarditis 2017    Urinary tract infection     Vitamin B12 deficiency     Vitamin B6 deficiency 2024    Wears glasses     to read         PAST SURGICAL HISTORY  Past Surgical History:   Procedure Laterality Date    BACK SURGERY  2024    BREAST SURGERY Bilateral 1987    breast reduction    CARDIAC CATHETERIZATION N/A 03/15/2017    Procedure: Coronary angiography;  Surgeon: Moustapha GALVIN" MD Anabela;  Location: [facility] CATH INVASIVE LOCATION;  Service:     CARDIAC CATHETERIZATION N/A 03/15/2017    Procedure: Left ventriculography;  Surgeon: Moustapha Peñaloza MD;  Location: [facility] CATH INVASIVE LOCATION;  Service:      SECTION      1991 & 1995    CHOLECYSTECTOMY  2013    COLONOSCOPY N/A 10/03/2019    Procedure: COLONOSCOPY WITH BIOPSIES; ANOSCOPY;  Surgeon: Jose Paez MD;  Location: [facility] ENDOSCOPY;  Service: Gastroenterology    COLONOSCOPY N/A 2023    Procedure: COLONOSCOPY with biopsy;  Surgeon: Sami Elizondo MD;  Location: [facility] ENDOSCOPY;  Service: Gastroenterology;  Laterality: N/A;    ENDOSCOPY      GASTRIC BYPASS  2011    LUMBAR LAMINECTOMY WITH FUSION N/A 2024    Procedure: LUMBAR FUSION DECOMPRESSON WITH PEDICLE SCREWS L4-5;  Surgeon: Jeovanny Smith MD;  Location: [facility] OR;  Service: Neurosurgery;  Laterality: N/A;    REDUCTION MAMMAPLASTY      STEROID INJECTION      x2 in back         FAMILY HISTORY  Family History   Problem Relation Age of Onset    Cancer Father     Heart attack Father     Cancer Other     Other Other     Breast cancer Mother     Asthma Brother     Ulcerative colitis Brother     Arthritis Paternal Grandmother     Colon cancer Neg Hx          SOCIAL HISTORY  Social History     Socioeconomic History    Marital status:    Tobacco Use    Smoking status: Former     Current packs/day: 0.00     Average packs/day: 1 pack/day for 2.0 years (2.0 ttl pk-yrs)     Types: Cigarettes     Start date: 10/1/1993     Quit date: 10/1/1995     Years since quittin.7    Smokeless tobacco: Never   Vaping Use    Vaping status: Never Used   Substance and Sexual Activity    Alcohol use: Yes     Comment: 16 beers per week    Drug use: No    Sexual activity: Defer         ALLERGIES  Sulfa antibiotics        REVIEW OF SYSTEMS    All systems reviewed and negative except for those discussed in HPI.       PHYSICAL EXAM    I have  reviewed the triage vital signs and nursing notes.    ED Triage Vitals [06/08/25 2348]   Temp Heart Rate Resp BP SpO2   97.6 °F (36.4 °C) (!) 126 18 (!) 133/101 98 %      Temp src Heart Rate Source Patient Position BP Location FiO2 (%)   Oral Monitor Sitting Left arm --         General: Moderate acute distress  Skin: normal color, warm and dry  Head: normocephalic, atraumatic  Nose: normal nasal mucosa, no visible deformity.  Mouth: moist mucous membranes.  Neck: supple.  Chest: no retractions, no visible deformity  Cardiovascular: Tachycardic, irregular rhythm   Lungs: clear to auscultation bilaterally.  Abdomen: soft, non-tender, non-distended. No rebound tenderness, no guarding.  Extremities: no cyanosis or edema. Palpable radial pulses bilaterally.  Neuro:  alert and oriented x3, no focal neurological deficits.  Psych:  appropriate mood and behavior.        LAB RESULTS  Recent Results (from the past 24 hours)   Comprehensive Metabolic Panel    Collection Time: 06/09/25 12:31 AM    Specimen: Blood   Result Value Ref Range    Glucose 103 (H) 65 - 99 mg/dL    BUN 16.0 6.0 - 20.0 mg/dL    Creatinine 1.14 (H) 0.57 - 1.00 mg/dL    Sodium 136 136 - 145 mmol/L    Potassium 3.6 3.5 - 5.2 mmol/L    Chloride 101 98 - 107 mmol/L    CO2 20.7 (L) 22.0 - 29.0 mmol/L    Calcium 9.7 8.6 - 10.5 mg/dL    Total Protein 6.6 6.0 - 8.5 g/dL    Albumin 4.1 3.5 - 5.2 g/dL    ALT (SGPT) 8 1 - 33 U/L    AST (SGOT) 29 1 - 32 U/L    Alkaline Phosphatase 87 39 - 117 U/L    Total Bilirubin 0.5 0.0 - 1.2 mg/dL    Globulin 2.5 gm/dL    A/G Ratio 1.6 g/dL    BUN/Creatinine Ratio 14.0 7.0 - 25.0    Anion Gap 14.3 5.0 - 15.0 mmol/L    eGFR 56.6 (L) >60.0 mL/min/1.73   High Sensitivity Troponin T    Collection Time: 06/09/25 12:31 AM    Specimen: Blood   Result Value Ref Range    HS Troponin T 6 <14 ng/L   Raheem Top (Gel)    Collection Time: 06/09/25 12:31 AM   Result Value Ref Range    Extra Tube Hold for add-ons.    Lavender Top    Collection  Time: 06/09/25 12:31 AM   Result Value Ref Range    Extra Tube hold for add-on    Gold Top - SST    Collection Time: 06/09/25 12:31 AM   Result Value Ref Range    Extra Tube Hold for add-ons.    Light Blue Top    Collection Time: 06/09/25 12:31 AM   Result Value Ref Range    Extra Tube Hold for add-ons.    CBC Auto Differential    Collection Time: 06/09/25 12:31 AM    Specimen: Blood   Result Value Ref Range    WBC 9.21 3.40 - 10.80 10*3/mm3    RBC 4.17 3.77 - 5.28 10*6/mm3    Hemoglobin 13.5 12.0 - 15.9 g/dL    Hematocrit 40.1 34.0 - 46.6 %    MCV 96.2 79.0 - 97.0 fL    MCH 32.4 26.6 - 33.0 pg    MCHC 33.7 31.5 - 35.7 g/dL    RDW 12.6 12.3 - 15.4 %    RDW-SD 44.3 37.0 - 54.0 fl    MPV 9.3 6.0 - 12.0 fL    Platelets 197 140 - 450 10*3/mm3    Neutrophil % 67.1 42.7 - 76.0 %    Lymphocyte % 26.2 19.6 - 45.3 %    Monocyte % 4.9 (L) 5.0 - 12.0 %    Eosinophil % 1.2 0.3 - 6.2 %    Basophil % 0.4 0.0 - 1.5 %    Immature Grans % 0.2 0.0 - 0.5 %    Neutrophils, Absolute 6.18 1.70 - 7.00 10*3/mm3    Lymphocytes, Absolute 2.41 0.70 - 3.10 10*3/mm3    Monocytes, Absolute 0.45 0.10 - 0.90 10*3/mm3    Eosinophils, Absolute 0.11 0.00 - 0.40 10*3/mm3    Basophils, Absolute 0.04 0.00 - 0.20 10*3/mm3    Immature Grans, Absolute 0.02 0.00 - 0.05 10*3/mm3    nRBC 0.0 0.0 - 0.2 /100 WBC   Magnesium    Collection Time: 06/09/25 12:31 AM    Specimen: Blood   Result Value Ref Range    Magnesium 2.0 1.6 - 2.6 mg/dL   High Sensitivity Troponin T 1Hr    Collection Time: 06/09/25  1:51 AM    Specimen: Blood   Result Value Ref Range    HS Troponin T 10 <14 ng/L    Troponin T Numeric Delta 4 (C) Abnormal if >/=3 ng/L       If labs were ordered, I independently reviewed the results and considered them in treating the patient.  See medical decision making discussion section for my interpretation of lab results.        RADIOLOGY  No Radiology Exams Resulted Within Past 24 Hours    I ordered and independently reviewed the above noted radiographic  studies.  See radiologist's dictation for official interpretation.    Per my independent reading:      Chest radiograph is obtained and is negative for acute cardiopulmonary findings based on my independent reading.            PROCEDURES    Procedures    ECG 12 Lead Chest Pain    (Results Pending)   ECG 12 Lead Rhythm Change    (Results Pending)       MEDICATIONS GIVEN IN ER    Medications   sodium chloride 0.9 % flush 10 mL (has no administration in time range)   ondansetron (ZOFRAN) injection 8 mg (8 mg Intravenous Not Given 6/9/25 0035)   metoprolol tartrate (LOPRESSOR) injection 5 mg (5 mg Intravenous Given 6/9/25 0111)   sodium chloride 0.9 % bolus 1,000 mL (0 mL Intravenous Stopped 6/9/25 0151)   LORazepam (ATIVAN) injection 0.25 mg (0.25 mg Intravenous Given 6/9/25 0101)   apixaban (ELIQUIS) tablet 5 mg (5 mg Oral Given 6/9/25 0313)         MEDICAL DECISION MAKING, PROGRESS, and CONSULTS    All labs, if obtained, have been independently reviewed by me.  All radiology studies, if obtained, have been reviewed by me and the radiologist dictating the report.  All EKG's, if obtained, have been independently viewed and interpreted by me/my attending physician.      Discussion below represents my analysis of pertinent findings related to patient's condition, differential diagnosis, treatment plan and final disposition.                         Differential diagnosis:    Differential diagnosis for this patient includes cardiac dysrhythmia, acute coronary syndrome, pneumothorax, pulmonary embolism, pericarditis, myocarditis, cardiac tamponade, pericardial effusion, aortic dissection, other acute emergency.    Medical Decision Making Discussion:    Vitals reviewed and demonstrate tachycardia and hypertension but otherwise are normal.    EKG obtained and based on my independent review demonstrates atrial fibrillation with rapid ventricular response.  No acute ischemic changes.    Patient does not have a known history of  atrial fibrillation.    Labs reviewed and are all essentially unremarkable.  Initial troponin is 6.  Repeat troponin at 10.  Although this is technically Eydelto for the repeat troponin is still below the normal limit and this is not thought to represent an acute myocardial infarction. Slight rise in troponin likely secondary to atrial fibrillation with RVR.    After 3 separate doses of 5mg of IV Lopressor patient remains in A-fib with RVR.  Patient has also had hypotension.  I discussed the case with Dr. Cardenas he recommends proceeding with synchronized cardioversion.  Informed written consent was obtained for synchronized cardioversion and procedural sedation.  Prior to procedural sedation and synchronized cardioversion the patient spontaneously converted to normal sinus rhythm.    Repeat EKG obtained and based on my independent review demonstrates normal sinus rhythm.  No acute ischemic changes.    Patient was then observed in the emergency department and she has remained in normal sinus rhythm.  She is now asymptomatic.  No chest pain.  She has been ambulatory in the emergency department without difficulty.    Patient has a [id number] score of 1.  She will be started on Eliquis.  She is established with Dr. Madsen with [facility].  She is instructed to follow-up with him within 1 week and to return to the Emergency Department before then for any concerning symptoms, worsening symptoms or new concerns    30 minutes of critical care provided. This time excludes other billable procedures. Time does include preparation of documents, medical consultations, review of old records, and direct bedside care. Patient is at high risk for life-threatening deterioration due to atrial fibrillation with RVR.      Additional sources:    - Discussed/ obtained information from independent historians:     - External (non-ED) record review: Cardiology note from April 2025 documenting history of hyperlipidemia,  heart disease, hypertension.    - Chronic or social conditions impacting care: Heart disease, hypertension, diabetes    Shared Decision Making:  After my consideration of clinical presentation and any laboratory/radiology studies obtained, I discussed the findings with the patient/patient representative who is in agreement with the treatment plan and the final disposition.   Risks and benefits of discharge and/or observation/admission were discussed.    Orders placed during this visit:  Orders Placed This Encounter   Procedures    XR Chest 1 View    [place] Draw    Comprehensive Metabolic Panel    High Sensitivity Troponin T    CBC Auto Differential    Magnesium    High Sensitivity Troponin T 1Hr    Ambulatory Referral to Cardiology for Atrial Fibrillation    Continuous Pulse Oximetry    Vital Signs    ECG 12 Lead Chest Pain    ECG 12 Lead Chest Pain    ECG 12 Lead Rhythm Change    Insert Peripheral IV    CBC & Differential    Green Top (Gel)    Lavender Top    Gold Top - SST    Light Blue Top         AS OF 04:40 EDT VITALS:    BP - 103/67  HR - 75  TEMP - 97.6 °F (36.4 °C) (Oral)  O2 SATS - 99%                  DIAGNOSIS  Final diagnoses:   New onset atrial fibrillation         DISPOSITION  Discharge      Please note that portions of this document were completed with voice recognition software.        Porter Reveles MD  06/09/25 0444

## 2025-06-09 NOTE — DISCHARGE INSTRUCTIONS
Follow-up with cardiology within 1 week.  Return to the emergency department before then for any recurrence of palpitations, chest pain, concerning symptoms, worsening symptoms or new concerns.

## 2025-06-09 NOTE — TELEPHONE ENCOUNTER
Caller: Mary Lunsford    Relationship to patient: Self    Best call back number: Mobile phone: [phone]     New or established patient?  [] New  [x] Established    Date of discharge: 6/8/25    Facility discharged from: [facility]    Diagnosis/Symptoms: NEW ONSET OF AFIB    Length of stay (If applicable): ER VISIT    Specialty Only: Did you see a [facility] health provider?    [] Yes  [x] No  If so, who?     Additional Details: PT TO HAVE FU WITH DR. ORTEZ. NO TIMEFRAME SPECIFIED. PLEASE CALL BACK TO ADVISE, THANK YOU.

## 2025-06-10 ENCOUNTER — LAB (OUTPATIENT)
Dept: LAB | Facility: HOSPITAL | Age: 55
End: 2025-06-10
Payer: COMMERCIAL

## 2025-06-10 ENCOUNTER — OFFICE VISIT (OUTPATIENT)
Dept: GASTROENTEROLOGY | Facility: CLINIC | Age: 55
End: 2025-06-10
Payer: COMMERCIAL

## 2025-06-10 VITALS
SYSTOLIC BLOOD PRESSURE: 130 MMHG | HEIGHT: 64 IN | TEMPERATURE: 97.1 F | OXYGEN SATURATION: 98 % | BODY MASS INDEX: 38.41 KG/M2 | WEIGHT: 225 LBS | HEART RATE: 83 BPM | DIASTOLIC BLOOD PRESSURE: 88 MMHG

## 2025-06-10 DIAGNOSIS — K50.00 CROHN'S DISEASE OF SMALL INTESTINE WITHOUT COMPLICATION: Primary | ICD-10-CM

## 2025-06-10 DIAGNOSIS — K59.03 DRUG-INDUCED CONSTIPATION: ICD-10-CM

## 2025-06-10 DIAGNOSIS — Z86.2 HISTORY OF IRON DEFICIENCY ANEMIA: ICD-10-CM

## 2025-06-10 DIAGNOSIS — K50.00 CROHN'S DISEASE OF SMALL INTESTINE WITHOUT COMPLICATION: ICD-10-CM

## 2025-06-10 PROCEDURE — 99214 OFFICE O/P EST MOD 30 MIN: CPT | Performed by: INTERNAL MEDICINE

## 2025-06-10 RX ORDER — GUSELKUMAB 200 MG/2ML
400 INJECTION SUBCUTANEOUS TAKE AS DIRECTED
Qty: 4 ML | Refills: 2
Start: 2025-06-10

## 2025-06-11 ENCOUNTER — OFFICE VISIT (OUTPATIENT)
Dept: CARDIOLOGY | Facility: CLINIC | Age: 55
End: 2025-06-11
Payer: COMMERCIAL

## 2025-06-11 VITALS
HEART RATE: 77 BPM | HEIGHT: 64 IN | DIASTOLIC BLOOD PRESSURE: 72 MMHG | OXYGEN SATURATION: 100 % | SYSTOLIC BLOOD PRESSURE: 118 MMHG | BODY MASS INDEX: 38.6 KG/M2

## 2025-06-11 DIAGNOSIS — I48.0 PAROXYSMAL ATRIAL FIBRILLATION: ICD-10-CM

## 2025-06-11 DIAGNOSIS — I20.89 OTHER FORMS OF ANGINA PECTORIS: Primary | ICD-10-CM

## 2025-06-11 DIAGNOSIS — E78.5 HYPERLIPIDEMIA LDL GOAL <100: ICD-10-CM

## 2025-06-11 PROCEDURE — 99214 OFFICE O/P EST MOD 30 MIN: CPT | Performed by: INTERNAL MEDICINE

## 2025-06-11 NOTE — PROGRESS NOTES
[facility] Cardiology  Office Progress Note  Mary Lunsford  1970  [address] [place] [address]       Visit Date: 25    PCP: Floridalma Jimenez MD  [address]  [place] [address]    IDENTIFICATION: A 54 y.o. female  , employed [employer]  Porter Faye's daughter-[facility] patient    PROBLEM LIST:   CAD    2017 [facility](trop 0.48)  nl cors marked distal LAD tapering(no dissection) hyperdynamic LV LVEDP 8     GXT   BRYSON     echo BHR EF>70% rvsp 12  Paf     53/71/127 <0.01% pac/pvc     ER presentation converted with IV Lopressor prior to ECV  HL    3/24 208/114/50/138  HTN  RLS- [facility]inson's Rx  Crohn's disease-Dr. Elizondo Rx     Noncardiac sxHx:   CCX   x 2  Gastric bypass Dr. Navarrete  preop weight 250  Breast reduction    CC:   Chief Complaint   Patient presents with    Follow-up     ED- 2025       Allergies  Allergies   Allergen Reactions    Sulfa Antibiotics Rash and Other (See Comments)     Blurry vision       Current Medications    Current Outpatient Medications:     Apixaban Starter Pack tablet therapy pack, Take two 5 mg tablets by mouth every 12 hours for 7 days. Followed by one 5 mg tablet every 12 hours. (Dispense starter pack if available), Disp: 74 tablet, Rfl: 0    atorvastatin (LIPITOR) 40 MG tablet, Take 1 tablet by mouth Every Night., Disp: 90 tablet, Rfl: 2    carbidopa-levodopa CR (SINEMET CR)  MG per CR tablet, Take 1 tablet by mouth 3 (Three) Times a Day., Disp: 270 tablet, Rfl: 3    cyanocobalamin 1000 MCG/ML injection, 1 ml Injection SQ or IM every other week x 4 doses, then once a month for maintenance, Disp: 30 mL, Rfl: 0    Ferrous Fumarate (Ferrocite) 324 (106 Fe) MG tablet, Take 1 tablet by mouth 2 (Two) Times a Day. (Patient taking differently: Take 1 tablet by mouth 2 (Two) Times a Day. qd), Disp: 180 tablet, Rfl: 3    Guselkumab (Tremfya Crohns Induction) 200 MG/2ML solution  "auto-injector, Inject 400 mg under the skin into the appropriate area as directed Take As Directed. 400 mg SQ x 1 on WK-0, 4, 8, Disp: 4 mL, Rfl: 2    levothyroxine (SYNTHROID, LEVOTHROID) 88 MCG tablet, Take 1 tablet by mouth Every Morning. For hypothyroidism., Disp: 90 tablet, Rfl: 3    Lyrica 100 MG capsule, Take 1 capsule by mouth 2 (Two) Times a Day., Disp: 180 capsule, Rfl: 0    nebivolol (Bystolic) 5 MG tablet, Take 1 tablet by mouth Daily., Disp: 90 tablet, Rfl: 3    pramipexole (MIRAPEX) 0.5 MG tablet, Take 1/2 tablet at 1230pm, 1 at bedtime and 1/2 tablet if needed for breakthrough symptoms, Disp: 180 tablet, Rfl: 3    Semaglutide-Weight Management (Wegovy) 2.4 MG/0.75ML solution auto-injector, Inject 2.4 mg under the skin into the appropriate area as directed 1 (One) Time Per Week., Disp: 3 mL, Rfl: 11    Syringe/Needle, Disp, 23G X 1\" 3 ML misc, Use as directed with B12 injections, Disp: 50 each, Rfl: 0    vitamin B-6 (PYRIDOXINE) 100 MG tablet, Take 1 tablet by mouth Daily., Disp: 90 tablet, Rfl: 3      History of Present Illness   Mary Lunsford is a 54 y.o. year old female here for follow up.  She was seen in the ED with paroxysmal A-fib.  She noted having nausea and emesis with eating too late on her GLP P.  She states she was retching and then noted tachypalpitations.  With third dose of IV Lopressor in ED she converted          OBJECTIVE:  Vitals:    06/11/25 0949   BP: 118/72   BP Location: Right arm   Patient Position: Sitting   Cuff Size: Adult   Pulse: 77   SpO2: 100%   Height: 162.6 cm (64.02\")       Body mass index is 38.6 kg/m².    Constitutional:       Appearance: Healthy appearance. Not in distress.   Neck:      Vascular: No JVR. JVD normal.   Pulmonary:      Effort: Pulmonary effort is normal.      Breath sounds: Normal breath sounds. No wheezing. No rhonchi. No rales.   Chest:      Chest wall: Not tender to palpatation.   Cardiovascular:      PMI at left midclavicular line. Normal " rate. Regular rhythm. Normal S1. Normal S2.       Murmurs: There is no murmur.      No gallop.  No click. No rub.   Pulses:     Intact distal pulses.   Edema:     Peripheral edema absent.   Abdominal:      General: Bowel sounds are normal.      Palpations: Abdomen is soft.      Tenderness: There is no abdominal tenderness.   Musculoskeletal: Normal range of motion.         General: No tenderness. Skin:     General: Skin is warm and dry.   Neurological:      General: No focal deficit present.      Mental Status: Alert and oriented to person, place and time.         Diagnostic Data:  Procedures        ASSESSMENT:   Diagnosis Plan   1. Other forms of angina pectoris        2. Paroxysmal atrial fibrillation        3. Hyperlipidemia LDL goal <100              PLAN:  Anginal equivalent with component of vasospastic angina.  She may have syndrome X with small mismatch apical LAD.      Mixed dyslipidemia with no evidence of coronary plaque noted on prior angiogram.  She will decrease atorvastatin to 3 times weekly    PAF [id number] 1 she will use additional pill in a pocket Nebivolol with recurrent.  She was also initiated NOAC only if sustains A-fib.  She is to obtain a i[employer] mobile and follow her heart rhythm daily  48-hour Holter monitor be obtained,      Isaias Madsen MD, FACC

## 2025-06-16 ENCOUNTER — LAB (OUTPATIENT)
Dept: LAB | Facility: HOSPITAL | Age: 55
End: 2025-06-16
Payer: COMMERCIAL

## 2025-06-16 DIAGNOSIS — K50.00 CROHN'S DISEASE OF SMALL INTESTINE WITHOUT COMPLICATION: ICD-10-CM

## 2025-06-16 LAB
ALBUMIN SERPL-MCNC: 4 G/DL (ref 3.5–5.2)
ALBUMIN/GLOB SERPL: 1.4 G/DL
ALP SERPL-CCNC: 83 U/L (ref 39–117)
ALT SERPL W P-5'-P-CCNC: 11 U/L (ref 1–33)
ANION GAP SERPL CALCULATED.3IONS-SCNC: 11.2 MMOL/L (ref 5–15)
AST SERPL-CCNC: 34 U/L (ref 1–32)
BASOPHILS # BLD AUTO: 0.05 10*3/MM3 (ref 0–0.2)
BASOPHILS NFR BLD AUTO: 0.9 % (ref 0–1.5)
BILIRUB SERPL-MCNC: 0.6 MG/DL (ref 0–1.2)
BUN SERPL-MCNC: 13 MG/DL (ref 6–20)
BUN/CREAT SERPL: 12.4 (ref 7–25)
CALCIUM SPEC-SCNC: 9 MG/DL (ref 8.6–10.5)
CHLORIDE SERPL-SCNC: 103 MMOL/L (ref 98–107)
CO2 SERPL-SCNC: 24.8 MMOL/L (ref 22–29)
CREAT SERPL-MCNC: 1.05 MG/DL (ref 0.57–1)
DEPRECATED RDW RBC AUTO: 42.6 FL (ref 37–54)
EGFRCR SERPLBLD CKD-EPI 2021: 62.5 ML/MIN/1.73
EOSINOPHIL # BLD AUTO: 0.08 10*3/MM3 (ref 0–0.4)
EOSINOPHIL NFR BLD AUTO: 1.5 % (ref 0.3–6.2)
ERYTHROCYTE [DISTWIDTH] IN BLOOD BY AUTOMATED COUNT: 11.9 % (ref 12.3–15.4)
GLOBULIN UR ELPH-MCNC: 2.8 GM/DL
GLUCOSE SERPL-MCNC: 92 MG/DL (ref 65–99)
HCT VFR BLD AUTO: 39.7 % (ref 34–46.6)
HGB BLD-MCNC: 13.5 G/DL (ref 12–15.9)
IMM GRANULOCYTES # BLD AUTO: 0.01 10*3/MM3 (ref 0–0.05)
IMM GRANULOCYTES NFR BLD AUTO: 0.2 % (ref 0–0.5)
LYMPHOCYTES # BLD AUTO: 2.21 10*3/MM3 (ref 0.7–3.1)
LYMPHOCYTES NFR BLD AUTO: 40.2 % (ref 19.6–45.3)
MCH RBC QN AUTO: 33 PG (ref 26.6–33)
MCHC RBC AUTO-ENTMCNC: 34 G/DL (ref 31.5–35.7)
MCV RBC AUTO: 97.1 FL (ref 79–97)
MONOCYTES # BLD AUTO: 0.36 10*3/MM3 (ref 0.1–0.9)
MONOCYTES NFR BLD AUTO: 6.5 % (ref 5–12)
NEUTROPHILS NFR BLD AUTO: 2.79 10*3/MM3 (ref 1.7–7)
NEUTROPHILS NFR BLD AUTO: 50.7 % (ref 42.7–76)
NRBC BLD AUTO-RTO: 0 /100 WBC (ref 0–0.2)
PLATELET # BLD AUTO: 208 10*3/MM3 (ref 140–450)
PMV BLD AUTO: 9.8 FL (ref 6–12)
POTASSIUM SERPL-SCNC: 4.1 MMOL/L (ref 3.5–5.2)
PROT SERPL-MCNC: 6.8 G/DL (ref 6–8.5)
RBC # BLD AUTO: 4.09 10*6/MM3 (ref 3.77–5.28)
SODIUM SERPL-SCNC: 139 MMOL/L (ref 136–145)
WBC NRBC COR # BLD AUTO: 5.5 10*3/MM3 (ref 3.4–10.8)

## 2025-06-16 PROCEDURE — 83993 ASSAY FOR CALPROTECTIN FECAL: CPT

## 2025-06-16 PROCEDURE — 80053 COMPREHEN METABOLIC PANEL: CPT

## 2025-06-16 PROCEDURE — 36415 COLL VENOUS BLD VENIPUNCTURE: CPT

## 2025-06-16 PROCEDURE — 85025 COMPLETE CBC W/AUTO DIFF WBC: CPT

## 2025-06-17 ENCOUNTER — OFFICE VISIT (OUTPATIENT)
Dept: INTERNAL MEDICINE | Facility: CLINIC | Age: 55
End: 2025-06-17
Payer: COMMERCIAL

## 2025-06-17 VITALS
HEART RATE: 59 BPM | SYSTOLIC BLOOD PRESSURE: 126 MMHG | DIASTOLIC BLOOD PRESSURE: 74 MMHG | TEMPERATURE: 97.7 F | RESPIRATION RATE: 14 BRPM | WEIGHT: 222 LBS | BODY MASS INDEX: 37.9 KG/M2 | HEIGHT: 64 IN | OXYGEN SATURATION: 99 %

## 2025-06-17 DIAGNOSIS — G56.03 BILATERAL CARPAL TUNNEL SYNDROME: ICD-10-CM

## 2025-06-17 DIAGNOSIS — M25.50 POLYARTHRALGIA: Primary | ICD-10-CM

## 2025-06-17 DIAGNOSIS — M79.10 PAIN IN THE MUSCLES: ICD-10-CM

## 2025-06-17 DIAGNOSIS — E07.9 THYROID DISORDER: ICD-10-CM

## 2025-06-17 PROCEDURE — 99214 OFFICE O/P EST MOD 30 MIN: CPT | Performed by: PHYSICIAN ASSISTANT

## 2025-06-17 NOTE — PROGRESS NOTES
Office Visit      Patient Name: Mary Lunsford  : 1970   MRN: [id number]     Chief Complaint:    Chief Complaint   Patient presents with    Elbow Pain     Wrist pain, x 2-3 months, both sides        History of Present Illness: Mary Lunsford is a 54 y.o. female who is here today to discuss bilateral wrist pain for the last 2-3 months, left worse than right. Pain is worse when working on a computer. Sometimes fingers ache but not frequently. No neck pain. Sometimes has a little numbness and tingling in the fingers throughout the night. Also aches in bilateral inner elbows intermittently which is a more long-term issue.  Reports frequent aches and pains and would like to see rheumatology.    She was diagnosed with atrial fibrillation on 2025 at [facility] after presenting with complaint of acute nausea, vomiting and palpitations with some chest pressure.  Troponin negative x 2 with +4 delta.  Labs otherwise generally unremarkable.  Chest x-ray showed no acute cardiopulmonary process.  Converted back to sinus rhythm after third dose of IV Lopressor in the ED. Apixaban starter pack was prescribed. She followed up with cardiology on 2025 and 48-hour Holter monitor was ordered.  Cardiology advised her to obtain a [employer] mobile and follow her heart rhythm daily with anticoagulation to be used only if found to be in A-fib.        Subjective      I have reviewed and the following portions of the patient's history were updated as appropriate: past family history, past medical history, past social history, past surgical history and problem list.      Current Outpatient Medications:     atorvastatin (LIPITOR) 40 MG tablet, Take 1 tablet by mouth Every Night., Disp: 90 tablet, Rfl: 2    carbidopa-levodopa CR (SINEMET CR)  MG per CR tablet, Take 1 tablet by mouth 3 (Three) Times a Day., Disp: 270 tablet, Rfl: 3    cyanocobalamin 1000 MCG/ML injection, 1 ml Injection SQ or IM  "every other week x 4 doses, then once a month for maintenance, Disp: 30 mL, Rfl: 0    Ferrous Fumarate (Ferrocite) 324 (106 Fe) MG tablet, Take 1 tablet by mouth 2 (Two) Times a Day. (Patient taking differently: Take 1 tablet by mouth 2 (Two) Times a Day. qd), Disp: 180 tablet, Rfl: 3    levothyroxine (SYNTHROID, LEVOTHROID) 88 MCG tablet, Take 1 tablet by mouth Every Morning. For hypothyroidism., Disp: 90 tablet, Rfl: 3    Lyrica 100 MG capsule, Take 1 capsule by mouth 2 (Two) Times a Day., Disp: 180 capsule, Rfl: 0    nebivolol (Bystolic) 5 MG tablet, Take 1 tablet by mouth Daily., Disp: 90 tablet, Rfl: 3    pramipexole (MIRAPEX) 0.5 MG tablet, Take 1/2 tablet at 1230pm, 1 at bedtime and 1/2 tablet if needed for breakthrough symptoms, Disp: 180 tablet, Rfl: 3    Semaglutide-Weight Management (Wegovy) 2.4 MG/0.75ML solution auto-injector, Inject 2.4 mg under the skin into the appropriate area as directed 1 (One) Time Per Week., Disp: 3 mL, Rfl: 11    Syringe/Needle, Disp, 23G X 1\" 3 ML misc, Use as directed with B12 injections, Disp: 50 each, Rfl: 0    vitamin B-6 (PYRIDOXINE) 100 MG tablet, Take 1 tablet by mouth Daily., Disp: 90 tablet, Rfl: 3    Apixaban Starter Pack tablet therapy pack, Take two 5 mg tablets by mouth every 12 hours for 7 days. Followed by one 5 mg tablet every 12 hours. (Dispense starter pack if available) (Patient not taking: Take two 5 mg tablets by mouth every 12 hours for 7 days. Followed by one 5 mg tablet every 12 hours. (Dispense starter pack if available) Reported on 6/17/2025), Disp: 74 tablet, Rfl: 0    Guselkumab (Tremfya Crohns Induction) 200 MG/2ML solution auto-injector, Inject 400 mg under the skin into the appropriate area as directed Take As Directed. 400 mg SQ x 1 on WK-0, 4, 8 (Patient not taking: Reported on 6/17/2025), Disp: 4 mL, Rfl: 2    Allergies   Allergen Reactions    Sulfa Antibiotics Rash and Other (See Comments)     Blurry vision       Objective     Vital Signs: " "  Vitals:    06/17/25 1708   BP: 126/74   Pulse: 59   Resp: 14   Temp: 97.7 °F (36.5 °C)   TempSrc: Temporal   SpO2: 99%   Weight: 101 kg (222 lb)   Height: 162.6 cm (64.02\")     Body mass index is 38.08 kg/m².    Physical Exam  Vitals and nursing note reviewed.   Constitutional:       General: She is not in acute distress.     Appearance: She is well-developed. She is obese. She is not diaphoretic.   Pulmonary:      Effort: Pulmonary effort is normal. No respiratory distress.   Musculoskeletal:      Right elbow: No tenderness.      Left elbow: Tenderness (soft tissue, inner elbow) present.      Right wrist: No swelling, tenderness, bony tenderness or crepitus. Normal range of motion. Normal pulse.      Left wrist: Tenderness present. No swelling, bony tenderness or crepitus. Decreased range of motion. Normal pulse.        Arms:       Cervical back: Normal range of motion and neck supple. No rigidity or tenderness.      Comments: Positive Tinel and Blomkest tests left wrist, negative right wrist.    Lymphadenopathy:      Cervical: No cervical adenopathy.   Neurological:      Mental Status: She is alert and oriented to person, place, and time.      Coordination: Coordination normal.   Psychiatric:         Behavior: Behavior normal.         Thought Content: Thought content normal.         Judgment: Judgment normal.         Common labs          3/10/2025    16:28 6/9/2025    00:31 6/16/2025    13:37   Common Labs   Glucose 87  103  92    BUN 12  16.0  13.0    Creatinine 0.99  1.14  1.05    Sodium 139  136  139    Potassium 4.6  3.6  4.1    Chloride 104  101  103    Calcium 9.1  9.7  9.0    Albumin 4.1  4.1  4.0    Total Bilirubin 0.6  0.5  0.6    Alkaline Phosphatase 95  87  83    AST (SGOT) 26  29  34    ALT (SGPT) 7  8  11    WBC 5.87  9.21  5.50    Hemoglobin 14.4  13.5  13.5    Hematocrit 41.8  40.1  39.7    Platelets 215  197  208          Assessment / Plan      Assessment/Plan:   Diagnoses and all orders for this " visit:    1. Polyarthralgia (Primary)  -     Ambulatory Referral to Rheumatology    2. Pain in the muscles  -     Ambulatory Referral to Rheumatology    3. Thyroid disorder  -     TSH Rfx On Abnormal To Free T4    4. Bilateral carpal tunnel syndrome       Recommended wearing carpal tunnel splints on bilateral wrists at night.        Follow Up:   No follow-ups on file.    Patient was given instructions and counseling regarding her condition or for health maintenance advice. Please see specific information pulled into the AVS if appropriate.     Margarita Esparza PA-C  [facility] [address] [facility]     Please note that portions of this note may have been completed with a voice recognition program.

## 2025-06-18 ENCOUNTER — TELEPHONE (OUTPATIENT)
Dept: CARDIOLOGY | Facility: CLINIC | Age: 55
End: 2025-06-18

## 2025-06-18 LAB — CALPROTECTIN STL-MCNT: 457 UG/G (ref 0–120)

## 2025-06-18 NOTE — TELEPHONE ENCOUNTER
Crager, James K, MD Jackson, Kristina, RN Holter WNLARISSA    Pt called informed of the above results, verbalized understanding.

## 2025-06-18 NOTE — TELEPHONE ENCOUNTER
Crager, James K, MD Jackson, Kristina, RN Holter WNLARISSA    Pt called and informed of the above results, verbalized understanding.

## 2025-06-30 DIAGNOSIS — M51.369 DEGENERATIVE LUMBAR DISC: ICD-10-CM

## 2025-06-30 DIAGNOSIS — G25.81 RESTLESS LEGS SYNDROME (RLS): ICD-10-CM

## 2025-06-30 RX ORDER — PREGABALIN 100 MG/1
100 CAPSULE ORAL 2 TIMES DAILY
Qty: 180 CAPSULE | Refills: 1 | Status: SHIPPED | OUTPATIENT
Start: 2025-06-30

## 2025-06-30 NOTE — TELEPHONE ENCOUNTER
Caller: Mary Lunsford    Relationship: Self    Best call back number: [phone]    Requested Prescriptions:   Requested Prescriptions     Pending Prescriptions Disp Refills    Lyrica 100 MG capsule [Pharmacy Med Name: LYRICA 100MG CAPSULES] 180 capsule      Sig: TAKE 1 CAPSULE BY MOUTH TWICE DAILY      Pharmacy where request should be sent: [id number] [place] [id number] [address] AT [facility] - [phone] PH - [phone] FX     Last office visit with prescribing clinician: 6/28/2025 SCOT/ LINNEA HOLLAND  Last telemedicine visit with prescribing clinician: 2/7/2025 SCOT/ LINNEA HOLLAND  Next office visit with prescribing clinician: 9/5/2025 SCOT/ LINNEA HOLLAND    Additional details provided by patient: PT REPORTS SHE IS COMPLETELY OUT OF THE PREGABALIN MEDICATION AS OF THIS MORNING.    Does the patient have less than a 3 day supply?:  [x] Yes  [] No    Would you like a call back once the refill request has been completed?: [] Yes  [x] No    If the office needs to give you a call back, can they leave a voicemail?: [] Yes  [x] No    PLEASE REVIEW AND ADVISE.    Marsha Berry Rep   06/30/25 10:38 EDT

## 2025-06-30 NOTE — TELEPHONE ENCOUNTER
Rx Refill Note  Requested Prescriptions     Pending Prescriptions Disp Refills    Lyrica 100 MG capsule [Pharmacy Med Name: LYRICA 100MG CAPSULES] 180 capsule      Sig: TAKE 1 CAPSULE BY MOUTH TWICE DAILY      Last office visit with prescribing clinician: 2/7/2025  Last telemedicine visit with prescribing clinician: Visit date not found   Next office visit with prescribing clinician:   9/5/2025                        Would you like a call back once the refill request has been completed: [] Yes [] No    If the office needs to give you a call back, can they leave a voicemail: [] Yes [] No    Kamilla Avila CMA  06/30/25, 11:39 EDT

## 2025-07-08 ENCOUNTER — HOSPITAL ENCOUNTER (OUTPATIENT)
Dept: MAMMOGRAPHY | Facility: HOSPITAL | Age: 55
Discharge: HOME | End: 2025-07-08
Admitting: FAMILY MEDICINE
Payer: COMMERCIAL

## 2025-07-08 DIAGNOSIS — Z12.31 ENCOUNTER FOR SCREENING MAMMOGRAM FOR BREAST CANCER: ICD-10-CM

## 2025-07-08 PROCEDURE — 77063 BREAST TOMOSYNTHESIS BI: CPT

## 2025-07-08 PROCEDURE — 77067 SCR MAMMO BI INCL CAD: CPT

## 2025-07-09 DIAGNOSIS — G25.81 RESTLESS LEGS SYNDROME (RLS): ICD-10-CM

## 2025-07-09 PROCEDURE — 77063 BREAST TOMOSYNTHESIS BI: CPT | Performed by: RADIOLOGY

## 2025-07-09 PROCEDURE — 77067 SCR MAMMO BI INCL CAD: CPT | Performed by: RADIOLOGY

## 2025-07-09 RX ORDER — CARBIDOPA AND LEVODOPA 50; 200 MG/1; MG/1
1 TABLET, EXTENDED RELEASE ORAL 3 TIMES DAILY
Qty: 270 TABLET | Refills: 3 | Status: SHIPPED | OUTPATIENT
Start: 2025-07-09

## 2025-07-09 NOTE — TELEPHONE ENCOUNTER
Rx Refill Note  Requested Prescriptions     Pending Prescriptions Disp Refills    carbidopa-levodopa CR (SINEMET CR)  MG per CR tablet [Pharmacy Med Name: CARBIDOPA/LEVODOPA 50-200MG CR TABS] 270 tablet 3     Sig: TAKE 1 TABLET BY MOUTH THREE TIMES DAILY      Last office visit with prescribing clinician: 6/28/2024   Last telemedicine visit with prescribing clinician: 2/7/2025   Next office visit with prescribing clinician: 9/5/2025                         Would you like a call back once the refill request has been completed: [] Yes [] No    If the office needs to give you a call back, can they leave a voicemail: [] Yes [] No    Kamilla Avila CMA  07/09/25, 14:05 EDT

## 2025-07-10 DIAGNOSIS — G25.81 RESTLESS LEGS SYNDROME (RLS): ICD-10-CM

## 2025-07-10 DIAGNOSIS — D50.9 IRON DEFICIENCY ANEMIA, UNSPECIFIED IRON DEFICIENCY ANEMIA TYPE: ICD-10-CM

## 2025-07-10 RX ORDER — FERROUS FUMARATE 324(106)MG
1 TABLET ORAL 2 TIMES DAILY
Qty: 180 TABLET | Refills: 3 | Status: SHIPPED | OUTPATIENT
Start: 2025-07-10

## 2025-07-10 NOTE — TELEPHONE ENCOUNTER
Rx Refill Note  Requested Prescriptions     Refused Prescriptions Disp Refills    Ferrocite 324 MG tablet [Pharmacy Med Name: FERROCITE 324MG TABLETS] 180 tablet 3     Sig: TAKE 1 TABLET BY MOUTH TWICE DAILY      Last office visit with prescribing clinician: 6/28/2024   Last telemedicine visit with prescribing clinician: Visit date not found   Next office visit with prescribing clinician: 9/5/2025                         Would you like a call back once the refill request has been completed: [] Yes [] No    If the office needs to give you a call back, can they leave a voicemail: [] Yes [] No    Kamilla Avila CMA  07/10/25, 08:24 EDT

## 2025-07-13 DIAGNOSIS — K50.00 CROHN'S DISEASE OF SMALL INTESTINE WITHOUT COMPLICATION: Primary | ICD-10-CM

## 2025-07-13 RX ORDER — SODIUM CHLORIDE 9 MG/ML
20 INJECTION, SOLUTION INTRAVENOUS ONCE
OUTPATIENT
Start: 2025-07-28

## 2025-07-13 RX ORDER — DIPHENHYDRAMINE HYDROCHLORIDE 50 MG/ML
50 INJECTION, SOLUTION INTRAMUSCULAR; INTRAVENOUS ONCE
OUTPATIENT
Start: 2025-07-28 | End: 2025-07-28

## 2025-07-16 DIAGNOSIS — K50.00 CROHN'S DISEASE OF SMALL INTESTINE WITHOUT COMPLICATION: Primary | ICD-10-CM

## 2025-08-05 ENCOUNTER — TELEPHONE (OUTPATIENT)
Dept: INTERNAL MEDICINE | Facility: CLINIC | Age: 55
End: 2025-08-05
Payer: COMMERCIAL

## 2025-08-05 ENCOUNTER — OFFICE VISIT (OUTPATIENT)
Dept: GASTROENTEROLOGY | Facility: CLINIC | Age: 55
End: 2025-08-05
Payer: COMMERCIAL

## 2025-08-05 VITALS
HEART RATE: 68 BPM | DIASTOLIC BLOOD PRESSURE: 76 MMHG | BODY MASS INDEX: 38.07 KG/M2 | SYSTOLIC BLOOD PRESSURE: 132 MMHG | OXYGEN SATURATION: 98 % | HEIGHT: 64 IN | WEIGHT: 223 LBS

## 2025-08-05 DIAGNOSIS — K56.609 SMALL BOWEL OBSTRUCTION: ICD-10-CM

## 2025-08-05 DIAGNOSIS — R19.7 ACUTE DIARRHEA: ICD-10-CM

## 2025-08-05 DIAGNOSIS — K50.00 CROHN'S DISEASE OF SMALL INTESTINE WITHOUT COMPLICATION: Primary | ICD-10-CM

## 2025-08-05 DIAGNOSIS — Z86.2 HISTORY OF IRON DEFICIENCY ANEMIA: ICD-10-CM

## 2025-08-05 PROCEDURE — 99214 OFFICE O/P EST MOD 30 MIN: CPT | Performed by: INTERNAL MEDICINE

## 2025-08-08 ENCOUNTER — LAB (OUTPATIENT)
Dept: LAB | Facility: HOSPITAL | Age: 55
End: 2025-08-08
Payer: COMMERCIAL

## 2025-08-08 ENCOUNTER — OFFICE VISIT (OUTPATIENT)
Dept: INTERNAL MEDICINE | Facility: CLINIC | Age: 55
End: 2025-08-08
Payer: COMMERCIAL

## 2025-08-08 VITALS
WEIGHT: 222.4 LBS | TEMPERATURE: 98.1 F | SYSTOLIC BLOOD PRESSURE: 126 MMHG | DIASTOLIC BLOOD PRESSURE: 84 MMHG | BODY MASS INDEX: 37.97 KG/M2 | OXYGEN SATURATION: 98 % | HEART RATE: 64 BPM | HEIGHT: 64 IN

## 2025-08-08 DIAGNOSIS — R19.7 ACUTE DIARRHEA: ICD-10-CM

## 2025-08-08 DIAGNOSIS — Z09 HOSPITAL DISCHARGE FOLLOW-UP: Primary | ICD-10-CM

## 2025-08-08 DIAGNOSIS — K50.00 CROHN'S DISEASE OF SMALL INTESTINE WITHOUT COMPLICATION: ICD-10-CM

## 2025-08-08 DIAGNOSIS — Z98.890 HISTORY OF ABDOMINAL SURGERY: ICD-10-CM

## 2025-08-08 DIAGNOSIS — K56.609 SMALL BOWEL OBSTRUCTION: ICD-10-CM

## 2025-08-08 LAB
ADV 40+41 DNA STL QL NAA+NON-PROBE: NOT DETECTED
ASTRO TYP 1-8 RNA STL QL NAA+NON-PROBE: NOT DETECTED
C CAYETANENSIS DNA STL QL NAA+NON-PROBE: NOT DETECTED
C COLI+JEJ+UPSA DNA STL QL NAA+NON-PROBE: NOT DETECTED
C DIFF TOX GENS STL QL NAA+PROBE: NOT DETECTED
CRYPTOSP DNA STL QL NAA+NON-PROBE: NOT DETECTED
E HISTOLYT DNA STL QL NAA+NON-PROBE: NOT DETECTED
EAEC PAA PLAS AGGR+AATA ST NAA+NON-PRB: NOT DETECTED
EC STX1+STX2 GENES STL QL NAA+NON-PROBE: NOT DETECTED
EPEC EAE GENE STL QL NAA+NON-PROBE: NOT DETECTED
ETEC LTA+ST1A+ST1B TOX ST NAA+NON-PROBE: NOT DETECTED
G LAMBLIA DNA STL QL NAA+NON-PROBE: NOT DETECTED
NOROVIRUS GI+II RNA STL QL NAA+NON-PROBE: NOT DETECTED
P SHIGELLOIDES DNA STL QL NAA+NON-PROBE: NOT DETECTED
RVA RNA STL QL NAA+NON-PROBE: NOT DETECTED
S ENT+BONG DNA STL QL NAA+NON-PROBE: NOT DETECTED
SAPO I+II+IV+V RNA STL QL NAA+NON-PROBE: NOT DETECTED
SHIGELLA SP+EIEC IPAH ST NAA+NON-PROBE: NOT DETECTED
V CHOL+PARA+VUL DNA STL QL NAA+NON-PROBE: NOT DETECTED
V CHOLERAE DNA STL QL NAA+NON-PROBE: NOT DETECTED
Y ENTEROCOL DNA STL QL NAA+NON-PROBE: NOT DETECTED

## 2025-08-08 PROCEDURE — 83993 ASSAY FOR CALPROTECTIN FECAL: CPT

## 2025-08-08 PROCEDURE — 87507 IADNA-DNA/RNA PROBE TQ 12-25: CPT

## 2025-08-08 PROCEDURE — 87493 C DIFF AMPLIFIED PROBE: CPT

## 2025-08-12 ENCOUNTER — HOSPITAL ENCOUNTER (OUTPATIENT)
Facility: HOSPITAL | Age: 55
Discharge: HOME | End: 2025-08-12
Admitting: INTERNAL MEDICINE
Payer: COMMERCIAL

## 2025-08-12 VITALS
SYSTOLIC BLOOD PRESSURE: 124 MMHG | RESPIRATION RATE: 18 BRPM | TEMPERATURE: 98 F | DIASTOLIC BLOOD PRESSURE: 76 MMHG | OXYGEN SATURATION: 98 % | HEART RATE: 86 BPM

## 2025-08-12 DIAGNOSIS — K50.00 CROHN'S DISEASE OF SMALL INTESTINE WITHOUT COMPLICATION: Primary | ICD-10-CM

## 2025-08-12 LAB
ALBUMIN SERPL-MCNC: 3.7 G/DL (ref 3.5–5.2)
ALBUMIN/GLOB SERPL: 1.7 G/DL
ALP SERPL-CCNC: 66 U/L (ref 39–117)
ALT SERPL W P-5'-P-CCNC: <5 U/L (ref 1–33)
ANION GAP SERPL CALCULATED.3IONS-SCNC: 9.4 MMOL/L (ref 5–15)
AST SERPL-CCNC: 34 U/L (ref 1–32)
BASOPHILS # BLD AUTO: 0.03 10*3/MM3 (ref 0–0.2)
BASOPHILS NFR BLD AUTO: 0.6 % (ref 0–1.5)
BILIRUB SERPL-MCNC: 0.4 MG/DL (ref 0–1.2)
BUN SERPL-MCNC: 13 MG/DL (ref 6–20)
BUN/CREAT SERPL: 13.1 (ref 7–25)
CALCIUM SPEC-SCNC: 8.4 MG/DL (ref 8.6–10.5)
CALPROTECTIN STL-MCNT: 785 UG/G (ref 0–120)
CHLORIDE SERPL-SCNC: 105 MMOL/L (ref 98–107)
CO2 SERPL-SCNC: 24.6 MMOL/L (ref 22–29)
CREAT SERPL-MCNC: 0.99 MG/DL (ref 0.57–1)
DEPRECATED RDW RBC AUTO: 45.1 FL (ref 37–54)
EGFRCR SERPLBLD CKD-EPI 2021: 67.1 ML/MIN/1.73
EOSINOPHIL # BLD AUTO: 0.09 10*3/MM3 (ref 0–0.4)
EOSINOPHIL NFR BLD AUTO: 1.8 % (ref 0.3–6.2)
ERYTHROCYTE [DISTWIDTH] IN BLOOD BY AUTOMATED COUNT: 12.8 % (ref 12.3–15.4)
GLOBULIN UR ELPH-MCNC: 2.2 GM/DL
GLUCOSE SERPL-MCNC: 77 MG/DL (ref 65–99)
HCT VFR BLD AUTO: 37.4 % (ref 34–46.6)
HGB BLD-MCNC: 12.5 G/DL (ref 12–15.9)
IMM GRANULOCYTES # BLD AUTO: 0.01 10*3/MM3 (ref 0–0.05)
IMM GRANULOCYTES NFR BLD AUTO: 0.2 % (ref 0–0.5)
LYMPHOCYTES # BLD AUTO: 1.93 10*3/MM3 (ref 0.7–3.1)
LYMPHOCYTES NFR BLD AUTO: 39 % (ref 19.6–45.3)
MCH RBC QN AUTO: 32.2 PG (ref 26.6–33)
MCHC RBC AUTO-ENTMCNC: 33.4 G/DL (ref 31.5–35.7)
MCV RBC AUTO: 96.4 FL (ref 79–97)
MONOCYTES # BLD AUTO: 0.31 10*3/MM3 (ref 0.1–0.9)
MONOCYTES NFR BLD AUTO: 6.3 % (ref 5–12)
NEUTROPHILS NFR BLD AUTO: 2.58 10*3/MM3 (ref 1.7–7)
NEUTROPHILS NFR BLD AUTO: 52.1 % (ref 42.7–76)
NRBC BLD AUTO-RTO: 0 /100 WBC (ref 0–0.2)
PLATELET # BLD AUTO: 201 10*3/MM3 (ref 140–450)
PMV BLD AUTO: 9.8 FL (ref 6–12)
POTASSIUM SERPL-SCNC: 4.4 MMOL/L (ref 3.5–5.2)
PROT SERPL-MCNC: 5.9 G/DL (ref 6–8.5)
RBC # BLD AUTO: 3.88 10*6/MM3 (ref 3.77–5.28)
SODIUM SERPL-SCNC: 139 MMOL/L (ref 136–145)
WBC NRBC COR # BLD AUTO: 4.95 10*3/MM3 (ref 3.4–10.8)

## 2025-08-12 PROCEDURE — 96365 THER/PROPH/DIAG IV INF INIT: CPT

## 2025-08-12 PROCEDURE — 25010000002 RISANKIZUMAB-RZAA 600 MG/10ML SOLUTION 10 ML VIAL: Performed by: INTERNAL MEDICINE

## 2025-08-12 PROCEDURE — 80053 COMPREHEN METABOLIC PANEL: CPT | Performed by: INTERNAL MEDICINE

## 2025-08-12 PROCEDURE — 96413 CHEMO IV INFUSION 1 HR: CPT

## 2025-08-12 PROCEDURE — 85025 COMPLETE CBC W/AUTO DIFF WBC: CPT | Performed by: INTERNAL MEDICINE

## 2025-08-12 PROCEDURE — 25810000003 SODIUM CHLORIDE 0.9 % SOLUTION 250 ML FLEX CONT: Performed by: INTERNAL MEDICINE

## 2025-08-12 RX ORDER — SODIUM CHLORIDE 9 MG/ML
20 INJECTION, SOLUTION INTRAVENOUS ONCE
OUTPATIENT
Start: 2025-09-09

## 2025-08-12 RX ORDER — DIPHENHYDRAMINE HYDROCHLORIDE 50 MG/ML
50 INJECTION, SOLUTION INTRAMUSCULAR; INTRAVENOUS ONCE
OUTPATIENT
Start: 2025-09-09 | End: 2025-09-09

## 2025-08-12 RX ORDER — SODIUM CHLORIDE 9 MG/ML
20 INJECTION, SOLUTION INTRAVENOUS ONCE
Status: DISCONTINUED | OUTPATIENT
Start: 2025-08-12 | End: 2025-08-13 | Stop reason: HOSPADM

## 2025-08-12 RX ADMIN — SODIUM CHLORIDE 600 MG: 9 INJECTION, SOLUTION INTRAVENOUS at 13:29

## 2025-08-14 ENCOUNTER — OFFICE VISIT (OUTPATIENT)
Dept: INTERNAL MEDICINE | Facility: CLINIC | Age: 55
End: 2025-08-14
Payer: COMMERCIAL

## 2025-08-14 VITALS
TEMPERATURE: 98.2 F | DIASTOLIC BLOOD PRESSURE: 85 MMHG | WEIGHT: 222 LBS | OXYGEN SATURATION: 98 % | HEIGHT: 64 IN | HEART RATE: 64 BPM | BODY MASS INDEX: 37.9 KG/M2 | SYSTOLIC BLOOD PRESSURE: 131 MMHG | RESPIRATION RATE: 16 BRPM

## 2025-08-14 DIAGNOSIS — R59.0 LEFT CERVICAL LYMPHADENOPATHY: ICD-10-CM

## 2025-08-14 DIAGNOSIS — H60.332 ACUTE SWIMMER'S EAR OF LEFT SIDE: Primary | ICD-10-CM

## 2025-08-14 PROCEDURE — 99214 OFFICE O/P EST MOD 30 MIN: CPT | Performed by: STUDENT IN AN ORGANIZED HEALTH CARE EDUCATION/TRAINING PROGRAM

## 2025-08-14 RX ORDER — CIPROFLOXACIN AND DEXAMETHASONE 3; 1 MG/ML; MG/ML
4 SUSPENSION/ DROPS AURICULAR (OTIC) 2 TIMES DAILY
Qty: 7.5 ML | Refills: 0 | Status: SHIPPED | OUTPATIENT
Start: 2025-08-14 | End: 2025-08-21

## 2025-08-15 ENCOUNTER — LAB (OUTPATIENT)
Dept: LAB | Facility: HOSPITAL | Age: 55
End: 2025-08-15
Payer: COMMERCIAL

## 2025-08-15 DIAGNOSIS — K50.00 CROHN'S DISEASE OF SMALL INTESTINE WITHOUT COMPLICATION: ICD-10-CM

## 2025-08-15 LAB
ALBUMIN SERPL-MCNC: 3.8 G/DL (ref 3.5–5.2)
ALBUMIN/GLOB SERPL: 1.5 G/DL
ALP SERPL-CCNC: 81 U/L (ref 39–117)
ALT SERPL W P-5'-P-CCNC: 8 U/L (ref 1–33)
ANION GAP SERPL CALCULATED.3IONS-SCNC: 9.3 MMOL/L (ref 5–15)
AST SERPL-CCNC: 30 U/L (ref 1–32)
BASOPHILS # BLD AUTO: 0.04 10*3/MM3 (ref 0–0.2)
BASOPHILS NFR BLD AUTO: 0.8 % (ref 0–1.5)
BILIRUB SERPL-MCNC: 0.5 MG/DL (ref 0–1.2)
BUN SERPL-MCNC: 10 MG/DL (ref 6–20)
BUN/CREAT SERPL: 9.4 (ref 7–25)
CALCIUM SPEC-SCNC: 9.2 MG/DL (ref 8.6–10.5)
CHLORIDE SERPL-SCNC: 104 MMOL/L (ref 98–107)
CO2 SERPL-SCNC: 25.7 MMOL/L (ref 22–29)
CREAT SERPL-MCNC: 1.06 MG/DL (ref 0.57–1)
DEPRECATED RDW RBC AUTO: 43.9 FL (ref 37–54)
EGFRCR SERPLBLD CKD-EPI 2021: 61.8 ML/MIN/1.73
EOSINOPHIL # BLD AUTO: 0.09 10*3/MM3 (ref 0–0.4)
EOSINOPHIL NFR BLD AUTO: 1.8 % (ref 0.3–6.2)
ERYTHROCYTE [DISTWIDTH] IN BLOOD BY AUTOMATED COUNT: 12.4 % (ref 12.3–15.4)
GLOBULIN UR ELPH-MCNC: 2.5 GM/DL
GLUCOSE SERPL-MCNC: 128 MG/DL (ref 65–99)
HCT VFR BLD AUTO: 40.2 % (ref 34–46.6)
HGB BLD-MCNC: 13.6 G/DL (ref 12–15.9)
IMM GRANULOCYTES # BLD AUTO: 0.01 10*3/MM3 (ref 0–0.05)
IMM GRANULOCYTES NFR BLD AUTO: 0.2 % (ref 0–0.5)
LYMPHOCYTES # BLD AUTO: 1.76 10*3/MM3 (ref 0.7–3.1)
LYMPHOCYTES NFR BLD AUTO: 35.8 % (ref 19.6–45.3)
MCH RBC QN AUTO: 33.1 PG (ref 26.6–33)
MCHC RBC AUTO-ENTMCNC: 33.8 G/DL (ref 31.5–35.7)
MCV RBC AUTO: 97.8 FL (ref 79–97)
MONOCYTES # BLD AUTO: 0.26 10*3/MM3 (ref 0.1–0.9)
MONOCYTES NFR BLD AUTO: 5.3 % (ref 5–12)
NEUTROPHILS NFR BLD AUTO: 2.76 10*3/MM3 (ref 1.7–7)
NEUTROPHILS NFR BLD AUTO: 56.1 % (ref 42.7–76)
NRBC BLD AUTO-RTO: 0 /100 WBC (ref 0–0.2)
PLATELET # BLD AUTO: 220 10*3/MM3 (ref 140–450)
PMV BLD AUTO: 9.5 FL (ref 6–12)
POTASSIUM SERPL-SCNC: 4.3 MMOL/L (ref 3.5–5.2)
PROT SERPL-MCNC: 6.3 G/DL (ref 6–8.5)
RBC # BLD AUTO: 4.11 10*6/MM3 (ref 3.77–5.28)
SODIUM SERPL-SCNC: 139 MMOL/L (ref 136–145)
TSH SERPL DL<=0.05 MIU/L-ACNC: 1.85 UIU/ML (ref 0.27–4.2)
WBC NRBC COR # BLD AUTO: 4.92 10*3/MM3 (ref 3.4–10.8)

## 2025-08-15 PROCEDURE — 80050 GENERAL HEALTH PANEL: CPT | Performed by: STUDENT IN AN ORGANIZED HEALTH CARE EDUCATION/TRAINING PROGRAM

## 2025-08-15 PROCEDURE — 36415 COLL VENOUS BLD VENIPUNCTURE: CPT

## (undated) DEVICE — CATH F6 ST JR 3.5 100CM: Brand: SUPERTORQUE

## (undated) DEVICE — DRP SURG 1/2 40X58IN STRL

## (undated) DEVICE — DRN WND RESVR BULB JP SIL 100ML

## (undated) DEVICE — Device

## (undated) DEVICE — SUT VIC 2/0  CT1 CR8 18IN VCP839D

## (undated) DEVICE — ENDOSCOPY PORT CONNECTOR FOR OLYMPUS® SCOPES: Brand: ERBE

## (undated) DEVICE — GLV SURG PREMIERPRO MIC LTX PF SZ7.5 BRN

## (undated) DEVICE — LUBE JELLY PK/2.75GM STRL BX/144

## (undated) DEVICE — PAD GRND E/S MEGADYNE MONOPLR 2/PLT W/CORD A/ DISP

## (undated) DEVICE — HYBRID TUBING/CAP SET FOR OLYMPUS® SCOPES: Brand: ERBE

## (undated) DEVICE — STRAP POSTN KN/BDY FM 5X72IN DISP

## (undated) DEVICE — SUT VIC 0 CTD BR 18IN UNDYE VCP724D

## (undated) DEVICE — Device: Brand: DEFENDO AIR/WATER/SUCTION AND BIOPSY VALVE

## (undated) DEVICE — SYR CONTRL PRESS/LO FIX/M/LL W/THMB/RNG 10ML

## (undated) DEVICE — SUT ETHLN 3/0 FS1 30IN 669H

## (undated) DEVICE — PK NEURO DISC 10

## (undated) DEVICE — DRSNG WND BORDR/ADHS NONADHR/GZ LF 2X2IN STRL

## (undated) DEVICE — FRCP BX RADJAW4 NDL 2.8 240 STD OG

## (undated) DEVICE — SUT VIC 3/0 SH CR8 18IN VCP864D

## (undated) DEVICE — GLV SURG DERMAPRENE LF PF SZ7.5

## (undated) DEVICE — NDL HYPO ECLPS SFTY 25G 1 1/2IN

## (undated) DEVICE — DRSNG WND GZ PAD BORDERED 4X8IN STRL

## (undated) DEVICE — HEMO SEALER AQUAMANTYS6.0 BIPOL 30D LF

## (undated) DEVICE — CATH F6 ST+ JL3.5 100CM: Brand: SUPER TORQUE

## (undated) DEVICE — STPLR SKIN PROXIMATE RH WD

## (undated) DEVICE — PCH INST SURG INVISISHIELD 2PCKT

## (undated) DEVICE — GLV SURG PREMIERPRO MIC LTX PF SZ7 BRN

## (undated) DEVICE — TR BAND RADIAL ARTERY COMPRESSION DEVICE: Brand: TR BAND

## (undated) DEVICE — VLV SXN AIR/H2O ORCAPOD3 1P/U STRL

## (undated) DEVICE — IRRIGATOR BULB ASEPTO 60CC STRL

## (undated) DEVICE — FRCP BIOP COLD ENDOJAW ALLGTR W/NDL 2.8X2300MM BLU

## (undated) DEVICE — SPHR MARKR STEALTH STATION

## (undated) DEVICE — BLANKT WARM UPPR/BDY ARM/OUT 57X196CM

## (undated) DEVICE — KT DRN EVAC WND PVC PCH WTROC RND 10F400

## (undated) DEVICE — BUR CUT MATCHHD MIDASREX MR8 FLUT 3MM 14CM LG

## (undated) DEVICE — CONN TBG Y 5 IN 1 LF STRL

## (undated) DEVICE — GOWN SURG IMPERV  XLG

## (undated) DEVICE — GLIDESHEATH BASIC HYDROPHILIC COATED INTRODUCER SHEATH: Brand: GLIDESHEATH

## (undated) DEVICE — PAD GRND REM POLYHESIVE A/ DISP

## (undated) DEVICE — SPNG GZ WOVN 4X4IN 12PLY 10/BX STRL

## (undated) DEVICE — ANGIOGRAPHIC CATHETER: Brand: EXPO™

## (undated) DEVICE — BG BND CVR CAM 36X28

## (undated) DEVICE — MARKR SKIN WRITESITE RULR LBL REG TP